# Patient Record
Sex: MALE | Race: WHITE | Employment: OTHER | URBAN - METROPOLITAN AREA
[De-identification: names, ages, dates, MRNs, and addresses within clinical notes are randomized per-mention and may not be internally consistent; named-entity substitution may affect disease eponyms.]

---

## 2017-01-05 ENCOUNTER — OFFICE VISIT (OUTPATIENT)
Dept: FAMILY MEDICINE | Facility: CLINIC | Age: 82
End: 2017-01-05

## 2017-01-05 VITALS
WEIGHT: 175 LBS | OXYGEN SATURATION: 95 % | TEMPERATURE: 97.6 F | HEART RATE: 60 BPM | BODY MASS INDEX: 27.4 KG/M2 | SYSTOLIC BLOOD PRESSURE: 157 MMHG | DIASTOLIC BLOOD PRESSURE: 61 MMHG

## 2017-01-05 DIAGNOSIS — E11.65 TYPE 2 DIABETES MELLITUS WITH HYPERGLYCEMIA, WITH LONG-TERM CURRENT USE OF INSULIN (H): ICD-10-CM

## 2017-01-05 DIAGNOSIS — Z79.4 TYPE 2 DIABETES MELLITUS WITH HYPERGLYCEMIA, WITH LONG-TERM CURRENT USE OF INSULIN (H): ICD-10-CM

## 2017-01-05 DIAGNOSIS — I10 ESSENTIAL HYPERTENSION: ICD-10-CM

## 2017-01-05 DIAGNOSIS — I48.20 CHRONIC ATRIAL FIBRILLATION (H): Primary | ICD-10-CM

## 2017-01-05 NOTE — PATIENT INSTRUCTIONS
- For Blood pressure: Amlodipine 5 mg once daily (continue Toprol  mg daily)  - If you are already taking Amlodipine 10 mg daily, continue.  - If you are taking Amlodipine 5 mg daily, then consider increasing to 10 mg daily.  - You should be off VASOTEC  - We will likely try Lisinopril 2.5 mg in the future (we will discuss at our next visit).  - Recheck INR in 6 weeks from 12/20/2016.    Your medication list is printed, please keep this with you, it is helpful to bring this current list to any other medical appointments, the emergency room or hospital.    If you had lab testing today and your results are reassuring or normal they will be be mailed to you within 7 days.     If the lab tests need quick action we will call you with the results.   The phone number we will call with results is # [phone] (home) . If this is not the best number please call our clinic and change the number.    If you need any refills please call your pharmacy and they will contact us.    If you have any further concerns or wish to schedule another appointment you must call our office during normal business hours  [phone] (8-5:00 M-F)  If you have urgent medical questions that cannot wait  you may also call [phone] at any time of day.  If you have a medical emergency please call [phone].    Thank you for coming to [facility].

## 2017-01-05 NOTE — MR AVS SNAPSHOT
After Visit Summary   1/5/2017    Suman Burton    MRN: [id number]           Patient Information     Date Of Birth          1935        Visit Information        Provider Department      1/5/2017 3:40 PM Ej Villarreal MD [facility]        Care Instructions    - For Blood pressure: Amlodipine 5 mg once daily (continue Toprol  mg daily)  - If you are already taking Amlodipine 10 mg daily, continue.  - If you are taking Amlodipine 5 mg daily, then consider increasing to 10 mg daily.  - You should be off VASOTEC  - We will likely try Lisinopril 2.5 mg in the future (we will discuss at our next visit).  - Recheck INR in 6 weeks from 12/20/2016.    Your medication list is printed, please keep this with you, it is helpful to bring this current list to any other medical appointments, the emergency room or hospital.    If you had lab testing today and your results are reassuring or normal they will be be mailed to you within 7 days.     If the lab tests need quick action we will call you with the results.   The phone number we will call with results is # [phone] (home) . If this is not the best number please call our clinic and change the number.    If you need any refills please call your pharmacy and they will contact us.    If you have any further concerns or wish to schedule another appointment you must call our office during normal business hours  [phone] (8-5:00 M-F)  If you have urgent medical questions that cannot wait  you may also call [phone] at any time of day.  If you have a medical emergency please call [phone].    Thank you for coming to [facility].          Follow-ups after your visit        Who to contact     Please call your clinic at [phone] to:    Ask questions about your health    Make or cancel appointments    Discuss your medicines    Learn about your test results    Speak to your doctor   If you have compliments or concerns about an  experience at your clinic, or if you wish to file a complaint, please contact [facility] Patient Relations at [phone] or email us at [email or web address]         Additional Information About Your Visit        Care EveryWhere ID     This is your Care EveryWhere ID. This could be used by other organizations to access your [place] medical records  [id number]        Your Vitals Were     Pulse Temperature Pulse Oximetry             60 97.6  F (36.4  C) (Oral) 95%          Blood Pressure from Last 3 Encounters:   01/05/17 157/61   12/23/16 172/66   12/21/16 144/78    Weight from Last 3 Encounters:   01/05/17 175 lb (79.379 kg)   12/21/16 165 lb (74.844 kg)   11/15/16 165 lb (74.844 kg)              Today, you had the following     No orders found for display         Today's Medication Changes          These changes are accurate as of: 1/5/17  4:43 PM.  If you have any questions, ask your nurse or doctor.               Stop taking these medicines if you haven't already. Please contact your care team if you have questions.     enalapril 2.5 MG tablet   Commonly known as:  VASOTEC   Stopped by:  Ej Villarreal MD           oxyCODONE 5 MG IR tablet   Commonly known as:  ROXICODONE   Stopped by:  Ej Villarreal MD                    Primary Care Provider Office Phone # Fax #    Ej Villarreal [phone]       [facility] [address] [place] [address]        Thank you!     Thank you for choosing [facility]  for your care. Our goal is always to provide you with excellent care. Hearing back from our patients is one way we can continue to improve our services. Please take a few minutes to complete the written survey that you may receive in the mail after your visit with us. Thank you!             Your Updated Medication List - Protect others around you: Learn how to safely use, store and throw away your medicines at www.disposemymeds.org.           This list is accurate as of: 1/5/17  4:43 PM.  Always use your most recent med list.                   Brand Name Dispense Instructions for use    acetaminophen 500 MG tablet    TYLENOL    100 tablet    Take 2 tablets (1,000 mg) by mouth 3 times daily as needed for mild pain       amLODIPine 5 MG tablet    NORVASC    60 tablet    Take 1 tablet (5 mg) by mouth 2 times daily       * blood glucose monitoring test strip    no brand specified    1 Box    Test blood glucose twice daily.       * blood glucose monitoring test strip    ONE TOUCH ULTRA    100 each    Use to test blood sugar 2 times daily or as directed.       digoxin 125 MCG tablet    LANOXIN    90 tablet    Take 1 tablet (125 mcg) by mouth every other day       glipiZIDE 10 MG tablet    GLUCOTROL    360 tablet    Take 2 tablets (20 mg) by mouth 2 times daily Take 20mg in AM with breakfast and 20mg in PM with evening meal       insulin glargine 100 UNIT/ML injection    LANTUS SOLOSTAR    3 Month    35 units at bedtime       insulin pen needle 32G X 4 MM    BD TAMIKA U/F    100 each    Use once daily or as directed.       metoprolol 100 MG 24 hr tablet    TOPROL-XL    90 tablet    Take 1 tablet (100 mg) by mouth daily       MULTIVITAMINS PO      Take 1 tablet by mouth daily.       * ONE TOUCH FINE POINT LANCETS      Check blood sugars twice a day.       * blood glucose monitoring lancets     1 Box    Use to test blood sugar 2 times daily or as directed.       * ONETOUCH DELICA LANCETS 33G Misc     100 each    1 strip 2 times daily       warfarin 3 MG tablet    COUMADIN    90 tablet    Take 1 tablet (3 mg) by mouth daily       * Notice:  This list has 5 medication(s) that are the same as other medications prescribed for you. Read the directions carefully, and ask your doctor or other care provider to review them with you.

## 2017-01-10 NOTE — PROGRESS NOTES
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  He presents today for a number of issues.   First, as far as his hypertension he recently initiated amlodipine in addition to an escalating dose of Elanopril over the past 3 months.  He moved from Elanapril 2.5mg to Elanapril 10 mg each day over the past 3 months.  He had a lab visit on 12/23 due to his increased Elanapril dose which showed some mild hyperkalemia and reduction in his GFR.  I recommended he discontinue his Elanopril and start amlodipine instead.  He has been checking his blood pressures at home with systolic blood pressures mainly in the 150s.  He denies any new muscle aches.  No change in his urinary frequency.  He does have chronic urinary hesitancy which has not changed and he has declined a trial of Flomax or other evaluation.  His \"left thorax pain\" has resolved.  He did have a cardiac stress test which was not diagnostic for clear ischemic heart disease back in November.   As far as his diabetes over the last couple months his Lantus dose has increased from 30 units up to 38 units a day.  He has not had any hypoglycemic symptoms or readings.   REVIEW OF SYSTEMS:  His weight has been stable.  No swelling, no new respiratory symptoms.  He continues to nap during the day and having low energy which is unchanged.  He has chronic skin itch for many years which is unchanged.  The remainder of the review of systems is outlined above.   PHYSICAL EXAMINATION:   VITAL SIGNS:  As above with his blood pressure just above goal.   GENERAL:  He is alert and relates his own history.  He occasionally pauses to recall what topic he was talking about.   HEENT:  Head is normocephalic and atraumatic.  Eyes, sclerae are nonicteric, noninjected.  He has hearing aids in place and when removed, tympanic membranes are normal with normal bony and light " landmarks.  Moist mucous membranes.   NECK:  Supple without lymphadenopathy.   CARDIOVASCULAR:  His heart tones today are irregular, no murmur.   LUNGS:  Clear with fair air exchange mildly prolonged expiratory phase.   ABDOMEN:  Modestly obese, soft and nontender.   EXTREMITIES:  Free of edema.   NEUROLOGIC:  He has a mild resting tremor.  He provides a detailed history and occasionally has to pause to recall a name or retrain his thoughts.   ASSESSMENT AND PLAN:   1.  Hypertension:  It is unclear today whether he is using amlodipine 5 mg once a day or twice a day.  He is going to go home and check his medications.  I recommend amlodipine increased to 10 mg a day if he is only taking 5 mg.  If he is already taking 10 mg he may benefit from reinitiation of a low dose ACE inhibitor such as lisinopril at 2.5 mg in light of his chronic kidney disease.  We will need to monitor potassium and GFR carefully and repeat a potassium and GFR 2-3 weeks after initiation even of a low dose ACE inhibitor.  ADDENDUM: via a phone call after the visit, patient confirmed he is taking amlodipine 10mg daily. Will likely add LISINOPRIL 2.5mg daily at his next visit in February 2017.  2.  Left-sided thorax pain, unknown etiology:  This has resolved.  A cardiac stress test in November was not definitive for an ischemic etiology at this point he is asymptomatic and after our discussion does not plan on pursuing further evaluation.  He was given a copy of his cardiac stress test report today.   3.  Type 2 diabetes:  His A1c in late December was 8.9%.  His goal is 8.5%.  I recommended he eliminate sweetened beverages, reduce high-calorie foods and increase his daily activity.  He will monitor his home glucoses with a fasting glucose goal of  and postprandial goal of less than 220.  If he is not mostly meeting these glycemic goals we will make diabetic medication management changes in Feb.  Otherwise if he is meeting these goals he will  return for a hemoglobin A1c in 03/2017.   4.  Atrial fibrillation:  INR management as outlined on the flow sheet.   5.  Chronic kidney disease.  His GFR has returned to his baseline and was 48 in late December.  Plan to repeat a basic metabolic profile 2-3 weeks after he resumes an ACE inhibitor.  If he does not resume an ACE inhibitor, than no later than the end of March 2017.   6.  Chronic pain syndrome:  He is using Tylenol for back pain at this point, he is not using oxycodone any longer.   7.  Chronic urinary hesitancy:  He at this point declines further evaluation or a trial of Flomax.  We will continue to consider a point of care ultrasound with a post void residual at that initial evaluation and certainly a prostate exam if he were to change his mind and consent to this in the future.   8.  Chronic fatigue.   9.  Chronic skin itching.   10.  Mild cognitive decline of aging.

## 2017-01-24 ENCOUNTER — OFFICE VISIT (OUTPATIENT)
Dept: FAMILY MEDICINE | Facility: CLINIC | Age: 82
End: 2017-01-24

## 2017-01-24 VITALS
DIASTOLIC BLOOD PRESSURE: 67 MMHG | BODY MASS INDEX: 24.88 KG/M2 | OXYGEN SATURATION: 95 % | SYSTOLIC BLOOD PRESSURE: 171 MMHG | TEMPERATURE: 97.6 F | HEART RATE: 57 BPM | RESPIRATION RATE: 24 BRPM | HEIGHT: 69 IN | WEIGHT: 168 LBS

## 2017-01-24 DIAGNOSIS — I10 BENIGN ESSENTIAL HYPERTENSION: ICD-10-CM

## 2017-01-24 DIAGNOSIS — N18.30 CHRONIC KIDNEY DISEASE, STAGE III (MODERATE) (H): ICD-10-CM

## 2017-01-24 DIAGNOSIS — R06.09 DYSPNEA ON EXERTION: Primary | ICD-10-CM

## 2017-01-24 DIAGNOSIS — E11.65 TYPE 2 DIABETES MELLITUS WITH HYPERGLYCEMIA, UNSPECIFIED LONG TERM INSULIN USE STATUS: ICD-10-CM

## 2017-01-24 LAB
% GRANULOCYTES: 72.9 %G (ref 40–75)
ANION GAP SERPL CALCULATED.3IONS-SCNC: 8 MMOL/L (ref 5–18)
BNP SERPL-MCNC: 388 PG/ML (ref 0–88)
BUN SERPL-MCNC: 27 MG/DL (ref 8–28)
CALCIUM SERPL-MCNC: 9.7 MG/DL (ref 8.5–10.5)
CHLORIDE SERPL-SCNC: 112 MMOL/L (ref 98–107)
CO2 SERPL-SCNC: 19 MMOL/L (ref 22–31)
CREAT SERPL-MCNC: 1.52 MG/DL (ref 0.7–1.3)
GLUCOSE SERPL-MCNC: 210 MG/DL (ref 70–125)
GRANULOCYTES #: 5.2 K/UL (ref 1.6–8.3)
HCT VFR BLD AUTO: 46.5 % (ref 40–53)
HEMOGLOBIN: 14.7 G/DL (ref 13.3–17.7)
LYMPHOCYTES # BLD AUTO: 1.4 K/UL (ref 0.8–5.3)
LYMPHOCYTES NFR BLD AUTO: 19.5 %L (ref 20–48)
MCH RBC QN AUTO: 31.3 PG (ref 26.5–35)
MCHC RBC AUTO-ENTMCNC: 31.6 G/DL (ref 32–36)
MCV RBC AUTO: 99.1 FL (ref 78–100)
MID #: 0.5 K/UL (ref 0–2.2)
MID %: 7.6 %M (ref 0–20)
PLATELET # BLD AUTO: 174 K/UL (ref 150–450)
POTASSIUM SERPL-SCNC: 4.7 MMOL/L (ref 3.5–5)
RBC # BLD AUTO: 4.69 M/UL (ref 4.4–5.9)
SODIUM SERPL-SCNC: 139 MMOL/L (ref 136–145)
WBC # BLD AUTO: 7.2 K/UL (ref 4–11)

## 2017-01-24 RX ORDER — AMLODIPINE BESYLATE 5 MG/1
5 TABLET ORAL DAILY
Qty: 60 TABLET | Refills: 0
Start: 2017-01-24 | End: 2017-03-01

## 2017-01-24 RX ORDER — FUROSEMIDE 20 MG
20 TABLET ORAL
Qty: 90 TABLET | Refills: 0 | Status: SHIPPED | OUTPATIENT
Start: 2017-01-24 | End: 2017-01-31

## 2017-01-24 NOTE — Clinical Note
"January 25, 2017      Suman KAREN [place]  [address] [facility] [address]        Dear Suman,    As we discussed by phone, I suspect that adding FUROSEMIDE will improve your shortness of breath.  Follow the FUROSEMIDE dose titration instructions on your \"after visit summary\", and call me Monday as planned. Please call earlier if you have any new symptoms or concerns.    Please see below for your test results.    Resulted Orders   BNP ([facility])   Result Value Ref Range     (H) 0 - 88 pg/mL    Narrative    Test performed by:  [facility]  [address] [place] [address]   CBC with Diff Plt ([facility])   Result Value Ref Range    WBC 7.2 4.0 - 11.0 K/uL    Lymphocytes # 1.4 0.8 - 5.3 K/uL    % Lymphocytes 19.5 (L) 20.0 - 48.0 %L    Mid # 0.5 0.0 - 2.2 K/uL    Mid % 7.6 0.0 - 20.0 %M    GRANULOCYTES # 5.2 1.6 - 8.3 K/uL    % Granulocytes 72.9 40.0 - 75.0 %G    RBC 4.69 4.40 - 5.90 M/uL    Hemoglobin 14.7 13.3 - 17.7 g/dL    Hematocrit 46.5 40.0 - 53.0 %    MCV 99.1 78.0 - 100.0 fL    MCH 31.3 26.5 - 35.0 pg    MCHC 31.6 (L) 32.0 - 36.0 g/dL    Platelets 174.0 150.0 - 450.0 K/uL   Basic Metabolic Profile ([facility]) - Results > 1 hr   Result Value Ref Range    Sodium 139 136 - 145 mmol/L    Potassium 4.7 3.5 - 5.0 mmol/L    Chloride 112 (H) 98 - 107 mmol/L    CO2, Total 19 (L) 22 - 31 mmol/L    Anion Gap 8 5 - 18 mmol/L    Glucose 210 (H) 70 - 125 mg/dL    Calcium 9.7 8.5 - 10.5 mg/dL    Urea Nitrogen 27 8 - 28 mg/dL    Creatinine 1.52 (H) 0.70 - 1.30 mg/dL    GFR Estimate If Black 54 (L) >60 mL/min/1.73m2    GFR Estimate 44 (L) >60 mL/min/1.73m2    Narrative    Test performed by:  [facility]  [address] [place] [address]  Fasting Glucose reference range is 70-99 mg/dL per  American Diabetes Association (ADA) guidelines.       If you have any questions, please call the clinic to make an appointment.    Sincerely,    Ej Villarreal MD  "

## 2017-01-24 NOTE — NURSING NOTE
1/24/2017 PCS Previsit Plan     DUE FOR:  Eye Exam referral  DM Foot exam  PHQ9  Advance Directive  Tdap - Declined

## 2017-01-24 NOTE — PATIENT INSTRUCTIONS
"Please REDUCE your AMLODIPINE dose from 10mg to 5 mg each day in an attempt to reduce your swelling.    START taking Furosemide 20mg once daily in the morning.  If in 3 days you have not lost at least 2 pounds of \"water-weight\", then increase your dose to FUROSEMIDE 40mg once daily in the morning.    Call the clinic if you lose more than 10 pounds of \"water-weight\" at any point, or if you have not lost 2 pounds after 6 days of the FUROSEMIDE.    You have a number of lab test results pending at this time.    Your blood pressure goal is 140/90.    Call Dr. Villarreal on Monday or Tuesday with an update on your swelling and shortness of breath.  If you are not improving, the next step may be to use breathing medications (prednisone and inhalers).      "

## 2017-01-24 NOTE — MR AVS SNAPSHOT
"              After Visit Summary   1/24/2017    Suman Burton    MRN: [id number]           Patient Information     Date Of Birth          1935        Visit Information        Provider Department      1/24/2017 4:00 PM Ej Villarreal MD [facility]        Today's Diagnoses     Dyspnea on exertion    -  1     Chronic kidney disease, stage III (moderate)         Type 2 diabetes mellitus with hyperglycemia, unspecified long term insulin use status (H)         Benign essential hypertension           Care Instructions    Please REDUCE your AMLODIPINE dose from 10mg to 5 mg each day in an attempt to reduce your swelling.    START taking Furosemide 20mg once daily in the morning.  If in 3 days you have not lost at least 2 pounds of \"water-weight\", then increase your dose to FUROSEMIDE 40mg once daily in the morning.    Call the clinic if you lose more than 10 pounds of \"water-weight\" at any point, or if you have not lost 2 pounds after 6 days of the FUROSEMIDE.    You have a number of lab test results pending at this time.    Your blood pressure goal is 140/90.    Call Dr. Villarreal on Monday or Tuesday with an update on your swelling and shortness of breath.  If you are not improving, the next step may be to use breathing medications (prednisone and inhalers).            Follow-ups after your visit        Your next 10 appointments already scheduled     Feb 13, 2017  1:00 PM   Return Visit with Ej Villarreal MD   [facility] ([facility])    [address] [place] [address]   [phone]              Who to contact     Please call your clinic at [phone] to:    Ask questions about your health    Make or cancel appointments    Discuss your medicines    Learn about your test results    Speak to your doctor   If you have compliments or concerns about an experience at your clinic, or if you wish to file a complaint, please contact [facility] Patient " "Relations at [phone] or email us at [email or web address]         Additional Information About Your Visit        Care EveryWhere ID     This is your Care EveryWhere ID. This could be used by other organizations to access your [place] medical records  [id number]        Your Vitals Were     Pulse Temperature Respirations Height BMI (Body Mass Index) Pulse Oximetry    57 97.6  F (36.4  C) (Oral) 24 5' 9\" (175.3 cm) 24.80 kg/m2 95%       Blood Pressure from Last 3 Encounters:   01/24/17 171/67   01/05/17 157/61   12/23/16 172/66    Weight from Last 3 Encounters:   01/24/17 168 lb (76.204 kg)   01/05/17 175 lb (79.379 kg)   12/21/16 165 lb (74.844 kg)              We Performed the Following     Basic Metabolic Profile ([facility]) - Results > 1 hr     BNP ([facility])     CBC with Diff Plt ([facility])          Today's Medication Changes          These changes are accurate as of: 1/24/17  5:26 PM.  If you have any questions, ask your nurse or doctor.               Start taking these medicines.        Dose/Directions    furosemide 20 MG tablet   Commonly known as:  LASIX   Used for:  Dyspnea on exertion, Benign essential hypertension   Started by:  Ej Villarreal MD        Dose:  20 mg   Take 1 tablet (20 mg) by mouth every morning (before breakfast)   Quantity:  90 tablet   Refills:  0         These medicines have changed or have updated prescriptions.        Dose/Directions    amLODIPine 5 MG tablet   Commonly known as:  NORVASC   This may have changed:  when to take this   Used for:  Benign essential hypertension   Changed by:  Ej Villarreal MD        Dose:  5 mg   Take 1 tablet (5 mg) by mouth daily   Quantity:  60 tablet   Refills:  0            Where to get your medicines      These medications were sent to [facility] [address] [place] [address]  [address] [facility] [address]     Phone:  [phone]    - furosemide 20 MG tablet      Some of these will " need a paper prescription and others can be bought over the counter.  Ask your nurse if you have questions.     You don't need a prescription for these medications    - amLODIPine 5 MG tablet             Primary Care Provider Office Phone # Fax #    Ej Villarreal [phone]       [facility] [address] [place] [address]        Thank you!     Thank you for choosing [facility]  for your care. Our goal is always to provide you with excellent care. Hearing back from our patients is one way we can continue to improve our services. Please take a few minutes to complete the written survey that you may receive in the mail after your visit with us. Thank you!             Your Updated Medication List - Protect others around you: Learn how to safely use, store and throw away your medicines at www.disposemymeds.org.          This list is accurate as of: 1/24/17  5:26 PM.  Always use your most recent med list.                   Brand Name Dispense Instructions for use    acetaminophen 500 MG tablet    TYLENOL    100 tablet    Take 2 tablets (1,000 mg) by mouth 3 times daily as needed for mild pain       amLODIPine 5 MG tablet    NORVASC    60 tablet    Take 1 tablet (5 mg) by mouth daily       * blood glucose monitoring test strip    no brand specified    1 Box    Test blood glucose twice daily.       * blood glucose monitoring test strip    ONE TOUCH ULTRA    100 each    Use to test blood sugar 2 times daily or as directed.       digoxin 125 MCG tablet    LANOXIN    90 tablet    Take 1 tablet (125 mcg) by mouth every other day       furosemide 20 MG tablet    LASIX    90 tablet    Take 1 tablet (20 mg) by mouth every morning (before breakfast)       glipiZIDE 10 MG tablet    GLUCOTROL    360 tablet    Take 2 tablets (20 mg) by mouth 2 times daily Take 20mg in AM with breakfast and 20mg in PM with evening meal       insulin glargine 100 UNIT/ML injection    LANVIJAY SOLOSTAR    3 Month     35 units at bedtime       insulin pen needle 32G X 4 MM    BD TAMIKA U/F    100 each    Use once daily or as directed.       metoprolol 100 MG 24 hr tablet    TOPROL-XL    90 tablet    Take 1 tablet (100 mg) by mouth daily       MULTIVITAMINS PO      Take 1 tablet by mouth daily.       * ONE TOUCH FINE POINT LANCETS      Check blood sugars twice a day.       * blood glucose monitoring lancets     1 Box    Use to test blood sugar 2 times daily or as directed.       * ONETOUCH DELICA LANCETS 33G Misc     100 each    1 strip 2 times daily       warfarin 3 MG tablet    COUMADIN    90 tablet    Take 1 tablet (3 mg) by mouth daily       * Notice:  This list has 5 medication(s) that are the same as other medications prescribed for you. Read the directions carefully, and ask your doctor or other care provider to review them with you.

## 2017-01-25 NOTE — PROGRESS NOTES
"Quick Note:    Please send result letter  As we discussed by phone, I suspect that adding FUROSEMIDE will improve your shortness of breath. Follow the FUROSEMIDE dose titration instructions on your \"after visit summary\", and call me Monday as planned. Please call earlier if you have any new symptoms or concerns.  ______  "

## 2017-01-26 NOTE — PROGRESS NOTES
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  He presents today with increasing dyspnea.  He has had a number of years of shortness of breath with exertion that is attributed to deconditioning.  This has slowly gotten worse over the years and he admits he has become less and less active.  He has noticed over the past few weeks that he become short of breath with walking even just the length of his house.  He does not have shortness of breath at rest.  He does not have orthopnea.  He has checked his heart rate on a number of occasions at home and has never greater than 75.  No chest pain or palpitations.  There is no acute onset of shortness of breath but it seems insidious over the past 3 weeks.  The shortness of breath will typically improve after 10 minutes of rest.  He did recently undergo cardiac stress testing which did not show any definitive signs of ischemia.  He has not had cough, runny nose, sore throat or other upper respiratory symptoms.  He has not had any bruising, bleeding, blood in stool or black stools.  He continues to be sedentary for most of the day.  He wakes up late, typically around noon and does very little walking at home.   REVIEW OF SYSTEMS:  Continues to have chronic skin itch which has bothered him for many years.  Both legs are mildly swollen compared to a month ago.  He thinks his weight has been stable, potentially gained a few pounds.  No dizziness or lightheadedness.  The remainder of the review of systems is as above.   OBJECTIVE:   VITAL SIGNS:  As above with his blood pressure above goal and increased compared to our last visit earlier this month.   GENERAL:  He is alert and relates his own history.   HEENT:  Head is normocephalic and atraumatic.  Eyes, sclerae are nonicteric, noninjected.  He has hearing aids in place and when removed, tympanic membranes are  normal with normal bony and light landmarks.  Moist mucous membranes.   NECK:  Supple without lymphadenopathy .  His neck veins are prominent at about 30 degrees elevation with an estimated jugular venous pressure of 14.   CARDIOVASCULAR:  His heart tones are irregular without murmur.   LUNGS:  Clear with fair air exchange.  He has fine crackles at the extreme bases bilaterally.  He has a prolonged expiratory phase, which is chronic.   ABDOMEN:  Modestly obese, soft and nontender.   EXTREMITIES:  With trace bilateral edema.   NEUROLOGIC:  He has a resting tremor in his hands.  He provides a detailed history and only rarely pauses to recall medication name during today's visit.   ASSESSMENT AND PLAN:   1.  Dyspnea:  Most likely due to hypervolemia due to potential new onset congestive heart failure.  He has recently undergone extensive cardiac testing.  At this point, after a lengthy discussion, he elected empiric treatment with Lasix.  He will initiate 20 mg once daily.  Due to his chronic kidney disease he may need significantly higher doses.  He will monitor his weights on a daily basis as well as his symptoms.  If he has not lost at least 2 pounds in the next 3 days then he will increase his furosemide dose to 40 mg once daily.  He will call the clinic if he loses more than 10 pounds at any point or if he has not lost at least 2 pounds after 3 days at the 40 mg dose.  He will present to the Emergency Department if he had any increasing shortness of breath or any chest  pain.  I anticipate improvement in his symptoms with the initiation of diuretic therapy.  The etiology at this point is not entirely clear.  He has undergone recent cardiac evaluation, but may need to repeat an echocardiogram if his symptoms do not respond.  At this point he is hesitant to pursue intervention but if his symptoms did not improve, I suspect he will want to pursue further evaluation and change his mind regarding intervention.   2.   Hypertension:  I expect some blood pressure improvement with the addition of furosemide.  I have asked him to decrease his amlodipine from 10 to 5 mg due to the peripheral edema and blood pressure goal is 140/90.   3.  Atrial fibrillation:  INR management as outlined on the flow sheet.   4.  Chronic kidney disease:  He has stage IIIA chronic kidney disease and his GFR baseline is about 48.  Basic metabolic profile today shows a GFR at baseline.  He will need to return for a basic metabolic profile next week to evaluate his potassium and GFR in light of the addition of Lasix.   5.  Chronic urinary hesitancy:  He declines further evaluation or a trial of Flomax.  Continue to monitor symptoms continue to consider a point of care ultrasound to measure post-void residual.   6.  Chronic fatigue.   7.  Chronic skin itching.   8.  Mild cognitive decline of aging.   ADDENDEUM:  Labs returned showing an elevation of BNP of 388.  This is an increase compared to the elevation in the 200 range 2 years ago during an episode of shortness of breath.  A CBC is normal without anemia contributing to his dyspnea, he will follow up with a phone call on Monday and lab or physician visit later in the week.  We will continue to monitor closely.

## 2017-01-31 ENCOUNTER — TELEPHONE (OUTPATIENT)
Dept: FAMILY MEDICINE | Facility: CLINIC | Age: 82
End: 2017-01-31

## 2017-01-31 DIAGNOSIS — Z79.4 TYPE 2 DIABETES MELLITUS WITH HYPERGLYCEMIA, WITH LONG-TERM CURRENT USE OF INSULIN (H): Primary | ICD-10-CM

## 2017-01-31 DIAGNOSIS — E11.65 TYPE 2 DIABETES MELLITUS WITH HYPERGLYCEMIA, WITH LONG-TERM CURRENT USE OF INSULIN (H): Primary | ICD-10-CM

## 2017-01-31 DIAGNOSIS — R06.09 DYSPNEA ON EXERTION: Primary | ICD-10-CM

## 2017-01-31 DIAGNOSIS — I10 BENIGN ESSENTIAL HYPERTENSION: ICD-10-CM

## 2017-01-31 RX ORDER — FUROSEMIDE 20 MG
40 TABLET ORAL
Qty: 90 TABLET | Refills: 0
Start: 2017-01-31 | End: 2017-03-07

## 2017-01-31 RX ORDER — GLIPIZIDE 10 MG/1
20 TABLET ORAL 2 TIMES DAILY
Qty: 360 TABLET | Refills: 0 | Status: SHIPPED | OUTPATIENT
Start: 2017-01-31

## 2017-01-31 NOTE — TELEPHONE ENCOUNTER
Dyspnea significantly improved. Leg swelling significantly improved. No chest pain.  Weight 163 today. Down 5 lbs since starting lasix.  Using lasix 40mg day.   /65  Using compression stockings  He will see me next week for a BMP and BNP on his new lasix regimen.  We may be able to discontinue amlodipine if BP controlled (as he may see further peripheral edema improvement if we can stop amlodipine completely)    OLYA Villarreal MD

## 2017-01-31 NOTE — TELEPHONE ENCOUNTER
Patient calls today with an update since starting lasix 20 mg daily and reducing amlodipine from 10 mg to 5 mg.  He reports his SOB has improved but still has trouble with exercise such as climbing stairs.  He has lost 5 lbs. And the swelling in his legs and ankles is much better.  Will wait for further instruction from Dr. Villarreal.  Laurita Romero

## 2017-02-08 ENCOUNTER — OFFICE VISIT (OUTPATIENT)
Dept: FAMILY MEDICINE | Facility: CLINIC | Age: 82
End: 2017-02-08

## 2017-02-08 VITALS
BODY MASS INDEX: 24.29 KG/M2 | HEIGHT: 69 IN | HEART RATE: 63 BPM | SYSTOLIC BLOOD PRESSURE: 132 MMHG | OXYGEN SATURATION: 93 % | DIASTOLIC BLOOD PRESSURE: 79 MMHG | TEMPERATURE: 97.4 F | WEIGHT: 164 LBS

## 2017-02-08 DIAGNOSIS — R06.09 DYSPNEA ON EXERTION: ICD-10-CM

## 2017-02-08 DIAGNOSIS — N18.30 CHRONIC KIDNEY DISEASE, STAGE III (MODERATE) (H): Primary | ICD-10-CM

## 2017-02-08 DIAGNOSIS — I48.20 CHRONIC ATRIAL FIBRILLATION (H): ICD-10-CM

## 2017-02-08 LAB
BNP SERPL-MCNC: 288 PG/ML (ref 0–88)
BUN SERPL-MCNC: 32 MG/DL (ref 7–30)
CALCIUM SERPL-MCNC: 10 MG/DL (ref 8.5–10.4)
CHLORIDE SERPLBLD-SCNC: 100 MMOL/L (ref 94–109)
CO2 SERPL-SCNC: 26 MMOL/L (ref 20–32)
CREAT SERPL-MCNC: 1.8 MG/DL (ref 0.8–1.5)
EGFR CALCULATED (BLACK REFERENCE): 46.8 ML/MIN
EGFR CALCULATED (NON BLACK REFERENCE): 38.7 ML/MIN
FEF 25/75: NORMAL
FEV-1: NORMAL
FEV1/FVC: NORMAL
FVC: NORMAL
GLUCOSE SERPL-MCNC: 373 MG/DL (ref 60–109)
INR PPP: 3.1
POTASSIUM SERPL-SCNC: 4.9 MMOL/L (ref 3.4–5.3)
SODIUM SERPL-SCNC: 138 MMOL/L (ref 133–144)

## 2017-02-08 RX ORDER — METOPROLOL SUCCINATE 25 MG/1
50 TABLET, EXTENDED RELEASE ORAL DAILY
Qty: 180 TABLET | Refills: 3 | Status: SHIPPED | OUTPATIENT
Start: 2017-02-08 | End: 2017-03-21

## 2017-02-08 NOTE — Clinical Note
February 9, 2017      Suman JONES Florien  [address]  [place] [facility] [address]        Dear Suman,    As we discussed in clinic, your kidney function is stable.  The slight decline in kidney function on this test is most likely due to the addition of lasix two weeks ago.  Your heart stretch test has improved with the addition of lasix, but is not yet back in the normal range.  Your chest x-ray was reviewed by the radiologist, and there are no new findings.    Please see below for your test results.    Resulted Orders   Basic Metabolic Panel ([facility])  - Results < 1 hr   Result Value Ref Range    Glucose 373.0 (H) 60.0 - 109.0 mg/dL    Urea Nitrogen 32.0 (H) 7.0 - 30.0 mg/dL    Creatinine 1.8 (H) 0.8 - 1.5 mg/dL    Sodium 138.0 133.0 - 144.0 mmol/L    Potassium 4.9 3.4 - 5.3 mmol/L    Chloride 100.0 94.0 - 109.0 mmol/L    Carbon Dioxide 26.0 20.0 - 32.0 mmol/L    Calcium 10.0 8.5 - 10.4 mg/dL    eGFR Calculated (Non Black Reference) 38.7 (L) >60.0 mL/min    eGFR Calculated (Black Reference) 46.8 (L) >60.0 mL/min   BNP ([facility])   Result Value Ref Range     (H) 0 - 88 pg/mL    Narrative    Test performed by:  [facility]  [address] [place] [address]   INR ([facility])   Result Value Ref Range    INR 3.1       Comment:      Adult Normal Range: 0.90 - 1.10  Therapeutic Range: 2.0 - 3.5         If you have any questions, please call the clinic to make an appointment.    Sincerely,    Ej Villarreal MD

## 2017-02-08 NOTE — NURSING NOTE
The following nebulizer treatment was given:     MEDICATION: Albuterol Sulfate  ROUTE: Inhalation  DOSE: 2.5 mg/ 3 mL  LOT #: [id number]  : [employer]  EXPIRATION DATE: 07/2018  NDC#: 0794-7798-49    One time dose given in clinic per Dr Villarreal.

## 2017-02-08 NOTE — PATIENT INSTRUCTIONS
- Decrease Metoprolol to 50 mg daily for the next 2 weeks and let Dr Villarreal know how you are feeling (shortness of breath).    -Call right away if your heart rate increases regularly above 100, or blood pressure on top greater than 180 or bottom number above 100.    - Check your blood sugar once in a while (twice a week) in the afternoons or evenings (as well as the morning readings)      Your medication list is printed, please keep this with you, it is helpful to bring this current list to any other medical appointments, the emergency room or hospital.    If you had lab testing today and your results are reassuring or normal they will be be mailed to you within 7 days.     If the lab tests need quick action we will call you with the results.   The phone number we will call with results is # [phone] (home) . If this is not the best number please call our clinic and change the number.    If you need any refills please call your pharmacy and they will contact us.    If you have any further concerns or wish to schedule another appointment you must call our office during normal business hours  [phone] (8-5:00 M-F)  If you have urgent medical questions that cannot wait  you may also call [phone] at any time of day.  If you have a medical emergency please call [phone].    Thank you for coming to [facility].    [facility] WARFARIN DOSING INSTRUCTIONS  Patient Name: Suman JONES Josephine       Date: 2/9/2017  INR goal: 2.0 - 3.0   Today s Result:  INR      3.1   2/8/2017  Your INR today is above your goal. Please take your warfarin as instructed in the table below. Recheck INR: 4 weeks is recommended:     Please schedule your follow up appointment on one of the following day: Mondays, Wednesday mornings (8-11:40am), or Thursday afternoons (1-4:40pm). This will allow our pharmacist to be able to speak with you about your results.  Warfarin Weekly Dosing  Sunday Dose: 3mg Monday Dose: 3mg Tuesday  Dose: 3mg Wednesday Dose: 3mg Thursday Dose: 3mg Friday Dose: 3mg Saturday Dose: 3mg   Additional Week Dosing (will be blank, if not needed)    mg   mg   mg   mg   mg   mg   mg          It is very important to take your warfarin as instructed and to watch for abnormal bruising or bleeding, including blood in your urine or stool. If you notice these, please contact the clinic right away. Please also inform your doctor of any upcoming surgeries or procedures, as we may need to change your warfarin dose for these events.  Thank you for choosing [facility] for your care.

## 2017-02-08 NOTE — MR AVS SNAPSHOT
After Visit Summary   2/8/2017    Suman Burton    MRN: [id number]           Patient Information     Date Of Birth          1935        Visit Information        Provider Department      2/8/2017 1:20 PM Ej Villarreal MD [facility]        Today's Diagnoses     Chronic kidney disease, stage III (moderate)    -  1     Dyspnea on exertion         Chronic atrial fibrillation (H)           Care Instructions    - Decrease Metoprolol to 50 mg daily for the next 2 weeks and let Dr Villarreal know how you are feeling (shortness of breath).    -Call right away if your heart rate increases regularly above 100, or blood pressure on top greater than 180 or bottom number above 100.    - Check your blood sugar once in a while (twice a week) in the afternoons or evenings (as well as the morning readings)      Your medication list is printed, please keep this with you, it is helpful to bring this current list to any other medical appointments, the emergency room or hospital.    If you had lab testing today and your results are reassuring or normal they will be be mailed to you within 7 days.     If the lab tests need quick action we will call you with the results.   The phone number we will call with results is # [phone] (home) . If this is not the best number please call our clinic and change the number.    If you need any refills please call your pharmacy and they will contact us.    If you have any further concerns or wish to schedule another appointment you must call our office during normal business hours  [phone] (8-5:00 M-F)  If you have urgent medical questions that cannot wait  you may also call [phone] at any time of day.  If you have a medical emergency please call [phone].    Thank you for coming to [facility].          Follow-ups after your visit        Your next 10 appointments already scheduled     Feb 13, 2017  1:00 PM   Return Visit with Ej Villarreal MD   [facility]    [address] [place] [address]   [phone]              Who to contact     Please call your clinic at [phone] to:    Ask questions about your health    Make or cancel appointments    Discuss your medicines    Learn about your test results    Speak to your doctor   If you have compliments or concerns about an experience at your clinic, or if you wish to file a complaint, please contact [facility] Patient Relations at [phone] or email us at [email or web address]         Additional Information About Your Visit        Care EveryWhere ID     This is your Care EveryWhere ID. This could be used by other organizations to access your [place] medical records  [id number]        Your Vitals Were     Pulse Temperature Height BMI (Body Mass Index) Pulse Oximetry       63 97.4  F (36.3  C) (Oral) 5' 9\" (175.3 cm) 24.21 kg/m2 93%        Blood Pressure from Last 3 Encounters:   02/08/17 132/79   01/24/17 171/67   01/05/17 157/61    Weight from Last 3 Encounters:   02/08/17 164 lb (74.39 kg)   01/24/17 168 lb (76.204 kg)   01/05/17 175 lb (79.379 kg)              We Performed the Following     Basic Metabolic Panel ([facility])  - Results < 1 hr     BNP ([facility])     EKG 12-lead complete w/read - Clinics     INR ([facility])     Nebulizer/Inhalation Treatment, initial     Spirometry, Breathing Capacity     XR CHEST 2 VW          Today's Medication Changes          These changes are accurate as of: 2/8/17  3:28 PM.  If you have any questions, ask your nurse or doctor.               These medicines have changed or have updated prescriptions.        Dose/Directions    * metoprolol 100 MG 24 hr tablet   Commonly known as:  TOPROL-XL   This may have changed:  Another medication with the same name was added. Make sure you understand how and when to take each.   Used for:  Chronic atrial fibrillation (H)   Changed by:  Ej Villarreal MD        " Dose:  100 mg   Take 1 tablet (100 mg) by mouth daily   Quantity:  90 tablet   Refills:  3       * metoprolol 25 MG 24 hr tablet   Commonly known as:  TOPROL-XL   This may have changed:  You were already taking a medication with the same name, and this prescription was added. Make sure you understand how and when to take each.   Used for:  Chronic atrial fibrillation (H)   Changed by:  Ej Villarreal MD        Dose:  50 mg   Take 2 tablets (50 mg) by mouth daily   Quantity:  180 tablet   Refills:  3       * Notice:  This list has 2 medication(s) that are the same as other medications prescribed for you. Read the directions carefully, and ask your doctor or other care provider to review them with you.         Where to get your medicines      These medications were sent to [facility] [address] [place] [address]     Phone:  [phone]    - metoprolol 25 MG 24 hr tablet             Primary Care Provider Office Phone # Fax #    Ej Villarreal [phone]       [facility] [address] [place] [address]        Thank you!     Thank you for choosing [facility]  for your care. Our goal is always to provide you with excellent care. Hearing back from our patients is one way we can continue to improve our services. Please take a few minutes to complete the written survey that you may receive in the mail after your visit with us. Thank you!             Your Updated Medication List - Protect others around you: Learn how to safely use, store and throw away your medicines at www.disposemymeds.org.          This list is accurate as of: 2/8/17  3:28 PM.  Always use your most recent med list.                   Brand Name Dispense Instructions for use    acetaminophen 500 MG tablet    TYLENOL    100 tablet    Take 2 tablets (1,000 mg) by mouth 3 times daily as needed for mild pain       amLODIPine 5 MG tablet     NORVASC    60 tablet    Take 1 tablet (5 mg) by mouth daily       * blood glucose monitoring test strip    no brand specified    1 Box    Test blood glucose twice daily.       * blood glucose monitoring test strip    ONE TOUCH ULTRA    100 each    Use to test blood sugar 2 times daily or as directed.       digoxin 125 MCG tablet    LANOXIN    90 tablet    Take 1 tablet (125 mcg) by mouth every other day       furosemide 20 MG tablet    LASIX    90 tablet    Take 2 tablets (40 mg) by mouth every morning (before breakfast)       glipiZIDE 10 MG tablet    GLUCOTROL    360 tablet    Take 2 tablets (20 mg) by mouth 2 times daily Take 20mg in AM with breakfast and 20mg in PM with evening meal       insulin glargine 100 UNIT/ML injection    LANTUS SOLOSTAR    3 Month    35 units at bedtime       insulin pen needle 32G X 4 MM    BD TAMIKA U/F    100 each    Use once daily or as directed.       * metoprolol 100 MG 24 hr tablet    TOPROL-XL    90 tablet    Take 1 tablet (100 mg) by mouth daily       * metoprolol 25 MG 24 hr tablet    TOPROL-XL    180 tablet    Take 2 tablets (50 mg) by mouth daily       MULTIVITAMINS PO      Take 1 tablet by mouth daily.       * ONE TOUCH FINE POINT LANCETS      Check blood sugars twice a day.       * blood glucose monitoring lancets     1 Box    Use to test blood sugar 2 times daily or as directed.       * ONETOUCH DELICA LANCETS 33G Misc     100 each    1 strip 2 times daily       warfarin 3 MG tablet    COUMADIN    90 tablet    Take 1 tablet (3 mg) by mouth daily       * Notice:  This list has 7 medication(s) that are the same as other medications prescribed for you. Read the directions carefully, and ask your doctor or other care provider to review them with you.

## 2017-02-09 NOTE — PROGRESS NOTES
Quick Note:    A letter with the results has been sent to be mailed to pt.  Spoke to patient, gave results.  ______

## 2017-02-09 NOTE — PROGRESS NOTES
Quick Note:    Please call with results and recommendations AND send letter with results and recommendations.    As we discussed in clinic, your kidney function is stable. The slight decline in kidney function on this test is most likely due to the addition of lasix two weeks ago.  Your heart stretch test has improved with the addition of lasix, but is not yet back in the normal range.  Your chest x-ray was reviewed by the radiologist, and there are no new findings.  ______

## 2017-02-09 NOTE — PROGRESS NOTES
SUBJECTIVE:  This is an 81-year-old male with a history of type 2 diabetes, chronic atrial fibrillation, hypertension, chronic low back pain and chronic tremor.  He presents today with ongoing dyspnea.  He has had a number of years or shortness of breath with exertion which he has attributed to deconditioning.  This has slowly gotten worse over the last 2 years.  He has become less and less active.  In the past few weeks he has become short of breath walking short distances within his house.  I saw him in late January and he had a moderately elevated BNP and with his description of symptoms and lab findings I initiated a trial of Lasix.  He has been taking 20 mg of Lasix since that time.  He initially lost 4 pounds and he had some improvement in his shortness of breath, but he continues to feel short of breath.  He has not had chest pain episodes.  He does not have orthopnea.  He has mild swelling in both legs which has improved slightly compared to 1/24 when he last saw me.  He has not had a cough, wheezing or noisy breathing, no palpitations or racing heart.  He has not been monitoring his blood pressure at home.  He has not missed any medication doses.   As far as his diabetes, he checks only fasting glucoses which have ranged from 125 to 150.   REVIEW OF SYSTEMS:  No fever, chills, no positional nature to his dyspnea.  No dizziness or lightheadedness or orthostatic symptoms.  He has chronic skin itch which has bothered him for many years and is unchanged.  No bruising or bleeding, no skin lumps or bumps.  No change in his urinary habits or stooling.  No blood in stool or black stool.  The remainder of the review of systems as outlined above.   PHYSICAL EXAMINATION:   VITAL SIGNS:  As above with his blood pressure at goal and improved compared to 1/24.   GENERAL:  He is alert, no distress, relates his own history.   HEENT:  Head is normocephalic and atraumatic.  Eyes, sclerae are nonicteric, noninjected.  He has  hearing aids in place.  He has moist mucous membranes.  No tonsillar hypertrophy or exudate.   NECK:  Supple without lymphadenopathy.  His neck  veins are prominent at 30 degrees of elevation.   CARDIOVASCULAR:  Heart tones are irregularly irregular with a heart rate of about 60.   LUNGS:  Clear with fair air exchange.  He has a prolonged expiratory phase.   ABDOMEN:  Modestly obese, soft, nontender.   EXTREMITIES:  With trace bilateral edema.   NEUROLOGIC:  He has a resting tremor in his hands.  He is able to provide a detailed history, he recalls a medication names and doses.   EKG shows atrial fibrillation with a slow ventricular response, heart rate of 54 beats per minute.  There is T-wave flattening laterally V4 through V6 and AVL.   Spirometry shows an FEV1 of 2.4 liters, 90% of predicted and FVC of 3.3 liters, 87% of predicted and a FEV1/FVC of 0.73, which 100% of predicted.   INR is 3.1, basic metabolic profile shows stable stage III chronic kidney disease with a GFR of 39 and potassium of 4.9.  B-type natriuretic peptide has improved.  To 288 down from 388  two weeks ago    ASSESSMENT AND PLAN:   1.  Dyspnea:  Most likely multifactorial including hypervolemia with mild congestive heart failure of unknown etiology.  He did undergo cardiac stress testing recently which was unremarkable other than some reversible stress-induced dilation.  Please see the report.  Heart rate today is slow and may be contributing to his dyspnea with exertion.  He has had some improvement after initiating Lasix diuresis  2 weeks ago.  At this point it seems unlikely he will have significant further improvement with more diuresis.  Spirometry was unremarkable, although he certainly has a chronic obstructive pulmonary disease, most likely from his long smoking history.  At this point I recommend reducing his beta blocker from 100 mg to 50 mg each day.  Reassess his symptoms in 2 weeks to see if symptoms improve significantly with  the reduction in his beta blocker which I anticipate will follow his heart rate response to exertion.  He was given instructions to call right away if he has some heart rates over 100, palpitations, chest pain, increased shortness of breath or other new symptoms particular symptoms suggestive of atrial fibrillation with RVR.  He will continue to monitor his weight and call if he gains any weight.  If his dyspnea does not improve with improvement in his bradycardia  could consider a modest increase in his diuretic dose versus a trial of prednisone.   2.  If his symptoms do not respond to any of these measures.  He will likely need to reconsider meeting with Cardiology for further evaluation.   3.  Hypertension:  Blood pressure is improved with the addition of furosemide.  His leg swelling has improved with the reduction of his amlodipine from 10 to 5 mg at the same time.   4.  Atrial fibrillation:  INR is therapeutic.  Plan to repeat no later than 4 weeks.   5.  Chronic kidney disease:  He has stage IIIA chronic kidney disease.  His GFR is at baseline today and his potassium is within the normal range.  Plan to repeat no later than July 2017 or earlier with medication changes.   6.  Chronic fatigue.   7.  Chronic urinary hesitancy:  He has declined further evaluation or a trial of Flomax.   8.  Mild cognitive decline of aging.

## 2017-03-01 DIAGNOSIS — I10 BENIGN ESSENTIAL HYPERTENSION: ICD-10-CM

## 2017-03-02 RX ORDER — AMLODIPINE BESYLATE 5 MG/1
5 TABLET ORAL DAILY
Qty: 60 TABLET | Refills: 1 | Status: SHIPPED | OUTPATIENT
Start: 2017-03-02 | End: 2017-03-21

## 2017-03-07 DIAGNOSIS — I10 BENIGN ESSENTIAL HYPERTENSION: ICD-10-CM

## 2017-03-07 DIAGNOSIS — R06.09 DYSPNEA ON EXERTION: ICD-10-CM

## 2017-03-08 DIAGNOSIS — R06.09 DYSPNEA ON EXERTION: ICD-10-CM

## 2017-03-08 DIAGNOSIS — I10 BENIGN ESSENTIAL HYPERTENSION: ICD-10-CM

## 2017-03-08 RX ORDER — FUROSEMIDE 20 MG
40 TABLET ORAL
Qty: 180 TABLET | Refills: 1 | Status: SHIPPED | OUTPATIENT
Start: 2017-03-08 | End: 2018-12-17

## 2017-03-08 RX ORDER — FUROSEMIDE 20 MG
40 TABLET ORAL
Qty: 90 TABLET | Refills: 3 | Status: SHIPPED | OUTPATIENT
Start: 2017-03-08 | End: 2017-03-08

## 2017-03-17 ENCOUNTER — TRANSFERRED RECORDS (OUTPATIENT)
Dept: HEALTH INFORMATION MANAGEMENT | Facility: CLINIC | Age: 82
End: 2017-03-17

## 2017-03-17 DIAGNOSIS — H34.8192 CRVO (CENTRAL RETINAL VEIN OCCLUSION) (H): Primary | ICD-10-CM

## 2017-03-21 ENCOUNTER — TELEPHONE (OUTPATIENT)
Dept: FAMILY MEDICINE | Facility: CLINIC | Age: 82
End: 2017-03-21

## 2017-03-21 DIAGNOSIS — E11.65 TYPE 2 DIABETES MELLITUS WITH HYPERGLYCEMIA, WITH LONG-TERM CURRENT USE OF INSULIN (H): Primary | ICD-10-CM

## 2017-03-21 DIAGNOSIS — Z79.4 TYPE 2 DIABETES MELLITUS WITH HYPERGLYCEMIA, WITH LONG-TERM CURRENT USE OF INSULIN (H): Primary | ICD-10-CM

## 2017-03-21 DIAGNOSIS — I10 BENIGN ESSENTIAL HYPERTENSION: ICD-10-CM

## 2017-03-21 RX ORDER — AMLODIPINE BESYLATE 10 MG/1
10 TABLET ORAL DAILY
Qty: 90 TABLET | Refills: 3 | Status: ON HOLD | OUTPATIENT
Start: 2017-03-21 | End: 2019-07-02

## 2017-03-21 RX ORDER — METOPROLOL SUCCINATE 100 MG/1
100 TABLET, EXTENDED RELEASE ORAL DAILY
Qty: 90 TABLET | Refills: 0 | Status: SHIPPED | OUTPATIENT
Start: 2017-03-21 | End: 2018-02-20

## 2017-03-21 NOTE — TELEPHONE ENCOUNTER
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  His BP readings at home run about 160/80 and blood sugars run about 120 or less in the AM, 250 before supper and 250 to 300 before bed.   I verified his medications as furosemide 20 mg two in AM, amlodipine 5 mg one daily, lanoxin 125 mcg one daily, metoprolol 100 mg one daily, glipizide 20 mg two tabs twice daily, lantus insulin 40 units at bedtime, warfarin 3 mg as directed (one daily).   He had some difficulty going through this list over the phone and I asked him to read it off to me from the bottles and he tells me that his vision is cloudy and grey.  Will send message to Dr. Villarreal.  Laurita Romero

## 2017-03-21 NOTE — TELEPHONE ENCOUNTER
"Saw eye doctor last Thursday.  Woke up with blurry vision last Thursday in left eye last Thursday.  Went to ophthalmologist, referred to retina specialist in [place]. Dx central retinal venous thrombosis.  Retina specialist reviewed recommended treatment options, discussed intraocular steroid injections, laser treatments, but recommended wait and watch for 3 weeks, and consider interventions in 3 weeks if no better or any worse. Has f/u appt 4/21/17.    Dr. Burton's understanding is that there were really no good options, and that his vision may not improve.  Vision same in left eye since last Thursday. \"Black spot in center of vision\" \"Dim all around\".        Blood glucoses:  Morning 100-120  Afternoon 225      BP at home usually 160/80, with pulse usually in 80's. No orthostatic symptoms. Last time he was in the office in Feb 's, pulse 62.    Today during our phone call I recommended increasing Toprol XL from 50mg to 100mg daily.  Continue AMLODIPINE 10mg daily, FUROSEMIDE 40mg daily.    For diabetes, his fasting glucoses are well controlled. Continue Lantus 35 units daily.  Post prandials above goal.  He is at max dose GLIPIZIDE 20mg twice daily.      Today during our phone call I recommended adding METFORMIN to his diabetic regimen, as his GFR is above 30.  Titrate from 500mg daily to 1000mg BID over the next 3 weeks (see script).  Please advise patient to titrate Metformin as follows:  Week one: 500mg once daily with breakfast  Week two: 500mg with breakfast, 500mg with evening meal  Week three: 1000mg with breakfast, 500mg with evening meal  Week four and thereafter: 1000mg twice daily with meals    He will see or call me Monday 3/27/17.    I have put out a call to his ophthalmologist to confirm the diagnosis and treatment plan.    OLYA Villarreal MD      "

## 2017-03-27 ENCOUNTER — OFFICE VISIT (OUTPATIENT)
Dept: FAMILY MEDICINE | Facility: CLINIC | Age: 82
End: 2017-03-27

## 2017-03-27 VITALS
SYSTOLIC BLOOD PRESSURE: 128 MMHG | WEIGHT: 165.8 LBS | DIASTOLIC BLOOD PRESSURE: 74 MMHG | TEMPERATURE: 97.5 F | OXYGEN SATURATION: 93 % | BODY MASS INDEX: 26.02 KG/M2 | HEIGHT: 67 IN | HEART RATE: 58 BPM

## 2017-03-27 DIAGNOSIS — I48.20 CHRONIC ATRIAL FIBRILLATION (H): ICD-10-CM

## 2017-03-27 DIAGNOSIS — Z79.4 TYPE 2 DIABETES MELLITUS WITH HYPERGLYCEMIA, WITH LONG-TERM CURRENT USE OF INSULIN (H): ICD-10-CM

## 2017-03-27 DIAGNOSIS — E11.65 TYPE 2 DIABETES MELLITUS WITH HYPERGLYCEMIA, WITH LONG-TERM CURRENT USE OF INSULIN (H): ICD-10-CM

## 2017-03-27 DIAGNOSIS — I10 BENIGN HYPERTENSION: ICD-10-CM

## 2017-03-27 DIAGNOSIS — H34.8192: Primary | ICD-10-CM

## 2017-03-27 LAB — INR PPP: 1.9

## 2017-03-27 NOTE — MR AVS SNAPSHOT
After Visit Summary   3/27/2017    Suman Burton    MRN: [id number]           Patient Information     Date Of Birth          1935        Visit Information        Provider Department      3/27/2017 11:20 AM Ej Villarreal MD [facility]        Today's Diagnoses     Central retinal vein thrombosis    -  1    Type 2 diabetes mellitus with hyperglycemia, with long-term current use of insulin (H)        Benign hypertension        Chronic atrial fibrillation (H)          Care Instructions    [facility] WARFARIN DOSING INSTRUCTIONS  Patient Name: Suman Burton       Date: 3/27/2017  INR goal: 2.0 - 3.0   Today s Result:  Lab Results   Component Value Date    INR 1.9 03/27/2017     Your INR today is below your goal. Please take your warfarin as instructed in the table below. Recheck INR: 4 days is recommended. INR Recheck Date: 03/31/17 approximate.    Please schedule your follow up appointment on one of the following day: Mondays, Wednesday mornings (8-11:40am), or Thursday afternoons (1-4:40pm). This will allow our pharmacist to be able to speak with you about your results.  Warfarin Weekly Dosing   mg Monday Dose: 3mg Tuesday Dose: 3mg Wednesday Dose: 3mg Thursday Dose: 3mg  mg  mg   Additional Week Dosing (will be blank, if not needed)    mg   mg   mg   mg   mg   mg   mg     Pill size at home: 5 mg (peach)    It is very important to take your warfarin as instructed and to watch for abnormal bruising or bleeding, including blood in your urine or stool. If you notice these, please contact the clinic right away. Please also inform your doctor of any upcoming surgeries or procedures, as we may need to change your warfarin dose for these events.  Thank you for choosing [facility] for your care.          Follow-ups after your visit        Who to contact     Please call your clinic at [phone] to:    Ask questions about your health    Make or cancel  "appointments    Discuss your medicines    Learn about your test results    Speak to your doctor   If you have compliments or concerns about an experience at your clinic, or if you wish to file a complaint, please contact [facility] Patient Relations at [phone] or email us at [email or web address]         Additional Information About Your Visit        Care EveryWhere ID     This is your Care EveryWhere ID. This could be used by other organizations to access your [facility] medical records  [id number]        Your Vitals Were     Pulse Temperature Height Pulse Oximetry BMI (Body Mass Index)       58 97.5  F (36.4  C) (Oral) 5' 6.75\" (169.5 cm) 93% 26.16 kg/m2        Blood Pressure from Last 3 Encounters:   03/27/17 128/74   02/08/17 132/79   01/24/17 171/67    Weight from Last 3 Encounters:   03/27/17 165 lb 12.8 oz (75.2 kg)   02/08/17 164 lb (74.4 kg)   01/24/17 168 lb (76.2 kg)              We Performed the Following     [facility])        Primary Care Provider Office Phone # Fax #    Ej Villarreal [phone]       [facility] [address] [place] [address]        Thank you!     Thank you for choosing [facility]  for your care. Our goal is always to provide you with excellent care. Hearing back from our patients is one way we can continue to improve our services. Please take a few minutes to complete the written survey that you may receive in the mail after your visit with us. Thank you!             Your Updated Medication List - Protect others around you: Learn how to safely use, store and throw away your medicines at www.disposemymeds.org.          This list is accurate as of: 3/27/17  5:19 PM.  Always use your most recent med list.                   Brand Name Dispense Instructions for use    acetaminophen 500 MG tablet    TYLENOL    100 tablet    Take 2 tablets (1,000 mg) by mouth 3 times daily as needed for mild pain       " amLODIPine 10 MG tablet    NORVASC    90 tablet    Take 1 tablet (10 mg) by mouth daily       * blood glucose monitoring test strip    no brand specified    1 Box    Test blood glucose twice daily.       * blood glucose monitoring test strip    ONE TOUCH ULTRA    100 each    Use to test blood sugar 2 times daily or as directed.       digoxin 125 MCG tablet    LANOXIN    90 tablet    Take 1 tablet (125 mcg) by mouth every other day       furosemide 20 MG tablet    LASIX    180 tablet    Take 2 tablets (40 mg) by mouth every morning (before breakfast)       glipiZIDE 10 MG tablet    GLUCOTROL    360 tablet    Take 2 tablets (20 mg) by mouth 2 times daily Take 20mg in AM with breakfast and 20mg in PM with evening meal       insulin glargine 100 UNIT/ML injection    LANTUS SOLOSTAR    3 Month    35 units at bedtime       insulin pen needle 32G X 4 MM    BD TAMIKA U/F    100 each    Use once daily or as directed.       metFORMIN 500 MG tablet    GLUCOPHAGE    360 tablet    Take 2 tablets (1,000 mg) by mouth 2 times daily (with meals)       metoprolol 100 MG 24 hr tablet    TOPROL-XL    90 tablet    Take 1 tablet (100 mg) by mouth daily       MULTIVITAMINS PO      Take 1 tablet by mouth daily.       * ONE TOUCH FINE POINT LANCETS      Check blood sugars twice a day.       * blood glucose monitoring lancets     1 Box    Use to test blood sugar 2 times daily or as directed.       * ONETOUCH DELICA LANCETS 33G Misc     100 each    1 strip 2 times daily       warfarin 3 MG tablet    COUMADIN    90 tablet    Take 1 tablet (3 mg) by mouth daily       * Notice:  This list has 5 medication(s) that are the same as other medications prescribed for you. Read the directions carefully, and ask your doctor or other care provider to review them with you.

## 2017-03-27 NOTE — PROGRESS NOTES
SUBJECTIVE:  This is an 81-year-old male who developed decreased vision in his left eye about 12 days ago.  He presented to the ophthalmologist around 3/17/2017 and was referred to retina specialist.  I have notes from Dr. Glass and I had actually talked with the patient by phone  late last week and to discuss his symptoms and his ophthalmology evaluation.  He was diagnosed with a central retinal vein occlusion.  He did not undergo any injections or laser therapy as there was not significant edema at that time.  Since I talked to the patient last week his vision has improved slightly.  He still has significant loss of vision in his central visual field.  He finds it easier to see using his peripheral vision in his left eye.  He has a number of risk factors for this condition including his age, diabetes and hypertension.   As far as his hypertension his blood pressure with his home readings had been above goal  often in the 150s to despite his last blood pressure reading here at goal.  We increased his Toprol dose by phone call late last week.  He is tolerating this change without dizziness, lightheadedness or orthostatic symptoms.  His home blood pressure systolics are now 130s and diastolics 73.  Blood pressure here is 128/74 and he is asymptomatic.   As far as his diabetes, he had increased his Lantus up to 40 units and his morning blood sugars were 92, 93, and 96 over the last 3 days.  He feels a bit shaky when his blood sugar is under 100.  He self-reduced his Lantus to 35 units, which was his recently prescribed dose about 2 weeks ago.  He initiated metformin late last week after discussion and is taking 500 mg once a day without any GI or other side effects.  He has not checked postprandial glucoses over the past 2-1/2 weeks, but in the past they have often 220-250.  He denies polyuria or polydipsia.   Next, as far as his atrial fibrillation he continues on Coumadin and his INR has been in the therapeutic  range consecutively for the past 7 months.   REVIEW OF SYSTEMS:  No palpitations, chest pain episodes, no change in his exercise tolerance.  His exercise tolerance continues to be low.  He is frustrated with his vision loss.  He is using a magnifying glass to read which he finds extremely frustrating.  The remainder of the review of systems is outlined above.   PHYSICAL EXAMINATION:   VITAL SIGNS:  As above.   GENERAL:  He is alert, no distress.  He has mild scleral injection at the inferior portion of the left eye.   HEENT:  There is no drainage, extraocular movements are intact.  Pupils are equal and reactive.  Tympanic membranes are partially obstructed by cerumen.  Moist mucous membranes.   NECK:  Supple without adenopathy.   CARDIOVASCULAR:  Regular rate and rhythm.   LUNGS:  Clear with fair air exchange with a somewhat prolonged expiratory phase bilaterally.   ABDOMEN:  Soft and nontender.   EXTREMITIES:  Free of edema.   ASSESSMENT AND PLAN:   1.  Central retinal vein occlusion:  Risk factor management with blood pressure and diabetic management as outlined below.  I have contacted his retinal specialist at this point and they recommended wait-and-watch approach and he has a follow up appointment in a few weeks.  He will return if he has any decrease in his vision or new symptoms and there is no sign that he has a new inflammatory disorder and a new thrombophilia.  He is adequately anticoagulated with Coumadin due to atrial fibrillation.   2.  Atrial fibrillation:  INR today is 1.9.  He has had an INR in therapeutic range for 7 consecutive months on 3 mg a day.  Will hold the same dose and recheck an INR in about 5 days.   3.  Hypertension:  Blood pressure is now well controlled with increased dose of Toprol see medication list for his current regimen.   4.  Type 2 diabetes:  Lantus now at 35 units today and he is initiating metformin and will titrated up over the next 3 weeks.  I have asked him to check his  blood glucoses once daily at rotating times and sometimes fasting and sometimes postprandial and record.   5.  Stage III chronic kidney disease.  Follow up basic metabolic profile no later than May 2017.

## 2017-03-27 NOTE — NURSING NOTE
Pt is not able to see any letter on the eye exam with the left eye wearing glasses  Rt eye: 20/50 With glasses

## 2017-03-27 NOTE — PATIENT INSTRUCTIONS
[facility] WARFARIN DOSING INSTRUCTIONS  Patient Name: Suman JONES Josephine       Date: 3/27/2017  INR goal: 2.0 - 3.0   Today s Result:  Lab Results   Component Value Date    INR 1.9 03/27/2017     Your INR today is below your goal. Please take your warfarin as instructed in the table below. Recheck INR: 4 days is recommended. INR Recheck Date: 03/31/17 approximate.    Please schedule your follow up appointment on one of the following day: Mondays, Wednesday mornings (8-11:40am), or Thursday afternoons (1-4:40pm). This will allow our pharmacist to be able to speak with you about your results.  Warfarin Weekly Dosing   mg Monday Dose: 3mg Tuesday Dose: 3mg Wednesday Dose: 3mg Thursday Dose: 3mg  mg  mg   Additional Week Dosing (will be blank, if not needed)    mg   mg   mg   mg   mg   mg   mg     Pill size at home: 5 mg (peach)    It is very important to take your warfarin as instructed and to watch for abnormal bruising or bleeding, including blood in your urine or stool. If you notice these, please contact the clinic right away. Please also inform your doctor of any upcoming surgeries or procedures, as we may need to change your warfarin dose for these events.  Thank you for choosing [facility] for your care.

## 2017-03-31 ENCOUNTER — DOCUMENTATION ONLY (OUTPATIENT)
Dept: FAMILY MEDICINE | Facility: CLINIC | Age: 82
End: 2017-03-31

## 2017-03-31 DIAGNOSIS — I48.20 CHRONIC ATRIAL FIBRILLATION (H): Primary | ICD-10-CM

## 2017-03-31 LAB — INR PPP: 2.7

## 2017-03-31 NOTE — PROGRESS NOTES
S: Discussed lab result with patient while in lab    Warfarin dose for last 7 days: 3 mg daily    Warfarin tablet strength(s) available: 3 mg     1) Any missed doses in last week: No  2) Any extra doses in last week: No  3) Has the patient started or stopped any medications, supplements, OTC products: Yes - Metformin   4) Has the patient experienced any diarrhea and/or vomiting episodes in the last week: Yes - Believes due to Metformin above. Currently not worrisome to patient. Reports using Loperamide PRN.  5) Has the patient had any changes in their diet (amount of green, leafy vegetables): No  6) Are there any bleeding concerns (blood in urine, stool, nose bleeds, bleeding gums, easy bruising): No  7) Warfarin refill needed?: No    Patient reports to follow up with Dr. Villarreal in one month.     O: Indication for therapy: Atrial Fibrillation 427.31 with INR goal: 2.0 - 3.0. Duration of therapy: indefinite  Per chart review: CHADS2-VASc = history of HTN +1, age (> 75) +2, history of DM +1 = 4    Lab Results   Component Value Date    INR 2.7 03/31/2017    INR 1.9 03/27/2017    INR 3.1 02/08/2017    INR 3.0 12/21/2016    INR 2.39 11/09/2016    INR 2.0 09/07/2016       Patient Active Problem List   Diagnosis     Adenocarcinoma (H)     Benign hypertension     Health Care Home     Cerebral infarction due to occlusion or stenosis of carotid artery     Chronic kidney disease, stage III (moderate)     Diabetes mellitus, type 2 (H)     Hyperlipidemia     Lumbago     Thoracic or lumbosacral neuritis or radiculitis, unspecified     Transient visual loss     Diabetes mellitus, type 2 (H)     Hypertension     Chronic atrial fibrillation (H)     Chronic pain syndrome       A/P: INR today within goal range. Fairly consistently within range with this same regimen. Given result and history obtained, no dose adjustment warranted today. Discussed that diarrhea common ADR and should improve with time. Patient not concerned and  agreeable.     Previous weekly dose: 21mg  New Weekly Dose: 21mg  = Percent Change: 0%    Using Warfarin Pill size at home: 5 mg (peach), weekly Warfarin dosing as follows:     Sunday Dose: 3mg Monday Dose: 3mg Tuesday Dose: 3mg Wednesday Dose: 3mg Thursday Dose: 3mg Friday Dose: 3mg Saturday Dose: 3mg      Recheck INR: 4 weeks is recommended. When returns to see Dr. Villarreal.       Dr. Villarreal was provided our recommendations via routed note and Dr. Garcia was available for supervision during this visit and is the authorizing prescriber for this visit through the pharmacist collaborative practice agreement.    Ekaterina Wilson, Pharm.D.

## 2017-04-17 DIAGNOSIS — I48.20 CHRONIC ATRIAL FIBRILLATION (H): ICD-10-CM

## 2017-04-17 DIAGNOSIS — H34.8192 CRVO (CENTRAL RETINAL VEIN OCCLUSION) (H): ICD-10-CM

## 2017-04-17 LAB
CHOLEST SERPL-MCNC: 194 MG/DL
CRP SERPL-MCNC: 0.7 MG/DL (ref 0–0.8)
ERYTHROCYTE [SEDIMENTATION RATE] IN BLOOD: 25 MM/HR (ref 0–15)
FASTING?: ABNORMAL
HCT VFR BLD AUTO: 48.3 % (ref 40–53)
HDLC SERPL-MCNC: 37 MG/DL
HEMOGLOBIN: 15.7 G/DL (ref 13.3–17.7)
LDLC SERPL CALC-MCNC: 100 MG/DL
MCH RBC QN AUTO: 31.7 PG (ref 26.5–35)
MCHC RBC AUTO-ENTMCNC: 32.5 G/DL (ref 32–36)
MCV RBC AUTO: 97.6 FL (ref 78–100)
PLATELET # BLD AUTO: 200 K/UL (ref 150–450)
RBC # BLD AUTO: 4.95 M/UL (ref 4.4–5.9)
TRIGL SERPL-MCNC: 284 MG/DL
WBC # BLD AUTO: 8.6 K/UL (ref 4–11)

## 2017-04-17 NOTE — LETTER
April 20, 2017      Suman KAREN Josephine  [address]  [facility] [address]        Dear Suman,    As we discussed by phone your results are unremarkable by my interpretation.  FAX results to ophthalmology for his visit scheduled with ophthalmology tomorrow.     Please see below for your test results.    Resulted Orders   CBC with Plt ([facility])   Result Value Ref Range    WBC 8.6 4.0 - 11.0 K/uL    RBC 4.95 4.40 - 5.90 M/uL    Hemoglobin 15.7 13.3 - 17.7 g/dL    Hematocrit 48.3 40.0 - 53.0 %    MCV 97.6 78.0 - 100.0 fL    MCH 31.7 26.5 - 35.0 pg    MCHC 32.5 32.0 - 36.0 g/dL    Platelets 200.0 150.0 - 450.0 K/uL   Lipid Profile ([facility])   Result Value Ref Range    Triglycerides 284 (H) <=149 mg/dL    Cholesterol 194 <=199 mg/dL    LDL Cholesterol Calculated 100 <=129 mg/dL    HDL Cholesterol 37 (L) >=40 mg/dL    Fasting? Unknown     Narrative    Test performed by:  [facility]  [address] [place] [address]   Erythrocyte Sed Rate ([facility])   Result Value Ref Range    Sed Rate 25 (H) 0 - 15 mm/hr    Narrative    Test performed by:  [facility] [address] [place] [address]   C-Reactive Protein ([facility])   Result Value Ref Range    C-Reactive Protein 0.7 0.0 - 0.8 mg/dL    Narrative    Test performed by:  [facility] [address] [place] [address]   ELP Prot  Serum [SPEP] ([facility])   Result Value Ref Range    Albumin-% 59.6 51.0 - 67.0 %    Albumin 4.1 3.2 - 4.7 g/dL    Alpha 1-% 3.0 2.0 - 4.0 %    Alpha 1 0.2 0.1 - 0.3 g/dL    Alpha 2-% 11.1 5.0 - 13.0 %    Alpha 2 0.8 0.4 - 0.9 g/dL    Beta-% 13.6 10.0 - 17.0 %    Beta 0.9 0.7 - 1.2 g/dL    Gamma-% 12.7 9.0 - 20.0 %    Gamma 0.9 0.6 - 1.4 g/dL    Electrophoresis Comments Unremarkable protein electrophoresis.      Protein Total 6.9 6.0 - 8.0 g/dL    Path ICD H34.8192     Interpreted By Wali Briceño MD     Narrative    Test performed by:  [facility]  [address] [place] [address]   Homocysteine Plasma ([facility])   Result Value Ref Range    Homocysteine umol/L 18 (H) 0 - 13 umol/L    Fasting? Unknown     Narrative    Test performed by:  [facility]  [address] [place] [address]  Higher values are detected on non-fasting patients.  Values are approximately 25% higher for males versus  age-matched pre-menopausal women.  Values increase with age.       If you have any questions, please call the clinic to make an appointment.    Sincerely,    [facility]

## 2017-04-18 LAB
FASTING?: ABNORMAL
HOMOCYSTEINE UMOL/L: 18 UMOL/L (ref 0–13)

## 2017-04-19 RX ORDER — WARFARIN SODIUM 3 MG/1
3 TABLET ORAL DAILY
Qty: 90 TABLET | Refills: 3 | Status: SHIPPED | OUTPATIENT
Start: 2017-04-19 | End: 2018-01-08

## 2017-04-20 ENCOUNTER — DOCUMENTATION ONLY (OUTPATIENT)
Dept: FAMILY MEDICINE | Facility: CLINIC | Age: 82
End: 2017-04-20

## 2017-04-20 LAB
ALBUMIN MFR SERPL ELPH: 59.6 % (ref 51–67)
ALBUMIN SERPL-MCNC: 4.1 G/DL (ref 3.2–4.7)
ALPHA 1-%: 3 % (ref 2–4)
ALPHA 1: 0.2 G/DL (ref 0.1–0.3)
ALPHA 2-%: 11.1 % (ref 5–13)
ALPHA 2: 0.8 G/DL (ref 0.4–0.9)
B-GLOBULIN MFR SERPL ELPH: 13.6 % (ref 10–17)
B-GLOBULIN SERPL ELPH-MCNC: 0.9 G/DL (ref 0.7–1.2)
ELECTROPHORESIS COMMENTS: NORMAL
GAMMA GLOB MFR SERPL ELPH: 12.7 % (ref 9–20)
GAMMA GLOB SNV ELPH-MCNC: 0.9 G/DL (ref 0.6–1.4)
INTERPRETED BY: NORMAL
PATH ICD: NORMAL
PROT SERPL-MCNC: 6.9 G/DL (ref 6–8)

## 2017-04-20 NOTE — PROGRESS NOTES
Please send result letter  As we discussed by phone your results are unremarkable by my interpretation.  FAX results to ophthalmology for his visit scheduled with ophthalmology tomorrow.

## 2017-04-21 ENCOUNTER — TRANSFERRED RECORDS (OUTPATIENT)
Dept: HEALTH INFORMATION MANAGEMENT | Facility: CLINIC | Age: 82
End: 2017-04-21

## 2017-04-28 DIAGNOSIS — Z79.4 TYPE 2 DIABETES MELLITUS WITH HYPERGLYCEMIA, WITH LONG-TERM CURRENT USE OF INSULIN (H): ICD-10-CM

## 2017-04-28 DIAGNOSIS — E11.65 TYPE 2 DIABETES MELLITUS WITH HYPERGLYCEMIA, WITH LONG-TERM CURRENT USE OF INSULIN (H): ICD-10-CM

## 2017-05-26 ENCOUNTER — OFFICE VISIT (OUTPATIENT)
Dept: FAMILY MEDICINE | Facility: CLINIC | Age: 82
End: 2017-05-26

## 2017-05-26 VITALS
HEART RATE: 64 BPM | DIASTOLIC BLOOD PRESSURE: 80 MMHG | WEIGHT: 159.8 LBS | TEMPERATURE: 97.6 F | HEIGHT: 67 IN | OXYGEN SATURATION: 93 % | SYSTOLIC BLOOD PRESSURE: 132 MMHG | BODY MASS INDEX: 25.08 KG/M2

## 2017-05-26 DIAGNOSIS — I48.20 CHRONIC ATRIAL FIBRILLATION (H): ICD-10-CM

## 2017-05-26 DIAGNOSIS — Z79.4 TYPE 2 DIABETES MELLITUS WITH HYPERGLYCEMIA, WITH LONG-TERM CURRENT USE OF INSULIN (H): Primary | ICD-10-CM

## 2017-05-26 DIAGNOSIS — E11.65 TYPE 2 DIABETES MELLITUS WITH HYPERGLYCEMIA, WITH LONG-TERM CURRENT USE OF INSULIN (H): Primary | ICD-10-CM

## 2017-05-26 DIAGNOSIS — N18.30 CHRONIC KIDNEY DISEASE, STAGE III (MODERATE) (H): ICD-10-CM

## 2017-05-26 LAB
BUN SERPL-MCNC: 43 MG/DL (ref 7–30)
CALCIUM SERPL-MCNC: 10.2 MG/DL (ref 8.5–10.4)
CHLORIDE SERPLBLD-SCNC: 101 MMOL/L (ref 94–109)
CO2 SERPL-SCNC: 23 MMOL/L (ref 20–32)
CREAT SERPL-MCNC: 2 MG/DL (ref 0.8–1.5)
DIGOXIN SERPL IA-MCNC: 0.8 NG/ML (ref 0.5–2)
EGFR CALCULATED (BLACK REFERENCE): 41.4 ML/MIN
EGFR CALCULATED (NON BLACK REFERENCE): 34.3 ML/MIN
GLUCOSE SERPL-MCNC: 181 MG/DL (ref 60–109)
HBA1C MFR BLD: 7.3 % (ref 4.1–5.7)
HCT VFR BLD AUTO: 46.8 % (ref 40–53)
HEMOGLOBIN: 15.4 G/DL (ref 13.3–17.7)
INR PPP: 2.1
MCH RBC QN AUTO: 32.3 PG (ref 26.5–35)
MCHC RBC AUTO-ENTMCNC: 32.9 G/DL (ref 32–36)
MCV RBC AUTO: 98.2 FL (ref 78–100)
PLATELET # BLD AUTO: 170 K/UL (ref 150–450)
POTASSIUM SERPL-SCNC: 4.3 MMOL/L (ref 3.4–5.3)
RBC # BLD AUTO: 4.77 M/UL (ref 4.4–5.9)
SODIUM SERPL-SCNC: 139 MMOL/L (ref 133–144)
WBC # BLD AUTO: 9.9 K/UL (ref 4–11)

## 2017-05-26 RX ORDER — LOPERAMIDE HYDROCHLORIDE 2 MG/1
TABLET ORAL
COMMUNITY
Start: 2017-05-26 | End: 2019-03-13

## 2017-05-26 NOTE — LETTER
June 1, 2017      Suman JONES Josephine  [address]  [facility] [address]        Dear Suman,    Your digoxin level is normal.     Please see below for your test results.    Resulted Orders   Basic Metabolic Panel ([facility])  - Results < 1 hr   Result Value Ref Range    Glucose 181.0 (H) 60.0 - 109.0 mg/dL    Urea Nitrogen 43.0 (H) 7.0 - 30.0 mg/dL    Creatinine 2.0 (H) 0.8 - 1.5 mg/dL    Sodium 139.0 133.0 - 144.0 mmol/L    Potassium 4.3 3.4 - 5.3 mmol/L    Chloride 101.0 94.0 - 109.0 mmol/L    Carbon Dioxide 23.0 20.0 - 32.0 mmol/L    Calcium 10.2 8.5 - 10.4 mg/dL    eGFR Calculated (Non Black Reference) 34.3 (L) >60.0 mL/min    eGFR Calculated (Black Reference) 41.4 (L) >60.0 mL/min   INR ([facility])   Result Value Ref Range    INR 2.1       Comment:      Adult Normal Range: 0.90 - 1.10  Therapeutic Range: 2.0 - 3.5     Hemoglobin A1c ([facility])   Result Value Ref Range    Hemoglobin A1C 7.3 (H) 4.1 - 5.7 %   Digoxin ([facility])   Result Value Ref Range    Digoxin Serum 0.8 0.5 - 2.0 ng/mL    Narrative    Test performed by:  [facility]  [address] [place] [address]   CBC with Plt ([facility])   Result Value Ref Range    WBC 9.9 4.0 - 11.0 K/uL    RBC 4.77 4.40 - 5.90 M/uL    Hemoglobin 15.4 13.3 - 17.7 g/dL    Hematocrit 46.8 40.0 - 53.0 %    MCV 98.2 78.0 - 100.0 fL    MCH 32.3 26.5 - 35.0 pg    MCHC 32.9 32.0 - 36.0 g/dL    Platelets 170.0 150.0 - 450.0 K/uL       If you have any questions, please call the clinic to make an appointment.    Sincerely,    Ej Villarreal MD

## 2017-05-26 NOTE — PATIENT INSTRUCTIONS
- [facility]  Address: [address]                     [place] [address]  Hours: 8AM-4:30PM  Phone: [phone]  - Increase your calorie intake.  - Replace Zantac with TUMS  - Take 500 mg JAQUELIN in the morning and 500 mg JAQUELIN in the afternoon.  - Recheck INR in 6 weeks.     Your medication list is printed, please keep this with you, it is helpful to bring this current list to any other medical appointments, the emergency room or hospital.    If you had lab testing today and your results are reassuring or normal they will be be mailed to you within 7 days.     If the lab tests need quick action we will call you with the results.   The phone number we will call with results is # [phone] (home) . If this is not the best number please call our clinic and change the number.    If you need any refills please call your pharmacy and they will contact us.    If you have any further concerns or wish to schedule another appointment you must call our office during normal business hours  [phone] (8-5:00 M-F)  If you have urgent medical questions that cannot wait  you may also call [phone] at any time of day.  If you have a medical emergency please call [phone].    Thank you for coming to [facility].

## 2017-05-26 NOTE — LETTER
May 30, 2017      Suman JNOES Josephine  [address]  [facility] [address]        Dear Suman,    Your blood count is normal.  Your kidney function is stable (stable chronic kidney disease).  Your 3 month blood sugar average reflects good sugar control.     Please see below for your test results.    Resulted Orders   Basic Metabolic Panel ([facility])  - Results < 1 hr   Result Value Ref Range    Glucose 181.0 (H) 60.0 - 109.0 mg/dL    Urea Nitrogen 43.0 (H) 7.0 - 30.0 mg/dL    Creatinine 2.0 (H) 0.8 - 1.5 mg/dL    Sodium 139.0 133.0 - 144.0 mmol/L    Potassium 4.3 3.4 - 5.3 mmol/L    Chloride 101.0 94.0 - 109.0 mmol/L    Carbon Dioxide 23.0 20.0 - 32.0 mmol/L    Calcium 10.2 8.5 - 10.4 mg/dL    eGFR Calculated (Non Black Reference) 34.3 (L) >60.0 mL/min    eGFR Calculated (Black Reference) 41.4 (L) >60.0 mL/min   INR ([facility])   Result Value Ref Range    INR 2.1       Comment:      Adult Normal Range: 0.90 - 1.10  Therapeutic Range: 2.0 - 3.5     Hemoglobin A1c ([facility])   Result Value Ref Range    Hemoglobin A1C 7.3 (H) 4.1 - 5.7 %   CBC with Plt ([facility])   Result Value Ref Range    WBC 9.9 4.0 - 11.0 K/uL    RBC 4.77 4.40 - 5.90 M/uL    Hemoglobin 15.4 13.3 - 17.7 g/dL    Hematocrit 46.8 40.0 - 53.0 %    MCV 98.2 78.0 - 100.0 fL    MCH 32.3 26.5 - 35.0 pg    MCHC 32.9 32.0 - 36.0 g/dL    Platelets 170.0 150.0 - 450.0 K/uL       If you have any questions, please call the clinic to make an appointment.    Sincerely,    Ej Villarreal MD

## 2017-05-26 NOTE — MR AVS SNAPSHOT
After Visit Summary   5/26/2017    Suman Burton    MRN: [id number]           Patient Information     Date Of Birth          1935        Visit Information        Provider Department      5/26/2017 2:20 PM Ej Villarreal MD [facility]        Today's Diagnoses     Type 2 diabetes mellitus with hyperglycemia, with long-term current use of insulin (H)    -  1    Chronic kidney disease, stage III (moderate)        Chronic atrial fibrillation (H)          Care Instructions    - [facility]  Address: [address] [place]                     [place] [address]  Hours: 8AM-4:30PM  Phone: [phone]  - Increase your calorie intake.  - Replace Zantac with TUMS  - Take 500 mg JAQUELIN in the morning and 500 mg JAQUELIN in the afternoon.  - Recheck INR in 6 weeks.     Your medication list is printed, please keep this with you, it is helpful to bring this current list to any other medical appointments, the emergency room or hospital.    If you had lab testing today and your results are reassuring or normal they will be be mailed to you within 7 days.     If the lab tests need quick action we will call you with the results.   The phone number we will call with results is # [phone] (home) . If this is not the best number please call our clinic and change the number.    If you need any refills please call your pharmacy and they will contact us.    If you have any further concerns or wish to schedule another appointment you must call our office during normal business hours  [phone] (8-5:00 M-F)  If you have urgent medical questions that cannot wait  you may also call [phone] at any time of day.  If you have a medical emergency please call [phone].    Thank you for coming to [facility].            Follow-ups after your visit        Who to contact     Please call your clinic at [phone] to:    Ask questions about your health    Make or cancel appointments    Discuss your  "medicines    Learn about your test results    Speak to your doctor   If you have compliments or concerns about an experience at your clinic, or if you wish to file a complaint, please contact [facility] Patient Relations at [phone] or email us at [email or web address]         Additional Information About Your Visit        Care EveryWhere ID     This is your Care EveryWhere ID. This could be used by other organizations to access your [place] medical records  [id number]        Your Vitals Were     Pulse Temperature Height Pulse Oximetry BMI (Body Mass Index)       64 97.6  F (36.4  C) (Oral) 5' 6.75\" (169.5 cm) 93% 25.22 kg/m2        Blood Pressure from Last 3 Encounters:   05/26/17 132/80   03/27/17 128/74   02/08/17 132/79    Weight from Last 3 Encounters:   05/26/17 159 lb 12.8 oz (72.5 kg)   03/27/17 165 lb 12.8 oz (75.2 kg)   02/08/17 164 lb (74.4 kg)              We Performed the Following     Basic Metabolic Panel ([facility])  - Results < 1 hr     CBC with Plt ([facility])     Digoxin ([facility])     Hemoglobin A1c ([facility])     INR (P )        Primary Care Provider Office Phone # Fax #    Ej Villarreal [phone]       [facility] [address] [place] [address]        Thank you!     Thank you for choosing [facility]  for your care. Our goal is always to provide you with excellent care. Hearing back from our patients is one way we can continue to improve our services. Please take a few minutes to complete the written survey that you may receive in the mail after your visit with us. Thank you!             Your Updated Medication List - Protect others around you: Learn how to safely use, store and throw away your medicines at www.disposemymeds.org.          This list is accurate as of: 5/26/17  3:24 PM.  Always use your most recent med list.                   Brand Name Dispense Instructions for use    acetaminophen 500 MG " tablet    TYLENOL    100 tablet    Take 2 tablets (1,000 mg) by mouth 3 times daily as needed for mild pain       amLODIPine 10 MG tablet    NORVASC    90 tablet    Take 1 tablet (10 mg) by mouth daily       * blood glucose monitoring test strip    no brand specified    1 Box    Test blood glucose twice daily.       * blood glucose monitoring test strip    ONE TOUCH ULTRA    100 each    Use to test blood sugar 2 times daily or as directed.       digoxin 125 MCG tablet    LANOXIN    90 tablet    Take 1 tablet (125 mcg) by mouth every other day       furosemide 20 MG tablet    LASIX    180 tablet    Take 2 tablets (40 mg) by mouth every morning (before breakfast)       glipiZIDE 10 MG tablet    GLUCOTROL    360 tablet    Take 2 tablets (20 mg) by mouth 2 times daily Take 20mg in AM with breakfast and 20mg in PM with evening meal       insulin pen needle 32G X 4 MM    BD TAMIKA U/F    100 each    Use once daily or as directed.       loperamide 2 MG tablet    IMODIUM A-D     Take 2 tabs in the morning and 1 tablet in the evening       metFORMIN 500 MG tablet    GLUCOPHAGE    360 tablet    Take 2 tablets (1,000 mg) by mouth 2 times daily (with meals)       metoprolol 100 MG 24 hr tablet    TOPROL-XL    90 tablet    Take 1 tablet (100 mg) by mouth daily       MULTIVITAMINS PO      Take 1 tablet by mouth daily.       * ONE TOUCH FINE POINT LANCETS      Check blood sugars twice a day.       * blood glucose monitoring lancets     1 Box    Use to test blood sugar 2 times daily or as directed.       * ONETOUCH DELICA LANCETS 33G Misc     100 each    1 strip 2 times daily       ranitidine 150 MG tablet    ZANTAC     Take 1 tablet (150 mg) by mouth daily       warfarin 3 MG tablet    COUMADIN    90 tablet    Take 1 tablet (3 mg) by mouth daily       * Notice:  This list has 5 medication(s) that are the same as other medications prescribed for you. Read the directions carefully, and ask your doctor or other care provider to review  them with you.

## 2017-05-26 NOTE — PROGRESS NOTES
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  He presents for multiple issues.   First, as far as his central retinal vein occlusion he is using magnifiers to help read.  He finds this very frustrating he wishes he could read the paper more readily.  He has not yet tapped into any professional vision loss resources.  He is following closely with an ophthalmologist.  They are not hopeful that he will regain much of his vision and are not hopeful that any injections or other therapies will be very effective.   Next, as far as his diabetes, for the most part his fasting blood sugars have been around 120.  The last few days they have been a bit higher in the 160s.  He has not had any hypoglycemic readings under 100 or any hypoglycemic symptoms.  He does have some intermittent loose stools which he attributes to metformin.   Next, as far as his reflux, he has intermittent reflux symptoms.  He is using ranitidine on a daily basis.   Next, as far as his atrial fibrillation he is using his digoxin daily.  He was initially ordered it every other day, but he has been using it daily regularly.  He takes at 5:00 p.m. each evening.  He has not had racing heart, palpitations.  He has some occasional bruising which has not changed on his Coumadin.   Next, as far as his hypertension,  he monitors his blood pressure at home cuff which tend to be a bit higher than our readings here at the clinic.  No headaches or chest pain episodes.   REVIEW OF SYSTEMS:  He continues to be short of breath with walking.  His exercise tolerance has not changed in months.  He finds that his legs get tired after walking more than 100 feet which is unchanged over the last couple of years.  He feels he is losing weight.  In reviewing his weights he has lost about 6 pounds over the past 6 months.  His typical weight was  around 164 to 165 pounds.  He is now 159.  He eats twice a day, occasionally eats a very small dinner if he is not feeling hungry.  The remainder of the review of systems is as outlined above.   PHYSICAL EXAMINATION:   VITAL SIGNS:  As above.   GENERAL:  He is alert, no distress.   HEENT:  Head is normocephalic and atraumatic.  Eyes, sclerae are nonicteric, noninjected.  Tympanic membranes are white with normal bony and light landmarks.   NECK:  Supple without adenopathy, moist mucous membranes.   CARDIOVASCULAR:  Heart tones are quiet, irregularly irregular.   LUNGS:  Have fair air exchange bilaterally.   ABDOMEN:  Soft and nontender.   EXTREMITIES:  With trace peripheral edema.  Examination of his feet shows no skin wounds.  He has dry skin on the feet.  He has onychomycosis with very thick, yellow, dystrophic toenails.   ASSESSMENT AND PLAN:   1.  Type 2 diabetes:  A1c today is at goal level.  Continue current regimen.  If he has more trouble loose stools could consider slightly reducing his dose of metformin.  I did not do this today as his fasting glucose glucoses have been trending up somewhat over the past week or so but continue to consider this for the future as his A1c has decreased despite deescalating his diabetic regimen I did check a CBC today which showed that there is no sign of anemia or hemolysis.  Plan for a repeat A1c, likely in 3-6 months.   2.  Central retinal vein occlusion, left:   I referred him to vision loss resources.  Hopefully, they can give him some recommendations as far as [place], magnifying devices to help him to get back to enjoying reading, he will continue to follow with Ophthalmology and the retinal specialist.  [facility]   3.  Hypertension:  Blood pressure is well controlled.  Will continue the current blood pressure regimen for now.  His goal blood pressure is 140/90 or less.   4.  Stage III chronic kidney disease.  Basic  metabolic profile today shows stable GFR.  Plan to repeat a basic metabolic profile no later than late 8/2017.   5.  Atrial fibrillation:  We will check a digoxin level today and if elevated he will go to an every other day dosing regimen.  Currently asymptomatic.  INR is therapeutic.  Repeat INR in 6 weeks.

## 2017-05-26 NOTE — PROGRESS NOTES
Please send result letter    Your blood count is normal.  Your kidney function is stable (stable chronic kidney disease).  Your 3 month blood sugar average reflects good sugar control.

## 2017-06-15 ENCOUNTER — TRANSFERRED RECORDS (OUTPATIENT)
Dept: HEALTH INFORMATION MANAGEMENT | Facility: CLINIC | Age: 82
End: 2017-06-15

## 2017-07-20 ENCOUNTER — OFFICE VISIT (OUTPATIENT)
Dept: FAMILY MEDICINE | Facility: CLINIC | Age: 82
End: 2017-07-20

## 2017-07-20 VITALS
TEMPERATURE: 97.4 F | WEIGHT: 158.4 LBS | HEART RATE: 65 BPM | OXYGEN SATURATION: 95 % | SYSTOLIC BLOOD PRESSURE: 110 MMHG | HEIGHT: 68 IN | DIASTOLIC BLOOD PRESSURE: 65 MMHG | BODY MASS INDEX: 24.01 KG/M2

## 2017-07-20 DIAGNOSIS — I48.20 CHRONIC ATRIAL FIBRILLATION (H): ICD-10-CM

## 2017-07-20 DIAGNOSIS — I10 BENIGN HYPERTENSION: ICD-10-CM

## 2017-07-20 DIAGNOSIS — E11.65 TYPE 2 DIABETES MELLITUS WITH HYPERGLYCEMIA, WITH LONG-TERM CURRENT USE OF INSULIN (H): Primary | ICD-10-CM

## 2017-07-20 DIAGNOSIS — B35.1 ONYCHOMYCOSIS: ICD-10-CM

## 2017-07-20 DIAGNOSIS — Z79.4 TYPE 2 DIABETES MELLITUS WITH HYPERGLYCEMIA, WITH LONG-TERM CURRENT USE OF INSULIN (H): Primary | ICD-10-CM

## 2017-07-20 LAB — INR PPP: 1.8

## 2017-07-20 NOTE — MR AVS SNAPSHOT
"              After Visit Summary   7/20/2017    Suman Burton    MRN: [id number]           Patient Information     Date Of Birth          1935        Visit Information        Provider Department      7/20/2017 11:40 AM Ej Villarreal MD [facility]        Today's Diagnoses     Type 2 diabetes mellitus with hyperglycemia, with long-term current use of insulin (H)    -  1    Chronic atrial fibrillation (H)        Onychomycosis        Benign hypertension           Follow-ups after your visit        Who to contact     Please call your clinic at [phone] to:    Ask questions about your health    Make or cancel appointments    Discuss your medicines    Learn about your test results    Speak to your doctor   If you have compliments or concerns about an experience at your clinic, or if you wish to file a complaint, please contact [facility] Patient Relations at [phone] or email us at [email or web address]         Additional Information About Your Visit        Care EveryWhere ID     This is your Care EveryWhere ID. This could be used by other organizations to access your [place] medical records  [id number]        Your Vitals Were     Pulse Temperature Height Pulse Oximetry BMI (Body Mass Index)       65 97.4  F (36.3  C) (Oral) 5' 8\" (172.7 cm) 95% 24.08 kg/m2        Blood Pressure from Last 3 Encounters:   07/20/17 110/65   05/26/17 132/80   03/27/17 128/74    Weight from Last 3 Encounters:   07/20/17 158 lb 6.4 oz (71.8 kg)   05/26/17 159 lb 12.8 oz (72.5 kg)   03/27/17 165 lb 12.8 oz (75.2 kg)              We Performed the Following     INR ([facility])        Primary Care Provider Office Phone # Fax #    Ej Villarreal [phone]       [facility] [address] [place] [address]        Equal Access to Services     DEMETRIO PETTY AH: Hadii aad ku hadasho Soomaali, waaxda luqadaha, qaybta kaalmada adeegyada, waxay idiin hayclarisse " saidaprieto leighannmini la'aaberry ah. So [facility] [phone].    ATENCIÓN: Si morena madrigal, tiene a ron disposición servicios gratuitos de asistencia lingüística. Roc bell [phone].    We comply with applicable federal civil rights laws and [place] laws. We do not discriminate on the basis of race, color, national origin, age, disability sex, sexual orientation or gender identity.            Thank you!     Thank you for choosing [facility]  for your care. Our goal is always to provide you with excellent care. Hearing back from our patients is one way we can continue to improve our services. Please take a few minutes to complete the written survey that you may receive in the mail after your visit with us. Thank you!             Your Updated Medication List - Protect others around you: Learn how to safely use, store and throw away your medicines at www.disposemymeds.org.          This list is accurate as of: 7/20/17 12:18 PM.  Always use your most recent med list.                   Brand Name Dispense Instructions for use Diagnosis    acetaminophen 500 MG tablet    TYLENOL    100 tablet    Take 2 tablets (1,000 mg) by mouth 3 times daily as needed for mild pain    Chronic pain syndrome       amLODIPine 10 MG tablet    NORVASC    90 tablet    Take 1 tablet (10 mg) by mouth daily    Benign essential hypertension       * blood glucose monitoring test strip    no brand specified    1 Box    Test blood glucose twice daily.    Type II or unspecified type diabetes mellitus without mention of complication, not stated as uncontrolled       * blood glucose monitoring test strip    ONE TOUCH ULTRA    100 each    Use to test blood sugar 2 times daily or as directed.    Type 2 diabetes mellitus with hyperglycemia, with long-term current use of insulin (H)       digoxin 125 MCG tablet    LANOXIN    90 tablet    Take 1 tablet (125 mcg) by mouth every other day    Chronic atrial fibrillation (H)       furosemide 20 MG tablet     LASIX    180 tablet    Take 2 tablets (40 mg) by mouth every morning (before breakfast)    Dyspnea on exertion, Benign essential hypertension       glipiZIDE 10 MG tablet    GLUCOTROL    360 tablet    Take 2 tablets (20 mg) by mouth 2 times daily Take 20mg in AM with breakfast and 20mg in PM with evening meal    Type 2 diabetes mellitus with hyperglycemia, with long-term current use of insulin (H)       insulin pen needle 32G X 4 MM    BD TAMIKA U/F    100 each    Use once daily or as directed.    Type 2 diabetes mellitus with hyperglycemia (H)       loperamide 2 MG tablet    IMODIUM A-D     Take 2 tabs in the morning and 1 tablet in the evening        metFORMIN 500 MG tablet    GLUCOPHAGE    360 tablet    Take 2 tablets (1,000 mg) by mouth 2 times daily (with meals)    Type 2 diabetes mellitus with hyperglycemia, with long-term current use of insulin (H)       metoprolol 100 MG 24 hr tablet    TOPROL-XL    90 tablet    Take 1 tablet (100 mg) by mouth daily    Benign essential hypertension       MULTIVITAMINS PO      Take 1 tablet by mouth daily.        * ONE TOUCH FINE POINT LANCETS      Check blood sugars twice a day.        * blood glucose monitoring lancets     1 Box    Use to test blood sugar 2 times daily or as directed.    Diabetes mellitus, type 2 (H)       * ONETOUCH DELICA LANCETS 33G Misc     100 each    1 strip 2 times daily    Type 2 diabetes mellitus with hyperglycemia (H)       ranitidine 150 MG tablet    ZANTAC     Take 1 tablet (150 mg) by mouth daily        warfarin 3 MG tablet    COUMADIN    90 tablet    Take 1 tablet (3 mg) by mouth daily    Chronic atrial fibrillation (H)       * Notice:  This list has 5 medication(s) that are the same as other medications prescribed for you. Read the directions carefully, and ask your doctor or other care provider to review them with you.

## 2017-07-20 NOTE — PROGRESS NOTES
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  He presents for diabetes management and hypertension management.  Right great toe pain at the distal nail for the past 10 days. Nail is ingrown. He can't cut it because it is too thick.  Blood pressure at home between 131-158 systolic, but he always checks first thing in the morning.  Later in the day here at clinic, his blood pressure is much lower. Today . No dizziness or lightheadedness.  Blood sugars at home  fasting, one glucose of 302 after a muffin.  As far as his central retinal vein occlusion, his vision has not improved.  He is no longer driving. He finds this very frustrating he wishes he could read the paper more readily.    He is following closely with an ophthalmologist.  They are not hopeful that he will regain much of his vision and are not hopeful that any injections or other therapies will be very effective.   Next, as far as his atrial fibrillation he is using his digoxin and coumadin daily.    He has not had racing heart, palpitations.  He has some occasional bruising which has not changed on his Coumadin.     REVIEW OF SYSTEMS:  He continues to be short of breath with walking.   His legs get tired after walking more than 100 feet which is unchanged over the last couple of years.  He feels he is losing weight.  In reviewing his weights he has lost about 6 pounds over the past 7 months.  His typical weight was around 164 to 165 pounds.  He is now 158. The remainder of the review of systems is as outlined above.   PHYSICAL EXAMINATION:   VITAL SIGNS:  As above.   GENERAL:  He is alert, no distress.   HEENT:  Head is normocephalic and atraumatic.  Eyes, sclerae are nonicteric, noninjected.  Tympanic membranes are white with normal bony and light landmarks.   NECK:  Supple without adenopathy, moist mucous  membranes.   CARDIOVASCULAR:  Heart tones are quiet, irregularly irregular.   LUNGS:  Have fair air exchange bilaterally.   ABDOMEN:  Soft and nontender.   EXTREMITIES:  With trace peripheral edema.  Examination of his feet shows no skin wounds.  He has dry skin on the feet.  He has onychomycosis with very thick, yellow, dystrophic toenails. He has an ingrown right great toenail.  ASSESSMENT AND PLAN:   1.  Ingrown right great toenail: If trimming does not improve the discomfort, may need toenail excision.  2. Type 2 diabetes:  A1c 7.2 2 months ago.  Continue metformin + Glipizide; likely reduce dose of glipizide after next A1c in a month.  Plan for a repeat A1c in late August.  3.  Central retinal vein occlusion, left:   4.  Stage III chronic kidney disease.  Basic metabolic profile today shows stable GFR.  Plan to repeat a basic metabolic profile no later than late 8/2017.   5.  Atrial fibrillation:  Repeat INR in 2 weeks.

## 2017-07-21 DIAGNOSIS — E11.65 TYPE 2 DIABETES MELLITUS WITH HYPERGLYCEMIA, WITH LONG-TERM CURRENT USE OF INSULIN (H): ICD-10-CM

## 2017-07-21 DIAGNOSIS — Z79.4 TYPE 2 DIABETES MELLITUS WITH HYPERGLYCEMIA, WITH LONG-TERM CURRENT USE OF INSULIN (H): ICD-10-CM

## 2017-07-27 RX ORDER — LANCETS 33 GAUGE
1 EACH MISCELLANEOUS 2 TIMES DAILY
Qty: 100 EACH | Refills: 11 | Status: SHIPPED | OUTPATIENT
Start: 2017-07-27 | End: 2019-08-07

## 2017-08-17 ENCOUNTER — TELEPHONE (OUTPATIENT)
Dept: FAMILY MEDICINE | Facility: CLINIC | Age: 82
End: 2017-08-17

## 2017-08-21 ENCOUNTER — OFFICE VISIT (OUTPATIENT)
Dept: FAMILY MEDICINE | Facility: CLINIC | Age: 82
End: 2017-08-21

## 2017-08-21 VITALS
DIASTOLIC BLOOD PRESSURE: 75 MMHG | BODY MASS INDEX: 24.64 KG/M2 | WEIGHT: 157 LBS | HEIGHT: 67 IN | OXYGEN SATURATION: 96 % | SYSTOLIC BLOOD PRESSURE: 127 MMHG | HEART RATE: 66 BPM | TEMPERATURE: 97.4 F

## 2017-08-21 DIAGNOSIS — E11.65 TYPE 2 DIABETES MELLITUS WITH HYPERGLYCEMIA, WITH LONG-TERM CURRENT USE OF INSULIN (H): ICD-10-CM

## 2017-08-21 DIAGNOSIS — Z79.4 TYPE 2 DIABETES MELLITUS WITH HYPERGLYCEMIA, WITH LONG-TERM CURRENT USE OF INSULIN (H): ICD-10-CM

## 2017-08-21 DIAGNOSIS — L60.0 INGROWN TOENAIL: Primary | ICD-10-CM

## 2017-08-21 DIAGNOSIS — N18.30 CKD (CHRONIC KIDNEY DISEASE) STAGE 3, GFR 30-59 ML/MIN (H): ICD-10-CM

## 2017-08-21 DIAGNOSIS — B35.1 ONYCHOMYCOSIS: ICD-10-CM

## 2017-08-21 LAB
BUN SERPL-MCNC: 29 MG/DL (ref 7–30)
CALCIUM SERPL-MCNC: 10.4 MG/DL (ref 8.5–10.4)
CHLORIDE SERPLBLD-SCNC: 100 MMOL/L (ref 94–109)
CO2 SERPL-SCNC: 24 MMOL/L (ref 20–32)
CREAT SERPL-MCNC: 1.8 MG/DL (ref 0.8–1.5)
EGFR CALCULATED (BLACK REFERENCE): 46.7 ML/MIN
EGFR CALCULATED (NON BLACK REFERENCE): 38.6 ML/MIN
GLUCOSE SERPL-MCNC: 278 MG/DL (ref 60–109)
HBA1C MFR BLD: 7.3 % (ref 4.1–5.7)
POTASSIUM SERPL-SCNC: 5.6 MMOL/L (ref 3.4–5.3)
SODIUM SERPL-SCNC: 146 MMOL/L (ref 133–144)

## 2017-08-21 NOTE — MR AVS SNAPSHOT
"              After Visit Summary   8/21/2017    Suman Burton    MRN: [id number]           Patient Information     Date Of Birth          1935        Visit Information        Provider Department      8/21/2017 2:20 PM Ej Villarreal MD; [facility] PROCEDURE ROOM [facility]        Today's Diagnoses     Ingrown toenail    -  1    CKD (chronic kidney disease) stage 3, GFR 30-59 ml/min        Type 2 diabetes mellitus with hyperglycemia, with long-term current use of insulin (H)        Onychomycosis           Follow-ups after your visit        Who to contact     Please call your clinic at [phone] to:    Ask questions about your health    Make or cancel appointments    Discuss your medicines    Learn about your test results    Speak to your doctor   If you have compliments or concerns about an experience at your clinic, or if you wish to file a complaint, please contact [facility] Patient Relations at [phone] or email us at [email or web address]         Additional Information About Your Visit        Care EveryWhere ID     This is your Care EveryWhere ID. This could be used by other organizations to access your [facility] medical records  [id number]        Your Vitals Were     Pulse Temperature Height Pulse Oximetry BMI (Body Mass Index)       66 97.4  F (36.3  C) (Oral) 5' 6.5\" (168.9 cm) 96% 24.96 kg/m2        Blood Pressure from Last 3 Encounters:   08/21/17 127/75   07/20/17 110/65   05/26/17 132/80    Weight from Last 3 Encounters:   08/21/17 157 lb (71.2 kg)   07/20/17 158 lb 6.4 oz (71.8 kg)   05/26/17 159 lb 12.8 oz (72.5 kg)              We Performed the Following     Basic Metabolic Panel ([facility])  - Results < 1 hr     Hemoglobin A1c ([facility])     REMOVAL OF NAIL PLATE SIMPLE SINGLE     TRIMMING DYSTROPHIC NAILS,ANY NUMBER        Primary Care Provider Office Phone # Fax #    Ej Villarreal [phone]       [facility] [address] [address] [facility] [id number]        Equal Access to Services     DEMETRIO PETTY : Hadii aad ku hadfredpage Campbelleddieali, waelaineda lunegritaadaha, qacarolyneta vivianamapaul mcghee, geovanna lawtonkeithcruzito kim. [facility] [phone].    ATENCIÓN: Si habla español, tiene a ron disposición servicios gratuitos de asistencia lingüística. [facility] [phone].    We comply with applicable federal civil rights laws and [place] laws. We do not discriminate on the basis of race, color, national origin, age, disability sex, sexual orientation or gender identity.            Thank you!     Thank you for choosing [facility]  for your care. Our goal is always to provide you with excellent care. Hearing back from our patients is one way we can continue to improve our services. Please take a few minutes to complete the written survey that you may receive in the mail after your visit with us. Thank you!             Your Updated Medication List - Protect others around you: Learn how to safely use, store and throw away your medicines at www.disposemymeds.org.          This list is accurate as of: 8/21/17  7:32 PM.  Always use your most recent med list.                   Brand Name Dispense Instructions for use Diagnosis    acetaminophen 500 MG tablet    TYLENOL    100 tablet    Take 2 tablets (1,000 mg) by mouth 3 times daily as needed for mild pain    Chronic pain syndrome       amLODIPine 10 MG tablet    NORVASC    90 tablet    Take 1 tablet (10 mg) by mouth daily    Benign essential hypertension       * blood glucose monitoring test strip    no brand specified    1 Box    Test blood glucose twice daily.    Type II or unspecified type diabetes mellitus without mention of complication, not stated as uncontrolled       * blood glucose monitoring test strip    ONE TOUCH ULTRA    100 each    Use to test blood sugar 2 times daily or as directed.    Type 2 diabetes mellitus with hyperglycemia, with long-term current use of insulin (H)        digoxin 125 MCG tablet    LANOXIN    90 tablet    Take 1 tablet (125 mcg) by mouth every other day    Chronic atrial fibrillation (H)       furosemide 20 MG tablet    LASIX    180 tablet    Take 2 tablets (40 mg) by mouth every morning (before breakfast)    Dyspnea on exertion, Benign essential hypertension       glipiZIDE 10 MG tablet    GLUCOTROL    360 tablet    Take 2 tablets (20 mg) by mouth 2 times daily Take 20mg in AM with breakfast and 20mg in PM with evening meal    Type 2 diabetes mellitus with hyperglycemia, with long-term current use of insulin (H)       insulin pen needle 32G X 4 MM    BD TAMIKA U/F    100 each    Use once daily or as directed.    Type 2 diabetes mellitus with hyperglycemia (H)       loperamide 2 MG tablet    IMODIUM A-D     Take 2 tabs in the morning and 1 tablet in the evening        metFORMIN 500 MG tablet    GLUCOPHAGE    360 tablet    Take 2 tablets (1,000 mg) by mouth 2 times daily (with meals)    Type 2 diabetes mellitus with hyperglycemia, with long-term current use of insulin (H)       metoprolol 100 MG 24 hr tablet    TOPROL-XL    90 tablet    Take 1 tablet (100 mg) by mouth daily    Benign essential hypertension       MULTIVITAMINS PO      Take 1 tablet by mouth daily.        * ONE TOUCH FINE POINT LANCETS      Check blood sugars twice a day.        * blood glucose monitoring lancets     1 Box    Use to test blood sugar 2 times daily or as directed.    Diabetes mellitus, type 2 (H)       * ONETOUCH DELICA LANCETS 33G Misc     100 each    1 strip 2 times daily    Type 2 diabetes mellitus with hyperglycemia, with long-term current use of insulin (H)       ranitidine 150 MG tablet    ZANTAC     Take 1 tablet (150 mg) by mouth daily        warfarin 3 MG tablet    COUMADIN    90 tablet    Take 1 tablet (3 mg) by mouth daily    Chronic atrial fibrillation (H)       * Notice:  This list has 5 medication(s) that are the same as other medications prescribed for you. Read the  directions carefully, and ask your doctor or other care provider to review them with you.

## 2017-08-21 NOTE — PROGRESS NOTES
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  He presents today with a number of issues.   First, as far as his diabetes, his A1c in May was 7.3%.  His morning blood glucoses are typically in the 140-160 range and postprandials usually similar to 160s to 170s.  He has had no readings under 100, no hypoglycemic symptoms.  No polyuria or polydipsia.   Next, as far as his reflux, he continues to have on and off heartburn.  He finds himself using Tums 2 or 3 times a day.  In the past he has used Zantac which helped with his reflux.  He is considering returning to daily Zantac for prophylaxis.   As far as his chronic kidney disease his GFR is typically 37-39.  He has not had change in his urination.     The medial side of his right great toenail is in pain.  He had transient relief with a partial toenail excision recently in the office.  The pain has persisted and he presents today for more complete excision of the medial nail fold.     Lastly, he has yellow dystrophic toenails x10.  He is unable to cut his nails because there are too thick and brittle and because his vision has become to poor.   REVIEW OF SYSTEMS:  No fevers or chills.  He is otherwise in his usual state.  He is frustrated with his vision loss which makes it hard for him to enjoy things as he cannot read or see the television.  He is no longer driving.  The remainder of the review of systems is outlined above.   PHYSICAL EXAMINATION:   VITAL SIGNS:  As above, blood pressure is at goal.   GENERAL:  He is alert.  He holds his left eye a bit closed.   EXTREMITIES:  Examination of his feet shows chronic venous stasis.  He has a scaling throughout both shins.  He has yellow thickened toenails x10.  He has mild amount of redness along the medial right great toenail fold.  He has tenderness to palpation along  the medial right toenail fold.   ASSESSMENT AND PLAN:   1.  Ingrown right great toenail:  We discussed the risks of a toenail excision procedure including bleeding, infection and pain, recurrence and non-efficacy.  He consented to the procedure.  He signed a consent form.  We discussed the risks and also we filled out a pause for the cause, safety check list.  After cleaning the skin with alcohol and Betadine and numbing with 2 cc of 2% lidocaine on each side of nail.  The toenail was excised (the medial one-half) in the usual fashion without complication.  Blood loss was less than 1 cc.  I did not use a tourniquet during the procedure.  He will resume his Coumadin.  He was given post-toenail excision cares.   2.  Type 2 diabetes:  A1c today is 7.3%.  Continue current medication regimen.  Plan to repeat an A1c in 6 months.   3.  Stage 3 chronic kidney disease:  GFR today is 39 and stable.  Continue current blood pressure, diabetes management.  His potassium was mildly elevated at 5.6.  I recommend a recheck before making any other changes.  He will schedule a lab only visit for some time no later than 14 days from now.   4.  Gastroesophageal reflux disease:  He can use Zantac on a daily basis.  Will monitor for side effects including return of loose stools which has been a problem for him in the past.   5.  Atrial fibrillation:  He will resume his typical Coumadin dosing today.

## 2017-08-21 NOTE — PROGRESS NOTES
Please send result letter  As we discussed in clinic, your kidney function is stable and your blood sugars are well controlled.  Your potassium level is slightly elevated. As you know, this could be due to lab error or hemolysis during the blood processing process. I recommend you have your potassium level rechecked in 1 to 2 weeks with a lab only appt.

## 2017-08-21 NOTE — LETTER
August 22, 2017      Suman JONES Josephine  [address]  [facility] [address]        Dear Suman,    As we discussed in clinic, your kidney function is stable and your blood sugars are well controlled.   Your potassium level is slightly elevated. As you know, this could be due to lab error or hemolysis during the blood processing process. I recommend you have your potassium level rechecked in 1 to 2 weeks with a lab only appt.     Please see below for your test results.    Resulted Orders   Basic Metabolic Panel ([facility])  - Results < 1 hr   Result Value Ref Range    Glucose 278.0 (H) 60.0 - 109.0 mg/dL    Urea Nitrogen 29.0 7.0 - 30.0 mg/dL    Creatinine 1.8 (H) 0.8 - 1.5 mg/dL    Sodium 146.0 (H) 133.0 - 144.0 mmol/L    Potassium 5.6 (H) 3.4 - 5.3 mmol/L    Chloride 100.0 94.0 - 109.0 mmol/L    Carbon Dioxide 24.0 20.0 - 32.0 mmol/L    Calcium 10.4 8.5 - 10.4 mg/dL    eGFR Calculated (Non Black Reference) 38.6 (L) >60.0 mL/min    eGFR Calculated (Black Reference) 46.7 (L) >60.0 mL/min   Hemoglobin A1c ([facility])   Result Value Ref Range    Hemoglobin A1C 7.3 (H) 4.1 - 5.7 %       If you have any questions, please call the clinic to make an appointment.    Sincerely,    Ej Villarreal MD

## 2017-09-11 ENCOUNTER — MEDICAL CORRESPONDENCE (OUTPATIENT)
Dept: HEALTH INFORMATION MANAGEMENT | Facility: CLINIC | Age: 82
End: 2017-09-11

## 2017-09-21 ENCOUNTER — TRANSFERRED RECORDS (OUTPATIENT)
Dept: HEALTH INFORMATION MANAGEMENT | Facility: CLINIC | Age: 82
End: 2017-09-21

## 2017-12-04 ENCOUNTER — OFFICE VISIT (OUTPATIENT)
Dept: FAMILY MEDICINE | Facility: CLINIC | Age: 82
End: 2017-12-04

## 2017-12-04 VITALS
DIASTOLIC BLOOD PRESSURE: 77 MMHG | BODY MASS INDEX: 24.32 KG/M2 | TEMPERATURE: 97.3 F | SYSTOLIC BLOOD PRESSURE: 120 MMHG | OXYGEN SATURATION: 96 % | HEART RATE: 89 BPM | WEIGHT: 153 LBS

## 2017-12-04 DIAGNOSIS — E11.65 TYPE 2 DIABETES MELLITUS WITH HYPERGLYCEMIA, WITH LONG-TERM CURRENT USE OF INSULIN (H): ICD-10-CM

## 2017-12-04 DIAGNOSIS — I48.20 CHRONIC ATRIAL FIBRILLATION (H): ICD-10-CM

## 2017-12-04 DIAGNOSIS — E11.65 TYPE 2 DIABETES MELLITUS WITH HYPERGLYCEMIA, WITHOUT LONG-TERM CURRENT USE OF INSULIN (H): ICD-10-CM

## 2017-12-04 DIAGNOSIS — Z23 NEEDS FLU SHOT: ICD-10-CM

## 2017-12-04 DIAGNOSIS — Z79.4 TYPE 2 DIABETES MELLITUS WITH HYPERGLYCEMIA, WITH LONG-TERM CURRENT USE OF INSULIN (H): ICD-10-CM

## 2017-12-04 DIAGNOSIS — N18.30 CHRONIC KIDNEY DISEASE, STAGE III (MODERATE) (H): Primary | ICD-10-CM

## 2017-12-04 LAB
BUN SERPL-MCNC: 29 MG/DL (ref 7–30)
CALCIUM SERPL-MCNC: 10.1 MG/DL (ref 8.5–10.4)
CHLORIDE SERPLBLD-SCNC: 103 MMOL/L (ref 94–109)
CO2 SERPL-SCNC: 23 MMOL/L (ref 20–32)
CREAT SERPL-MCNC: 1.7 MG/DL (ref 0.8–1.5)
EGFR CALCULATED (BLACK REFERENCE): 49.9 ML/MIN
EGFR CALCULATED (NON BLACK REFERENCE): 41.2 ML/MIN
GLUCOSE SERPL-MCNC: 199 MG/DL (ref 60–109)
HBA1C MFR BLD: 7.4 % (ref 4.1–5.7)
INR PPP: 3
POTASSIUM SERPL-SCNC: 4.7 MMOL/L (ref 3.4–5.3)
SODIUM SERPL-SCNC: 138 MMOL/L (ref 133–144)

## 2017-12-04 NOTE — PROGRESS NOTES
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  He has a complex medical history.  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  He presents today to start care planning for transfer to a primary care clinic closer to his home in [place].  He is no longer able to drive due to his vision and finds it increasingly difficult to get the 45 minutes over to this clinic.  Our longtime nurse manager is retiring in the coming 2 weeks which in the and has prompted the move.     As far as his atrial fibrillation his last INR was done in late July.  He has not had any bruising or bleeding.  He continues on 3 mg a day which has not changed in a year and a half.   Next, as far as his diabetes he continues on metformin and glipizide.  His medication options were significantly limited over the years due to cost, but [facility] all medications for about $15.00.  He previously was on Lantus insulin.  Due to decreasing A1c he moved to an oral only regimen.  He has had some difficulty with very loose stools about 3 or 4 times a month, so loose that it causes stool incontinence.  It is very embarrassing and messy for him to deal with his stool incontinence.  He reduced how much Zantac he was taking and this seemed to reduce the frequency but not extinguished these symptoms.  We have done some medication adjustments in the past which seemed to help but not extinguished his symptoms.  He denies any blood in stool, black stools.  He continues to have yellow thickened toenails and when he trims them himself he always draw blood at the end of his toes.  He has had resolution of his toenail pain since a partial toenail removal during the summer.     As far as his  hypertension he had been checking blood pressures first thing in the morning at home with 130s to 140s systolic.  His blood pressures have been in the 120s/70s here in the clinic.  Denies dizziness or lightheadedness.   Next, as far as his central retinal vein occlusion, he has had no improvement in his vision over the course of the summer.  He is no longer able to drive.  He can hardly read the paper with a magnifying air.  He is unable to read his book with book club.  He is very frustrated.     As far as his atrial fibrillation he feels rate controlled.  He has not had palpitations or racing heart.  He continues on digoxin.  He has also seen Dr. Gibbons of [facility].     Lastly, and most importantly he continues now with years of anhedonia, lack of motivation.  No appetite and a slow weight loss.  He feels very guilty that he is not able to get himself up and moving it.  He will frequently stay in bed for most of the day until 3 in the afternoon.  His wife begs him to just get up in the morning at breakfast and do 10 minutes of walking or exercise and he just cannot motivate himself to do it.  He feels very guilty about this.  This is a cycle that has been going on now for a number of years.  He tried citalopram starting at 10 mg back in March 2015 and worked his way up to 20 mg and continued the medication for about 4 months.  He did not feel any improvement.  He felt his tremor was a bit worse.  He has not taken any alternative antidepressants.   REVIEW OF SYSTEMS:  He has longstanding back pain.  He gets short of breath with walking which is unchanged.  He can only walk 100 feet before he feels tired.  He feels unsteady on his feet.  His typical weight was about 165 pounds during his adult life.  He currently weighs 153 pounds.  He was 158 pounds 4 months ago.  The remainder of the review of systems is as outlined above.   OBJECTIVE:   VITAL SIGNS:  As above.  He has lost about 5 pounds in the last  4 months.  Blood pressure is at goal.   GENERAL:  He is alert, in no distress.  He has a somewhat flat affect today but is an emotional day.   HEENT:  Head is normocephalic and atraumatic.  Eyes, sclerae are nonicteric, not injected.  The neck is free of adenopathy he has moist mucous membranes.   CARDIOVASCULAR:  Heart tones are quiet and irregularly irregular.   LUNGS:  He has fair air exchange bilaterally with a prolonged expiratory phase.   ABDOMEN:  Soft.   EXTREMITIES:  Have trace to 1+ peripheral edema.  He has a thickened erythematous skin on his feet, without ulceration.  He has onychomycosis with very thick yellow dystrophic toenails x10.   ASSESSMENT AND PLAN:   1.  Weight loss and lack of motivation; likely depression:  He plans to transfer his primary care to [place].  I think pursuing his mood, world-view, and ability to get out of bed each day and enjoy at least some aspect of his life is paramount.  His wife and family are going to work to get him audiobooks so he can continue to participate in his monthly book club.  I would strongly consider alternative antidepressant medications (he has only tried citalopram x4 months back in 2015).  He does have a number of chronic conditions and medications so medications will need to be chosen and titrated carefully.  Counseling may be beneficial. He will schedule an appointment with his new primary physician in the next few weeks to outline a care strategy.   2.  Type 2 diabetes:  A1c today is 7.4%.  His goal A1c is 8.5%, in light of his age and longstanding disease.  I recommend reducing his metformin from 1000 mg twice a day to 500 mg twice a day in an effort to reduce his loose stools.  Could consider discontinuing metformin altogether.  I would also consider now that [facility] other [facility] discontinuing glipizide and potentially returning to a lower dose metformin plus Lantus only regimen.  He will work out a long-term plan with  his new primary.   3.  Stage III chronic kidney disease:  GFR today shows stable GFR in the 40s.  Plan to repeat a basic metabolic profile no later than 3 months.   4.  Atrial fibrillation:  INR today is 3.0.  Continue Coumadin 3 mg a day.  He will follow up with an INR no later than 8 weeks through his new primary office.   5.  Central rate vein occlusion, left:  Continue to follow with ophthalmology.  Continue to pursue vision loss resources.   6.  COPD:  Stable.

## 2017-12-04 NOTE — LETTER
December 6, 2017      Suman KAREN Lipan  [address]  [facility] [address]        Dear Suman,    As we discussed your diabetes is well controlled.  Your kidney function is stable.   Your new physician will have access to your lab results through the electronic medical record.     Please see below for your test results.    Resulted Orders   Basic Metabolic Panel ([facility])  - Results < 1 hr   Result Value Ref Range    Glucose 199.0 (H) 60.0 - 109.0 mg/dL    Urea Nitrogen 29.0 7.0 - 30.0 mg/dL    Creatinine 1.7 (H) 0.8 - 1.5 mg/dL    Sodium 138.0 133.0 - 144.0 mmol/L    Potassium 4.7 3.4 - 5.3 mmol/L    Chloride 103.0 94.0 - 109.0 mmol/L    Carbon Dioxide 23.0 20.0 - 32.0 mmol/L    Calcium 10.1 8.5 - 10.4 mg/dL    eGFR Calculated (Non Black Reference) 41.2 (L) >60.0 mL/min    eGFR Calculated (Black Reference) 49.9 (L) >60.0 mL/min   INR ([facility])   Result Value Ref Range    INR 3.0       Comment:      Adult Normal Range: 0.90 - 1.10  Therapeutic Range: 2.0 - 3.5     Hemoglobin A1c ([facility])   Result Value Ref Range    Hemoglobin A1C 7.4 (H) 4.1 - 5.7 %       If you have any questions, please call the clinic to make an appointment.    Sincerely,    Ej Villarreal MD

## 2017-12-04 NOTE — PATIENT INSTRUCTIONS
Indication for therapy: Atrial Fibrillation 427.31 with INR goal: 2.0 - 3.0. Duration of therapy: indefinite    INR today within goal range.     Using Warfarin Pill size at home: 3 mg (beige/tan), weekly Warfarin dosing as follows:     Sunday Dose: 3mg Monday Dose: 3mg Tuesday Dose: 3mg Wednesday Dose: 3mg Thursday Dose: 3mg Friday Dose: 3mg Saturday Dose: 3mg   Additional Week Dosing (will be blank, if not needed)    mg   mg   mg   mg   mg   mg   mg        Recheck INR: 8 weeks is recommended.   Approximate.    You are transferring your care to a physician in [place].  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   Chavez NLOASCO

## 2017-12-04 NOTE — MR AVS SNAPSHOT
After Visit Summary   12/4/2017    Suman Burton    MRN: [id number]           Patient Information     Date Of Birth          1935        Visit Information        Provider Department      12/4/2017 11:20 AM Ej Villarreal MD [facility]        Today's Diagnoses     Chronic kidney disease, stage III (moderate)    -  1    Chronic atrial fibrillation (H)        Needs flu shot        Type 2 diabetes mellitus with hyperglycemia, without long-term current use of insulin (H)        Type 2 diabetes mellitus with hyperglycemia, with long-term current use of insulin (H)          Care Instructions    Indication for therapy: Atrial Fibrillation 427.31 with INR goal: 2.0 - 3.0. Duration of therapy: indefinite    INR today within goal range.     Using Warfarin Pill size at home: 3 mg (beige/tan), weekly Warfarin dosing as follows:     Sunday Dose: 3mg Monday Dose: 3mg Tuesday Dose: 3mg Wednesday Dose: 3mg Thursday Dose: 3mg Friday Dose: 3mg Saturday Dose: 3mg   Additional Week Dosing (will be blank, if not needed)    mg   mg   mg   mg   mg   mg   mg        Recheck INR: 8 weeks is recommended.   Approximate.    You are transferring your care to a physician in [place].  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   Chavez NOLASCO            Follow-ups after your visit        Follow-up notes from your care team     Return in about 4 weeks (around 1/1/2018).      Who to contact     Please call your clinic at [phone] to:    Ask questions about your health    Make or cancel appointments    Discuss your medicines    Learn about your test results    Speak to your doctor   If you have compliments or concerns about an experience at your clinic, or if you wish to file a complaint, please contact [facility] Patient Relations at [phone] or email us at [email or web address]         Additional Information About Your Visit        MyChart Information     [employer] is an  electronic gateway that provides easy, online access to your medical records. With [employer], you can request a clinic appointment, read your test results, renew a prescription or communicate with your care team.     To sign up for [employer] visit the website at [email or web address]   You will be asked to enter the access code listed below, as well as some personal information. Please follow the directions to create your username and password.     Your access code is: [email or web address]  Expires: 3/5/2018  8:16 PM     Your access code will  in 90 days. If you need help or a new code, please contact your [facility] or call [phone] for assistance.        Care EveryWhere ID     This is your Care EveryWhere ID. This could be used by other organizations to access your [place] medical records  [id number]        Your Vitals Were     Pulse Temperature Pulse Oximetry BMI (Body Mass Index)          89 97.3  F (36.3  C) (Oral) 96% 24.32 kg/m2         Blood Pressure from Last 3 Encounters:   17 120/77   17 127/75   17 110/65    Weight from Last 3 Encounters:   17 153 lb (69.4 kg)   17 157 lb (71.2 kg)   17 158 lb 6.4 oz (71.8 kg)              We Performed the Following     ADMIN VACCINE, INITIAL     Basic Metabolic Panel ([facility])  - Results < 1 hr     C FOOT EXAM  NO CHARGE     Hemoglobin A1c ([facility])     INR ([facility])          Today's Medication Changes          These changes are accurate as of: 17 11:59 PM.  If you have any questions, ask your nurse or doctor.               These medicines have changed or have updated prescriptions.        Dose/Directions    metFORMIN 500 MG tablet   Commonly known as:  GLUCOPHAGE   This may have changed:  how much to take   Used for:  Type 2 diabetes mellitus with hyperglycemia, with long-term current use of insulin (H)   Changed by:  Ej Villarreal MD        Dose:  500 mg   Take 1 tablet (500 mg) by mouth 2  times daily (with meals)   Quantity:  360 tablet   Refills:  0            Where to get your medicines      Some of these will need a paper prescription and others can be bought over the counter.  Ask your nurse if you have questions.     You don't need a prescription for these medications     metFORMIN 500 MG tablet                Primary Care Provider Office Phone # Fax #    Ej Villarreal [phone]       [facility] [address] [place] [address]        Equal Access to Services     JUDITH [facility] HospitalKENIA : Hadii aad ku hadasho Soomaali, waaxda luqadaha, qaybta kaalmada adeegyada, waxay idiin hayaan adeeg kharash la'aan . So [facility] [phone].    ATENCIÓN: Si habla español, tiene a ron disposición servicios gratuitos de asistencia lingüística. [facility] [phone].    We comply with applicable federal civil rights laws and [place] laws. We do not discriminate on the basis of race, color, national origin, age, disability, sex, sexual orientation, or gender identity.            Thank you!     Thank you for choosing [facility]  for your care. Our goal is always to provide you with excellent care. Hearing back from our patients is one way we can continue to improve our services. Please take a few minutes to complete the written survey that you may receive in the mail after your visit with us. Thank you!             Your Updated Medication List - Protect others around you: Learn how to safely use, store and throw away your medicines at www.disposemymeds.org.          This list is accurate as of: 12/4/17 11:59 PM.  Always use your most recent med list.                   Brand Name Dispense Instructions for use Diagnosis    acetaminophen 500 MG tablet    TYLENOL    100 tablet    Take 2 tablets (1,000 mg) by mouth 3 times daily as needed for mild pain    Chronic pain syndrome       amLODIPine 10 MG tablet    NORVASC    90 tablet    Take 1 tablet (10 mg) by mouth daily    Benign  essential hypertension       * blood glucose monitoring test strip    no brand specified    1 Box    Test blood glucose twice daily.    Type II or unspecified type diabetes mellitus without mention of complication, not stated as uncontrolled       * blood glucose monitoring test strip    ONETOUCH ULTRA    100 each    Use to test blood sugar 2 times daily or as directed.    Type 2 diabetes mellitus with hyperglycemia, with long-term current use of insulin (H)       digoxin 125 MCG tablet    LANOXIN    90 tablet    Take 1 tablet (125 mcg) by mouth every other day    Chronic atrial fibrillation (H)       furosemide 20 MG tablet    LASIX    180 tablet    Take 2 tablets (40 mg) by mouth every morning (before breakfast)    Dyspnea on exertion, Benign essential hypertension       glipiZIDE 10 MG tablet    GLUCOTROL    360 tablet    Take 2 tablets (20 mg) by mouth 2 times daily Take 20mg in AM with breakfast and 20mg in PM with evening meal    Type 2 diabetes mellitus with hyperglycemia, with long-term current use of insulin (H)       insulin pen needle 32G X 4 MM    BD TAMIKA U/F    100 each    Use once daily or as directed.    Type 2 diabetes mellitus with hyperglycemia (H)       loperamide 2 MG tablet    IMODIUM A-D     Take 2 tabs in the morning and 1 tablet in the evening        metFORMIN 500 MG tablet    GLUCOPHAGE    360 tablet    Take 1 tablet (500 mg) by mouth 2 times daily (with meals)    Type 2 diabetes mellitus with hyperglycemia, with long-term current use of insulin (H)       metoprolol 100 MG 24 hr tablet    TOPROL-XL    90 tablet    Take 1 tablet (100 mg) by mouth daily    Benign essential hypertension       MULTIVITAMINS PO      Take 1 tablet by mouth daily.        * ONE TOUCH FINE POINT LANCETS      Check blood sugars twice a day.        * blood glucose monitoring lancets     1 Box    Use to test blood sugar 2 times daily or as directed.    Diabetes mellitus, type 2 (H)       * ONETOUCH DELICA LANCETS 33G  Misc     100 each    1 strip 2 times daily    Type 2 diabetes mellitus with hyperglycemia, with long-term current use of insulin (H)       ranitidine 150 MG tablet    ZANTAC     Take 1 tablet (150 mg) by mouth daily        warfarin 3 MG tablet    COUMADIN    90 tablet    Take 1 tablet (3 mg) by mouth daily    Chronic atrial fibrillation (H)       * Notice:  This list has 5 medication(s) that are the same as other medications prescribed for you. Read the directions carefully, and ask your doctor or other care provider to review them with you.

## 2017-12-06 NOTE — PROGRESS NOTES
Please call with results and recommendations AND send letter with results and recommendations.  As we discussed your diabetes is well controlled.  Your kidney function is stable.  Your new physician will have access to your lab results through the electronic medical record.

## 2018-01-08 ENCOUNTER — OFFICE VISIT (OUTPATIENT)
Dept: FAMILY MEDICINE | Facility: CLINIC | Age: 83
End: 2018-01-08
Payer: MEDICARE

## 2018-01-08 VITALS
BODY MASS INDEX: 24.33 KG/M2 | TEMPERATURE: 97 F | HEART RATE: 75 BPM | WEIGHT: 155 LBS | HEIGHT: 67 IN | RESPIRATION RATE: 16 BRPM | SYSTOLIC BLOOD PRESSURE: 126 MMHG | DIASTOLIC BLOOD PRESSURE: 60 MMHG

## 2018-01-08 DIAGNOSIS — I10 BENIGN HYPERTENSION: Primary | ICD-10-CM

## 2018-01-08 DIAGNOSIS — G47.10 HYPERSOMNIA: ICD-10-CM

## 2018-01-08 DIAGNOSIS — Z23 NEED FOR PROPHYLACTIC VACCINATION AND INOCULATION AGAINST INFLUENZA: ICD-10-CM

## 2018-01-08 DIAGNOSIS — I48.20 CHRONIC ATRIAL FIBRILLATION (H): ICD-10-CM

## 2018-01-08 PROCEDURE — 90662 IIV NO PRSV INCREASED AG IM: CPT | Performed by: FAMILY MEDICINE

## 2018-01-08 PROCEDURE — 99214 OFFICE O/P EST MOD 30 MIN: CPT | Mod: 25 | Performed by: FAMILY MEDICINE

## 2018-01-08 PROCEDURE — G0008 ADMIN INFLUENZA VIRUS VAC: HCPCS | Performed by: FAMILY MEDICINE

## 2018-01-08 RX ORDER — WARFARIN SODIUM 3 MG/1
3 TABLET ORAL DAILY
Qty: 90 TABLET | Refills: 3 | Status: SHIPPED | OUTPATIENT
Start: 2018-01-08 | End: 2019-01-17

## 2018-01-08 ASSESSMENT — ANXIETY QUESTIONNAIRES
IF YOU CHECKED OFF ANY PROBLEMS ON THIS QUESTIONNAIRE, HOW DIFFICULT HAVE THESE PROBLEMS MADE IT FOR YOU TO DO YOUR WORK, TAKE CARE OF THINGS AT HOME, OR GET ALONG WITH OTHER PEOPLE: NOT DIFFICULT AT ALL
7. FEELING AFRAID AS IF SOMETHING AWFUL MIGHT HAPPEN: NOT AT ALL
5. BEING SO RESTLESS THAT IT IS HARD TO SIT STILL: NOT AT ALL
GAD7 TOTAL SCORE: 0
6. BECOMING EASILY ANNOYED OR IRRITABLE: NOT AT ALL
2. NOT BEING ABLE TO STOP OR CONTROL WORRYING: NOT AT ALL
1. FEELING NERVOUS, ANXIOUS, OR ON EDGE: NOT AT ALL
3. WORRYING TOO MUCH ABOUT DIFFERENT THINGS: NOT AT ALL

## 2018-01-08 ASSESSMENT — PATIENT HEALTH QUESTIONNAIRE - PHQ9
5. POOR APPETITE OR OVEREATING: NOT AT ALL
SUM OF ALL RESPONSES TO PHQ QUESTIONS 1-9: 5

## 2018-01-08 NOTE — PATIENT INSTRUCTIONS
We had a lengthy discussion about the causes of his hypersomnia during the day which then leads to difficulties with insomnia at night.  He is not getting any exercise.  We discussed a trial of an antidepressant.  His PHQ 9 score today was 5.  He was tried a number of years ago on an antidepressant which did not have any positive effects for him.  We did finally come to the conclusion that we would try having him increase his exercise by walking in place.  He will follow-up in one month.  Influenza vaccine was given today.  The patient no longer drives due to his decreased vision in his left eye.  He sees a retina specialist at the [facility] and is very happy with that care so will not get a second opinion from a different retina specialist.

## 2018-01-08 NOTE — MR AVS SNAPSHOT
After Visit Summary   1/8/2018    Suman Burton    MRN: [id number]           Patient Information     Date Of Birth          1935        Visit Information        Provider Department      1/8/2018 2:15 PM Jamie Guerrier MD [facility]        Today's Diagnoses     Benign hypertension    -  1    Chronic atrial fibrillation (H)        Need for prophylactic vaccination and inoculation against influenza        Hypersomnia          Care Instructions    We had a lengthy discussion about the causes of his hypersomnia during the day which then leads to difficulties with insomnia at night.  He is not getting any exercise.  We discussed a trial of an antidepressant.  His PHQ 9 score today was 5.  He was tried a number of years ago on an antidepressant which did not have any positive effects for him.  We did finally come to the conclusion that we would try having him increase his exercise by walking in place.  He will follow-up in one month.  Influenza vaccine was given today.  The patient no longer drives due to his decreased vision in his left eye.  He sees a retina specialist at the [facility] and is very happy with that care so will not get a second opinion from a different retina specialist.          Follow-ups after your visit        Your next 10 appointments already scheduled     Feb 19, 2018  1:00 PM CST   SHORT [facility]    [address] [place] [address] [id number] [phone]              Who to contact     If you have questions or need follow up information about today's clinic visit or your schedule please contact [facility] directly at [phone].  Normal or non-critical lab and imaging results will be communicated to you by MyChart, letter or phone within 4 business days after the clinic has received  "the results. If you do not hear from us within 7 days, please contact the clinic through [employer] or phone. If you have a critical or abnormal lab result, we will notify you by phone as soon as possible.  Submit refill requests through [employer] or call your pharmacy and they will forward the refill request to us. Please allow 3 business days for your refill to be completed.          Additional Information About Your Visit        [employer] Information     [employer] lets you send messages to your doctor, view your test results, renew your prescriptions, schedule appointments and more. To sign up, go to [email or web address] . Click on \"Log in\" on the left side of the screen, which will take you to the Welcome page. Then click on \"Sign up Now\" on the right side of the page.     You will be asked to enter the access code listed below, as well as some personal information. Please follow the directions to create your username and password.     Your access code is: [email or web address]  Expires: 3/5/2018  8:16 PM     Your access code will  in 90 days. If you need help or a new code, please call your [place] clinic or [phone].        Care EveryWhere ID     This is your Care EveryWhere ID. This could be used by other organizations to access your [place] medical records  [id number]        Your Vitals Were     Pulse Temperature Respirations Height BMI (Body Mass Index)       75 97  F (36.1  C) (Tympanic) 16 5' 6.5\" (1.689 m) 24.64 kg/m2        Blood Pressure from Last 3 Encounters:   18 126/60   17 120/77   17 127/75    Weight from Last 3 Encounters:   18 155 lb (70.3 kg)   17 153 lb (69.4 kg)   17 157 lb (71.2 kg)              We Performed the Following     ADMIN INFLUENZA (For MEDICARE Patients ONLY) []     FLU VACCINE, INCREASED ANTIGEN, PRESV FREE, AGE 65+ [00288]          Where to get your medicines      Some of these will need a paper prescription and others can be bought over " the counter.  Ask your nurse if you have questions.     Bring a paper prescription for each of these medications     warfarin 3 MG tablet          Primary Care Provider Office Phone # Fax #    Ej Villarreal [phone]       [facility] [address] [place] [id number]        Equal Access to Services     MANDAJUDITH JOVANNY : Hadii aad ku hadasho Soomaali, waaxda luqadaha, qaybta kaalmada adeegyada, waxay idiin hayaan adeeg khkeithsh la'filippon ah. So [facility] [phone].    ATENCIÓN: Si habla español, tiene a ron disposición servicios gratuitos de asistencia lingüística. Llame al [phone].    We comply with applicable federal civil rights laws and [place] laws. We do not discriminate on the basis of race, color, national origin, age, disability, sex, sexual orientation, or gender identity.            Thank you!     Thank you for choosing [facility]  for your care. Our goal is always to provide you with excellent care. Hearing back from our patients is one way we can continue to improve our services. Please take a few minutes to complete the written survey that you may receive in the mail after your visit with us. Thank you!             Your Updated Medication List - Protect others around you: Learn how to safely use, store and throw away your medicines at www.disposemymeds.org.          This list is accurate as of: 1/8/18  3:27 PM.  Always use your most recent med list.                   Brand Name Dispense Instructions for use Diagnosis    acetaminophen 500 MG tablet    TYLENOL    100 tablet    Take 2 tablets (1,000 mg) by mouth 3 times daily as needed for mild pain    Chronic pain syndrome       amLODIPine 10 MG tablet    NORVASC    90 tablet    Take 1 tablet (10 mg) by mouth daily    Benign essential hypertension       * blood glucose monitoring test strip    no brand specified    1 Box    Test blood glucose twice daily.    Type II or unspecified type diabetes  mellitus without mention of complication, not stated as uncontrolled       * blood glucose monitoring test strip    ONETOUCH ULTRA    100 each    Use to test blood sugar 2 times daily or as directed.    Type 2 diabetes mellitus with hyperglycemia, with long-term current use of insulin (H)       digoxin 125 MCG tablet    LANOXIN    90 tablet    Take 1 tablet (125 mcg) by mouth every other day    Chronic atrial fibrillation (H)       furosemide 20 MG tablet    LASIX    180 tablet    Take 2 tablets (40 mg) by mouth every morning (before breakfast)    Dyspnea on exertion, Benign essential hypertension       glipiZIDE 10 MG tablet    GLUCOTROL    360 tablet    Take 2 tablets (20 mg) by mouth 2 times daily Take 20mg in AM with breakfast and 20mg in PM with evening meal    Type 2 diabetes mellitus with hyperglycemia, with long-term current use of insulin (H)       insulin pen needle 32G X 4 MM    BD TAMIKA U/F    100 each    Use once daily or as directed.    Type 2 diabetes mellitus with hyperglycemia (H)       loperamide 2 MG tablet    IMODIUM A-D     Take 2 tabs in the morning and 1 tablet in the evening        metFORMIN 500 MG tablet    GLUCOPHAGE    360 tablet    Take 1 tablet (500 mg) by mouth 2 times daily (with meals)    Type 2 diabetes mellitus with hyperglycemia, with long-term current use of insulin (H)       metoprolol 100 MG 24 hr tablet    TOPROL-XL    90 tablet    Take 1 tablet (100 mg) by mouth daily    Benign essential hypertension       MULTIVITAMINS PO      Take 1 tablet by mouth daily.        * ONE TOUCH FINE POINT LANCETS      Check blood sugars twice a day.        * blood glucose monitoring lancets     1 Box    Use to test blood sugar 2 times daily or as directed.    Diabetes mellitus, type 2 (H)       * ONETOUCH DELICA LANCETS 33G Misc     100 each    1 strip 2 times daily    Type 2 diabetes mellitus with hyperglycemia, with long-term current use of insulin (H)       ranitidine 150 MG tablet    ZANTAC      Take 1 tablet (150 mg) by mouth daily        warfarin 3 MG tablet    COUMADIN    90 tablet    Take 1 tablet (3 mg) by mouth daily    Chronic atrial fibrillation (H)       * Notice:  This list has 5 medication(s) that are the same as other medications prescribed for you. Read the directions carefully, and ask your doctor or other care provider to review them with you.

## 2018-01-08 NOTE — NURSING NOTE
"Chief Complaint   Patient presents with     Establish Care       Initial /60  Pulse 75  Temp 97  F (36.1  C) (Tympanic)  Resp 16  Ht 5' 6.5\" (1.689 m)  Wt 155 lb (70.3 kg)  BMI 24.64 kg/m2 Estimated body mass index is 24.64 kg/(m^2) as calculated from the following:    Height as of this encounter: 5' 6.5\" (1.689 m).    Weight as of this encounter: 155 lb (70.3 kg).  Medication Reconciliation: complete     Talya Casas CMA      "

## 2018-01-09 ASSESSMENT — ANXIETY QUESTIONNAIRES: GAD7 TOTAL SCORE: 0

## 2018-02-20 ENCOUNTER — OFFICE VISIT (OUTPATIENT)
Dept: FAMILY MEDICINE | Facility: CLINIC | Age: 83
End: 2018-02-20
Payer: MEDICARE

## 2018-02-20 VITALS
DIASTOLIC BLOOD PRESSURE: 64 MMHG | OXYGEN SATURATION: 94 % | RESPIRATION RATE: 15 BRPM | SYSTOLIC BLOOD PRESSURE: 126 MMHG | BODY MASS INDEX: 24.64 KG/M2 | HEART RATE: 68 BPM | TEMPERATURE: 98.5 F | WEIGHT: 155 LBS

## 2018-02-20 DIAGNOSIS — G47.10 HYPERSOMNIA: ICD-10-CM

## 2018-02-20 DIAGNOSIS — G47.00 PERSISTENT INSOMNIA: ICD-10-CM

## 2018-02-20 DIAGNOSIS — I10 BENIGN ESSENTIAL HYPERTENSION: ICD-10-CM

## 2018-02-20 DIAGNOSIS — E11.65 TYPE 2 DIABETES MELLITUS WITH HYPERGLYCEMIA, WITH LONG-TERM CURRENT USE OF INSULIN (H): Primary | ICD-10-CM

## 2018-02-20 DIAGNOSIS — Z79.4 TYPE 2 DIABETES MELLITUS WITH HYPERGLYCEMIA, WITH LONG-TERM CURRENT USE OF INSULIN (H): Primary | ICD-10-CM

## 2018-02-20 LAB — HBA1C MFR BLD: 9.1 % (ref 4.3–6)

## 2018-02-20 PROCEDURE — 36415 COLL VENOUS BLD VENIPUNCTURE: CPT | Performed by: FAMILY MEDICINE

## 2018-02-20 PROCEDURE — 83036 HEMOGLOBIN GLYCOSYLATED A1C: CPT | Performed by: FAMILY MEDICINE

## 2018-02-20 PROCEDURE — 82043 UR ALBUMIN QUANTITATIVE: CPT | Performed by: FAMILY MEDICINE

## 2018-02-20 PROCEDURE — 99214 OFFICE O/P EST MOD 30 MIN: CPT | Performed by: FAMILY MEDICINE

## 2018-02-20 RX ORDER — METOPROLOL SUCCINATE 100 MG/1
50 TABLET, EXTENDED RELEASE ORAL DAILY
Qty: 90 TABLET | Refills: 0
Start: 2018-02-20 | End: 2018-03-06

## 2018-02-20 NOTE — NURSING NOTE
"Chief Complaint   Patient presents with     Diabetes     Hypertension     Sleep Problem     hypersomnia       Initial /64  Pulse 68  Temp 98.5  F (36.9  C) (Tympanic)  Resp 15  Wt 155 lb (70.3 kg)  SpO2 94%  BMI 24.64 kg/m2 Estimated body mass index is 24.64 kg/(m^2) as calculated from the following:    Height as of 1/8/18: 5' 6.5\" (1.689 m).    Weight as of this encounter: 155 lb (70.3 kg).  Medication Reconciliation: complete   Batsheva Burton MA student     "

## 2018-02-20 NOTE — PROGRESS NOTES
SUBJECTIVE:   Suman Burton is a 82 year old male who presents to clinic today for the following health issues:      Diabetes Follow-up      Patient is checking blood sugars: not at all    Diabetic concerns:other - fasting sugar 2/20/18     Symptoms of hypoglycemia (low blood sugar): none     Paresthesias (numbness or burning in feet) or sores: No     Date of last diabetic eye exam: UTD     BP Readings from Last 2 Encounters:   02/20/18 126/64   01/08/18 126/60     Hemoglobin A1C (%)   Date Value   02/20/2018 9.1 (H)   12/04/2017 7.4 (H)     LDL Cholesterol Calculated (mg/dL)   Date Value   04/17/2017 100   04/29/2014 112     Hypertension Follow-up      Outpatient blood pressures are not being checked.    Low Salt Diet: not monitoring salt      Amount of exercise or physical activity: 1 day/week for an average of 15-30 minutes    Problems taking medications regularly: No    Medication side effects: none    Diet: regular (no restrictions)      Insomnia      Duration: awhile     Description  Frequency of insomnia:  several times a week  Time to fall asleep: 2 hours plus  Middle of night awakening:  several times a week  Early morning awakening:  none    Accompanying signs and symptoms:  excessive daytime sleepiness    History  Similar episodes in past:  YES  Previous evaluation/sleep study:  no     Precipitating or alleviating factors:  New stressful situation: no   Caffeine intake after lunchtime: no   OTC decongestants: no   Any new medications: no     Therapies tried and outcome: none          Problem list and histories reviewed & adjusted, as indicated.  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     Chronic kidney disease, stage III (moderate)     Diabetes mellitus, type 2 (H)     Hyperlipidemia     Lumbago     Thoracic or lumbosacral  neuritis or radiculitis, unspecified     Transient visual loss     Diabetes mellitus, type 2 (H)     Chronic atrial fibrillation (H)     Chronic pain syndrome     Hypersomnia     Persistent insomnia     Past Surgical History:   Procedure Laterality Date     CHOLECYSTECTOMY       LAPAROSCOPIC APPENDECTOMY         Social History   Substance Use Topics     Smoking status: Former Smoker     Smokeless tobacco: Never Used     Alcohol use 0.0 oz/week     0 Standard drinks or equivalent per week      Comment: Occasionally.      Family History   Problem Relation Age of Onset     DIABETES No family hx of      Coronary Artery Disease No family hx of      Hypertension No family hx of      Breast Cancer No family hx of      Colon Cancer No family hx of      Prostate Cancer No family hx of      Other Cancer No family hx of      Asthma No family hx of            Reviewed and updated as needed this visit by clinical staff  Tobacco  Allergies       Reviewed and updated as needed this visit by Provider         ROS:  Constitutional, HEENT, cardiovascular, pulmonary, gi and gu systems are negative, except as otherwise noted.    OBJECTIVE:                                                    /64  Pulse 68  Temp 98.5  F (36.9  C) (Tympanic)  Resp 15  Wt 155 lb (70.3 kg)  SpO2 94%  BMI 24.64 kg/m2  Body mass index is 24.64 kg/(m^2).  GENERAL APPEARANCE: healthy, alert and no distress         ASSESSMENT/PLAN:                                                        ICD-10-CM    1. Type 2 diabetes mellitus with hyperglycemia, with long-term current use of insulin (H) E11.65 Albumin Random Urine Quantitative with Creat Ratio    Z79.4 Hemoglobin A1c   2. Benign essential hypertension I10 metoprolol succinate (TOPROL-XL) 100 MG 24 hr tablet   3. Persistent insomnia G47.00    4. Hypersomnia G47.10     during the day       Patient Instructions   Will decrease metoprolol to 50 mg daily and see back in 2 weeks. Hopefully the decrease  will have a positive effect on his cold hands and feet. Will again attempt to increase his exercise to see if he gets a positive effect on his sleeping.      Jamie Guerrier MD  [facility]

## 2018-02-20 NOTE — PATIENT INSTRUCTIONS
Will decrease metoprolol to 50 mg daily and see back in 2 weeks. Hopefully the decrease will have a positive effect on his cold hands and feet. Will again attempt to increase his exercise to see if he gets a positive effect on his sleeping.

## 2018-02-20 NOTE — LETTER
February 21, 2018      Suman Burton  [address]  [facility] [address]        Dear ,    We are writing to inform you of your test results.    Please make an appointment with your provider to review or follow up on your test results.  Appointments can be made by calling [phone].    Resulted Orders   Albumin Random Urine Quantitative with Creat Ratio   Result Value Ref Range    Creatinine Urine 110 mg/dL    Albumin Urine mg/L 488 mg/L    Albumin Urine mg/g Cr 443.64 (H) 0 - 17 mg/g Cr   Hemoglobin A1c   Result Value Ref Range    Hemoglobin A1C 9.1 (H) 4.3 - 6.0 %       If you have any questions or concerns, please call the clinic at the number listed above.       Sincerely,        Jamie Guerrier MD

## 2018-02-20 NOTE — MR AVS SNAPSHOT
"              After Visit Summary   2/20/2018    Suman Burton    MRN: [id number]           Patient Information     Date Of Birth          1935        Visit Information        Provider Department      2/20/2018 2:00 PM Jamie Guerrier MD [facility]        Today's Diagnoses     Type 2 diabetes mellitus with hyperglycemia, with long-term current use of insulin (H)    -  1    Benign essential hypertension          Care Instructions    Will decrease metoprolol to 50 mg daily and see back in 2 weeks. Hopefully the decrease will have a positive effect on his cold hands and feet. Will again attempt to increase his exercise to see if he gets a positive effect on his sleeping.          Follow-ups after your visit        Follow-up notes from your care team     Return in about 2 weeks (around 3/6/2018).      Who to contact     If you have questions or need follow up information about today's clinic visit or your schedule please contact [facility] directly at [phone].  Normal or non-critical lab and imaging results will be communicated to you by Xolvehart, letter or phone within 4 business days after the clinic has received the results. If you do not hear from us within 7 days, please contact the clinic through OrSenset or phone. If you have a critical or abnormal lab result, we will notify you by phone as soon as possible.  Submit refill requests through [email or web address] or call your pharmacy and they will forward the refill request to us. Please allow 3 business days for your refill to be completed.          Additional Information About Your Visit        Xolvehart Information     [email or web address] lets you send messages to your doctor, view your test results, renew your prescriptions, schedule appointments and more. To sign up, go to [email or web address] . Click on \"Log in\" on the left side of the screen, which will take you to the Welcome page. Then click on \"Sign " "up Now\" on the right side of the page.     You will be asked to enter the access code listed below, as well as some personal information. Please follow the directions to create your username and password.     Your access code is: [email or web address]  Expires: 3/5/2018  8:16 PM     Your access code will  in 90 days. If you need help or a new code, please call your [facility] or [phone].        Care EveryWhere ID     This is your Care EveryWhere ID. This could be used by other organizations to access your [facility] medical records  [id number]        Your Vitals Were     Pulse Temperature Respirations Pulse Oximetry BMI (Body Mass Index)       68 98.5  F (36.9  C) (Tympanic) 15 94% 24.64 kg/m2        Blood Pressure from Last 3 Encounters:   18 126/64   18 126/60   17 120/77    Weight from Last 3 Encounters:   18 155 lb (70.3 kg)   18 155 lb (70.3 kg)   17 153 lb (69.4 kg)              We Performed the Following     Albumin Random Urine Quantitative with Creat Ratio     Hemoglobin A1c          Today's Medication Changes          These changes are accurate as of 18  2:25 PM.  If you have any questions, ask your nurse or doctor.               These medicines have changed or have updated prescriptions.        Dose/Directions    metoprolol succinate 100 MG 24 hr tablet   Commonly known as:  TOPROL-XL   This may have changed:  how much to take   Used for:  Benign essential hypertension   Changed by:  Jamie Guerrier MD        Dose:  50 mg   Take 0.5 tablets (50 mg) by mouth daily   Quantity:  90 tablet   Refills:  0            Where to get your medicines      Some of these will need a paper prescription and others can be bought over the counter.  Ask your nurse if you have questions.     You don't need a prescription for these medications     metoprolol succinate 100 MG 24 hr tablet                Primary Care Provider Office Phone # Fax #    Ej Villarreal MD " [phone]       [facility] [address] [facility] [id number]        Equal Access to Services     DEMETRIO PETTY : Hadwong aad ku hadberna Patino, waelaineda elanlamineha, sadiqta kaianda taz, geovanna saldanaberry judy. So [facility] [phone].    ATENCIÓN: Si habla español, tiene a ron disposición servicios gratuitos de asistencia lingüística. Llame al [phone].    We comply with applicable federal civil rights laws and [place] laws. We do not discriminate on the basis of race, color, national origin, age, disability, sex, sexual orientation, or gender identity.            Thank you!     Thank you for choosing [facility]  for your care. Our goal is always to provide you with excellent care. Hearing back from our patients is one way we can continue to improve our services. Please take a few minutes to complete the written survey that you may receive in the mail after your visit with us. Thank you!             Your Updated Medication List - Protect others around you: Learn how to safely use, store and throw away your medicines at www.disposemymeds.org.          This list is accurate as of 2/20/18  2:25 PM.  Always use your most recent med list.                   Brand Name Dispense Instructions for use Diagnosis    acetaminophen 500 MG tablet    TYLENOL    100 tablet    Take 2 tablets (1,000 mg) by mouth 3 times daily as needed for mild pain    Chronic pain syndrome       amLODIPine 10 MG tablet    NORVASC    90 tablet    Take 1 tablet (10 mg) by mouth daily    Benign essential hypertension       * blood glucose monitoring test strip    no brand specified    1 Box    Test blood glucose twice daily.    Type II or unspecified type diabetes mellitus without mention of complication, not stated as uncontrolled       * blood glucose monitoring test strip    ONETOUCH ULTRA    100 each    Use to test blood sugar 2 times daily or as directed.    Type 2  diabetes mellitus with hyperglycemia, with long-term current use of insulin (H)       digoxin 125 MCG tablet    LANOXIN    90 tablet    Take 1 tablet (125 mcg) by mouth every other day    Chronic atrial fibrillation (H)       furosemide 20 MG tablet    LASIX    180 tablet    Take 2 tablets (40 mg) by mouth every morning (before breakfast)    Dyspnea on exertion, Benign essential hypertension       glipiZIDE 10 MG tablet    GLUCOTROL    360 tablet    Take 2 tablets (20 mg) by mouth 2 times daily Take 20mg in AM with breakfast and 20mg in PM with evening meal    Type 2 diabetes mellitus with hyperglycemia, with long-term current use of insulin (H)       insulin pen needle 32G X 4 MM    BD TAMIKA U/F    100 each    Use once daily or as directed.    Type 2 diabetes mellitus with hyperglycemia (H)       loperamide 2 MG tablet    IMODIUM A-D     Take 2 tabs in the morning and 1 tablet in the evening        metFORMIN 500 MG tablet    GLUCOPHAGE    360 tablet    Take 1 tablet (500 mg) by mouth 2 times daily (with meals)    Type 2 diabetes mellitus with hyperglycemia, with long-term current use of insulin (H)       metoprolol succinate 100 MG 24 hr tablet    TOPROL-XL    90 tablet    Take 0.5 tablets (50 mg) by mouth daily    Benign essential hypertension       MULTIVITAMINS PO      Take 1 tablet by mouth daily.        * ONE TOUCH FINE POINT LANCETS      Check blood sugars twice a day.        * blood glucose monitoring lancets     1 Box    Use to test blood sugar 2 times daily or as directed.    Diabetes mellitus, type 2 (H)       * ONETOUCH DELICA LANCETS 33G Misc     100 each    1 strip 2 times daily    Type 2 diabetes mellitus with hyperglycemia, with long-term current use of insulin (H)       ranitidine 150 MG tablet    ZANTAC     Take 1 tablet (150 mg) by mouth daily        warfarin 3 MG tablet    COUMADIN    90 tablet    Take 1 tablet (3 mg) by mouth daily    Chronic atrial fibrillation (H)       * Notice:  This list has  5 medication(s) that are the same as other medications prescribed for you. Read the directions carefully, and ask your doctor or other care provider to review them with you.

## 2018-02-21 LAB
CREAT UR-MCNC: 110 MG/DL
MICROALBUMIN UR-MCNC: 488 MG/L
MICROALBUMIN/CREAT UR: 443.64 MG/G CR (ref 0–17)

## 2018-03-06 ENCOUNTER — OFFICE VISIT (OUTPATIENT)
Dept: FAMILY MEDICINE | Facility: CLINIC | Age: 83
End: 2018-03-06
Payer: MEDICARE

## 2018-03-06 VITALS
DIASTOLIC BLOOD PRESSURE: 60 MMHG | HEART RATE: 78 BPM | SYSTOLIC BLOOD PRESSURE: 114 MMHG | BODY MASS INDEX: 24.8 KG/M2 | TEMPERATURE: 96.5 F | RESPIRATION RATE: 16 BRPM | WEIGHT: 156 LBS

## 2018-03-06 DIAGNOSIS — L29.9 ITCHING: ICD-10-CM

## 2018-03-06 DIAGNOSIS — N18.30 CHRONIC KIDNEY DISEASE, STAGE III (MODERATE) (H): ICD-10-CM

## 2018-03-06 DIAGNOSIS — I10 BENIGN HYPERTENSION: Primary | ICD-10-CM

## 2018-03-06 DIAGNOSIS — R41.3 SHORT-TERM MEMORY LOSS: ICD-10-CM

## 2018-03-06 PROCEDURE — 84520 ASSAY OF UREA NITROGEN: CPT | Performed by: FAMILY MEDICINE

## 2018-03-06 PROCEDURE — 82565 ASSAY OF CREATININE: CPT | Performed by: FAMILY MEDICINE

## 2018-03-06 PROCEDURE — 99214 OFFICE O/P EST MOD 30 MIN: CPT | Performed by: FAMILY MEDICINE

## 2018-03-06 PROCEDURE — 36415 COLL VENOUS BLD VENIPUNCTURE: CPT | Performed by: FAMILY MEDICINE

## 2018-03-06 RX ORDER — METOPROLOL SUCCINATE 25 MG/1
25 TABLET, EXTENDED RELEASE ORAL DAILY
Qty: 90 TABLET | Refills: 3 | Status: SHIPPED | OUTPATIENT
Start: 2018-03-06 | End: 2018-05-01

## 2018-03-06 NOTE — PROGRESS NOTES
SUBJECTIVE:   Suman Burton is a 82 year old male who presents to clinic today for the following health issues:      Hypertension Follow-up      Outpatient blood pressures are being checked at home.  Results are 127/59 on  Machine today.    Low Salt Diet: not monitoring salt      Amount of exercise or physical activity: 1 day/week for an average of 15-30 minutes    Problems taking medications regularly: No    Medication side effects: none    Diet: regular (no restrictions)        Rash/with itching      Duration: Years    Description  Location: Abdomen and legs mostly  Itching: moderate    Intensity:  moderate    Accompanying signs and symptoms: None    History (similar episodes/previous evaluation): Patient has had this for years and has tried multiple topical creams and lotions without success.    Precipitating or alleviating factors:  New exposures:  None  Recent travel: no      Therapies tried and outcome: Multiple topical creams and lotions and oral medications over the years none of which have worked.    Short-term memory loss      Duration: Many months    Description (location/character/radiation): Could not remember my name this morning for his visit so he had to pull out a card to look at that.  However he does have pretty good short-term recall.    Intensity:  mild    Accompanying signs and symptoms: None    History (similar episodes/previous evaluation): None    Precipitating or alleviating factors: None    Therapies tried and outcome: None         Problem list and histories reviewed & adjusted, as indicated.  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     Chronic kidney disease, stage III (moderate)     Diabetes mellitus, type 2 (H)     Hyperlipidemia     Lumbago     Thoracic or lumbosacral neuritis or radiculitis, unspecified      Transient visual loss     Diabetes mellitus, type 2 (H)     Chronic atrial fibrillation (H)     Chronic pain syndrome     Hypersomnia     Persistent insomnia     Past Surgical History:   Procedure Laterality Date     CHOLECYSTECTOMY       LAPAROSCOPIC APPENDECTOMY         Social History   Substance Use Topics     Smoking status: Former Smoker     Smokeless tobacco: Never Used     Alcohol use 0.0 oz/week     0 Standard drinks or equivalent per week      Comment: Occasionally.      Family History   Problem Relation Age of Onset     DIABETES No family hx of      Coronary Artery Disease No family hx of      Hypertension No family hx of      Breast Cancer No family hx of      Colon Cancer No family hx of      Prostate Cancer No family hx of      Other Cancer No family hx of      Asthma No family hx of          Labs reviewed in EPIC    Reviewed and updated as needed this visit by clinical staff  Tobacco  Allergies  Meds  Med Hx  Surg Hx  Fam Hx  Soc Hx      Reviewed and updated as needed this visit by Provider         ROS:  Constitutional, neuro, ENT, endocrine, pulmonary, cardiac, gastrointestinal, genitourinary, musculoskeletal, integument and psychiatric systems are negative, except as otherwise noted.    OBJECTIVE:                                                    /60  Pulse 78  Temp 96.5  F (35.8  C) (Tympanic)  Resp 16  Wt 156 lb (70.8 kg)  BMI 24.8 kg/m2  Body mass index is 24.8 kg/(m^2).  GENERAL APPEARANCE: healthy, alert and no distress         ASSESSMENT/PLAN:                                                        ICD-10-CM    1. Benign hypertension I10 metoprolol succinate (TOPROL-XL) 25 MG 24 hr tablet   2. Itching L29.9 Pramoxine HCl (CERAVE ITCH RELIEF) 1 % CREA   3. Chronic kidney disease, stage III (moderate) N18.3 Creatinine     Urea nitrogen   4. Short-term memory loss R41.3 NEUROPSYCHOLOGY REFERRAL       Patient Instructions   I gave the patient information for getting his memory  tested with Lizett Mast.  We faxed a referral over to her.  I recommended Cerave cream to put on daily to his entire body to prevent itching.  I recommended no extra hot showers.  He will use a very mild soap like Dove or Dove sensitive skin.  He will not use [place] or Coast soap.  He is not using any fabric softeners at present.  He could try Arm and Hammer sensitive skin detergent.  He will follow-up after getting his memory tests done.  I wrote him a new prescription for his hypertension for metoprolol succinate 25 mg daily.  I printed up his AVS form with his new medication list.  We will check his kidney function today.  He needs his diabetes tests done in May.  His blood pressure cuff from home measured well against ours today in the office.    New prescriptions for his metoprolol and his Cerave cream were sent to [facility].      Jamie Guerrier MD  [facility]

## 2018-03-06 NOTE — NURSING NOTE
"Chief Complaint   Patient presents with     Sleep Problem     Hypertension       Initial /60  Pulse 78  Temp 96.5  F (35.8  C) (Tympanic)  Resp 16  Wt 156 lb (70.8 kg)  BMI 24.8 kg/m2 Estimated body mass index is 24.8 kg/(m^2) as calculated from the following:    Height as of 1/8/18: 5' 6.5\" (1.689 m).    Weight as of this encounter: 156 lb (70.8 kg).  Medication Reconciliation: complete     Talya Casas CMA    '  "

## 2018-03-06 NOTE — PATIENT INSTRUCTIONS
I gave the patient information for getting his memory tested with Lizett Mast.  We faxed a referral over to her.  I recommended Cerave cream to put on daily to his entire body to prevent itching.  I recommended no extra hot showers.  He will use a very mild soap like Dove or Dove sensitive skin.  He will not use [place] Spring or Coast soap.  He is not using any fabric softeners at present.  He could try Arm and Hammer sensitive skin detergent.  He will follow-up after getting his memory tests done.  I wrote him a new prescription for his hypertension for metoprolol succinate 25 mg daily.  I printed up his AVS form with his new medication list.  We will check his kidney function today.  He needs his diabetes tests done in May.  His blood pressure cuff from home measured well against ours today in the office.    New prescriptions for his metoprolol and his Cerave cream were sent to [facility].

## 2018-03-06 NOTE — LETTER
March 7, 2018      Suman Burton  [address] [facility] [address]        Dear ,    We are writing to inform you of your test results.    This is all very stable and can be repeated in 3 months.    Resulted Orders   Creatinine   Result Value Ref Range    Creatinine 1.72 (H) 0.66 - 1.25 mg/dL    GFR Estimate 38 (L) >60 mL/min/1.7m2      Comment:      Non  GFR Calc    GFR Estimate If Black 46 (L) >60 mL/min/1.7m2      Comment:       GFR Calc   Urea nitrogen   Result Value Ref Range    Urea Nitrogen 35 (H) 7 - 30 mg/dL       If you have any questions or concerns, please call the clinic at the number listed above.       Sincerely,        Jamie Guerrier MD

## 2018-03-06 NOTE — MR AVS SNAPSHOT
After Visit Summary   3/6/2018    Suman Burton    MRN: [id number]           Patient Information     Date Of Birth          1935        Visit Information        Provider Department      3/6/2018 10:30 AM Jamie Guerrier MD [facility]        Today's Diagnoses     Benign hypertension    -  1    Itching        Chronic kidney disease, stage III (moderate)        Short-term memory loss           Follow-ups after your visit        Additional Services     NEUROPSYCHOLOGY REFERRAL       Your provider has referred you to:    Lizett Tarore    All scheduling is subject to the client's specific insurance plan & benefits, provider/location availability, and provider clinical specialities.  Please arrive 15 minutes early for your first appointment and bring your completed paperwork.    Please be aware that coverage of these services is subject to the terms and limitations of your health insurance plan.  Call member services at your health plan with any benefit or coverage questions.    Please bring the following to your appointment:  >>   Any x-rays, CTs or MRIs which have been performed.  Contact the facility where they were done to arrange for  prior to your scheduled appointment.  Any new CT, MRI or other procedures ordered by your specialist must be performed at a [place] facility or coordinated by your clinic's referral office.    >>   List of current medications   >>   This referral request   >>   Any documents/labs given to you for this referral                  Who to contact     If you have questions or need follow up information about today's clinic visit or your schedule please contact [facility] directly at [phone].  Normal or non-critical lab and imaging results will be communicated to you by MyChart, letter or phone within 4 business days after the clinic has received the results. If you do not hear from us  "within 7 days, please contact the clinic through [employer] or phone. If you have a critical or abnormal lab result, we will notify you by phone as soon as possible.  Submit refill requests through [employer] or call your pharmacy and they will forward the refill request to us. Please allow 3 business days for your refill to be completed.          Additional Information About Your Visit        [employer] Information     [employer] lets you send messages to your doctor, view your test results, renew your prescriptions, schedule appointments and more. To sign up, go to [email or web address] . Click on \"Log in\" on the left side of the screen, which will take you to the Welcome page. Then click on \"Sign up Now\" on the right side of the page.     You will be asked to enter the access code listed below, as well as some personal information. Please follow the directions to create your username and password.     Your access code is: [email or web address]  Expires: 2018 10:53 AM     Your access code will  in 90 days. If you need help or a new code, please call your [place] clinic or [phone].        Care EveryWhere ID     This is your Care EveryWhere ID. This could be used by other organizations to access your [place] medical records  [id number]        Your Vitals Were     Pulse Temperature Respirations BMI (Body Mass Index)          78 96.5  F (35.8  C) (Tympanic) 16 24.8 kg/m2         Blood Pressure from Last 3 Encounters:   18 114/60   18 126/64   18 126/60    Weight from Last 3 Encounters:   18 156 lb (70.8 kg)   18 155 lb (70.3 kg)   18 155 lb (70.3 kg)              We Performed the Following     Creatinine     NEUROPSYCHOLOGY REFERRAL     Urea nitrogen          Today's Medication Changes          These changes are accurate as of 3/6/18 10:54 AM.  If you have any questions, ask your nurse or doctor.               Start taking these medicines.        Dose/Directions    Pramoxine HCl 1 % " Crea   Commonly known as:  CERAVE ITCH RELIEF   Used for:  Itching   Started by:  Jamie Guerrier MD        Dose:  30 g   Externally apply 30 g topically daily   Quantity:  340 g   Refills:  11         These medicines have changed or have updated prescriptions.        Dose/Directions    metoprolol succinate 25 MG 24 hr tablet   Commonly known as:  TOPROL-XL   This may have changed:    - medication strength  - how much to take   Used for:  Benign hypertension   Changed by:  Jamie Guerrier MD        Dose:  25 mg   Take 1 tablet (25 mg) by mouth daily   Quantity:  90 tablet   Refills:  3            Where to get your medicines      Some of these will need a paper prescription and others can be bought over the counter.  Ask your nurse if you have questions.     Bring a paper prescription for each of these medications     metoprolol succinate 25 MG 24 hr tablet    Pramoxine HCl 1 % Crea                Primary Care Provider Office Phone # Fax #    Ej Villarreal [phone]       [facility] [address] [place] [address]        Equal Access to Services     [facility] HospitalKENIA AH: Hadii aad ku hadasho Soomaali, waaxda luqadaha, qaybta kaalmada adeegyada, waxay idiin hayaan adeeg kharash la'clarisse . So [facility] [phone].    ATENCIÓN: Si habla español, tiene a ron disposición servicios gratuitos de asistencia lingüística. [facility] [phone].    We comply with applicable federal civil rights laws and [place] laws. We do not discriminate on the basis of race, color, national origin, age, disability, sex, sexual orientation, or gender identity.            Thank you!     Thank you for choosing [facility]  for your care. Our goal is always to provide you with excellent care. Hearing back from our patients is one way we can continue to improve our services. Please take a few minutes to complete the written survey that you may receive in the mail after your  visit with us. Thank you!             Your Updated Medication List - Protect others around you: Learn how to safely use, store and throw away your medicines at www.disposemymeds.org.          This list is accurate as of 3/6/18 10:54 AM.  Always use your most recent med list.                   Brand Name Dispense Instructions for use Diagnosis    acetaminophen 500 MG tablet    TYLENOL    100 tablet    Take 2 tablets (1,000 mg) by mouth 3 times daily as needed for mild pain    Chronic pain syndrome       amLODIPine 10 MG tablet    NORVASC    90 tablet    Take 1 tablet (10 mg) by mouth daily    Benign essential hypertension       * blood glucose monitoring test strip    no brand specified    1 Box    Test blood glucose twice daily.    Type II or unspecified type diabetes mellitus without mention of complication, not stated as uncontrolled       * blood glucose monitoring test strip    ONETOUCH ULTRA    100 each    Use to test blood sugar 2 times daily or as directed.    Type 2 diabetes mellitus with hyperglycemia, with long-term current use of insulin (H)       digoxin 125 MCG tablet    LANOXIN    90 tablet    Take 1 tablet (125 mcg) by mouth every other day    Chronic atrial fibrillation (H)       furosemide 20 MG tablet    LASIX    180 tablet    Take 2 tablets (40 mg) by mouth every morning (before breakfast)    Dyspnea on exertion, Benign essential hypertension       glipiZIDE 10 MG tablet    GLUCOTROL    360 tablet    Take 2 tablets (20 mg) by mouth 2 times daily Take 20mg in AM with breakfast and 20mg in PM with evening meal    Type 2 diabetes mellitus with hyperglycemia, with long-term current use of insulin (H)       insulin pen needle 32G X 4 MM    BD TAMIKA U/F    100 each    Use once daily or as directed.    Type 2 diabetes mellitus with hyperglycemia (H)       loperamide 2 MG tablet    IMODIUM A-D     Take 2 tabs in the morning and 1 tablet in the evening        metFORMIN 500 MG tablet    GLUCOPHAGE    360  tablet    Take 1 tablet (500 mg) by mouth 2 times daily (with meals)    Type 2 diabetes mellitus with hyperglycemia, with long-term current use of insulin (H)       metoprolol succinate 25 MG 24 hr tablet    TOPROL-XL    90 tablet    Take 1 tablet (25 mg) by mouth daily    Benign hypertension       MULTIVITAMINS PO      Take 1 tablet by mouth daily.        * ONE TOUCH FINE POINT LANCETS      Check blood sugars twice a day.        * blood glucose monitoring lancets     1 Box    Use to test blood sugar 2 times daily or as directed.    Diabetes mellitus, type 2 (H)       * ONETOUCH DELICA LANCETS 33G Misc     100 each    1 strip 2 times daily    Type 2 diabetes mellitus with hyperglycemia, with long-term current use of insulin (H)       Pramoxine HCl 1 % Crea    CERAVE ITCH RELIEF    340 g    Externally apply 30 g topically daily    Itching       ranitidine 150 MG tablet    ZANTAC     Take 1 tablet (150 mg) by mouth daily        warfarin 3 MG tablet    COUMADIN    90 tablet    Take 1 tablet (3 mg) by mouth daily    Chronic atrial fibrillation (H)       * Notice:  This list has 5 medication(s) that are the same as other medications prescribed for you. Read the directions carefully, and ask your doctor or other care provider to review them with you.

## 2018-03-07 LAB
BUN SERPL-MCNC: 35 MG/DL (ref 7–30)
CREAT SERPL-MCNC: 1.72 MG/DL (ref 0.66–1.25)
GFR SERPL CREATININE-BSD FRML MDRD: 38 ML/MIN/1.7M2

## 2018-05-01 ENCOUNTER — OFFICE VISIT (OUTPATIENT)
Dept: FAMILY MEDICINE | Facility: CLINIC | Age: 83
End: 2018-05-01
Payer: MEDICARE

## 2018-05-01 VITALS
HEART RATE: 74 BPM | TEMPERATURE: 98 F | RESPIRATION RATE: 18 BRPM | DIASTOLIC BLOOD PRESSURE: 60 MMHG | BODY MASS INDEX: 24.32 KG/M2 | WEIGHT: 153 LBS | SYSTOLIC BLOOD PRESSURE: 132 MMHG

## 2018-05-01 DIAGNOSIS — Z23 NEED FOR PROPHYLACTIC VACCINATION WITH TETANUS-DIPHTHERIA (TD): ICD-10-CM

## 2018-05-01 DIAGNOSIS — E11.65 TYPE 2 DIABETES MELLITUS WITH HYPERGLYCEMIA, WITHOUT LONG-TERM CURRENT USE OF INSULIN (H): ICD-10-CM

## 2018-05-01 DIAGNOSIS — E78.2 MIXED HYPERLIPIDEMIA: ICD-10-CM

## 2018-05-01 DIAGNOSIS — L60.3 DYSTROPHIC NAIL: ICD-10-CM

## 2018-05-01 DIAGNOSIS — N18.30 CHRONIC KIDNEY DISEASE, STAGE III (MODERATE) (H): ICD-10-CM

## 2018-05-01 DIAGNOSIS — I10 BENIGN HYPERTENSION: Primary | ICD-10-CM

## 2018-05-01 LAB
HBA1C MFR BLD: 9.1 % (ref 0–5.6)
HGB BLD-MCNC: 15.3 G/DL (ref 13.3–17.7)

## 2018-05-01 PROCEDURE — 84520 ASSAY OF UREA NITROGEN: CPT | Performed by: FAMILY MEDICINE

## 2018-05-01 PROCEDURE — 84439 ASSAY OF FREE THYROXINE: CPT | Performed by: FAMILY MEDICINE

## 2018-05-01 PROCEDURE — 83036 HEMOGLOBIN GLYCOSYLATED A1C: CPT | Performed by: FAMILY MEDICINE

## 2018-05-01 PROCEDURE — 82043 UR ALBUMIN QUANTITATIVE: CPT | Performed by: FAMILY MEDICINE

## 2018-05-01 PROCEDURE — 80061 LIPID PANEL: CPT | Performed by: FAMILY MEDICINE

## 2018-05-01 PROCEDURE — 99214 OFFICE O/P EST MOD 30 MIN: CPT | Performed by: FAMILY MEDICINE

## 2018-05-01 PROCEDURE — 36415 COLL VENOUS BLD VENIPUNCTURE: CPT | Performed by: FAMILY MEDICINE

## 2018-05-01 PROCEDURE — 80051 ELECTROLYTE PANEL: CPT | Performed by: FAMILY MEDICINE

## 2018-05-01 PROCEDURE — 84443 ASSAY THYROID STIM HORMONE: CPT | Performed by: FAMILY MEDICINE

## 2018-05-01 PROCEDURE — 82565 ASSAY OF CREATININE: CPT | Performed by: FAMILY MEDICINE

## 2018-05-01 PROCEDURE — 85018 HEMOGLOBIN: CPT | Performed by: FAMILY MEDICINE

## 2018-05-01 RX ORDER — METOPROLOL SUCCINATE 25 MG/1
25 TABLET, EXTENDED RELEASE ORAL DAILY
Qty: 90 TABLET | Refills: 3 | Status: SHIPPED | OUTPATIENT
Start: 2018-05-01 | End: 2018-09-24

## 2018-05-01 NOTE — MR AVS SNAPSHOT
After Visit Summary   5/1/2018    Suman Burton    MRN: [id number]           Patient Information     Date Of Birth          1935        Visit Information        Provider Department      5/1/2018 1:15 PM Jamie Guerrier MD [facility]        Today's Diagnoses     Benign hypertension    -  1    Chronic kidney disease, stage III (moderate)        Dystrophic nail        Type 2 diabetes mellitus with hyperglycemia, without long-term current use of insulin (H)        Need for prophylactic vaccination with tetanus-diphtheria (TD)        Mixed hyperlipidemia          Care Instructions    The patient's short-term memory is obviously impaired some.  He has an appointment upcoming to get his memory formally tested.  He cannot remember the date of the testing but says his wife has it on the calendar at home.  Further follow-up will be pending review of the memory testing.    With respect to his blood pressure I refilled his metoprolol.    I did give him the contact information for [employer] to get his toenails cut.    With respect to his chronic kidney disease we will check his hemoglobin, electrolyte panel, creatinine and BUN.  He is also due to get his hemoglobin A1c done because of his type 2 diabetes.  We will also do his albumin.  Lipid panel is also being done.  Follow-up will be pending review of his tests.              Follow-ups after your visit        Your next 10 appointments already scheduled     May 08, 2018 10:45 AM CDT   SHORT with Jamie Guerrier MD   [facility] ([facility])    [address] [place] [id number] [phone]              Who to contact     If you have questions or need follow up information about today's clinic visit or your schedule please contact [facility] directly at [phone].  Normal or non-critical  "lab and imaging results will be communicated to you by "[employer]hart, letter or phone within 4 business days after the clinic has received the results. If you do not hear from us within 7 days, please contact the clinic through [employer] or phone. If you have a critical or abnormal lab result, we will notify you by phone as soon as possible.  Submit refill requests through [employer] or call your pharmacy and they will forward the refill request to us. Please allow 3 business days for your refill to be completed.          Additional Information About Your Visit        "[employer]harTVTY Information     [employer] lets you send messages to your doctor, view your test results, renew your prescriptions, schedule appointments and more. To sign up, go to [email or web address] . Click on \"Log in\" on the left side of the screen, which will take you to the Welcome page. Then click on \"Sign up Now\" on the right side of the page.     You will be asked to enter the access code listed below, as well as some personal information. Please follow the directions to create your username and password.     Your access code is: [email or web address]  Expires: 2018 11:53 AM     Your access code will  in 90 days. If you need help or a new code, please call your [place] clinic or [phone].        Care EveryWhere ID     This is your Care EveryWhere ID. This could be used by other organizations to access your [place] medical records  [id number]        Your Vitals Were     Pulse Temperature Respirations BMI (Body Mass Index)          74 98  F (36.7  C) (Tympanic) 18 24.32 kg/m2         Blood Pressure from Last 3 Encounters:   18 132/60   18 114/60   18 126/64    Weight from Last 3 Encounters:   18 153 lb (69.4 kg)   18 156 lb (70.8 kg)   18 155 lb (70.3 kg)              We Performed the Following     Albumin Random Urine Quantitative with Creat Ratio     Creatinine     Electrolyte panel (Na, K, Cl, CO2, Anion gap)     " Hemoglobin A1c     Hemoglobin     Lipid panel reflex to direct LDL Fasting     T4 free     TSH WITH FREE T4 REFLEX     Urea nitrogen          Today's Medication Changes          These changes are accurate as of 5/1/18 11:59 PM.  If you have any questions, ask your nurse or doctor.               These medicines have changed or have updated prescriptions.        Dose/Directions    * ONE TOUCH FINE POINT LANCETS   This may have changed:  Another medication with the same name was removed. Continue taking this medication, and follow the directions you see here.   Changed by:  Jamie Guerrier MD        Check blood sugars twice a day.   Refills:  0       * ONETOUCH DELICA LANCETS 33G Misc   This may have changed:  Another medication with the same name was removed. Continue taking this medication, and follow the directions you see here.   Used for:  Type 2 diabetes mellitus with hyperglycemia, with long-term current use of insulin (H)   Changed by:  Jamie Guerrier MD        Dose:  1 strip   1 strip 2 times daily   Quantity:  100 each   Refills:  11       * Notice:  This list has 2 medication(s) that are the same as other medications prescribed for you. Read the directions carefully, and ask your doctor or other care provider to review them with you.      Stop taking these medicines if you haven't already. Please contact your care team if you have questions.     blood glucose monitoring test strip   Commonly known as:  ONETOUCH ULTRA   Stopped by:  Jamie Guerrier MD           insulin pen needle 32G X 4 MM   Commonly known as:  BD TAMIKA U/F   Stopped by:  Jamie Guerrier MD           ranitidine 150 MG tablet   Commonly known as:  ZANTAC   Stopped by:  Jamie Guerrier MD                Where to get your medicines      Some of these will need a paper prescription and others can be bought over the counter.  Ask your nurse if you have questions.     Bring a paper prescription for each of  these medications     metoprolol succinate 25 MG 24 hr tablet                Primary Care Provider Office Phone # Fax #    Jamie Guerrier [phone]       [address] [facility] [address]        Equal Access to Services     DEMETRIO PETTY : Hadii aad ku hadfredo Soomaali, waaxda luqadaha, qaybta kaalmada adeegyada, waxay idiin hayaan adeeg khkeithsh lalulu kim. So [facility] [phone].    ATENCIÓN: Si habla español, tiene a ron disposición servicios gratuitos de asistencia lingüística. Llame al [phone].    We comply with applicable federal civil rights laws and [place] laws. We do not discriminate on the basis of race, color, national origin, age, disability, sex, sexual orientation, or gender identity.            Thank you!     Thank you for choosing [facility]  for your care. Our goal is always to provide you with excellent care. Hearing back from our patients is one way we can continue to improve our services. Please take a few minutes to complete the written survey that you may receive in the mail after your visit with us. Thank you!             Your Updated Medication List - Protect others around you: Learn how to safely use, store and throw away your medicines at www.disposemymeds.org.          This list is accurate as of 5/1/18 11:59 PM.  Always use your most recent med list.                   Brand Name Dispense Instructions for use Diagnosis    acetaminophen 500 MG tablet    TYLENOL    100 tablet    Take 2 tablets (1,000 mg) by mouth 3 times daily as needed for mild pain    Chronic pain syndrome       amLODIPine 10 MG tablet    NORVASC    90 tablet    Take 1 tablet (10 mg) by mouth daily    Benign essential hypertension       digoxin 125 MCG tablet    LANOXIN    90 tablet    Take 1 tablet (125 mcg) by mouth every other day    Chronic atrial fibrillation (H)       furosemide 20 MG tablet    LASIX    180 tablet    Take 2 tablets (40 mg) by mouth every  morning (before breakfast)    Dyspnea on exertion, Benign essential hypertension       glipiZIDE 10 MG tablet    GLUCOTROL    360 tablet    Take 2 tablets (20 mg) by mouth 2 times daily Take 20mg in AM with breakfast and 20mg in PM with evening meal    Type 2 diabetes mellitus with hyperglycemia, with long-term current use of insulin (H)       loperamide 2 MG tablet    IMODIUM A-D     Take 2 tabs in the morning and 1 tablet in the evening        metFORMIN 500 MG tablet    GLUCOPHAGE    360 tablet    Take 1 tablet (500 mg) by mouth 2 times daily (with meals)    Type 2 diabetes mellitus with hyperglycemia, with long-term current use of insulin (H)       metoprolol succinate 25 MG 24 hr tablet    TOPROL-XL    90 tablet    Take 1 tablet (25 mg) by mouth daily    Benign hypertension       MULTIVITAMINS PO      Take 1 tablet by mouth daily.        * ONE TOUCH FINE POINT LANCETS      Check blood sugars twice a day.        * ONETOUCH DELICA LANCETS 33G Misc     100 each    1 strip 2 times daily    Type 2 diabetes mellitus with hyperglycemia, with long-term current use of insulin (H)       Pramoxine HCl 1 % Crea    CERAVE ITCH RELIEF    340 g    Externally apply 30 g topically daily    Itching       warfarin 3 MG tablet    COUMADIN    90 tablet    Take 1 tablet (3 mg) by mouth daily    Chronic atrial fibrillation (H)       * Notice:  This list has 2 medication(s) that are the same as other medications prescribed for you. Read the directions carefully, and ask your doctor or other care provider to review them with you.

## 2018-05-01 NOTE — NURSING NOTE
"Chief Complaint   Patient presents with     Hypertension       Initial /60 (Cuff Size: Adult Regular)  Pulse 74  Temp 98  F (36.7  C) (Tympanic)  Resp 18  Wt 153 lb (69.4 kg)  BMI 24.32 kg/m2 Estimated body mass index is 24.32 kg/(m^2) as calculated from the following:    Height as of 1/8/18: 5' 6.5\" (1.689 m).    Weight as of this encounter: 153 lb (69.4 kg).  Medication Reconciliation: complete     Talya Casas CMA      "

## 2018-05-01 NOTE — LETTER
May 3, 2018      Suman Burton  [address] [facility] [address]        Dear ,    We are writing to inform you of your test results.    As I am sure you know your hemoglobin A1c is too high at 9.1.  Remainder of your tests look pretty stable.  We should repeat your tests again in 3 months.  I should see you at that time in August.    Resulted Orders   Hemoglobin   Result Value Ref Range    Hemoglobin 15.3 13.3 - 17.7 g/dL   Lipid panel reflex to direct LDL Fasting   Result Value Ref Range    Cholesterol 165 <200 mg/dL    Triglycerides 262 (H) <150 mg/dL      Comment:      Borderline high:  150-199 mg/dl  High:             200-499 mg/dl  Very high:       >499 mg/dl      HDL Cholesterol 36 (L) >39 mg/dL    LDL Cholesterol Calculated 77 <100 mg/dL      Comment:      Desirable:       <100 mg/dl    Non HDL Cholesterol 129 <130 mg/dL   TSH WITH FREE T4 REFLEX   Result Value Ref Range    TSH 5.28 (H) 0.40 - 4.00 mU/L   Albumin Random Urine Quantitative with Creat Ratio   Result Value Ref Range    Creatinine Urine 88 mg/dL    Albumin Urine mg/L 301 mg/L    Albumin Urine mg/g Cr 342.04 (H) 0 - 17 mg/g Cr   Hemoglobin A1c   Result Value Ref Range    Hemoglobin A1C 9.1 (H) 0 - 5.6 %      Comment:      Normal <5.7% Prediabetes 5.7-6.4%  Diabetes 6.5% or higher - adopted from ADA   consensus guidelines.  Reviewed: OK with previous     Electrolyte panel (Na, K, Cl, CO2, Anion gap)   Result Value Ref Range    Sodium 139 133 - 144 mmol/L    Potassium 4.6 3.4 - 5.3 mmol/L    Chloride 105 94 - 109 mmol/L    Carbon Dioxide 26 20 - 32 mmol/L    Anion Gap 8 3 - 14 mmol/L   Creatinine   Result Value Ref Range    Creatinine 1.80 (H) 0.66 - 1.25 mg/dL    GFR Estimate 36 (L) >60 mL/min/1.7m2      Comment:      Non  GFR Calc    GFR Estimate If Black 44 (L) >60 mL/min/1.7m2      Comment:       GFR Calc   Urea nitrogen   Result Value Ref Range    Urea Nitrogen 43 (H) 7 - 30 mg/dL   T4 free    Result Value Ref Range    T4 Free 0.96 0.76 - 1.46 ng/dL       If you have any questions or concerns, please call the clinic at the number listed above.       Sincerely,        Jamie Guerrier MD

## 2018-05-01 NOTE — PROGRESS NOTES
SUBJECTIVE:   Suman Burton is a 82 year old male who presents to clinic today for the following health issues:      Hypertension Follow-up      Outpatient blood pressures are not being checked.    Low Salt Diet: not monitoring salt      Amount of exercise or physical activity: 1 day/week for an average of 15-30 minutes    Problems taking medications regularly: No    Medication side effects: none    Diet: regular (no restrictions)        Rash      Duration: years    Description  Location: chest and legs  Itching: moderate    Intensity:  moderate    Accompanying signs and symptoms: None    History (similar episodes/previous evaluation): None    Precipitating or alleviating factors:  New exposures:  None  Recent travel: no      Therapies tried and outcome: none      Problem list and histories reviewed & adjusted, as indicated.  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     Chronic kidney disease, stage III (moderate)     Diabetes mellitus, type 2 (H)     Hyperlipidemia     Lumbago     Thoracic or lumbosacral neuritis or radiculitis, unspecified     Transient visual loss     Diabetes mellitus, type 2 (H)     Chronic atrial fibrillation (H)     Chronic pain syndrome     Hypersomnia     Persistent insomnia     Dystrophic nail     Past Surgical History:   Procedure Laterality Date     CHOLECYSTECTOMY       LAPAROSCOPIC APPENDECTOMY         Social History   Substance Use Topics     Smoking status: Former Smoker     Smokeless tobacco: Never Used     Alcohol use 0.0 oz/week     0 Standard drinks or equivalent per week      Comment: Occasionally.      Family History   Problem Relation Age of Onset     DIABETES No family hx of      Coronary Artery Disease No family hx of      Hypertension No family hx of      Breast Cancer No family hx of      Colon  "Cancer No family hx of      Prostate Cancer No family hx of      Other Cancer No family hx of      Asthma No family hx of            Reviewed and updated as needed this visit by clinical staff  Tobacco  Allergies  Meds  Med Hx  Surg Hx  Fam Hx  Soc Hx      Reviewed and updated as needed this visit by Provider         ROS:  Constitutional, HEENT, cardiovascular, pulmonary, gi and gu systems are negative, except as otherwise noted.  INTEGUMENTARY/SKIN: POSITIVE for rash with itching  MUSCULOSKELETAL: POSITIVE  for left leg pain with walking    OBJECTIVE:                                                    /60 (Cuff Size: Adult Regular)  Pulse 74  Temp 98  F (36.7  C) (Tympanic)  Resp 18  Wt 153 lb (69.4 kg)  BMI 24.32 kg/m2  Body mass index is 24.32 kg/(m^2).  GENERAL APPEARANCE: healthy, alert and no distress  EXT: The feet show \"alligator\" skin.  He has marked dystrophy of all toenails.  The skin on the dorsal aspects of both feet is markedly thickened and yellowish in color.       ASSESSMENT/PLAN:                                                        ICD-10-CM    1. Benign hypertension I10 metoprolol succinate (TOPROL-XL) 25 MG 24 hr tablet     T4 free   2. Chronic kidney disease, stage III (moderate) N18.3 Hemoglobin     Electrolyte panel (Na, K, Cl, CO2, Anion gap)     Creatinine     Urea nitrogen   3. Dystrophic nail L60.3    4. Type 2 diabetes mellitus with hyperglycemia, without long-term current use of insulin (H) E11.65 TSH WITH FREE T4 REFLEX     Albumin Random Urine Quantitative with Creat Ratio     Hemoglobin A1c   5. Need for prophylactic vaccination with tetanus-diphtheria (TD) Z23    6. Mixed hyperlipidemia E78.2 Lipid panel reflex to direct LDL Fasting       Patient Instructions   The patient's short-term memory is obviously impaired some.  He has an appointment upcoming to get his memory formally tested.  He cannot remember the date of the testing but says his wife has it on the " calendar at home.  Further follow-up will be pending review of the memory testing.    With respect to his blood pressure I refilled his metoprolol.    I did give him the contact information for [employer] to get his toenails cut.    With respect to his chronic kidney disease we will check his hemoglobin, electrolyte panel, creatinine and BUN.  He is also due to get his hemoglobin A1c done because of his type 2 diabetes.  We will also do his albumin.  Lipid panel is also being done.  Follow-up will be pending review of his tests.          Jamie Guerrier MD  [facility]

## 2018-05-02 LAB
ANION GAP SERPL CALCULATED.3IONS-SCNC: 8 MMOL/L (ref 3–14)
BUN SERPL-MCNC: 43 MG/DL (ref 7–30)
CHLORIDE SERPL-SCNC: 105 MMOL/L (ref 94–109)
CHOLEST SERPL-MCNC: 165 MG/DL
CO2 SERPL-SCNC: 26 MMOL/L (ref 20–32)
CREAT SERPL-MCNC: 1.8 MG/DL (ref 0.66–1.25)
CREAT UR-MCNC: 88 MG/DL
GFR SERPL CREATININE-BSD FRML MDRD: 36 ML/MIN/1.7M2
HDLC SERPL-MCNC: 36 MG/DL
LDLC SERPL CALC-MCNC: 77 MG/DL
MICROALBUMIN UR-MCNC: 301 MG/L
MICROALBUMIN/CREAT UR: 342.04 MG/G CR (ref 0–17)
NONHDLC SERPL-MCNC: 129 MG/DL
POTASSIUM SERPL-SCNC: 4.6 MMOL/L (ref 3.4–5.3)
SODIUM SERPL-SCNC: 139 MMOL/L (ref 133–144)
T4 FREE SERPL-MCNC: 0.96 NG/DL (ref 0.76–1.46)
TRIGL SERPL-MCNC: 262 MG/DL
TSH SERPL DL<=0.005 MIU/L-ACNC: 5.28 MU/L (ref 0.4–4)

## 2018-05-02 NOTE — PATIENT INSTRUCTIONS
The patient's short-term memory is obviously impaired some.  He has an appointment upcoming to get his memory formally tested.  He cannot remember the date of the testing but says his wife has it on the calendar at home.  Further follow-up will be pending review of the memory testing.    With respect to his blood pressure I refilled his metoprolol.    I did give him the contact information for [employer] to get his toenails cut.    With respect to his chronic kidney disease we will check his hemoglobin, electrolyte panel, creatinine and BUN.  He is also due to get his hemoglobin A1c done because of his type 2 diabetes.  We will also do his albumin.  Lipid panel is also being done.  Follow-up will be pending review of his tests.

## 2018-05-08 ENCOUNTER — TELEPHONE (OUTPATIENT)
Dept: FAMILY MEDICINE | Facility: CLINIC | Age: 83
End: 2018-05-08

## 2018-05-08 NOTE — TELEPHONE ENCOUNTER
Reason for Call:  Form, our goal is to have forms completed with 72 hours, however, some forms may require a visit or additional information.    Type of letter, form or note:  medical    Who is the form from?: Home care    Where did the form come from: form was faxed in    What clinic location was the form placed at?: [facility]    Where the form was placed: 's Box: Jamie Guerrier MD    What number is listed as a contact on the form?: [phone]  Phone [phone]       Additional comments: [facility] mg vs the 25 mg we have in chart need OV notes to confirm    Call taken on 5/8/2018 at 1:39 PM by Ca Conteh

## 2018-05-24 ENCOUNTER — TRANSFERRED RECORDS (OUTPATIENT)
Dept: HEALTH INFORMATION MANAGEMENT | Facility: CLINIC | Age: 83
End: 2018-05-24

## 2018-05-24 NOTE — TELEPHONE ENCOUNTER
form was refaxed with a letter  From Dr Guerrier stating that his dose was decreased and 2 OV notes were sent as well 5/24/18

## 2018-07-05 ENCOUNTER — TELEPHONE (OUTPATIENT)
Dept: FAMILY MEDICINE | Facility: CLINIC | Age: 83
End: 2018-07-05

## 2018-08-06 ENCOUNTER — TELEPHONE (OUTPATIENT)
Dept: FAMILY MEDICINE | Facility: CLINIC | Age: 83
End: 2018-08-06

## 2018-08-06 NOTE — TELEPHONE ENCOUNTER
[facility] phone call message- general phone call:    Reason for call: Patient is no longer a patient here now, he use to see Dr. Villarreal but [facility] and per the letter they received they are to let the clinic know so that is why she is calling.     Return call needed: No    OK to leave a message on voice mail?     Primary language: English      needed? No    Call taken on August 6, 2018 at 1:53 PM by Da Gonzalez

## 2018-08-20 ENCOUNTER — TRANSFERRED RECORDS (OUTPATIENT)
Dept: HEALTH INFORMATION MANAGEMENT | Facility: CLINIC | Age: 83
End: 2018-08-20

## 2018-08-27 ENCOUNTER — OFFICE VISIT (OUTPATIENT)
Dept: FAMILY MEDICINE | Facility: CLINIC | Age: 83
End: 2018-08-27
Payer: MEDICARE

## 2018-08-27 VITALS
BODY MASS INDEX: 24.17 KG/M2 | WEIGHT: 152 LBS | OXYGEN SATURATION: 94 % | DIASTOLIC BLOOD PRESSURE: 60 MMHG | HEART RATE: 77 BPM | SYSTOLIC BLOOD PRESSURE: 130 MMHG

## 2018-08-27 DIAGNOSIS — I10 BENIGN HYPERTENSION: ICD-10-CM

## 2018-08-27 DIAGNOSIS — E11.65 TYPE 2 DIABETES MELLITUS WITH HYPERGLYCEMIA, WITHOUT LONG-TERM CURRENT USE OF INSULIN (H): ICD-10-CM

## 2018-08-27 DIAGNOSIS — E11.65 TYPE 2 DIABETES MELLITUS WITH HYPERGLYCEMIA, WITH LONG-TERM CURRENT USE OF INSULIN (H): ICD-10-CM

## 2018-08-27 DIAGNOSIS — N18.30 CHRONIC KIDNEY DISEASE, STAGE III (MODERATE) (H): Primary | ICD-10-CM

## 2018-08-27 DIAGNOSIS — I48.20 CHRONIC ATRIAL FIBRILLATION (H): ICD-10-CM

## 2018-08-27 DIAGNOSIS — Z79.4 TYPE 2 DIABETES MELLITUS WITH HYPERGLYCEMIA, WITH LONG-TERM CURRENT USE OF INSULIN (H): ICD-10-CM

## 2018-08-27 LAB
BASOPHILS # BLD AUTO: 0 10E9/L (ref 0–0.2)
BASOPHILS NFR BLD AUTO: 0.4 %
DIFFERENTIAL METHOD BLD: ABNORMAL
EOSINOPHIL # BLD AUTO: 0.2 10E9/L (ref 0–0.7)
EOSINOPHIL NFR BLD AUTO: 1.8 %
ERYTHROCYTE [DISTWIDTH] IN BLOOD BY AUTOMATED COUNT: 13.1 % (ref 10–15)
HBA1C MFR BLD: 10.6 % (ref 0–5.6)
HCT VFR BLD AUTO: 43.8 % (ref 40–53)
HGB BLD-MCNC: 15.3 G/DL (ref 13.3–17.7)
LYMPHOCYTES # BLD AUTO: 2.3 10E9/L (ref 0.8–5.3)
LYMPHOCYTES NFR BLD AUTO: 27.1 %
MCH RBC QN AUTO: 31.7 PG (ref 26.5–33)
MCHC RBC AUTO-ENTMCNC: 34.9 G/DL (ref 31.5–36.5)
MCV RBC AUTO: 91 FL (ref 78–100)
MONOCYTES # BLD AUTO: 1 10E9/L (ref 0–1.3)
MONOCYTES NFR BLD AUTO: 11.3 %
NEUTROPHILS # BLD AUTO: 5.1 10E9/L (ref 1.6–8.3)
NEUTROPHILS NFR BLD AUTO: 59.4 %
PLATELET # BLD AUTO: 148 10E9/L (ref 150–450)
RBC # BLD AUTO: 4.82 10E12/L (ref 4.4–5.9)
WBC # BLD AUTO: 8.5 10E9/L (ref 4–11)

## 2018-08-27 PROCEDURE — 85025 COMPLETE CBC W/AUTO DIFF WBC: CPT | Performed by: FAMILY MEDICINE

## 2018-08-27 PROCEDURE — 36415 COLL VENOUS BLD VENIPUNCTURE: CPT | Performed by: FAMILY MEDICINE

## 2018-08-27 PROCEDURE — 82565 ASSAY OF CREATININE: CPT | Performed by: FAMILY MEDICINE

## 2018-08-27 PROCEDURE — 83036 HEMOGLOBIN GLYCOSYLATED A1C: CPT | Performed by: FAMILY MEDICINE

## 2018-08-27 PROCEDURE — 99214 OFFICE O/P EST MOD 30 MIN: CPT | Performed by: FAMILY MEDICINE

## 2018-08-27 PROCEDURE — 80051 ELECTROLYTE PANEL: CPT | Performed by: FAMILY MEDICINE

## 2018-08-27 PROCEDURE — 84520 ASSAY OF UREA NITROGEN: CPT | Performed by: FAMILY MEDICINE

## 2018-08-27 PROCEDURE — 81001 URINALYSIS AUTO W/SCOPE: CPT | Performed by: FAMILY MEDICINE

## 2018-08-27 RX ORDER — DIGOXIN 125 MCG
125 TABLET ORAL DAILY
Qty: 90 TABLET | Refills: 3
Start: 2018-08-27

## 2018-08-27 NOTE — MR AVS SNAPSHOT
After Visit Summary   8/27/2018    Suman Burton    MRN: [id number]           Patient Information     Date Of Birth          1935        Visit Information        Provider Department      8/27/2018 1:00 PM Jamie Guerrier MD [facility]        Today's Diagnoses     Chronic kidney disease, stage III (moderate)    -  1    Type 2 diabetes mellitus with hyperglycemia, without long-term current use of insulin (H)        Chronic atrial fibrillation (H)        Benign hypertension          Care Instructions    Will see back in one month with all of his pill bottles. Check labs today.          Follow-ups after your visit        Your next 10 appointments already scheduled     Sep 24, 2018  1:00 PM CDT   Office Visit with Jamie Guerrier MD   [facility] ([facility])    [address] [place] [id number] [phone]           Bring a current list of meds and any records pertaining to this visit. For Physicals, please bring immunization records and any forms needing to be filled out. Please arrive 10 minutes early to complete paperwork.              Who to contact     If you have questions or need follow up information about today's clinic visit or your schedule please contact [facility] directly at [phone].  Normal or non-critical lab and imaging results will be communicated to you by MyChart, letter or phone within 4 business days after the clinic has received the results. If you do not hear from us within 7 days, please contact the clinic through MyChart or phone. If you have a critical or abnormal lab result, we will notify you by phone as soon as possible.  Submit refill requests through [employer] or call your pharmacy and they will forward the refill request to us. Please allow 3 business days for your refill to be completed.           Additional Information About Your Visit        Care EveryWhere ID     This is your Care EveryWhere ID. This could be used by other organizations to access your [place] medical records  [id number]        Your Vitals Were     Pulse Pulse Oximetry BMI (Body Mass Index)             77 94% 24.17 kg/m2          Blood Pressure from Last 3 Encounters:   08/27/18 130/60   05/01/18 132/60   03/06/18 114/60    Weight from Last 3 Encounters:   08/27/18 152 lb (68.9 kg)   05/01/18 153 lb (69.4 kg)   03/06/18 156 lb (70.8 kg)              We Performed the Following     Albumin Random Urine Quantitative with Creat Ratio     CBC with platelets differential     Creatinine     Electrolyte panel (Na, K, Cl, CO2, Anion gap)     Hemoglobin A1c     UA with Microscopic     Urea nitrogen        Primary Care Provider Office Phone # Fax #    Jamie Guerrier [phone] [address] [facility] [address]        Equal Access to Services     DEMETRIO PETTY : Hadii aad ku hadasho Soomaali, waaxda luqadaha, qaybta kaalmada adeegyada, waxay idiin hayaan adeeg kharash la'clarisse . So [facility] [phone].    ATENCIÓN: Si habla español, tiene a ron disposición servicios gratuitos de asistencia lingüística. Llame al [phone].    We comply with applicable federal civil rights laws and [place] laws. We do not discriminate on the basis of race, color, national origin, age, disability, sex, sexual orientation, or gender identity.            Thank you!     Thank you for choosing [facility]  for your care. Our goal is always to provide you with excellent care. Hearing back from our patients is one way we can continue to improve our services. Please take a few minutes to complete the written survey that you may receive in the mail after your visit with us. Thank you!             Your Updated Medication List - Protect others around you: Learn how to safely use, store and throw away your  medicines at www.disposemymeds.org.          This list is accurate as of 8/27/18  1:33 PM.  Always use your most recent med list.                   Brand Name Dispense Instructions for use Diagnosis    acetaminophen 500 MG tablet    TYLENOL    100 tablet    Take 2 tablets (1,000 mg) by mouth 3 times daily as needed for mild pain    Chronic pain syndrome       amLODIPine 10 MG tablet    NORVASC    90 tablet    Take 1 tablet (10 mg) by mouth daily    Benign essential hypertension       digoxin 125 MCG tablet    LANOXIN    90 tablet    Take 1 tablet (125 mcg) by mouth every other day    Chronic atrial fibrillation (H)       furosemide 20 MG tablet    LASIX    180 tablet    Take 2 tablets (40 mg) by mouth every morning (before breakfast)    Dyspnea on exertion, Benign essential hypertension       glipiZIDE 10 MG tablet    GLUCOTROL    360 tablet    Take 2 tablets (20 mg) by mouth 2 times daily Take 20mg in AM with breakfast and 20mg in PM with evening meal    Type 2 diabetes mellitus with hyperglycemia, with long-term current use of insulin (H)       loperamide 2 MG tablet    IMODIUM A-D     Take 2 tabs in the morning and 1 tablet in the evening        metFORMIN 500 MG tablet    GLUCOPHAGE    360 tablet    Take 1 tablet (500 mg) by mouth 2 times daily (with meals)    Type 2 diabetes mellitus with hyperglycemia, with long-term current use of insulin (H)       metoprolol succinate 25 MG 24 hr tablet    TOPROL-XL    90 tablet    Take 1 tablet (25 mg) by mouth daily    Benign hypertension       MULTIVITAMINS PO      Take 1 tablet by mouth daily.        * ONE TOUCH FINE POINT LANCETS      Check blood sugars twice a day.        * ONETOUCH DELICA LANCETS 33G Misc     100 each    1 strip 2 times daily    Type 2 diabetes mellitus with hyperglycemia, with long-term current use of insulin (H)       Pramoxine HCl 1 % Crea    CERAVE ITCH RELIEF    340 g    Externally apply 30 g topically daily    Itching       warfarin 3 MG tablet     COUMADIN    90 tablet    Take 1 tablet (3 mg) by mouth daily    Chronic atrial fibrillation (H)       * Notice:  This list has 2 medication(s) that are the same as other medications prescribed for you. Read the directions carefully, and ask your doctor or other care provider to review them with you.

## 2018-08-27 NOTE — PATIENT INSTRUCTIONS
Will see back in one month with all of his pill bottles. Check labs today.  The patient did get his formal memory testing done and came out just about average.  See report from Lizett Mast.  The patient does seem confused about his medications.  He will bring in all of his pill bottles next visit.  He gets the vast majority of his medicines from the [facility].  Unfortunately, when they change medications we do not necessarily know that.  The patient was urged to start checking his blood sugars a little more faithfully.  He stopped because his fingers were getting sore.  He has also getting numbness and tingling in his hands and feet most likely due to his diabetes since that has not been in good control for quite a while.  We did have a discussion about seeing a vascular surgeon about his right carotid artery which had a 70-80% stenosis.  He is not sure who ordered the test.  He is also not sure he wants to have any surgery done so at this point he is passing on the referral to vascular surgery.  25 minutes was spent with the patient today discussing his medications, diabetes, carotid artery stenosis and his atrial fibrillation.

## 2018-08-27 NOTE — PROGRESS NOTES
SUBJECTIVE:   Suman Burton is a 83 year old male who presents to clinic today for the following health issues:      Diabetes Follow-up      Patient is checking blood sugars: not at all    Diabetic concerns: None and other - thinks sugar high     Symptoms of hypoglycemia (low blood sugar): none     Paresthesias (numbness or burning in feet) or sores: Yes numb     Date of last diabetic eye exam:     BP Readings from Last 2 Encounters:   08/27/18 130/60   05/01/18 132/60     Hemoglobin A1C (%)   Date Value   08/27/2018 10.6 (H)   05/01/2018 9.1 (H)     LDL Cholesterol Calculated (mg/dL)   Date Value   05/01/2018 77   04/17/2017 100       Diabetes Management Resources    Amount of exercise or physical activity: None    Problems taking medications regularly: No    Medication side effects: none    Diet: regular (no restrictions)        Hypertension Follow-up      Outpatient blood pressures are not being checked.    Low Salt Diet: no added salt      Problem list and histories reviewed & adjusted, as indicated.  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     Chronic kidney disease, stage III (moderate)     Diabetes mellitus, type 2 (H)     Hyperlipidemia     Lumbago     Thoracic or lumbosacral neuritis or radiculitis, unspecified     Transient visual loss     Diabetes mellitus, type 2 (H)     Chronic atrial fibrillation (H)     Chronic pain syndrome     Hypersomnia     Persistent insomnia     Dystrophic nail     Past Surgical History:   Procedure Laterality Date     CHOLECYSTECTOMY       LAPAROSCOPIC APPENDECTOMY         Social History   Substance Use Topics     Smoking status: Former Smoker     Smokeless tobacco: Never Used     Alcohol use 0.0 oz/week     0 Standard drinks or equivalent per week      Comment: Occasionally.      Family History    Problem Relation Age of Onset     Diabetes No family hx of      Coronary Artery Disease No family hx of      Hypertension No family hx of      Breast Cancer No family hx of      Colon Cancer No family hx of      Prostate Cancer No family hx of      Other Cancer No family hx of      Asthma No family hx of            Reviewed and updated as needed this visit by clinical staff  Allergies  Meds       Reviewed and updated as needed this visit by Provider         ROS:  Constitutional, HEENT, cardiovascular, pulmonary, gi and gu systems are negative, except as otherwise noted.  NEURO: POSITIVE for numbness or tingling in hands and feet    OBJECTIVE:                                                    /60  Pulse 77  Wt 152 lb (68.9 kg)  SpO2 94%  BMI 24.17 kg/m2  Body mass index is 24.17 kg/(m^2).  GENERAL APPEARANCE: healthy, alert and no distress  RESP: lungs clear to auscultation - no rales, rhonchi or wheezes  CV: regular rates and rhythm, normal S1 S2, no S3 or S4 and no murmur, click or rub         ASSESSMENT/PLAN:                                                        ICD-10-CM    1. Chronic kidney disease, stage III (moderate) N18.3 UA with Microscopic     Electrolyte panel (Na, K, Cl, CO2, Anion gap)     Creatinine     Urea nitrogen   2. Type 2 diabetes mellitus with hyperglycemia, without long-term current use of insulin (H) E11.65 Albumin Random Urine Quantitative with Creat Ratio     Hemoglobin A1c   3. Chronic atrial fibrillation (H) I48.2 digoxin (LANOXIN) 125 MCG tablet   4. Benign hypertension I10 UA with Microscopic     CBC with platelets differential   5. Type 2 diabetes mellitus with hyperglycemia, with long-term current use of insulin (H) E11.65 metFORMIN (GLUCOPHAGE) 1000 MG tablet    Z79.4        Follow up with Provider -1 month  Patient Instructions   Will see back in one month with all of his pill bottles. Check labs today.      Jamie Guerrier MD  [facility]  [facility]

## 2018-08-27 NOTE — LETTER
August 29, 2018      Suman Burton  [address] [facility] [id number]        Dear ,    We are writing to inform you of your test results.  When you come in with your pill bottles we will need to change your medications.        Resulted Orders   UA with Microscopic   Result Value Ref Range    Color Urine Yellow     Appearance Urine Clear     Glucose Urine 250 (A) NEG^Negative mg/dL    Bilirubin Urine Small (A) NEG^Negative      Comment:      This is an unconfirmed screening test result. A positive result may be false.    Ketones Urine Trace (A) NEG^Negative mg/dL    Specific Gravity Urine 1.025 1.003 - 1.035    pH Urine 5.5 5.0 - 7.0 pH    Protein Albumin Urine >=300 (A) NEG^Negative mg/dL    Urobilinogen Urine 1.0 0.2 - 1.0 EU/dL    Nitrite Urine Negative NEG^Negative    Blood Urine Negative NEG^Negative    Leukocyte Esterase Urine Negative NEG^Negative    Source Midstream Urine     WBC Urine 0 - 5 OTO5^0 - 5 /HPF    RBC Urine O - 2 OTO2^O - 2 /HPF    Squamous Epithelial /LPF Urine Few FEW^Few /LPF   CBC with platelets differential   Result Value Ref Range    WBC 8.5 4.0 - 11.0 10e9/L    RBC Count 4.82 4.4 - 5.9 10e12/L    Hemoglobin 15.3 13.3 - 17.7 g/dL    Hematocrit 43.8 40.0 - 53.0 %    MCV 91 78 - 100 fl    MCH 31.7 26.5 - 33.0 pg    MCHC 34.9 31.5 - 36.5 g/dL    RDW 13.1 10.0 - 15.0 %    Platelet Count 148 (L) 150 - 450 10e9/L    Diff Method Automated Method     % Neutrophils 59.4 %    % Lymphocytes 27.1 %    % Monocytes 11.3 %    % Eosinophils 1.8 %    % Basophils 0.4 %    Absolute Neutrophil 5.1 1.6 - 8.3 10e9/L    Absolute Lymphocytes 2.3 0.8 - 5.3 10e9/L    Absolute Monocytes 1.0 0.0 - 1.3 10e9/L    Absolute Eosinophils 0.2 0.0 - 0.7 10e9/L    Absolute Basophils 0.0 0.0 - 0.2 10e9/L   Hemoglobin A1c   Result Value Ref Range    Hemoglobin A1C 10.6 (H) 0 - 5.6 %      Comment:      Normal <5.7% Prediabetes 5.7-6.4%  Diabetes 6.5% or higher - adopted from ADA   consensus guidelines.      Electrolyte panel (Na, K, Cl, CO2, Anion gap)   Result Value Ref Range    Sodium 138 133 - 144 mmol/L    Potassium 4.3 3.4 - 5.3 mmol/L    Chloride 104 94 - 109 mmol/L    Carbon Dioxide 22 20 - 32 mmol/L    Anion Gap 12 3 - 14 mmol/L   Creatinine   Result Value Ref Range    Creatinine 1.76 (H) 0.66 - 1.25 mg/dL    GFR Estimate 37 (L) >60 mL/min/1.7m2      Comment:      Non  GFR Calc    GFR Estimate If Black 45 (L) >60 mL/min/1.7m2      Comment:       GFR Calc   Urea nitrogen   Result Value Ref Range    Urea Nitrogen 39 (H) 7 - 30 mg/dL       If you have any questions or concerns, please call the clinic at the number listed above.       Sincerely,        Jamie Guerrier MD

## 2018-08-28 LAB
ALBUMIN UR-MCNC: >=300 MG/DL
ANION GAP SERPL CALCULATED.3IONS-SCNC: 12 MMOL/L (ref 3–14)
APPEARANCE UR: CLEAR
BILIRUB UR QL STRIP: ABNORMAL
BUN SERPL-MCNC: 39 MG/DL (ref 7–30)
CHLORIDE SERPL-SCNC: 104 MMOL/L (ref 94–109)
CO2 SERPL-SCNC: 22 MMOL/L (ref 20–32)
COLOR UR AUTO: YELLOW
CREAT SERPL-MCNC: 1.76 MG/DL (ref 0.66–1.25)
GFR SERPL CREATININE-BSD FRML MDRD: 37 ML/MIN/1.7M2
GLUCOSE UR STRIP-MCNC: 250 MG/DL
HGB UR QL STRIP: NEGATIVE
KETONES UR STRIP-MCNC: ABNORMAL MG/DL
LEUKOCYTE ESTERASE UR QL STRIP: NEGATIVE
NITRATE UR QL: NEGATIVE
NON-SQ EPI CELLS #/AREA URNS LPF: ABNORMAL /LPF
PH UR STRIP: 5.5 PH (ref 5–7)
POTASSIUM SERPL-SCNC: 4.3 MMOL/L (ref 3.4–5.3)
RBC #/AREA URNS AUTO: ABNORMAL /HPF
SODIUM SERPL-SCNC: 138 MMOL/L (ref 133–144)
SOURCE: ABNORMAL
SP GR UR STRIP: 1.02 (ref 1–1.03)
UROBILINOGEN UR STRIP-ACNC: 1 EU/DL (ref 0.2–1)
WBC #/AREA URNS AUTO: ABNORMAL /HPF

## 2018-09-24 ENCOUNTER — OFFICE VISIT (OUTPATIENT)
Dept: FAMILY MEDICINE | Facility: CLINIC | Age: 83
End: 2018-09-24
Payer: MEDICARE

## 2018-09-24 VITALS
WEIGHT: 152 LBS | DIASTOLIC BLOOD PRESSURE: 60 MMHG | OXYGEN SATURATION: 92 % | SYSTOLIC BLOOD PRESSURE: 126 MMHG | HEART RATE: 98 BPM | BODY MASS INDEX: 24.17 KG/M2

## 2018-09-24 DIAGNOSIS — I25.10 CORONARY ARTERY DISEASE INVOLVING NATIVE CORONARY ARTERY OF NATIVE HEART WITHOUT ANGINA PECTORIS: ICD-10-CM

## 2018-09-24 DIAGNOSIS — N18.30 TYPE 2 DIABETES MELLITUS WITH STAGE 3 CHRONIC KIDNEY DISEASE, WITHOUT LONG-TERM CURRENT USE OF INSULIN (H): Primary | ICD-10-CM

## 2018-09-24 DIAGNOSIS — H34.9 RETINAL ARTERY OCCLUSION: ICD-10-CM

## 2018-09-24 DIAGNOSIS — E11.22 TYPE 2 DIABETES MELLITUS WITH STAGE 3 CHRONIC KIDNEY DISEASE, WITHOUT LONG-TERM CURRENT USE OF INSULIN (H): Primary | ICD-10-CM

## 2018-09-24 DIAGNOSIS — I10 BENIGN HYPERTENSION: ICD-10-CM

## 2018-09-24 DIAGNOSIS — Z23 NEED FOR PROPHYLACTIC VACCINATION AND INOCULATION AGAINST INFLUENZA: ICD-10-CM

## 2018-09-24 PROCEDURE — G0008 ADMIN INFLUENZA VIRUS VAC: HCPCS | Performed by: FAMILY MEDICINE

## 2018-09-24 PROCEDURE — 90662 IIV NO PRSV INCREASED AG IM: CPT | Performed by: FAMILY MEDICINE

## 2018-09-24 PROCEDURE — 99214 OFFICE O/P EST MOD 30 MIN: CPT | Mod: 25 | Performed by: FAMILY MEDICINE

## 2018-09-24 RX ORDER — METOPROLOL SUCCINATE 50 MG/1
25 TABLET, EXTENDED RELEASE ORAL DAILY
Status: ON HOLD
Start: 2018-09-24 | End: 2019-07-02

## 2018-09-24 NOTE — MR AVS SNAPSHOT
After Visit Summary   9/24/2018    Suman Burton    MRN: [id number]           Patient Information     Date Of Birth          1935        Visit Information        Provider Department      9/24/2018 1:00 PM Jamie Guerrier MD [facility]        Today's Diagnoses     Type 2 diabetes mellitus with stage 3 chronic kidney disease, without long-term current use of insulin (H)    -  1    Retinal artery occlusion        Coronary artery disease involving native coronary artery of native heart without angina pectoris        Benign hypertension        Need for prophylactic vaccination and inoculation against influenza          Care Instructions    Flu shot was given today.    I added Januvia 50 mg daily to his regimen.  We faxed that prescription over to [facility].    With regards to his retinal artery occlusion on the left, apparently, the [facility] wants him to have a full echocardiogram done.  He has had a carotid ultrasound done showing 70-80% occlusion on one side and 50-60 on the other.  That is being followed by the [facility].  He will return in 1 month to see what his blood sugars are doing.          Follow-ups after your visit        Follow-up notes from your care team     Return in about 4 weeks (around 10/22/2018) for diabetes, Lab Work.      Your next 10 appointments already scheduled     Oct 29, 2018  1:00 PM CDT   Office Visit with Jamie Guerrier MD   [facility] ([facility])    [address] [place] [phone]           Bring a current list of meds and any records pertaining to this visit. For Physicals, please bring immunization records and any forms needing to be filled out. Please arrive 10 minutes early to complete paperwork.              Future tests that were ordered for you today     Open Future Orders        Priority Expected Expires Ordered     Echocardiogram Complete Routine  9/24/2019 9/24/2018            Who to contact     If you have questions or need follow up information about today's clinic visit or your schedule please contact [facility] directly at [phone].  Normal or non-critical lab and imaging results will be communicated to you by MyChart, letter or phone within 4 business days after the clinic has received the results. If you do not hear from us within 7 days, please contact the clinic through MyChart or phone. If you have a critical or abnormal lab result, we will notify you by phone as soon as possible.  Submit refill requests through [employer] or call your pharmacy and they will forward the refill request to us. Please allow 3 business days for your refill to be completed.          Additional Information About Your Visit        Care EveryWhere ID     This is your Care EveryWhere ID. This could be used by other organizations to access your [place] medical records  [id number]        Your Vitals Were     Pulse Pulse Oximetry BMI (Body Mass Index)             98 92% 24.17 kg/m2          Blood Pressure from Last 3 Encounters:   09/24/18 126/60   08/27/18 130/60   05/01/18 132/60    Weight from Last 3 Encounters:   09/24/18 152 lb (68.9 kg)   08/27/18 152 lb (68.9 kg)   05/01/18 153 lb (69.4 kg)              We Performed the Following     ADMIN INFLUENZA (For MEDICARE Patients ONLY) []     FLU VACCINE, INCREASED ANTIGEN, PRESV FREE, AGE 65+ [80182]          Today's Medication Changes          These changes are accurate as of 9/24/18  2:19 PM.  If you have any questions, ask your nurse or doctor.               Start taking these medicines.        Dose/Directions    sitagliptin 50 MG tablet   Commonly known as:  JANUVIA   Used for:  Type 2 diabetes mellitus with stage 3 chronic kidney disease, without long-term current use of insulin (H)   Started by:  Jamie Guerrier MD        Dose:  50 mg   Take 1  tablet (50 mg) by mouth daily   Quantity:  30 tablet   Refills:  1         These medicines have changed or have updated prescriptions.        Dose/Directions    metoprolol succinate 50 MG 24 hr tablet   Commonly known as:  TOPROL-XL   This may have changed:  medication strength   Used for:  Benign hypertension   Changed by:  Jamie Guerrier MD        Dose:  25 mg   Take 0.5 tablets (25 mg) by mouth daily   Refills:  0            Where to get your medicines      Some of these will need a paper prescription and others can be bought over the counter.  Ask your nurse if you have questions.     Bring a paper prescription for each of these medications     sitagliptin 50 MG tablet       You don't need a prescription for these medications     metoprolol succinate 50 MG 24 hr tablet                Primary Care Provider Office Phone # Fax #    Jamie Guerrier [phone] [address] [facility] [address]        Equal Access to Services     [facility]: Hadii aad ku hadasho Soomaali, waaxda luqadaha, qaybta kaalmada adeegyada, waxay idiin hayaan rosemarie kharacruzito brooke . So [facility] [phone].    ATENCIÓN: Si habla español, tiene a ron disposición servicios gratuitos de asistencia lingüística. Llame al [phone].    We comply with applicable federal civil rights laws and [place] laws. We do not discriminate on the basis of race, color, national origin, age, disability, sex, sexual orientation, or gender identity.            Thank you!     Thank you for choosing [facility]  for your care. Our goal is always to provide you with excellent care. Hearing back from our patients is one way we can continue to improve our services. Please take a few minutes to complete the written survey that you may receive in the mail after your visit with us. Thank you!             Your Updated Medication List - Protect others around you: Learn how to safely use, store and  throw away your medicines at www.disposemymeds.org.          This list is accurate as of 9/24/18  2:19 PM.  Always use your most recent med list.                   Brand Name Dispense Instructions for use Diagnosis    acetaminophen 500 MG tablet    TYLENOL    100 tablet    Take 2 tablets (1,000 mg) by mouth 3 times daily as needed for mild pain    Chronic pain syndrome       amLODIPine 10 MG tablet    NORVASC    90 tablet    Take 1 tablet (10 mg) by mouth daily    Benign essential hypertension       digoxin 125 MCG tablet    LANOXIN    90 tablet    Take 1 tablet (125 mcg) by mouth daily    Chronic atrial fibrillation (H)       furosemide 20 MG tablet    LASIX    180 tablet    Take 2 tablets (40 mg) by mouth every morning (before breakfast)    Dyspnea on exertion, Benign essential hypertension       glipiZIDE 10 MG tablet    GLUCOTROL    360 tablet    Take 2 tablets (20 mg) by mouth 2 times daily Take 20mg in AM with breakfast and 20mg in PM with evening meal    Type 2 diabetes mellitus with hyperglycemia, with long-term current use of insulin (H)       loperamide 2 MG tablet    IMODIUM A-D     Take 2 tabs in the morning and 1 tablet in the evening        metFORMIN 1000 MG tablet    GLUCOPHAGE     Take 1 tablet (1,000 mg) by mouth 2 times daily (with meals)    Type 2 diabetes mellitus with hyperglycemia, with long-term current use of insulin (H)       metoprolol succinate 50 MG 24 hr tablet    TOPROL-XL     Take 0.5 tablets (25 mg) by mouth daily    Benign hypertension       MULTIVITAMINS PO      Take 1 tablet by mouth daily.        * ONE TOUCH FINE POINT LANCETS      Check blood sugars twice a day.        * ONETOUCH DELICA LANCETS 33G Misc     100 each    1 strip 2 times daily    Type 2 diabetes mellitus with hyperglycemia, with long-term current use of insulin (H)       Pramoxine HCl 1 % Crea    CERAVE ITCH RELIEF    340 g    Externally apply 30 g topically daily    Itching       sitagliptin 50 MG tablet    JANUVIA     30 tablet    Take 1 tablet (50 mg) by mouth daily    Type 2 diabetes mellitus with stage 3 chronic kidney disease, without long-term current use of insulin (H)       warfarin 3 MG tablet    COUMADIN    90 tablet    Take 1 tablet (3 mg) by mouth daily    Chronic atrial fibrillation (H)       * Notice:  This list has 2 medication(s) that are the same as other medications prescribed for you. Read the directions carefully, and ask your doctor or other care provider to review them with you.

## 2018-09-24 NOTE — PROGRESS NOTES
SUBJECTIVE:   Suman Burton is a 83 year old male who presents to clinic today for the following health issues:      Diabetes Follow-up      Patient is checking blood sugars: not at all    Diabetic concerns: None and other - not controlled     Symptoms of hypoglycemia (low blood sugar): none     Paresthesias (numbness or burning in feet) or sores: Yes      Date of last diabetic eye exam:     BP Readings from Last 2 Encounters:   09/24/18 126/60   08/27/18 130/60     Hemoglobin A1C (%)   Date Value   08/27/2018 10.6 (H)   05/01/2018 9.1 (H)     LDL Cholesterol Calculated (mg/dL)   Date Value   05/01/2018 77   04/17/2017 100       Diabetes Management Resources    Amount of exercise or physical activity: None    Problems taking medications regularly: No    Medication side effects: none    Diet: regular (no restrictions)            Problem list and histories reviewed & adjusted, as indicated.  Additional history: See evaluation by Dr. Mast for his intellectual capabilities.  When he comes for his next visit we will do a formal Mini-Mental exam so we have a baseline to compare over the next year.    Patient Active Problem List   Diagnosis     Adenocarcinoma (H)     Benign hypertension     Health Care Home     Cerebral infarction due to occlusion or stenosis of carotid artery     Chronic kidney disease, stage III (moderate)     Diabetes mellitus, type 2 (H)     Hyperlipidemia     Lumbago     Thoracic or lumbosacral neuritis or radiculitis, unspecified     Transient visual loss     Diabetes mellitus, type 2 (H)     Chronic atrial fibrillation (H)     Chronic pain syndrome     Hypersomnia     Persistent insomnia     Dystrophic nail     Retinal artery occlusion     Past Surgical History:   Procedure Laterality Date     CHOLECYSTECTOMY       LAPAROSCOPIC APPENDECTOMY         Social History   Substance Use Topics     Smoking status: Former Smoker     Smokeless tobacco: Never Used     Alcohol use 0.0 oz/week     0  Standard drinks or equivalent per week      Comment: Occasionally.      History reviewed. No pertinent family history.        Reviewed and updated as needed this visit by clinical staff  Tobacco  Allergies  Med Hx  Surg Hx  Fam Hx  Soc Hx      Reviewed and updated as needed this visit by Provider         ROS:  Constitutional, HEENT, cardiovascular, pulmonary, gi and gu systems are negative, except as otherwise noted.    OBJECTIVE:                                                    /60 (Cuff Size: Adult Regular)  Pulse 98  Wt 152 lb (68.9 kg)  SpO2 92%  BMI 24.17 kg/m2  Body mass index is 24.17 kg/(m^2).  GENERAL APPEARANCE: healthy, alert and no distress         ASSESSMENT/PLAN:                                                        ICD-10-CM    1. Type 2 diabetes mellitus with stage 3 chronic kidney disease, without long-term current use of insulin (H) E11.22 sitagliptin (JANUVIA) 50 MG tablet    N18.3    2. Retinal artery occlusion H34.9     left   3. Coronary artery disease involving native coronary artery of native heart without angina pectoris I25.10 Echocardiogram Complete   4. Benign hypertension I10 metoprolol succinate (TOPROL-XL) 50 MG 24 hr tablet   5. Need for prophylactic vaccination and inoculation against influenza Z23 FLU VACCINE, INCREASED ANTIGEN, PRESV FREE, AGE 65+ [97409]     ADMIN INFLUENZA (For MEDICARE Patients ONLY) []     Return in about 4 weeks (around 10/22/2018) for diabetes, Lab Work.  Patient Instructions   Flu shot was given today.    I added Januvia 50 mg daily to his regimen.  We faxed that prescription over to [facility].    With regards to his retinal artery occlusion on the left, apparently, the [facility] wants him to have a full echocardiogram done.  He has had a carotid ultrasound done showing 70-80% occlusion on one side and 50-60 on the other.  That is being followed by the [facility].  He will return in 1 month to see what his blood sugars are doing.      Jamie Carrero  MD [place]  [facility]

## 2018-09-24 NOTE — PROGRESS NOTES

## 2018-09-24 NOTE — PATIENT INSTRUCTIONS
Flu shot was given today.    I added Januvia 50 mg daily to his regimen.  We faxed that prescription over to [facility].    With regards to his retinal artery occlusion on the left, apparently, the [facility] wants him to have a full echocardiogram done.  He has had a carotid ultrasound done showing 70-80% occlusion on one side and 50-60 on the other.  That is being followed by the [facility].  He will return in 1 month to see what his blood sugars are doing.

## 2018-09-26 ENCOUNTER — TELEPHONE (OUTPATIENT)
Dept: FAMILY MEDICINE | Facility: CLINIC | Age: 83
End: 2018-09-26

## 2018-09-26 ENCOUNTER — HOSPITAL ENCOUNTER (OUTPATIENT)
Dept: CARDIOLOGY | Facility: CLINIC | Age: 83
Discharge: HOME | End: 2018-09-26
Attending: FAMILY MEDICINE | Admitting: FAMILY MEDICINE
Payer: MEDICARE

## 2018-09-26 DIAGNOSIS — N18.30 TYPE 2 DIABETES MELLITUS WITH STAGE 3 CHRONIC KIDNEY DISEASE, WITHOUT LONG-TERM CURRENT USE OF INSULIN (H): ICD-10-CM

## 2018-09-26 DIAGNOSIS — I25.10 CORONARY ARTERY DISEASE INVOLVING NATIVE CORONARY ARTERY OF NATIVE HEART WITHOUT ANGINA PECTORIS: ICD-10-CM

## 2018-09-26 DIAGNOSIS — E11.22 TYPE 2 DIABETES MELLITUS WITH STAGE 3 CHRONIC KIDNEY DISEASE, WITHOUT LONG-TERM CURRENT USE OF INSULIN (H): ICD-10-CM

## 2018-09-26 PROCEDURE — 93306 TTE W/DOPPLER COMPLETE: CPT

## 2018-09-26 PROCEDURE — 93306 TTE W/DOPPLER COMPLETE: CPT | Mod: 26 | Performed by: INTERNAL MEDICINE

## 2018-09-26 NOTE — TELEPHONE ENCOUNTER
Reason for Call:  Other call back    Detailed comments: Suman called to request [facility] most [facility]. Patient does not know the name of the medication.     Phone Number Patient can be reached at: Home number on file [phone] (home)    Best Time: any    Can we leave a detailed message on this number? YES    Call taken on 9/26/2018 at 2:45 PM by Svitlana Giraldo

## 2018-09-26 NOTE — TELEPHONE ENCOUNTER
sitagliptin (JANUVIA) 50 MG tablet 30 tablet 1 9/24/2018  --      Sig - Route: Take 1 tablet (50 mg) by mouth daily - Oral     Class: Local Print     Order: [id number]     Patient is wanting the above medication sent to [facility] pharmacy so [facility] can start it. [facility] for [facility] to get it through them.    Order and preferred pharmacy are pended.     Routing to provider for approval/denial.

## 2018-10-09 ENCOUNTER — TELEPHONE (OUTPATIENT)
Dept: FAMILY MEDICINE | Facility: CLINIC | Age: 83
End: 2018-10-09

## 2018-10-10 NOTE — TELEPHONE ENCOUNTER
Our goal is to have forms completed within 72 hours, however some forms may require a visit or additional information.    What clinic location was the form placed at [facility] or [place]     Who is the form from?   Where did the form come from? Faxed to clinic   The form was placed in the inbox of Jamie Guerrier MD      Please fax to [phone]  Phone number: [phone]    Additional comments: [facility] and [facility] a non formulary med    Call take on 10/10/2018 at 11:32 AM by Ca Conteh

## 2018-10-29 ENCOUNTER — OFFICE VISIT (OUTPATIENT)
Dept: FAMILY MEDICINE | Facility: CLINIC | Age: 83
End: 2018-10-29
Payer: MEDICARE

## 2018-10-29 VITALS
WEIGHT: 152 LBS | OXYGEN SATURATION: 95 % | BODY MASS INDEX: 24.17 KG/M2 | SYSTOLIC BLOOD PRESSURE: 110 MMHG | DIASTOLIC BLOOD PRESSURE: 62 MMHG | HEART RATE: 65 BPM

## 2018-10-29 DIAGNOSIS — E78.2 MIXED HYPERLIPIDEMIA: ICD-10-CM

## 2018-10-29 DIAGNOSIS — E11.65 UNCONTROLLED TYPE 2 DIABETES MELLITUS WITH HYPERGLYCEMIA (H): Primary | ICD-10-CM

## 2018-10-29 DIAGNOSIS — I10 BENIGN HYPERTENSION: ICD-10-CM

## 2018-10-29 DIAGNOSIS — I48.20 CHRONIC ATRIAL FIBRILLATION (H): ICD-10-CM

## 2018-10-29 DIAGNOSIS — R06.02 SHORTNESS OF BREATH: ICD-10-CM

## 2018-10-29 PROCEDURE — 99214 OFFICE O/P EST MOD 30 MIN: CPT | Performed by: FAMILY MEDICINE

## 2018-10-29 RX ORDER — SAXAGLIPTIN 2.5 MG/1
2.5 TABLET, FILM COATED ORAL DAILY
Qty: 30 TABLET | Refills: 1 | Status: ON HOLD | OUTPATIENT
Start: 2018-10-29 | End: 2019-08-05

## 2018-10-29 NOTE — PROGRESS NOTES
SUBJECTIVE:   Suman Burton is a 83 year old male who presents to clinic today for the following health issues:      Diabetes Follow-up      Patient is checking blood sugars: not at all    Diabetic concerns: None     Symptoms of hypoglycemia (low blood sugar): none     Paresthesias (numbness or burning in feet) or sores: Yes      Date of last diabetic eye exam:     BP Readings from Last 2 Encounters:   10/29/18 110/62   09/24/18 126/60     Hemoglobin A1C (%)   Date Value   08/27/2018 10.6 (H)   05/01/2018 9.1 (H)     LDL Cholesterol Calculated (mg/dL)   Date Value   05/01/2018 77   04/17/2017 100       Diabetes Management Resources    Amount of exercise or physical activity: None    Problems taking medications regularly: No    Medication side effects: none    Diet: regular (no restrictions)        RESPIRATORY SYMPTOMS      Duration: Months to years    Description  Shortness of breath    Severity: moderate    Accompanying signs and symptoms: None    History (predisposing factors):  none    Precipitating or alleviating factors: Any activity precipitates the problem and consequently the patient is not getting any exercise whatsoever.    Therapies tried and outcome:  none    Hypertension Follow-up      Outpatient blood pressures are not being checked.    Low Salt Diet: no added salt      Problem list and histories reviewed & adjusted, as indicated.  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     Chronic kidney disease, stage III (moderate) (H)     Diabetes mellitus, type 2 (H)     Hyperlipidemia     Lumbago     Thoracic or lumbosacral neuritis or radiculitis, unspecified     Transient visual loss     Diabetes mellitus, type 2 (H)     Chronic atrial fibrillation (H)     Chronic pain syndrome     Hypersomnia     Persistent insomnia     Dystrophic  nail     Retinal artery occlusion     Shortness of breath     Past Surgical History:   Procedure Laterality Date     CHOLECYSTECTOMY       LAPAROSCOPIC APPENDECTOMY         Social History   Substance Use Topics     Smoking status: Former Smoker     Smokeless tobacco: Never Used     Alcohol use 0.0 oz/week     0 Standard drinks or equivalent per week      Comment: Occasionally.      History reviewed. No pertinent family history.        Reviewed and updated as needed this visit by clinical staff  Tobacco  Allergies  Meds  Med Hx  Surg Hx  Fam Hx  Soc Hx      Reviewed and updated as needed this visit by Provider         ROS:  Constitutional, HEENT, cardiovascular, pulmonary, gi and gu systems are negative, except as otherwise noted.    OBJECTIVE:                                                    /62 (Cuff Size: Adult Regular)  Pulse 65  Wt 152 lb (68.9 kg)  SpO2 95%  BMI 24.17 kg/m2  Body mass index is 24.17 kg/(m^2).  GENERAL APPEARANCE: healthy, alert and no distress  RESP: lungs clear to auscultation - no rales, rhonchi or wheezes  CV: regular rates and rhythm, normal S1 S2, no S3 or S4 and no murmur, click or rub         ASSESSMENT/PLAN:                                                        ICD-10-CM    1. Uncontrolled type 2 diabetes mellitus with hyperglycemia (H) E11.65 saxagliptin (ONGLYZA) 2.5 MG TABS tablet   2. Benign hypertension I10    3. Mixed hyperlipidemia E78.2    4. Chronic atrial fibrillation (H) I48.2    5. Shortness of breath R06.02      Return in about 6 weeks (around 12/10/2018) for diabetes.  Patient Instructions   I added saxagliptin 2.5 mg daily for his diabetes regimen.  I encouraged him to use take baby steps at increasing his exercise by walking in place.  He should start by doing whatever is comfortable even if that is only a minute or 2.  Then weekly he should be trying to increase the amount of time that he walks.  He will follow-up in approximately 6 weeks and will  need blood testing at that time for his diabetes.  Prescription was faxed over to [facility].      Jamie Guerrier MD  [facility]

## 2018-10-29 NOTE — MR AVS SNAPSHOT
After Visit Summary   10/29/2018    Suman Burton    MRN: [id number]           Patient Information     Date Of Birth          1935        Visit Information        Provider Department      10/29/2018 1:00 PM Jamie Guerrier MD [facility]        Today's Diagnoses     Uncontrolled type 2 diabetes mellitus with hyperglycemia (H)    -  1    Benign hypertension        Mixed hyperlipidemia        Chronic atrial fibrillation (H)        Shortness of breath          Care Instructions    I added saxagliptin 2.5 mg daily for his diabetes regimen.  I encouraged him to  take baby steps at increasing his exercise by walking in place.  He should start by doing whatever is comfortable even if that is only a minute or 2.  Then weekly he should be trying to increase the amount of time that he walks.  He will follow-up in approximately 6 weeks and will need blood testing at that time for his diabetes.  Prescription was faxed over to [facility].          Follow-ups after your visit        Follow-up notes from your care team     Return in about 6 weeks (around 12/10/2018) for diabetes.      Your next 10 appointments already scheduled     Dec 10, 2018  1:00 PM CST   Office Visit with Jamie Guerrier MD   [facility] ([facility])    [address] [place] [id number] [phone]           Bring a current list of meds and any records pertaining to this visit. For Physicals, please bring immunization records and any forms needing to be filled out. Please arrive 10 minutes early to complete paperwork.              Who to contact     If you have questions or need follow up information about today's clinic visit or your schedule please contact [facility] directly at [phone].  Normal or non-critical lab and imaging results will be communicated to you by Beba  letter or phone within 4 business days after the clinic has received the results. If you do not hear from us within 7 days, please contact the clinic through [employer] or phone. If you have a critical or abnormal lab result, we will notify you by phone as soon as possible.  Submit refill requests through GemPh[employer] or call your pharmacy and they will forward the refill request to us. Please allow 3 business days for your refill to be completed.          Additional Information About Your Visit        Care EveryWhere ID     This is your Care EveryWhere ID. This could be used by other organizations to access your [facility] medical records  [id number]        Your Vitals Were     Pulse Pulse Oximetry BMI (Body Mass Index)             65 95% 24.17 kg/m2          Blood Pressure from Last 3 Encounters:   10/29/18 110/62   09/24/18 126/60   08/27/18 130/60    Weight from Last 3 Encounters:   10/29/18 152 lb (68.9 kg)   09/24/18 152 lb (68.9 kg)   08/27/18 152 lb (68.9 kg)              Today, you had the following     No orders found for display         Today's Medication Changes          These changes are accurate as of 10/29/18  2:25 PM.  If you have any questions, ask your nurse or doctor.               Start taking these medicines.        Dose/Directions    saxagliptin 2.5 MG Tabs tablet   Commonly known as:  ONGLYZA   Used for:  Uncontrolled type 2 diabetes mellitus with hyperglycemia (H)   Started by:  Jamie Guerrier MD        Dose:  2.5 mg   Take 1 tablet (2.5 mg) by mouth daily   Quantity:  30 tablet   Refills:  1         Stop taking these medicines if you haven't already. Please contact your care team if you have questions.     sitagliptin 50 MG tablet   Commonly known as:  JANUVIA   Stopped by:  Jamie Guerrier MD                Where to get your medicines      Some of these will need a paper prescription and others can be bought over the counter.  Ask your nurse if you have questions.     Bring a paper  prescription for each of these medications     saxagliptin 2.5 MG Tabs tablet                Primary Care Provider Office Phone # Fax #    Jamie Guerrier [phone]       [address] [facility] [address]        Equal Access to Services     DEMETRIO PETTY : Hadii aad ku hadasho Soomaali, waaxda luqadaha, qaybta kaalmada adeegyada, waxay kayliin hayaan adeeg mahendracruzito lalulu kim. So [facility] [place] [phone].    ATENCIÓN: Si habla español, tiene a ron disposición servicios gratuitos de asistencia lingüística. Llame al [phone].    We comply with applicable federal civil rights laws and [place] laws. We do not discriminate on the basis of race, color, national origin, age, disability, sex, sexual orientation, or gender identity.            Thank you!     Thank you for choosing [facility]  for your care. Our goal is always to provide you with excellent care. Hearing back from our patients is one way we can continue to improve our services. Please take a few minutes to complete the written survey that you may receive in the mail after your visit with us. Thank you!             Your Updated Medication List - Protect others around you: Learn how to safely use, store and throw away your medicines at www.disposemymeds.org.          This list is accurate as of 10/29/18  2:25 PM.  Always use your most recent med list.                   Brand Name Dispense Instructions for use Diagnosis    acetaminophen 500 MG tablet    TYLENOL    100 tablet    Take 2 tablets (1,000 mg) by mouth 3 times daily as needed for mild pain    Chronic pain syndrome       amLODIPine 10 MG tablet    NORVASC    90 tablet    Take 1 tablet (10 mg) by mouth daily    Benign essential hypertension       digoxin 125 MCG tablet    LANOXIN    90 tablet    Take 1 tablet (125 mcg) by mouth daily    Chronic atrial fibrillation (H)       furosemide 20 MG tablet    LASIX    180 tablet    Take 2 tablets (40 mg) by  mouth every morning (before breakfast)    Dyspnea on exertion, Benign essential hypertension       glipiZIDE 10 MG tablet    GLUCOTROL    360 tablet    Take 2 tablets (20 mg) by mouth 2 times daily Take 20mg in AM with breakfast and 20mg in PM with evening meal    Type 2 diabetes mellitus with hyperglycemia, with long-term current use of insulin (H)       loperamide 2 MG tablet    IMODIUM A-D     Take 2 tabs in the morning and 1 tablet in the evening        metFORMIN 1000 MG tablet    GLUCOPHAGE     Take 1 tablet (1,000 mg) by mouth 2 times daily (with meals)    Type 2 diabetes mellitus with hyperglycemia, with long-term current use of insulin (H)       metoprolol succinate 50 MG 24 hr tablet    TOPROL-XL     Take 0.5 tablets (25 mg) by mouth daily    Benign hypertension       MULTIVITAMINS PO      Take 1 tablet by mouth daily.        * ONE TOUCH FINE POINT LANCETS      Check blood sugars twice a day.        * ONETOUCH DELICA LANCETS 33G Misc     100 each    1 strip 2 times daily    Type 2 diabetes mellitus with hyperglycemia, with long-term current use of insulin (H)       Pramoxine HCl 1 % Crea    CERAVE ITCH RELIEF    340 g    Externally apply 30 g topically daily    Itching       saxagliptin 2.5 MG Tabs tablet    ONGLYZA    30 tablet    Take 1 tablet (2.5 mg) by mouth daily    Uncontrolled type 2 diabetes mellitus with hyperglycemia (H)       warfarin 3 MG tablet    COUMADIN    90 tablet    Take 1 tablet (3 mg) by mouth daily    Chronic atrial fibrillation (H)       * Notice:  This list has 2 medication(s) that are the same as other medications prescribed for you. Read the directions carefully, and ask your doctor or other care provider to review them with you.

## 2018-10-29 NOTE — PATIENT INSTRUCTIONS
I added saxagliptin 2.5 mg daily for his diabetes regimen.  I encouraged him to  take baby steps at increasing his exercise by walking in place.  He should start by doing whatever is comfortable even if that is only a minute or 2.  Then weekly he should be trying to increase the amount of time that he walks.  He will follow-up in approximately 6 weeks and will need blood testing at that time for his diabetes.  Prescription was faxed over to [facility].

## 2018-11-07 ENCOUNTER — TRANSFERRED RECORDS (OUTPATIENT)
Dept: HEALTH INFORMATION MANAGEMENT | Facility: CLINIC | Age: 83
End: 2018-11-07

## 2018-12-17 ENCOUNTER — OFFICE VISIT (OUTPATIENT)
Dept: FAMILY MEDICINE | Facility: CLINIC | Age: 83
End: 2018-12-17
Payer: MEDICARE

## 2018-12-17 VITALS — HEART RATE: 68 BPM | WEIGHT: 152 LBS | OXYGEN SATURATION: 96 % | BODY MASS INDEX: 24.17 KG/M2

## 2018-12-17 DIAGNOSIS — E11.22 TYPE 2 DIABETES MELLITUS WITH STAGE 3 CHRONIC KIDNEY DISEASE, WITHOUT LONG-TERM CURRENT USE OF INSULIN (H): ICD-10-CM

## 2018-12-17 DIAGNOSIS — E78.2 MIXED HYPERLIPIDEMIA: ICD-10-CM

## 2018-12-17 DIAGNOSIS — E11.65 UNCONTROLLED TYPE 2 DIABETES MELLITUS WITH HYPERGLYCEMIA (H): Primary | ICD-10-CM

## 2018-12-17 DIAGNOSIS — I10 BENIGN ESSENTIAL HYPERTENSION: ICD-10-CM

## 2018-12-17 DIAGNOSIS — I10 BENIGN HYPERTENSION: ICD-10-CM

## 2018-12-17 DIAGNOSIS — R06.09 DYSPNEA ON EXERTION: ICD-10-CM

## 2018-12-17 DIAGNOSIS — N18.30 TYPE 2 DIABETES MELLITUS WITH STAGE 3 CHRONIC KIDNEY DISEASE, WITHOUT LONG-TERM CURRENT USE OF INSULIN (H): ICD-10-CM

## 2018-12-17 DIAGNOSIS — N18.30 CKD (CHRONIC KIDNEY DISEASE) STAGE 3, GFR 30-59 ML/MIN (H): ICD-10-CM

## 2018-12-17 LAB — HBA1C MFR BLD: 7.1 % (ref 0–5.6)

## 2018-12-17 PROCEDURE — 36415 COLL VENOUS BLD VENIPUNCTURE: CPT | Performed by: FAMILY MEDICINE

## 2018-12-17 PROCEDURE — 82043 UR ALBUMIN QUANTITATIVE: CPT | Performed by: FAMILY MEDICINE

## 2018-12-17 PROCEDURE — 82565 ASSAY OF CREATININE: CPT | Performed by: FAMILY MEDICINE

## 2018-12-17 PROCEDURE — 80051 ELECTROLYTE PANEL: CPT | Performed by: FAMILY MEDICINE

## 2018-12-17 PROCEDURE — 99214 OFFICE O/P EST MOD 30 MIN: CPT | Performed by: FAMILY MEDICINE

## 2018-12-17 PROCEDURE — 83036 HEMOGLOBIN GLYCOSYLATED A1C: CPT | Performed by: FAMILY MEDICINE

## 2018-12-17 PROCEDURE — 84520 ASSAY OF UREA NITROGEN: CPT | Performed by: FAMILY MEDICINE

## 2018-12-17 RX ORDER — FUROSEMIDE 20 MG
80 TABLET ORAL
Qty: 180 TABLET | Refills: 1
Start: 2018-12-17 | End: 2019-03-12

## 2018-12-17 NOTE — PROGRESS NOTES
SUBJECTIVE:   Suman Burton is a 83 year old male who presents to clinic today for the following health issues:    SUBJECTIVE:   Suman Burton is a 83 year old male who presents to clinic today for the following health issues:      Hyperlipidemia Follow-Up      Rate your low fat/cholesterol diet?: good    Taking statin?  No    Other lipid medications/supplements?:  none    Hypertension Follow-up      Outpatient blood pressures are not being checked.    Low Salt Diet: no added salt      Amount of exercise or physical activity: None    Problems taking medications regularly: No    Medication side effects: none    Diet: low salt            Problem list and histories reviewed & adjusted, as indicated.  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     Chronic kidney disease, stage III (moderate) (H)     Diabetes mellitus, type 2 (H)     Hyperlipidemia     Lumbago     Thoracic or lumbosacral neuritis or radiculitis, unspecified     Transient visual loss     Diabetes mellitus, type 2 (H)     Chronic atrial fibrillation (H)     Chronic pain syndrome     Hypersomnia     Persistent insomnia     Dystrophic nail     Retinal artery occlusion     Shortness of breath     Past Surgical History:   Procedure Laterality Date     CHOLECYSTECTOMY       LAPAROSCOPIC APPENDECTOMY         Social History     Tobacco Use     Smoking status: Former Smoker     Smokeless tobacco: Never Used   Substance Use Topics     Alcohol use: Yes     Alcohol/week: 0.0 oz     Comment: Occasionally.      History reviewed. No pertinent family history.        Reviewed and updated as needed this visit by clinical staff  Tobacco  Allergies  Meds  Med Hx  Surg Hx  Fam Hx  Soc Hx      Reviewed and updated as needed this visit by Provider             Diabetes Follow-up      Patient  is checking blood sugars: not at all    Diabetic concerns: None     Symptoms of hypoglycemia (low blood sugar): none     Paresthesias (numbness or burning in feet) or sores: Yes      Date of last diabetic eye exam:     BP Readings from Last 2 Encounters:   10/29/18 110/62   09/24/18 126/60     Hemoglobin A1C (%)   Date Value   08/27/2018 10.6 (H)   05/01/2018 9.1 (H)     LDL Cholesterol Calculated (mg/dL)   Date Value   05/01/2018 77   04/17/2017 100       Diabetes Management Resources    Amount of exercise or physical activity: None    Problems taking medications regularly: No    Medication side effects: none    Diet: regular (no restrictions)        Pt increased lasix to 80mg       Problem list and histories reviewed & adjusted, as indicated.  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     Chronic kidney disease, stage III (moderate) (H)     Diabetes mellitus, type 2 (H)     Hyperlipidemia     Lumbago     Thoracic or lumbosacral neuritis or radiculitis, unspecified     Transient visual loss     Diabetes mellitus, type 2 (H)     Chronic atrial fibrillation (H)     Chronic pain syndrome     Hypersomnia     Persistent insomnia     Dystrophic nail     Retinal artery occlusion     Shortness of breath     Past Surgical History:   Procedure Laterality Date     CHOLECYSTECTOMY       LAPAROSCOPIC APPENDECTOMY         Social History     Tobacco Use     Smoking status: Former Smoker     Smokeless tobacco: Never Used   Substance Use Topics     Alcohol use: Yes     Alcohol/week: 0.0 oz     Comment: Occasionally.      History reviewed. No pertinent family history.        Reviewed and updated as needed this visit by clinical staff  Tobacco  Allergies  Meds  Med Hx  Surg Hx  Fam Hx  Soc Hx      Reviewed and updated as needed this visit by Provider          ROS:  Constitutional, neuro, ENT, endocrine, pulmonary, cardiac, gastrointestinal, genitourinary, musculoskeletal, integument and psychiatric systems are negative, except as otherwise noted.    OBJECTIVE:                                                    Pulse 68   Wt 68.9 kg (152 lb)   SpO2 96%   BMI 24.17 kg/m    Body mass index is 24.17 kg/m .  GENERAL APPEARANCE: healthy, alert and no distress  CV: regular rates and rhythm, normal S1 S2, no S3 or S4, no murmur, click or rub and trace B/L LE edema to ankles         ASSESSMENT/PLAN:                                                        ICD-10-CM    1. Uncontrolled type 2 diabetes mellitus with hyperglycemia (H) E11.65    2. Benign hypertension I10    3. Mixed hyperlipidemia E78.2    4. Type 2 diabetes mellitus with stage 3 chronic kidney disease, without long-term current use of insulin (H) E11.22 Albumin Random Urine Quantitative with Creat Ratio    N18.3 Hemoglobin A1c   5. CKD (chronic kidney disease) stage 3, GFR 30-59 ml/min (H) N18.3 Electrolyte panel (Na, K, Cl, CO2, Anion gap)     Creatinine     Urea nitrogen     Return in about 3 months (around 3/17/2019) for diabetes, dyslipidemia, hypertension.  Patient Instructions   Will see back in 3 months, sooner as needed.      Jamie Guerrier MD  [facility] [address]

## 2018-12-18 LAB
ANION GAP SERPL CALCULATED.3IONS-SCNC: 10 MMOL/L (ref 3–14)
BUN SERPL-MCNC: 35 MG/DL (ref 7–30)
CHLORIDE SERPL-SCNC: 107 MMOL/L (ref 94–109)
CO2 SERPL-SCNC: 20 MMOL/L (ref 20–32)
CREAT SERPL-MCNC: 1.46 MG/DL (ref 0.66–1.25)
CREAT UR-MCNC: 94 MG/DL
GFR SERPL CREATININE-BSD FRML MDRD: 46 ML/MIN/1.7M2
MICROALBUMIN UR-MCNC: 499 MG/L
MICROALBUMIN/CREAT UR: 530.85 MG/G CR (ref 0–17)
POTASSIUM SERPL-SCNC: 4.4 MMOL/L (ref 3.4–5.3)
SODIUM SERPL-SCNC: 137 MMOL/L (ref 133–144)

## 2019-01-07 ENCOUNTER — TELEPHONE (OUTPATIENT)
Dept: FAMILY MEDICINE | Facility: CLINIC | Age: 84
End: 2019-01-07

## 2019-01-07 NOTE — TELEPHONE ENCOUNTER
Pt's dtr gabriel called asking if pt should still be taking sitagliptin (Januvia) since it was on the medlist from [facility] 10/29/18 or just saxagliptin? Advised saxagliptin is the current med on the list and the sitagliptin can be discarded since it is missing the original bottle and date of expiration. Current medlist e-mailed ([email or web address]) and mailed to patient per gabriel's request.

## 2019-01-07 NOTE — TELEPHONE ENCOUNTER
Reason for Call:  Other med clarification    Detailed comments: daughter Birgit is with pt. Wants to go over med list.  There is a discrepancy.    Phone Number Patient can be reached at: Home number on file [phone] (home)    Best Time: any    Can we leave a detailed message on this number? YES    Call taken on 1/7/2019 at 9:55 AM by ELIZABETH ALEJANDRE

## 2019-01-14 ENCOUNTER — TELEPHONE (OUTPATIENT)
Dept: FAMILY MEDICINE | Facility: CLINIC | Age: 84
End: 2019-01-14

## 2019-01-14 DIAGNOSIS — I48.20 CHRONIC ATRIAL FIBRILLATION (H): ICD-10-CM

## 2019-01-14 NOTE — TELEPHONE ENCOUNTER
Reason for Call:  Other prescription    Detailed comments: Suman's wife is calling regarding the patients furosemide (LASIX) 20 MG tablet and how much he should be taking every day.     Phone Number Patient can be reached at: Home number on file [phone] (home)    Best Time: anytime     Can we leave a detailed message on this number? YES    Call taken on 1/14/2019 at 2:05 PM by Faye Morales

## 2019-01-14 NOTE — TELEPHONE ENCOUNTER
furosemide (LASIX) 20 MG tablet 180 tablet 1 12/17/2018  No   Sig - Route: Take 4 tablets (80 mg) by mouth every morning (before breakfast) - Oral   Class: No Print Out   Order: [id number]       Office visit note from 12/17/18:         ASSESSMENT/PLAN:                                                           ICD-10-CM     1. Uncontrolled type 2 diabetes mellitus with hyperglycemia (H) E11.65     2. Benign hypertension I10     3. Mixed hyperlipidemia E78.2     4. Type 2 diabetes mellitus with stage 3 chronic kidney disease, without long-term current use of insulin (H) E11.22 Albumin Random Urine Quantitative with Creat Ratio     N18.3 Hemoglobin A1c   5. CKD (chronic kidney disease) stage 3, GFR 30-59 ml/min (H) N18.3 Electrolyte panel (Na, K, Cl, CO2, Anion gap)       Creatinine       Urea nitrogen      Return in about 3 months (around 3/17/2019) for diabetes, dyslipidemia, hypertension.  Patient Instructions   Will see back in 3 months, sooner as needed.        Jamie Guerrier MD  [facility]         Instructions         Return in about 3 months (around 3/17/2019) for diabetes, dyslipidemia, hypertension.   Will see back in 3 months, sooner as needed.        Patient's wife is calling to clarify the dosage on this prescription, per chart review, looks like the patient should be taking 80 mg daily.     Routing to provider to clarify, I told the wife that he should be taking 80 mg.

## 2019-01-15 NOTE — TELEPHONE ENCOUNTER
"Requested Prescriptions   Pending Prescriptions Disp Refills     warfarin (COUMADIN) 3 MG tablet  Last Written Prescription Date:  1/8/18  Last Fill Quantity: 90,  # refills: 3   Last office visit: 12/17/2018 with prescribing provider:  dejan   Future Office Visit:     90 tablet 3     Sig: Take 1 tablet (3 mg) by mouth daily    Vitamin K Antagonists Failed - 1/14/2019  1:03 PM       Failed - INR is within goal in the past 6 weeks    Confirm INR is within goal in the past 6 weeks.     Recent Labs   Lab Test 12/04/17  1123   INR 3.0                      Passed - Recent (12 mo) or future (30 days) visit within the authorizing provider's specialty    Patient had office visit in the last 12 months or has a visit in the next 30 days with authorizing provider or within the authorizing provider's specialty.  See \"Patient Info\" tab in inbasket, or \"Choose Columns\" in Meds & Orders section of the refill encounter.             Passed - Medication is active on med list       Passed - Patient is 18 years of age or older          "

## 2019-01-16 NOTE — TELEPHONE ENCOUNTER
Routing refill request to provider for review/approval because:  Labs not current:  INR last in 2017, we don;t see that we see him for INRs, is the patient supposed to be on this medication?

## 2019-01-17 RX ORDER — WARFARIN SODIUM 3 MG/1
3 TABLET ORAL DAILY
Qty: 90 TABLET | Refills: 3 | Status: SHIPPED | OUTPATIENT
Start: 2019-01-17 | End: 2019-08-07

## 2019-01-17 RX ORDER — WARFARIN SODIUM 3 MG/1
3 TABLET ORAL DAILY
Qty: 90 TABLET | Refills: 3 | OUTPATIENT
Start: 2019-01-17

## 2019-02-13 ENCOUNTER — TRANSFERRED RECORDS (OUTPATIENT)
Dept: HEALTH INFORMATION MANAGEMENT | Facility: CLINIC | Age: 84
End: 2019-02-13

## 2019-02-15 NOTE — TELEPHONE ENCOUNTER
Lexis, patient's wife came into the [facility], her  [facility] [place] [facility]    They will not refill her coumadin though, Dr SNIDER Has been doing that for them.     The patient is down to 5 pills left, she ask that a prescription be sent over to [facility] so the patient does not run out.       Routing to provider team for the patient's request.         DO NOT FEEL THAT ACUTE SPEECH PATHOLOGY INTERVENTION WOULD BE OF CLINICAL BENEFIT/WOULD . PT IS ABLE TO VERBALIZE HER INTENTS WITHOUT A SPEECH-LANGUAGE PATHOLOGY AND IS AT COMMUNICATIVE BASELINE. FURTHER, I FEEL THAT PRESBYPHAGIA/OROPHARYNGEAL SWALLOW INTEGRITY IS AT USUAL STATE. COMMUNICATIVE AND OROPHARYNGEAL SWALLOWING POTENTIAL ARE FELT TO BE MAXIMIZED. GIVEN ABOVE, WILL NOT ACTIVELY FOLLOW. RECONSULT PRN.

## 2019-03-12 ENCOUNTER — OFFICE VISIT (OUTPATIENT)
Dept: FAMILY MEDICINE | Facility: CLINIC | Age: 84
End: 2019-03-12
Payer: MEDICARE

## 2019-03-12 VITALS
HEART RATE: 78 BPM | BODY MASS INDEX: 23.86 KG/M2 | OXYGEN SATURATION: 94 % | DIASTOLIC BLOOD PRESSURE: 68 MMHG | RESPIRATION RATE: 18 BRPM | HEIGHT: 67 IN | SYSTOLIC BLOOD PRESSURE: 130 MMHG | WEIGHT: 152 LBS

## 2019-03-12 DIAGNOSIS — R32 URINARY INCONTINENCE, UNSPECIFIED TYPE: ICD-10-CM

## 2019-03-12 DIAGNOSIS — R19.7 DIARRHEA, UNSPECIFIED TYPE: ICD-10-CM

## 2019-03-12 DIAGNOSIS — Z13.89 SCREENING FOR DIABETIC PERIPHERAL NEUROPATHY: ICD-10-CM

## 2019-03-12 DIAGNOSIS — R60.9 DEPENDENT EDEMA: ICD-10-CM

## 2019-03-12 DIAGNOSIS — Z23 NEED FOR PROPHYLACTIC VACCINATION WITH TETANUS-DIPHTHERIA (TD): ICD-10-CM

## 2019-03-12 DIAGNOSIS — E11.22 TYPE 2 DIABETES MELLITUS WITH STAGE 3 CHRONIC KIDNEY DISEASE, WITHOUT LONG-TERM CURRENT USE OF INSULIN (H): Primary | ICD-10-CM

## 2019-03-12 DIAGNOSIS — N18.30 TYPE 2 DIABETES MELLITUS WITH STAGE 3 CHRONIC KIDNEY DISEASE, WITHOUT LONG-TERM CURRENT USE OF INSULIN (H): Primary | ICD-10-CM

## 2019-03-12 LAB — HBA1C MFR BLD: 6.7 % (ref 0–5.6)

## 2019-03-12 PROCEDURE — 99214 OFFICE O/P EST MOD 30 MIN: CPT | Performed by: FAMILY MEDICINE

## 2019-03-12 PROCEDURE — 99207 C FOOT EXAM  NO CHARGE: CPT | Performed by: FAMILY MEDICINE

## 2019-03-12 PROCEDURE — 82043 UR ALBUMIN QUANTITATIVE: CPT | Performed by: FAMILY MEDICINE

## 2019-03-12 PROCEDURE — 36415 COLL VENOUS BLD VENIPUNCTURE: CPT | Performed by: FAMILY MEDICINE

## 2019-03-12 PROCEDURE — 80048 BASIC METABOLIC PNL TOTAL CA: CPT | Performed by: FAMILY MEDICINE

## 2019-03-12 PROCEDURE — 83036 HEMOGLOBIN GLYCOSYLATED A1C: CPT | Performed by: FAMILY MEDICINE

## 2019-03-12 RX ORDER — OXYBUTYNIN CHLORIDE 5 MG/1
5 TABLET, EXTENDED RELEASE ORAL DAILY
Qty: 30 TABLET | Refills: 1 | Status: SHIPPED | OUTPATIENT
Start: 2019-03-12 | End: 2019-04-30

## 2019-03-12 RX ORDER — LOPERAMIDE HYDROCHLORIDE 2 MG/1
2 TABLET ORAL 4 TIMES DAILY PRN
Qty: 100 TABLET | Refills: 5 | Status: ON HOLD | OUTPATIENT
Start: 2019-03-12 | End: 2019-07-02

## 2019-03-12 RX ORDER — FUROSEMIDE 80 MG
80 TABLET ORAL DAILY
Qty: 90 TABLET | Refills: 1 | Status: SHIPPED | OUTPATIENT
Start: 2019-03-12 | End: 2019-04-10

## 2019-03-12 RX ORDER — FUROSEMIDE 80 MG
80 TABLET ORAL DAILY
Qty: 30 TABLET | Refills: 0 | Status: SHIPPED | OUTPATIENT
Start: 2019-03-12 | End: 2019-03-12

## 2019-03-12 ASSESSMENT — PATIENT HEALTH QUESTIONNAIRE - PHQ9: SUM OF ALL RESPONSES TO PHQ QUESTIONS 1-9: 3

## 2019-03-12 ASSESSMENT — MIFFLIN-ST. JEOR: SCORE: 1335.16

## 2019-03-12 NOTE — PATIENT INSTRUCTIONS
We will check labs as noted.  I refilled his furosemide and wrote him a new prescription for Imodium right ear.  I added oxybutynin 5 mg daily.  The patient will follow-up in April.  He will follow-up sooner as needed.  We did discuss potential side effects of the new medication for his urinary incontinence.

## 2019-03-12 NOTE — LETTER
March 13, 2019      Suman Burton  [address] [facility] [address]        Dear ,    We are writing to inform you of your test results.    This all looks pretty stable and can be repeated in 3 months.    Resulted Orders   BASIC METABOLIC PANEL   Result Value Ref Range    Sodium 138 133 - 144 mmol/L    Potassium 3.7 3.4 - 5.3 mmol/L    Chloride 105 94 - 109 mmol/L    Carbon Dioxide 26 20 - 32 mmol/L    Anion Gap 7 3 - 14 mmol/L    Glucose 300 (H) 70 - 99 mg/dL    Urea Nitrogen 33 (H) 7 - 30 mg/dL    Creatinine 1.49 (H) 0.66 - 1.25 mg/dL    GFR Estimate 43 (L) >60 mL/min/[1.73_m2]      Comment:      Non  GFR Calc  Starting 12/18/2018, serum creatinine based estimated GFR (eGFR) will be   calculated using the Chronic Kidney Disease Epidemiology Collaboration   (CKD-EPI) equation.      GFR Estimate If Black 49 (L) >60 mL/min/[1.73_m2]      Comment:       GFR Calc  Starting 12/18/2018, serum creatinine based estimated GFR (eGFR) will be   calculated using the Chronic Kidney Disease Epidemiology Collaboration   (CKD-EPI) equation.      Calcium 10.0 8.5 - 10.1 mg/dL   Albumin Random Urine Quantitative with Creat Ratio   Result Value Ref Range    Creatinine Urine 38 mg/dL    Albumin Urine mg/L 212 mg/L    Albumin Urine mg/g Cr 563.83 (H) 0 - 17 mg/g Cr   Hemoglobin A1c   Result Value Ref Range    Hemoglobin A1C 6.7 (H) 0 - 5.6 %      Comment:      Normal <5.7% Prediabetes 5.7-6.4%  Diabetes 6.5% or higher - adopted from ADA   consensus guidelines.         If you have any questions or concerns, please call the clinic at the number listed above.       Sincerely,        Jamie Guerrier MD

## 2019-03-12 NOTE — PROGRESS NOTES
SUBJECTIVE:   Suman Burton is a 83 year old male who presents to clinic today for the following health issues:      Diabetes Follow-up      Patient is checking blood sugars: not at all    Diabetic concerns: None     Symptoms of hypoglycemia (low blood sugar): none     Paresthesias (numbness or burning in feet) or sores: Yes numbness     [facility]    Check Furosemide rx, pt is running out of medication.  Pt also concerned because he is having incontinence    Diabetes Management Resources    Hyperlipidemia Follow-Up      Rate your low fat/cholesterol diet?: good    Taking statin?  No    Other lipid medications/supplements?:  none    Hypertension Follow-up      Outpatient blood pressures are not being checked.    Low Salt Diet: no added salt    BP Readings from Last 2 Encounters:   10/29/18 110/62   09/24/18 126/60     Hemoglobin A1C (%)   Date Value   12/17/2018 7.1 (H)   08/27/2018 10.6 (H)     LDL Cholesterol Calculated (mg/dL)   Date Value   05/01/2018 77   04/17/2017 100       Amount of exercise or physical activity: rare    Problems taking medications regularly: No    Medication side effects: none    Diet: low salt, low fat/cholesterol and diabetic        Genitourinary symptoms      Duration: Months    Description:  incontinence    Intensity:  moderate and worse after increasing dose of furosemide.    Accompanying signs and symptoms (fever/discharge/nausea/vomiting/back or abdominal pain):  None    History (frequent UTI's/kidney stones/prostate problems): None  Sexually active: no     Precipitating or alleviating factors: Increased dose of furosemide is increased the problem.    Therapies tried and outcome: none   Outcome: N/A      Problem list and histories reviewed & adjusted, as indicated.  Additional history: as documented    Patient Active Problem List   Diagnosis     Adenocarcinoma (H)     Benign hypertension     Health Care Home     Cerebral infarction due  "to occlusion or stenosis of carotid artery     Chronic kidney disease, stage III (moderate) (H)     Diabetes mellitus, type 2 (H)     Hyperlipidemia     Lumbago     Thoracic or lumbosacral neuritis or radiculitis, unspecified     Transient visual loss     Diabetes mellitus, type 2 (H)     Chronic atrial fibrillation (H)     Chronic pain syndrome     Hypersomnia     Persistent insomnia     Dystrophic nail     Retinal artery occlusion     Shortness of breath     Past Surgical History:   Procedure Laterality Date     CHOLECYSTECTOMY       LAPAROSCOPIC APPENDECTOMY         Social History     Tobacco Use     Smoking status: Former Smoker     Smokeless tobacco: Never Used   Substance Use Topics     Alcohol use: Yes     Alcohol/week: 0.0 oz     Comment: Occasionally.      History reviewed. No pertinent family history.        Reviewed and updated as needed this visit by clinical staff       Reviewed and updated as needed this visit by Provider         ROS:  Constitutional, HEENT, cardiovascular, pulmonary, gi and gu systems are negative, except as otherwise noted.    OBJECTIVE:                                                    /68   Pulse 78   Resp 18   Ht 1.689 m (5' 6.5\")   Wt 68.9 kg (152 lb)   SpO2 94%   BMI 24.17 kg/m    Body mass index is 24.17 kg/m .  GENERAL APPEARANCE: healthy, alert, no distress and there is trace edema in the lower extremities worse on the right than the left.  Skin is intact of the feet.  Pulses are symmetric.  Sensation is normal.         ASSESSMENT/PLAN:                                                        ICD-10-CM    1. Type 2 diabetes mellitus with stage 3 chronic kidney disease, without long-term current use of insulin (H) E11.22 BASIC METABOLIC PANEL    N18.3 Albumin Random Urine Quantitative with Creat Ratio     Hemoglobin A1c   2. Screening for diabetic peripheral neuropathy Z13.89 FOOT EXAM  NO CHARGE [95648.114]   3. Need for prophylactic vaccination with " tetanus-diphtheria (Td) Z23    4. Diarrhea, unspecified type R19.7 loperamide (IMODIUM A-D) 2 MG tablet   5. Urinary incontinence, unspecified type R32 oxybutynin ER (DITROPAN-XL) 5 MG 24 hr tablet   6. Dependent edema R60.9 furosemide (LASIX) 80 MG tablet     DISCONTINUED: furosemide (LASIX) 80 MG tablet       There are no Patient Instructions on file for this visit.    Jamie Guerrier MD  [facility]

## 2019-03-13 LAB
ANION GAP SERPL CALCULATED.3IONS-SCNC: 7 MMOL/L (ref 3–14)
BUN SERPL-MCNC: 33 MG/DL (ref 7–30)
CALCIUM SERPL-MCNC: 10 MG/DL (ref 8.5–10.1)
CHLORIDE SERPL-SCNC: 105 MMOL/L (ref 94–109)
CO2 SERPL-SCNC: 26 MMOL/L (ref 20–32)
CREAT SERPL-MCNC: 1.49 MG/DL (ref 0.66–1.25)
CREAT UR-MCNC: 38 MG/DL
GFR SERPL CREATININE-BSD FRML MDRD: 43 ML/MIN/{1.73_M2}
GLUCOSE SERPL-MCNC: 300 MG/DL (ref 70–99)
MICROALBUMIN UR-MCNC: 212 MG/L
MICROALBUMIN/CREAT UR: 563.83 MG/G CR (ref 0–17)
POTASSIUM SERPL-SCNC: 3.7 MMOL/L (ref 3.4–5.3)
SODIUM SERPL-SCNC: 138 MMOL/L (ref 133–144)

## 2019-04-04 ENCOUNTER — TELEPHONE (OUTPATIENT)
Dept: FAMILY MEDICINE | Facility: CLINIC | Age: 84
End: 2019-04-04

## 2019-04-04 NOTE — TELEPHONE ENCOUNTER
Our goal is to have forms completed within 72 hours, however some forms may require a visit or additional information.    What clinic location was the form placed at [facility] or [place].?     Who is the form from?   Where did the form come from? Faxed to clinic   The form was placed in the inbox of Jamie Guerrier MD      Please fax to [phone]  Phone number: [phone]    Additional comments: [facility]  [facility] notes do not talk about dosage increase of furosemide    Call take on 4/4/2019 at 11:00 AM by Ca Conthe

## 2019-04-08 DIAGNOSIS — R60.9 DEPENDENT EDEMA: ICD-10-CM

## 2019-04-08 NOTE — TELEPHONE ENCOUNTER
Reason for Call:  Medication or medication refill:  Do you use a [facility]?  Name of the pharmacy and phone number for the current request:  [facility] [address] [place] [address]  [phone]    Name of the medication requested:   furosemide (LASIX) 80 MG tablet 80 mg, DAILY     Other request: none  Can we leave a detailed message on this number? YES  Phone number patient can be reached at: Home number on file [phone] (home)  Best Time: any  Call taken on 4/8/2019 at 12:47 PM by PRANEETH HOWELL

## 2019-04-09 NOTE — TELEPHONE ENCOUNTER
"Requested Prescriptions   Pending Prescriptions Disp Refills     furosemide (LASIX) 80 MG tablet 90 tablet 1     Sig: Take 1 tablet (80 mg) by mouth daily    Last Written Prescription Date:  3/12/2019  Last Fill Quantity: 90 tablet,  # refills: 1   Last office visit: 3/12/2019 with prescribing provider:  Fareed   Future Office Visit:   Next 5 appointments (look out 90 days)    Apr 22, 2019 11:45 AM CDT  [facility] [address] [place] [id number]  [phone]                Diuretics (Including Combos) Protocol Failed - 4/8/2019  2:06 PM        Failed - Normal serum creatinine on file in past 12 months     Recent Labs   Lab Test 03/12/19  1147   CR 1.49*              Passed - Blood pressure under 140/90 in past 12 months     BP Readings from Last 3 Encounters:   03/12/19 130/68   10/29/18 110/62   09/24/18 126/60                 Passed - Recent (12 mo) or future (30 days) visit within the authorizing provider's specialty     Patient had office visit in the last 12 months or has a visit in the next 30 days with authorizing provider or within the authorizing provider's specialty.  See \"Patient Info\" tab in inbasket, or \"Choose Columns\" in Meds & Orders section of the refill encounter.              Passed - Medication is active on med list        Passed - Patient is age 18 or older        Passed - Normal serum potassium on file in past 12 months     Recent Labs   Lab Test 03/12/19  1147   POTASSIUM 3.7                    Passed - Normal serum sodium on file in past 12 months     Recent Labs   Lab Test 03/12/19  1147                    "

## 2019-04-10 RX ORDER — FUROSEMIDE 80 MG
80 TABLET ORAL DAILY
Qty: 90 TABLET | Refills: 1 | Status: SHIPPED | OUTPATIENT
Start: 2019-04-10 | End: 2019-08-01

## 2019-04-30 ENCOUNTER — OFFICE VISIT (OUTPATIENT)
Dept: FAMILY MEDICINE | Facility: CLINIC | Age: 84
End: 2019-04-30
Payer: MEDICARE

## 2019-04-30 VITALS
WEIGHT: 146 LBS | SYSTOLIC BLOOD PRESSURE: 112 MMHG | RESPIRATION RATE: 16 BRPM | HEART RATE: 74 BPM | OXYGEN SATURATION: 95 % | DIASTOLIC BLOOD PRESSURE: 66 MMHG | BODY MASS INDEX: 23.21 KG/M2

## 2019-04-30 DIAGNOSIS — G47.10 HYPERSOMNIA: ICD-10-CM

## 2019-04-30 DIAGNOSIS — H34.9 RETINAL ARTERY OCCLUSION: ICD-10-CM

## 2019-04-30 DIAGNOSIS — N39.42 URINARY INCONTINENCE WITHOUT SENSORY AWARENESS: Primary | ICD-10-CM

## 2019-04-30 DIAGNOSIS — F33.1 MODERATE EPISODE OF RECURRENT MAJOR DEPRESSIVE DISORDER (H): ICD-10-CM

## 2019-04-30 PROCEDURE — 99214 OFFICE O/P EST MOD 30 MIN: CPT | Performed by: FAMILY MEDICINE

## 2019-04-30 RX ORDER — OXYBUTYNIN CHLORIDE 10 MG/1
10 TABLET, EXTENDED RELEASE ORAL DAILY
Qty: 30 TABLET | Refills: 3 | Status: SHIPPED | OUTPATIENT
Start: 2019-04-30 | End: 2019-06-04

## 2019-04-30 RX ORDER — MIRTAZAPINE 7.5 MG/1
7.5 TABLET, FILM COATED ORAL AT BEDTIME
Qty: 30 TABLET | Refills: 3 | Status: SHIPPED | OUTPATIENT
Start: 2019-04-30 | End: 2019-06-04

## 2019-04-30 ASSESSMENT — ANXIETY QUESTIONNAIRES
5. BEING SO RESTLESS THAT IT IS HARD TO SIT STILL: NOT AT ALL
1. FEELING NERVOUS, ANXIOUS, OR ON EDGE: NOT AT ALL
GAD7 TOTAL SCORE: 1
3. WORRYING TOO MUCH ABOUT DIFFERENT THINGS: NOT AT ALL
6. BECOMING EASILY ANNOYED OR IRRITABLE: SEVERAL DAYS
7. FEELING AFRAID AS IF SOMETHING AWFUL MIGHT HAPPEN: NOT AT ALL
7. FEELING AFRAID AS IF SOMETHING AWFUL MIGHT HAPPEN: NOT AT ALL
4. TROUBLE RELAXING: NOT AT ALL
GAD7 TOTAL SCORE: 1
2. NOT BEING ABLE TO STOP OR CONTROL WORRYING: NOT AT ALL

## 2019-04-30 NOTE — PATIENT INSTRUCTIONS
We will try increasing his dose of oxybutynin to 10 mg daily.  If not improving over the next couple of weeks he will let us know so we can give even a larger dose.  I placed him on mirtazapine 7.5 mg to take at bedtime.  Hopefully this will help with both of his problems with appetite and with depression and lack of interest in doing things.  He will follow-up in 1 month sooner as needed.

## 2019-04-30 NOTE — PROGRESS NOTES
SUBJECTIVE:   Suman Burton is a 83 year old male who presents to clinic today for the following   health issues:        Follow up      Duration:     Description (location/character/radiation): pt is here to follow up on incontinence. And states many other things    Intensity:      Accompanying signs and symptoms:     History (similar episodes/previous evaluation): None    Precipitating or alleviating factors: None    Therapies tried and outcome: None       Genitourinary symptoms      Duration: Many months    Description:  incontinence    Intensity:  moderate    Accompanying signs and symptoms (fever/discharge/nausea/vomiting/back or abdominal pain):  None    History (frequent UTI's/kidney stones/prostate problems): None  Sexually active: no     Precipitating or alleviating factors: None    Therapies tried and outcome:   Outcome: Oxybutynin 5 mg once daily and he is not sure if that had a beneficial effect    Depression Followup    Status since last visit: Stable however, the patient will get out of bed for breakfast or lunch and sometimes does get out of bed for dinner.  In talking with the patient and his wife is pretty clear that he is become moderately depressed.  He does not have much of an appetite.    See PHQ-9 for current symptoms.  Other associated symptoms: None    Complicating factors:   Significant life event:  Yes-vision has gotten bad enough that he cannot drive anymore.  His wife is going to get him some large crossword puzzles because he used to like to do those but he cannot read the normal-sized ones now.   Current substance abuse:  None  Anxiety or Panic symptoms:  No    PHQ 1/8/2018 3/12/2019   PHQ-9 Total Score 5 3   Q9: Thoughts of better off dead/self-harm past 2 weeks Not at all Not at all       PHQ-9  English  PHQ-9   Any Language  Suicide Assessment Five-step Evaluation and Treatment (SAFE-T)    Additional history: as documented    Reviewed  and updated as needed this visit by  clinical staff  Tobacco  Allergies  Meds  Med Hx  Surg Hx  Fam Hx  Soc Hx        Reviewed and updated as needed this visit by Provider         Patient Active Problem List   Diagnosis     Adenocarcinoma (H)     Benign hypertension     Health Care Home     Cerebral infarction due to occlusion or stenosis of carotid artery     Chronic kidney disease, stage III (moderate) (H)     Diabetes mellitus, type 2 (H)     Hyperlipidemia     Lumbago     Thoracic or lumbosacral neuritis or radiculitis, unspecified     Transient visual loss     Diabetes mellitus, type 2 (H)     Chronic atrial fibrillation (H)     Chronic pain syndrome     Hypersomnia     Persistent insomnia     Dystrophic nail     Retinal artery occlusion     Shortness of breath     Urinary incontinence without sensory awareness     Past Surgical History:   Procedure Laterality Date     CHOLECYSTECTOMY       LAPAROSCOPIC APPENDECTOMY         Social History     Tobacco Use     Smoking status: Former Smoker     Smokeless tobacco: Never Used   Substance Use Topics     Alcohol use: Yes     Alcohol/week: 0.0 oz     Comment: Occasionally.      History reviewed. No pertinent family history.        ROS:  Constitutional, HEENT, cardiovascular, pulmonary, gi and gu systems are negative, except as otherwise noted.  Patient is getting numbness and a sensation that his hands are just waking up from being asleep in both hands.  He also has tremors in both hands.    OBJECTIVE:                                                    /66   Pulse 74   Resp 16   Wt 66.2 kg (146 lb)   SpO2 95%   BMI 23.21 kg/m    Body mass index is 23.21 kg/m .  GENERAL APPEARANCE: healthy, alert and no distress  NEURO: Normal strength and tone, mentation intact, speech normal, cranial nerves 2-12 intact and there are tremors of both hands.         ASSESSMENT/PLAN:                                                        ICD-10-CM    1. Urinary incontinence without sensory awareness N39.42  oxybutynin ER (DITROPAN-XL) 10 MG 24 hr tablet   2. Moderate episode of recurrent major depressive disorder (H) F33.1 mirtazapine (REMERON) 7.5 MG tablet   3. Retinal artery occlusion H34.9    4. Hypersomnia G47.10        Patient Instructions   We will try increasing his dose of oxybutynin to 10 mg daily.  If not improving over the next couple of weeks he will let us know so we can give even a larger dose.  I placed him on mirtazapine 7.5 mg to take at bedtime.  Hopefully this will help with both of his problems with appetite and with depression and lack of interest in doing things.  He will follow-up in 1 month sooner as needed.      Jamie Guerrier MD  [facility]

## 2019-04-30 NOTE — LETTER
[facility]  [address]  [facility] [address]  Phone: [phone]  Fax: [phone]    April 30, 2019        Suman Burton  [address] [place] [facility] [address]          To whom it may concern:    RE: Suman Burton    Patient was seen and treated today at our clinic. Due to health issues he is unable to fly.    Please contact me for questions or concerns.      Sincerely,        Jamie Guerrier MD

## 2019-05-01 ASSESSMENT — ANXIETY QUESTIONNAIRES: GAD7 TOTAL SCORE: 1

## 2019-06-04 ENCOUNTER — OFFICE VISIT (OUTPATIENT)
Dept: FAMILY MEDICINE | Facility: CLINIC | Age: 84
End: 2019-06-04
Payer: MEDICARE

## 2019-06-04 VITALS
SYSTOLIC BLOOD PRESSURE: 108 MMHG | WEIGHT: 143 LBS | RESPIRATION RATE: 14 BRPM | HEIGHT: 67 IN | DIASTOLIC BLOOD PRESSURE: 64 MMHG | BODY MASS INDEX: 22.44 KG/M2 | HEART RATE: 75 BPM | OXYGEN SATURATION: 96 %

## 2019-06-04 DIAGNOSIS — F32.1 MODERATE MAJOR DEPRESSION (H): ICD-10-CM

## 2019-06-04 DIAGNOSIS — N39.42 URINARY INCONTINENCE WITHOUT SENSORY AWARENESS: Primary | ICD-10-CM

## 2019-06-04 DIAGNOSIS — I10 BENIGN HYPERTENSION: ICD-10-CM

## 2019-06-04 DIAGNOSIS — R06.02 SOB (SHORTNESS OF BREATH): ICD-10-CM

## 2019-06-04 DIAGNOSIS — R60.9 DEPENDENT EDEMA: ICD-10-CM

## 2019-06-04 LAB
FEF 25/75: NORMAL
FEV-1: NORMAL
FEV1/FVC: NORMAL
FVC: NORMAL

## 2019-06-04 PROCEDURE — 99215 OFFICE O/P EST HI 40 MIN: CPT | Mod: 25 | Performed by: FAMILY MEDICINE

## 2019-06-04 PROCEDURE — 94010 BREATHING CAPACITY TEST: CPT | Performed by: FAMILY MEDICINE

## 2019-06-04 RX ORDER — OXYBUTYNIN CHLORIDE 15 MG/1
15 TABLET, EXTENDED RELEASE ORAL DAILY
Qty: 30 TABLET | Refills: 5 | Status: SHIPPED | OUTPATIENT
Start: 2019-06-04 | End: 2019-06-04

## 2019-06-04 RX ORDER — MIRTAZAPINE 15 MG/1
15 TABLET, FILM COATED ORAL AT BEDTIME
Qty: 30 TABLET | Refills: 5 | Status: ON HOLD | OUTPATIENT
Start: 2019-06-04 | End: 2019-07-02

## 2019-06-04 RX ORDER — OXYBUTYNIN CHLORIDE 15 MG/1
15 TABLET, EXTENDED RELEASE ORAL DAILY
Qty: 30 TABLET | Refills: 5 | Status: ON HOLD | OUTPATIENT
Start: 2019-06-04 | End: 2019-07-02

## 2019-06-04 ASSESSMENT — MIFFLIN-ST. JEOR: SCORE: 1294.33

## 2019-06-04 NOTE — PROGRESS NOTES
Subjective     Suman Burton is a 83 year old male who presents to clinic today for the following health issues:    HPI   Patient states here for routine follow up with Dr AUSTIN .    RESPIRATORY SYMPTOMS      Duration: Months    Description  Shortness of breath    Severity: moderate after only walking 50 to 60 feet    Accompanying signs and symptoms: Swollen ankles    History (predisposing factors):  tobacco abuse    Precipitating or alleviating factors: None    Therapies tried and outcome:  none    Abnormal Mood Symptoms      Duration: Months    Description:  Depression: YES  Anxiety: no  Panic attacks: no     Accompanying signs and symptoms: see PHQ-9 and OMERO scores    History (similar episodes/previous evaluation): None    Precipitating or alleviating factors: On mirtazapine but not having a very good effect yet.  Therapies tried and outcome: Remeron (Mirtazapine) and has not noticed much of an effect yet  Diarrhea      Duration: Months    Description:       Consistency of stool: loose       Blood in stool: no        Number of loose stools past 24 hours: Usually has one very loose stool that he may not be able to control every 5 to 7 days.    Intensity:  moderate    Accompanying signs and symptoms:       Fever: no        Nausea/vomitting: no        Abdominal pain: no        Weight loss: no     History (recent antibiotics or travel/ill contacts/med changes/testing done): None    Precipitating or alleviating factors: None    Therapies tried and outcome: Imodium AD may be helps a little    Genitourinary symptoms      Duration: Months    Description:  incontinence    Intensity:  moderate    Accompanying signs and symptoms (fever/discharge/nausea/vomiting/back or abdominal pain):  None    History (frequent UTI's/kidney stones/prostate problems): None  Sexually active: no     Precipitating or alleviating factors: None    Therapies tried and outcome: Oxybutynin XR 10 mg   outcome: Not effective      Patient Active  "Problem List   Diagnosis     Adenocarcinoma (H)     Benign hypertension     Health Care Home     Cerebral infarction due to occlusion or stenosis of carotid artery     Chronic kidney disease, stage III (moderate) (H)     Diabetes mellitus, type 2 (H)     Hyperlipidemia     Lumbago     Thoracic or lumbosacral neuritis or radiculitis, unspecified     Transient visual loss     Diabetes mellitus, type 2 (H)     Chronic atrial fibrillation (H)     Chronic pain syndrome     Hypersomnia     Persistent insomnia     Dystrophic nail     Retinal artery occlusion     SOB (shortness of breath)     Urinary incontinence without sensory awareness     Moderate major depression (H)     Dependent edema     Past Surgical History:   Procedure Laterality Date     CHOLECYSTECTOMY       LAPAROSCOPIC APPENDECTOMY         Social History     Tobacco Use     Smoking status: Former Smoker     Smokeless tobacco: Never Used   Substance Use Topics     Alcohol use: Yes     Alcohol/week: 0.0 oz     Comment: Occasionally.      No family history on file.          Reviewed and updated as needed this visit by Provider         Review of Systems   ROS COMP: Constitutional, HEENT, cardiovascular, pulmonary, gi and gu systems are negative, except as otherwise noted.      Objective    /64 (BP Location: Right arm, Patient Position: Sitting, Cuff Size: Adult Regular)   Pulse 75   Resp 14   Ht 1.689 m (5' 6.5\")   Wt 64.9 kg (143 lb)   SpO2 96%   BMI 22.74 kg/m    Body mass index is 22.74 kg/m .  Physical Exam   GENERAL APPEARANCE: healthy, alert and no distress  RESP: lungs clear to auscultation - no rales, rhonchi or wheezes  CV: regular rates and rhythm, normal S1 S2, no S3 or S4, no murmur, click or rub and pitting B/L LE edema to mid calf  ABDOMEN: soft, nontender, without hepatosplenomegaly or masses and bowel sounds normal            Assessment & Plan       ICD-10-CM    1. Urinary incontinence without sensory awareness N39.42 oxybutynin ER " (DITROPAN XL) 15 MG 24 hr tablet     DISCONTINUED: oxybutynin ER (DITROPAN XL) 15 MG 24 hr tablet   2. SOB (shortness of breath) R06.02 Spirometry, Breathing Capacity     Spirometry, Breathing Capacity     CARDIOLOGY EVAL ADULT REFERRAL     BNP-N terminal pro   3. Moderate major depression (H) F32.1    4. Benign hypertension I10    5. Dependent edema R60.9           MEDICATIONS:        - Increase mirtazapine to 15 mg daily and Ditropan XL to 15 mg daily       - Start taking Metamucil daily       - Continue other medications without change  CONSULTATION/REFERRAL to cardiology for consultation about his shortness of breath.  Patient Instructions   Spirometry was done today and is normal.  Patient does have a lengthy smoking history.  We will check a BNP level.  Patient will continue with Imodium on an as-needed basis.  He will start Metamucil 1 tablespoon in water daily.  Follow-up will be in 4 weeks sooner as needed if not having good effect from his medication increases.  I increased mirtazapine to 15 mg at bedtime.  I also increased his oxybutynin XR to 15 mg daily.  Consultation was sent for cardiology to help evaluate his shortness of breath.  I spent 45 minutes with the patient and his wife going over all of his medical issues.      Return in about 1 month (around 7/2/2019) for urine, sob, depression.    Jamie Guerrier MD  [facility]

## 2019-06-04 NOTE — PATIENT INSTRUCTIONS
Spirometry was done today and is normal.  Patient does have a lengthy smoking history.  We will check a BNP level.  Patient will continue with Imodium on an as-needed basis.  He will start Metamucil 1 tablespoon in water daily.  Follow-up will be in 4 weeks sooner as needed if not having good effect from his medication increases.  I increased mirtazapine to 15 mg at bedtime.  I also increased his oxybutynin XR to 15 mg daily.  Consultation was sent for cardiology to help evaluate his shortness of breath.  I spent 45 minutes with the patient and his wife going over all of his medical issues.

## 2019-06-12 ENCOUNTER — TELEPHONE (OUTPATIENT)
Dept: FAMILY MEDICINE | Facility: CLINIC | Age: 84
End: 2019-06-12

## 2019-06-12 NOTE — TELEPHONE ENCOUNTER
Our goal is to have forms completed within 72 hours, however some forms may require a visit or additional information.    What clinic location was the form placed at [facility] or [place].?     Who is the form from?   Where did the form come from? Faxed to clinic   The form was placed in the inbox of Jamie Guerrier MD      Please fax to [phone]  Phone number: [phone]    Additional comments: [facility] [place] [facility] supporting mirtazapine and oxybutynin ER     Call take on 6/12/2019 at 4:07 PM by Ca Conteh

## 2019-06-17 ENCOUNTER — TELEPHONE (OUTPATIENT)
Dept: FAMILY MEDICINE | Facility: CLINIC | Age: 84
End: 2019-06-17

## 2019-06-17 NOTE — TELEPHONE ENCOUNTER
Clinic Action Needed:Yes, please follow up as necessary    Reason for Call: Wife Lexis called.  Suman has an appointment with a Cardiologist at [facility] on Thursday June 20th at 9:15 am.  They were notified today that the Cardiologist would like him to have a BNP test/result prior to Thursday's appointment.  It appears that there is an order already in chart for lab draw, transferred caller to make lab only visit.  Please return call to Lexis to discuss having the results sent to cardiology prior to Thursday.  Thank you.     Routed to: [facility] Patient Care Team 1 Chirag Estrada, KIRILL  [facility]

## 2019-06-18 ENCOUNTER — PRE VISIT (OUTPATIENT)
Dept: CARDIOLOGY | Facility: CLINIC | Age: 84
End: 2019-06-18

## 2019-06-18 DIAGNOSIS — R06.02 SOB (SHORTNESS OF BREATH): ICD-10-CM

## 2019-06-18 LAB — NT-PROBNP SERPL-MCNC: 2666 PG/ML (ref 0–450)

## 2019-06-18 PROCEDURE — 83880 ASSAY OF NATRIURETIC PEPTIDE: CPT | Performed by: FAMILY MEDICINE

## 2019-06-18 PROCEDURE — 36415 COLL VENOUS BLD VENIPUNCTURE: CPT | Performed by: FAMILY MEDICINE

## 2019-06-18 NOTE — TELEPHONE ENCOUNTER
If cardiology appt is within [place]-which I see in his appointments that it is indeed at [facility], we do not have to send results anywhere because they will be able to view them. Pt is coming in today for BNP lab test at 2:15.

## 2019-06-20 ENCOUNTER — OFFICE VISIT (OUTPATIENT)
Dept: CARDIOLOGY | Facility: CLINIC | Age: 84
End: 2019-06-20
Attending: FAMILY MEDICINE
Payer: MEDICARE

## 2019-06-20 ENCOUNTER — CARE COORDINATION (OUTPATIENT)
Dept: CARDIOLOGY | Facility: CLINIC | Age: 84
End: 2019-06-20

## 2019-06-20 VITALS
HEIGHT: 68 IN | HEART RATE: 82 BPM | OXYGEN SATURATION: 95 % | BODY MASS INDEX: 22.13 KG/M2 | WEIGHT: 146 LBS | SYSTOLIC BLOOD PRESSURE: 120 MMHG | DIASTOLIC BLOOD PRESSURE: 50 MMHG

## 2019-06-20 DIAGNOSIS — R06.02 SOB (SHORTNESS OF BREATH): ICD-10-CM

## 2019-06-20 DIAGNOSIS — I48.91 ATRIAL FIBRILLATION, UNSPECIFIED TYPE (H): ICD-10-CM

## 2019-06-20 DIAGNOSIS — I50.32 CHRONIC DIASTOLIC CONGESTIVE HEART FAILURE (H): ICD-10-CM

## 2019-06-20 DIAGNOSIS — R06.02 SOB (SHORTNESS OF BREATH): Primary | ICD-10-CM

## 2019-06-20 DIAGNOSIS — I65.23 BILATERAL CAROTID ARTERY STENOSIS: ICD-10-CM

## 2019-06-20 DIAGNOSIS — I25.9 CHEST PAIN DUE TO MYOCARDIAL ISCHEMIA, UNSPECIFIED ISCHEMIC CHEST PAIN TYPE: ICD-10-CM

## 2019-06-20 LAB
ALBUMIN SERPL-MCNC: 3.5 G/DL (ref 3.4–5)
ALP SERPL-CCNC: 84 U/L (ref 40–150)
ALT SERPL W P-5'-P-CCNC: 20 U/L (ref 0–70)
ANION GAP SERPL CALCULATED.3IONS-SCNC: 11 MMOL/L (ref 3–14)
AST SERPL W P-5'-P-CCNC: 16 U/L (ref 0–45)
BASOPHILS # BLD AUTO: 0 10E9/L (ref 0–0.2)
BASOPHILS NFR BLD AUTO: 0.4 %
BILIRUB SERPL-MCNC: 0.5 MG/DL (ref 0.2–1.3)
BUN SERPL-MCNC: 37 MG/DL (ref 7–30)
CALCIUM SERPL-MCNC: 10 MG/DL (ref 8.5–10.1)
CHLORIDE SERPL-SCNC: 106 MMOL/L (ref 94–109)
CHOLEST SERPL-MCNC: 155 MG/DL
CO2 SERPL-SCNC: 22 MMOL/L (ref 20–32)
CREAT SERPL-MCNC: 1.74 MG/DL (ref 0.66–1.25)
DIFFERENTIAL METHOD BLD: NORMAL
DIGOXIN SERPL-MCNC: 1.2 UG/L (ref 0.5–2)
EOSINOPHIL # BLD AUTO: 0.2 10E9/L (ref 0–0.7)
EOSINOPHIL NFR BLD AUTO: 1.8 %
ERYTHROCYTE [DISTWIDTH] IN BLOOD BY AUTOMATED COUNT: 12.8 % (ref 10–15)
GFR SERPL CREATININE-BSD FRML MDRD: 35 ML/MIN/{1.73_M2}
GLUCOSE SERPL-MCNC: 230 MG/DL (ref 70–99)
HCT VFR BLD AUTO: 43.6 % (ref 40–53)
HDLC SERPL-MCNC: 36 MG/DL
HGB BLD-MCNC: 15.1 G/DL (ref 13.3–17.7)
IMM GRANULOCYTES # BLD: 0.1 10E9/L (ref 0–0.4)
IMM GRANULOCYTES NFR BLD: 0.8 %
LDLC SERPL CALC-MCNC: 71 MG/DL
LYMPHOCYTES # BLD AUTO: 1.1 10E9/L (ref 0.8–5.3)
LYMPHOCYTES NFR BLD AUTO: 12.9 %
MCH RBC QN AUTO: 31.9 PG (ref 26.5–33)
MCHC RBC AUTO-ENTMCNC: 34.6 G/DL (ref 31.5–36.5)
MCV RBC AUTO: 92 FL (ref 78–100)
MONOCYTES # BLD AUTO: 0.8 10E9/L (ref 0–1.3)
MONOCYTES NFR BLD AUTO: 9 %
NEUTROPHILS # BLD AUTO: 6.3 10E9/L (ref 1.6–8.3)
NEUTROPHILS NFR BLD AUTO: 75.1 %
NONHDLC SERPL-MCNC: 119 MG/DL
NRBC # BLD AUTO: 0 10*3/UL
NRBC BLD AUTO-RTO: 0 /100
PLATELET # BLD AUTO: 167 10E9/L (ref 150–450)
POTASSIUM SERPL-SCNC: 4.2 MMOL/L (ref 3.4–5.3)
PROT SERPL-MCNC: 7.4 G/DL (ref 6.8–8.8)
RBC # BLD AUTO: 4.73 10E12/L (ref 4.4–5.9)
SODIUM SERPL-SCNC: 139 MMOL/L (ref 133–144)
TRIGL SERPL-MCNC: 239 MG/DL
WBC # BLD AUTO: 8.4 10E9/L (ref 4–11)

## 2019-06-20 PROCEDURE — 36415 COLL VENOUS BLD VENIPUNCTURE: CPT | Performed by: INTERNAL MEDICINE

## 2019-06-20 PROCEDURE — 80061 LIPID PANEL: CPT | Performed by: INTERNAL MEDICINE

## 2019-06-20 PROCEDURE — 93000 ELECTROCARDIOGRAM COMPLETE: CPT | Performed by: INTERNAL MEDICINE

## 2019-06-20 PROCEDURE — 80162 ASSAY OF DIGOXIN TOTAL: CPT | Performed by: INTERNAL MEDICINE

## 2019-06-20 PROCEDURE — 85025 COMPLETE CBC W/AUTO DIFF WBC: CPT | Performed by: INTERNAL MEDICINE

## 2019-06-20 PROCEDURE — 80053 COMPREHEN METABOLIC PANEL: CPT | Performed by: INTERNAL MEDICINE

## 2019-06-20 PROCEDURE — 99203 OFFICE O/P NEW LOW 30 MIN: CPT | Performed by: INTERNAL MEDICINE

## 2019-06-20 RX ORDER — ASPIRIN 325 MG
325 TABLET ORAL DAILY
Qty: 30 TABLET | Refills: 0 | Status: ON HOLD | OUTPATIENT
Start: 2019-06-20 | End: 2019-07-02

## 2019-06-20 RX ORDER — ISOSORBIDE MONONITRATE 30 MG/1
30 TABLET, EXTENDED RELEASE ORAL DAILY
Qty: 30 TABLET | Refills: 3 | Status: SHIPPED | OUTPATIENT
Start: 2019-06-20

## 2019-06-20 RX ORDER — ROSUVASTATIN CALCIUM 20 MG/1
20 TABLET, COATED ORAL DAILY
Qty: 30 TABLET | Refills: 3 | Status: ON HOLD | OUTPATIENT
Start: 2019-06-20 | End: 2019-07-02

## 2019-06-20 ASSESSMENT — MIFFLIN-ST. JEOR: SCORE: 1331.75

## 2019-06-20 NOTE — PROGRESS NOTES
Pharmacist from [facility] pharmacy called to clarify if pt should be starting  mg daily while on warfarin. I told her that pt will be holding warfarin for an upcoming procedure and he was given instructions to take the ASA once he stops the warfarin. Further ASA dosing will be dependent on the cath results. Juan Daniel ROY June 20, 2019, 10:50 AM

## 2019-06-20 NOTE — PROGRESS NOTES
HPI and Plan:   See dictation [id number]    Orders Placed This Encounter   Procedures     US Carotid Bilateral     Comprehensive metabolic panel     Lipid Profile     CBC with platelets differential     Digoxin level     [facility] with [facility] - Return MD visit     EKG 12-lead complete w/read - Clinics (performed today)     Zio Patch Holter Adult Pediatric Greater than 48 hrs     Echocardiogram Complete     Orders Placed This Encounter   Medications     aspirin (ASA) 325 MG tablet     Sig: Take 1 tablet (325 mg) by mouth daily     Dispense:  30 tablet     Refill:  0     isosorbide mononitrate (IMDUR) 30 MG 24 hr tablet     Sig: Take 1 tablet (30 mg) by mouth daily     Dispense:  30 tablet     Refill:  3     rosuvastatin (CRESTOR) 20 MG tablet     Sig: Take 1 tablet (20 mg) by mouth daily     Dispense:  30 tablet     Refill:  3     There are no discontinued medications.      Encounter Diagnoses   Name Primary?     SOB (shortness of breath) Yes     Atrial fibrillation, unspecified type (H)      Chronic diastolic congestive heart failure (H)      Bilateral carotid artery stenosis      Chest pain due to myocardial ischemia, unspecified ischemic chest pain type        CURRENT MEDICATIONS:  Current Outpatient Medications   Medication Sig Dispense Refill     acetaminophen (TYLENOL) 500 MG tablet Take 2 tablets (1,000 mg) by mouth 3 times daily as needed for mild pain 100 tablet 0     amLODIPine (NORVASC) 10 MG tablet Take 1 tablet (10 mg) by mouth daily 90 tablet 3     aspirin (ASA) 325 MG tablet Take 1 tablet (325 mg) by mouth daily 30 tablet 0     digoxin (LANOXIN) 125 MCG tablet Take 1 tablet (125 mcg) by mouth daily 90 tablet 3     furosemide (LASIX) 80 MG tablet Take 1 tablet (80 mg) by mouth daily 90 tablet 1     glipiZIDE (GLUCOTROL) 10 MG tablet Take 2 tablets (20 mg) by mouth 2 times daily Take 20mg in AM with breakfast and 20mg in PM with evening meal 360 tablet 0     isosorbide mononitrate (IMDUR) 30 MG 24 hr  tablet Take 1 tablet (30 mg) by mouth daily 30 tablet 3     loperamide (IMODIUM A-D) 2 MG tablet Take 1 tablet (2 mg) by mouth 4 times daily as needed for diarrhea 100 tablet 5     metFORMIN (GLUCOPHAGE) 1000 MG tablet Take 1 tablet (1,000 mg) by mouth 2 times daily (with meals)       metoprolol succinate (TOPROL-XL) 50 MG 24 hr tablet Take 0.5 tablets (25 mg) by mouth daily       mirtazapine (REMERON) 15 MG tablet Take 1 tablet (15 mg) by mouth At Bedtime 30 tablet 5     Multiple Vitamin (MULTIVITAMINS PO) Take 1 tablet by mouth daily.       ONE TOUCH FINE POINT LANCETS Check blood sugars twice a day.       ONETOUCH DELICA LANCETS 33G MISC 1 strip 2 times daily 100 each 11     oxybutynin ER (DITROPAN XL) 15 MG 24 hr tablet Take 1 tablet (15 mg) by mouth daily 30 tablet 5     Pramoxine HCl (CERAVE ITCH RELIEF) 1 % CREA Externally apply 30 g topically daily 340 g 11     rosuvastatin (CRESTOR) 20 MG tablet Take 1 tablet (20 mg) by mouth daily 30 tablet 3     saxagliptin (ONGLYZA) 2.5 MG TABS tablet Take 1 tablet (2.5 mg) by mouth daily 30 tablet 1     warfarin (COUMADIN) 3 MG tablet Take 1 tablet (3 mg) by mouth daily (Patient taking differently: Take 3 mg by mouth daily ) 90 tablet 3       ALLERGIES     Allergies   Allergen Reactions     Nkda [No Known Drug Allergies]        PAST MEDICAL HISTORY:  Past Medical History:   Diagnosis Date     Atrial fibrillation (H)      Diabetes (H)      Edema      Hypertension        PAST SURGICAL HISTORY:  Past Surgical History:   Procedure Laterality Date     CHOLECYSTECTOMY       LAPAROSCOPIC APPENDECTOMY         FAMILY HISTORY:  No family history on file.    SOCIAL HISTORY:  Social History     Socioeconomic History     Marital status:      Spouse name: Not on file     Number of children: Not on file     Years of education: Not on file     Highest education level: Not on file   Occupational History     Not on file   Social Needs     Financial resource strain: Not on file      "Food insecurity:     Worry: Not on file     Inability: Not on file     Transportation needs:     Medical: Not on file     Non-medical: Not on file   Tobacco Use     Smoking status: Former Smoker     Packs/day: 2.00     Types: Cigarettes     Start date:      Last attempt to quit: 2008     Years since quittin.4     Smokeless tobacco: Never Used   Substance and Sexual Activity     Alcohol use: Yes     Alcohol/week: 0.0 oz     Comment: Occasionally.      Drug use: No     Sexual activity: Never   Lifestyle     Physical activity:     Days per week: Not on file     Minutes per session: Not on file     Stress: Not on file   Relationships     Social connections:     Talks on phone: Not on file     Gets together: Not on file     Attends Hoahaoism service: Not on file     Active member of club or organization: Not on file     Attends meetings of clubs or organizations: Not on file     Relationship status: Not on file     Intimate partner violence:     Fear of current or ex partner: Not on file     Emotionally abused: Not on file     Physically abused: Not on file     Forced sexual activity: Not on file   Other Topics Concern     Parent/sibling w/ CABG, MI or angioplasty before 65F 55M? Not Asked   Social History Narrative     Not on file       Review of Systems:  Skin:  Negative     Eyes:  Negative    ENT:  Negative    Respiratory:  Positive for shortness of breath;dyspnea on exertion  Cardiovascular:    Positive for;chest pain;edema;exercise intolerance;fatigue  Gastroenterology: Positive for poor appetite;heartburn  Genitourinary:  Positive for nocturia  Musculoskeletal:  Positive for back pain  Neurologic:  Negative    Psychiatric:  Negative    Heme/Lymph/Imm:  Negative    Endocrine:  Positive for diabetes    Physical Exam:  Vitals: /50   Pulse 82   Ht 1.727 m (5' 8\")   Wt 66.2 kg (146 lb)   SpO2 95%   BMI 22.20 kg/m      Recent Lab Results:  LIPID RESULTS:  Lab Results   Component Value Date    CHOL 165 " 05/01/2018    HDL 36 (L) 05/01/2018    LDL 77 05/01/2018    TRIG 262 (H) 05/01/2018    CHOLHDLRATIO 3.8 02/05/2013       LIVER ENZYME RESULTS:  Lab Results   Component Value Date    .0 (H) 03/15/2012    .0 (H) 03/15/2012    .0 (H) 03/15/2012    .0 (H) 03/15/2012       CBC RESULTS:  Lab Results   Component Value Date    WBC 8.5 08/27/2018    RBC 4.82 08/27/2018    HGB 15.3 08/27/2018    HCT 43.8 08/27/2018    MCV 91 08/27/2018    MCH 31.7 08/27/2018    MCHC 34.9 08/27/2018    RDW 13.1 08/27/2018     (L) 08/27/2018       BMP RESULTS:  Lab Results   Component Value Date     03/12/2019    POTASSIUM 3.7 03/12/2019    CHLORIDE 105 03/12/2019    CO2 26 03/12/2019    ANIONGAP 7 03/12/2019     (H) 03/12/2019    BUN 33 (H) 03/12/2019    CR 1.49 (H) 03/12/2019    GFRESTIMATED 43 (L) 03/12/2019    GFRESTBLACK 49 (L) 03/12/2019    VINCENT 10.0 03/12/2019        A1C RESULTS:  Lab Results   Component Value Date    A1C 6.7 (H) 03/12/2019       INR RESULTS:  Lab Results   Component Value Date    INR 3.0 12/04/2017    INR 1.8 07/20/2017           CC  Jamie Guerrier MD  [address]  [place] [address]

## 2019-06-20 NOTE — LETTER
6/20/2019    Jamie Guerrier MD  [address]  [facility] [address]    RE: Suman Burton       Dear Colleague,    I had the pleasure of seeing Suman Burton in the [facility].    HPI and Plan:   See dictation [id number]    Orders Placed This Encounter   Procedures     US Carotid Bilateral     Comprehensive metabolic panel     Lipid Profile     CBC with platelets differential     Digoxin level     [facility] with [facility] - Return MD visit     EKG 12-lead complete w/read - Clinics (performed today)     Zio Patch Holter Adult Pediatric Greater than 48 hrs     Echocardiogram Complete     Orders Placed This Encounter   Medications     aspirin (ASA) 325 MG tablet     Sig: Take 1 tablet (325 mg) by mouth daily     Dispense:  30 tablet     Refill:  0     isosorbide mononitrate (IMDUR) 30 MG 24 hr tablet     Sig: Take 1 tablet (30 mg) by mouth daily     Dispense:  30 tablet     Refill:  3     rosuvastatin (CRESTOR) 20 MG tablet     Sig: Take 1 tablet (20 mg) by mouth daily     Dispense:  30 tablet     Refill:  3     There are no discontinued medications.      Encounter Diagnoses   Name Primary?     SOB (shortness of breath) Yes     Atrial fibrillation, unspecified type (H)      Chronic diastolic congestive heart failure (H)      Bilateral carotid artery stenosis      Chest pain due to myocardial ischemia, unspecified ischemic chest pain type        CURRENT MEDICATIONS:  Current Outpatient Medications   Medication Sig Dispense Refill     acetaminophen (TYLENOL) 500 MG tablet Take 2 tablets (1,000 mg) by mouth 3 times daily as needed for mild pain 100 tablet 0     amLODIPine (NORVASC) 10 MG tablet Take 1 tablet (10 mg) by mouth daily 90 tablet 3     aspirin (ASA) 325 MG tablet Take 1 tablet (325 mg) by mouth daily 30 tablet 0     digoxin (LANOXIN) 125 MCG tablet Take 1 tablet (125 mcg) by mouth daily 90 tablet 3     furosemide (LASIX) 80 MG tablet Take 1 tablet (80 mg) by mouth  daily 90 tablet 1     glipiZIDE (GLUCOTROL) 10 MG tablet Take 2 tablets (20 mg) by mouth 2 times daily Take 20mg in AM with breakfast and 20mg in PM with evening meal 360 tablet 0     isosorbide mononitrate (IMDUR) 30 MG 24 hr tablet Take 1 tablet (30 mg) by mouth daily 30 tablet 3     loperamide (IMODIUM A-D) 2 MG tablet Take 1 tablet (2 mg) by mouth 4 times daily as needed for diarrhea 100 tablet 5     metFORMIN (GLUCOPHAGE) 1000 MG tablet Take 1 tablet (1,000 mg) by mouth 2 times daily (with meals)       metoprolol succinate (TOPROL-XL) 50 MG 24 hr tablet Take 0.5 tablets (25 mg) by mouth daily       mirtazapine (REMERON) 15 MG tablet Take 1 tablet (15 mg) by mouth At Bedtime 30 tablet 5     Multiple Vitamin (MULTIVITAMINS PO) Take 1 tablet by mouth daily.       ONE TOUCH FINE POINT LANCETS Check blood sugars twice a day.       ONETOUCH DELICA LANCETS 33G MISC 1 strip 2 times daily 100 each 11     oxybutynin ER (DITROPAN XL) 15 MG 24 hr tablet Take 1 tablet (15 mg) by mouth daily 30 tablet 5     Pramoxine HCl (CERAVE ITCH RELIEF) 1 % CREA Externally apply 30 g topically daily 340 g 11     rosuvastatin (CRESTOR) 20 MG tablet Take 1 tablet (20 mg) by mouth daily 30 tablet 3     saxagliptin (ONGLYZA) 2.5 MG TABS tablet Take 1 tablet (2.5 mg) by mouth daily 30 tablet 1     warfarin (COUMADIN) 3 MG tablet Take 1 tablet (3 mg) by mouth daily (Patient taking differently: Take 3 mg by mouth daily ) 90 tablet 3       ALLERGIES     Allergies   Allergen Reactions     Nkda [No Known Drug Allergies]        PAST MEDICAL HISTORY:  Past Medical History:   Diagnosis Date     Atrial fibrillation (H)      Diabetes (H)      Edema      Hypertension        PAST SURGICAL HISTORY:  Past Surgical History:   Procedure Laterality Date     CHOLECYSTECTOMY       LAPAROSCOPIC APPENDECTOMY         FAMILY HISTORY:  No family history on file.    SOCIAL HISTORY:  Social History     Socioeconomic History     Marital status:      Spouse  name: Not on file     Number of children: Not on file     Years of education: Not on file     Highest education level: Not on file   Occupational History     Not on file   Social Needs     Financial resource strain: Not on file     Food insecurity:     Worry: Not on file     Inability: Not on file     Transportation needs:     Medical: Not on file     Non-medical: Not on file   Tobacco Use     Smoking status: Former Smoker     Packs/day: 2.00     Types: Cigarettes     Start date:      Last attempt to quit:      Years since quittin.4     Smokeless tobacco: Never Used   Substance and Sexual Activity     Alcohol use: Yes     Alcohol/week: 0.0 oz     Comment: Occasionally.      Drug use: No     Sexual activity: Never   Lifestyle     Physical activity:     Days per week: Not on file     Minutes per session: Not on file     Stress: Not on file   Relationships     Social connections:     Talks on phone: Not on file     Gets together: Not on file     Attends Holiness service: Not on file     Active member of club or organization: Not on file     Attends meetings of clubs or organizations: Not on file     Relationship status: Not on file     Intimate partner violence:     Fear of current or ex partner: Not on file     Emotionally abused: Not on file     Physically abused: Not on file     Forced sexual activity: Not on file   Other Topics Concern     Parent/sibling w/ CABG, MI or angioplasty before 65F 55M? Not Asked   Social History Narrative     Not on file       Review of Systems:  Skin:  Negative     Eyes:  Negative    ENT:  Negative    Respiratory:  Positive for shortness of breath;dyspnea on exertion  Cardiovascular:    Positive for;chest pain;edema;exercise intolerance;fatigue  Gastroenterology: Positive for poor appetite;heartburn  Genitourinary:  Positive for nocturia  Musculoskeletal:  Positive for back pain  Neurologic:  Negative    Psychiatric:  Negative    Heme/Lymph/Imm:  Negative    Endocrine:   "Positive for diabetes    Physical Exam:  Vitals: /50   Pulse 82   Ht 1.727 m (5' 8\")   Wt 66.2 kg (146 lb)   SpO2 95%   BMI 22.20 kg/m       Recent Lab Results:  LIPID RESULTS:  Lab Results   Component Value Date    CHOL 165 05/01/2018    HDL 36 (L) 05/01/2018    LDL 77 05/01/2018    TRIG 262 (H) 05/01/2018    CHOLHDLRATIO 3.8 02/05/2013       LIVER ENZYME RESULTS:  Lab Results   Component Value Date    .0 (H) 03/15/2012    .0 (H) 03/15/2012    .0 (H) 03/15/2012    .0 (H) 03/15/2012       CBC RESULTS:  Lab Results   Component Value Date    WBC 8.5 08/27/2018    RBC 4.82 08/27/2018    HGB 15.3 08/27/2018    HCT 43.8 08/27/2018    MCV 91 08/27/2018    MCH 31.7 08/27/2018    MCHC 34.9 08/27/2018    RDW 13.1 08/27/2018     (L) 08/27/2018       BMP RESULTS:  Lab Results   Component Value Date     03/12/2019    POTASSIUM 3.7 03/12/2019    CHLORIDE 105 03/12/2019    CO2 26 03/12/2019    ANIONGAP 7 03/12/2019     (H) 03/12/2019    BUN 33 (H) 03/12/2019    CR 1.49 (H) 03/12/2019    GFRESTIMATED 43 (L) 03/12/2019    GFRESTBLACK 49 (L) 03/12/2019    VINCENT 10.0 03/12/2019        A1C RESULTS:  Lab Results   Component Value Date    A1C 6.7 (H) 03/12/2019       INR RESULTS:  Lab Results   Component Value Date    INR 3.0 12/04/2017    INR 1.8 07/20/2017           CC  Jamie Guerrier MD  [address]  [place] [address]                    Thank you for allowing me to participate in the care of your patient.      Sincerely,     Judy Ruiz MD     [facility]    cc:   Jamie Guerrier MD  [address]  [facility] [place] [address]        "

## 2019-06-20 NOTE — PROGRESS NOTES
Service Date: 06/20/2019      REASON FOR VISIT:  Chest discomfort, heart failure, atrial fibrillation.      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  He is a delightful gentleman.  He was followed up in the past at [facility] by Dr. Ej Villarreal and also by Dr. Fermin Hwang at the [facility] who was a PCP for medication refills.      The patient denies any prior cardiovascular problems, but is extremely high risk for coronary artery disease.  He has bilateral carotid artery disease that is under conservative management.  He has had atrial fibrillation for at least 5 years from what he tells me.  This was incidentally noted and he has been on Coumadin and tolerating that well without any bleeding problems.  He has never had an angiogram heart attack or stress testing in the past, he says.  He is a poorly controlled diabetic and also is starting to have signs of early dementia from what he tells me, and his wife tells me.        Today he is seeing a cardiologist because of some bilateral lower extremity edema, shortness of breath, feeling fatigued and having intermittent episodes of chest discomfort that have been going on for the last 6 months.  He has not had resting chest pain ever.  Denies any chest pain at this very moment when he is in clinic as well.  He is getting slow he says and from the [facility] help to the clinic, he took a wheelchair, but otherwise at home, walks with a cane.  Denies any bleeding problems, syncope or presyncope.  He had a mechanical fall about 2 years ago, he says.  Denies orthopnea or PND.      CURRENT MEDICATIONS:   1.  Amlodipine 10 mg daily.   2.  Digoxin 125 mcg daily.   3.  Lasix 80 mg daily.   4.  Glipizide.     5.  Metformin.   6.  Metoprolol succinate 25 mg daily.   7.  Mirtazapine.   8.  Multivitamins.   9.  Oxybutynin.   9.  Saxagliptin.   10.  Warfarin.   Today we started:   11.   Aspirin 325 mg daily (see below).   12.  Imdur 30 mg daily.   13.  Crestor 20 mg daily.      PHYSICAL EXAMINATION:   VITAL SIGNS:  Blood pressure is 120/50 with a pulse of 82.   GENERAL:  Alert and oriented x3, in no acute distress but complaining of mild dementia.   NECK:  Supple.  JVP is normal.  There are carotid bruits bilaterally.   LUNGS:  Clear to auscultation bilaterally.   CARDIAC:  Heart sounds are irregular.  There is a soft systolic murmur, but no S3 or S4.     ABDOMEN:  Soft, nontender.   HEENT:  No icterus or pallor.   EXTREMITIES:  Warm with trace to 1+ bilateral pitting edema.   PSYCHIATRIC:  Appropriate affect.     NEUROLOGIC:  A limited motor neuro exam is nonfocal.      PERTINENT LABORATORY DATA:  He had an echocardiogram in 09/2018, which showed normal LV function without major valve disease.  He has never had an angiogram.  His last LDL was 77 last year.  No recent digoxin level.  Creatinine is 1.5, BUN 33, GFR is 43.  Last hemoglobin A1c is 6.7 in 03/2019, but in the past was 10.6.  Platelet counts are normal.  Hemoglobin is normal.  ECG shows nonspecific ST changes.  Subtle diffuse ST depressions in the setting of atrial fibrillation.      ASSESSMENT AND PLAN:  This is an 83-year-old male with a history of chronic atrial fibrillation for the last 5 years and some degree of diastolic heart failure maintained well on 80 mg of Lasix.  He is taking Coumadin for his atrial fibrillation without any major problems.  He has been complaining of shortness of breath and chest heaviness and discomfort that has been getting worse over the last 6 months.  No resting symptoms.  Denies pain, but says that he just feels this discomfort that comes on occasionally, especially with exertion.        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  At this point, given his classic symptoms of angina, I recommend doing a coronary angiography with right heart catheterization.  He is not interested in surgery.  He says he is starting to have signs of early dementia and he wants to be conservative, which I think is fair.  He denies any bleeding problems and is open to PCI and dual antiplatelet therapy and the addition of Coumadin if needed.  He understands the risk of chronic kidney disease that could get worse with contrast exposure, but at this point he understands the risks and benefits ratio and is willing to proceed with coronary angiogram.      We will do coronary angiography with right heart, echocardiogram, carotid ultrasound and a 7-day ZIO Patch monitor.  In addition to this, we will do labs include CBC, complete comprehensive lipid panel and a BNP and a digoxin level.  I have told my nursing team to give him instructions for the catheterization that would include holding his Coumadin and digoxin.  I am okay with him holding Lasix the day of his coronary angiography, but he can take it the prior day.  When he is going to be off his Coumadin, I have started him on aspirin 325 mg daily so that he can continue to be bridged from a CAD standpoint.  I will start him on Imdur 30 mg daily today and add 20 mg of Crestor to his regimen.        [facility] will [facility] and he will need close followup moving forward.  He understands that based on his coronary angiography findings, he may have to get admitted to the hospital, but we will see what we find.        Between now and the time of his catheterization, which is in the next few days, should he start having any worsening symptoms or any degree of resting chest pain or pressure, I have advised him to immediately call 911 and come into the hospital for an inpatient admission.        cc:      Jamie Guerrier MD    [facility]   [address] [place] [address]          LOLITA BE MD             D: 2019   T: 2019   MT: LIAN      Name:     MOSES BONILLA   MRN:      [id number]        Account:      [id number]   :      1935           Service Date: 2019      Document: [id number]

## 2019-06-20 NOTE — LETTER
6/20/2019      Jaime Guerrier MD  [address]  [facility] [address]      RE: Suman Burton       Dear Colleague,    I had the pleasure of seeing Suman Burton in the [facility].    Service Date: 06/20/2019      REASON FOR VISIT:  Chest discomfort, heart failure, atrial fibrillation.      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  He is a delightful gentleman.  He was followed up in the past at [facility] by Dr. Ej Villarreal and also by Dr. Fermin Hwang at the [facility] who was a PCP for medication refills.      The patient denies any prior cardiovascular problems, but is extremely high risk for coronary artery disease.  He has bilateral carotid artery disease that is under conservative management.  He has had atrial fibrillation for at least 5 years from what he tells me.  This was incidentally noted and he has been on Coumadin and tolerating that well without any bleeding problems.  He has never had an angiogram heart attack or stress testing in the past, he says.  He is a poorly controlled diabetic and also is starting to have signs of early dementia from what he tells me, and his wife tells me.        Today he is seeing a cardiologist because of some bilateral lower extremity edema, shortness of breath, feeling fatigued and having intermittent episodes of chest discomfort that have been going on for the last 6 months.  He has not had resting chest pain ever.  Denies any chest pain at this very moment when he is in clinic as well.  He is getting slow he says and from the  help to the clinic, he took a wheelchair, but otherwise at home, walks with a cane.  Denies any bleeding problems, syncope or presyncope.  He had a mechanical fall about 2 years ago, he says.  Denies orthopnea or PND.          CURRENT MEDICATIONS:   1.  Amlodipine 10 mg daily.   2.  Digoxin 125  mcg daily.   3.  Lasix 80 mg daily.   4.  Glipizide.     5.  Metformin.   6.  Metoprolol succinate 25 mg daily.   7.  Mirtazapine.   8.  Multivitamins.   9.  Oxybutynin.   9.  Saxagliptin.   10.  Warfarin.   Today we started:   11.  Aspirin 325 mg daily (see below).   12.  Imdur 30 mg daily.   13.  Crestor 20 mg daily.      PHYSICAL EXAMINATION:   VITAL SIGNS:  Blood pressure is 120/50 with a pulse of 82.   GENERAL:  Alert and oriented x3, in no acute distress but complaining of mild dementia.   NECK:  Supple.  JVP is normal.  There are carotid bruits bilaterally.   LUNGS:  Clear to auscultation bilaterally.   CARDIAC:  Heart sounds are irregular.  There is a soft systolic murmur, but no S3 or S4.     ABDOMEN:  Soft, nontender.   HEENT:  No icterus or pallor.   EXTREMITIES:  Warm with trace to 1+ bilateral pitting edema.   PSYCHIATRIC:  Appropriate affect.     NEUROLOGIC:  A limited motor neuro exam is nonfocal.      PERTINENT LABORATORY DATA:  He had an echocardiogram in 09/2018, which showed normal LV function without major valve disease.  He has never had an angiogram.  His last LDL was 77 last year.  No recent digoxin level.  Creatinine is 1.5, BUN 33, GFR is 43.  Last hemoglobin A1c is 6.7 in 03/2019, but in the past was 10.6.  Platelet counts are normal.  Hemoglobin is normal.  ECG shows nonspecific ST changes.  Subtle diffuse ST depressions in the setting of atrial fibrillation.      ASSESSMENT AND PLAN:  This is an 83-year-old male with a history of chronic atrial fibrillation for the last 5 years and some degree of diastolic heart failure maintained well on 80 mg of Lasix.  He is taking Coumadin for his atrial fibrillation without any major problems.  He has been complaining of shortness of breath and chest heaviness and discomfort that has been getting worse over the last 6 months.  No resting symptoms.  Denies pain, but says that he just feels this discomfort that comes on occasionally, especially with  exertion.        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  At this point, given his classic symptoms of angina, I recommend doing a coronary angiography with right heart catheterization.  He is not interested in surgery.  He says he is starting to have signs of early dementia and he wants to be conservative, which I think is fair.  He denies any bleeding problems and is open to PCI and dual antiplatelet therapy and the addition of Coumadin if needed.  He understands the risk of chronic kidney disease that could get worse with contrast exposure, but at this point he understands the risks and benefits ratio and is willing to proceed with coronary angiogram.      We will do coronary angiography with right heart, echocardiogram, carotid ultrasound and a 7-day ZIO Patch monitor.  In addition to this, we will do labs include CBC, complete comprehensive lipid panel and a BNP and a digoxin level.  I have told my nursing team to give him instructions for the catheterization that would include holding his Coumadin and digoxin.  I am okay with him holding Lasix the day of his coronary angiography, but he can take it the prior day.  When he is going to be off his Coumadin, I have started him on aspirin 325 mg daily so that he can continue to be bridged from a CAD standpoint.  I will start him on Imdur 30 mg daily today and add 20 mg of Crestor to his regimen.        [facility] will [facility] and he will need close followup moving forward.  He understands that based on his coronary angiography findings, he may have to get admitted to the hospital, but we will see what we find.        Between now and the time of his catheterization, which is in the next few days, should he start having any worsening symptoms or any degree of resting  chest pain or pressure, I have advised him to immediately call 911 and come into the hospital for an inpatient admission.        cc:      Jamie Guerrier MD    [facility]   [address] [place] [address]         LOLITA BE MD             D: 2019   T: 2019   MT: LIAN      Name:     MOSES BONILLA   MRN:      [id number]        Account:      [id number]   :      1935           Service Date: 2019      Document: [id number]         No facility-administered encounter medications on file as of 2019.      Outpatient Encounter Medications as of 2019   Medication Sig Dispense Refill     acetaminophen (TYLENOL) 500 MG tablet Take 2 tablets (1,000 mg) by mouth 3 times daily as needed for mild pain 100 tablet 0     amLODIPine (NORVASC) 10 MG tablet Take 1 tablet (10 mg) by mouth daily 90 tablet 3     aspirin (ASA) 325 MG tablet Take 1 tablet (325 mg) by mouth daily 30 tablet 0     digoxin (LANOXIN) 125 MCG tablet Take 1 tablet (125 mcg) by mouth daily 90 tablet 3     furosemide (LASIX) 80 MG tablet Take 1 tablet (80 mg) by mouth daily 90 tablet 1     glipiZIDE (GLUCOTROL) 10 MG tablet Take 2 tablets (20 mg) by mouth 2 times daily Take 20mg in AM with breakfast and 20mg in PM with evening meal 360 tablet 0     isosorbide mononitrate (IMDUR) 30 MG 24 hr tablet Take 1 tablet (30 mg) by mouth daily 30 tablet 3     loperamide (IMODIUM A-D) 2 MG tablet Take 1 tablet (2 mg) by mouth 4 times daily as needed for diarrhea 100 tablet 5     metFORMIN (GLUCOPHAGE) 1000 MG tablet Take 1 tablet (1,000 mg) by mouth 2 times daily (with meals)       metoprolol succinate (TOPROL-XL) 50 MG 24 hr tablet Take 0.5 tablets (25 mg) by mouth daily       mirtazapine (REMERON) 15 MG tablet Take 1 tablet (15 mg) by mouth At Bedtime 30 tablet 5     Multiple Vitamin (MULTIVITAMINS PO) Take 1 tablet by mouth daily.       ONE TOUCH FINE POINT LANCETS Check blood sugars twice a day.        ONETOUCH DELICA LANCETS 33G MISC 1 strip 2 times daily 100 each 11     oxybutynin ER (DITROPAN XL) 15 MG 24 hr tablet Take 1 tablet (15 mg) by mouth daily 30 tablet 5     Pramoxine HCl (CERAVE ITCH RELIEF) 1 % CREA Externally apply 30 g topically daily 340 g 11     rosuvastatin (CRESTOR) 20 MG tablet Take 1 tablet (20 mg) by mouth daily 30 tablet 3     saxagliptin (ONGLYZA) 2.5 MG TABS tablet Take 1 tablet (2.5 mg) by mouth daily 30 tablet 1     warfarin (COUMADIN) 3 MG tablet Take 1 tablet (3 mg) by mouth daily (Patient taking differently: Take 3 mg by mouth daily ) 90 tablet 3       Again, thank you for allowing me to participate in the care of your patient.      Sincerely,    Judy Ruiz MD     [facility]

## 2019-06-20 NOTE — PATIENT INSTRUCTIONS
Call the nurse at: [phone] if questions, concerns or symptoms      1. Labs today  2. Coronary Angiogram - last day of warfarin is Friday 6/21, then starting Saturday take an aspirin 325 mg once daily, every day  3. Echocardiogram  4. Zio Patch monitor  5. Carotid Ultrasound  6.  new prescriptions from your pharmacy today

## 2019-06-21 ENCOUNTER — CARE COORDINATION (OUTPATIENT)
Dept: CARDIOLOGY | Facility: CLINIC | Age: 84
End: 2019-06-21

## 2019-06-21 ENCOUNTER — TELEPHONE (OUTPATIENT)
Dept: FAMILY MEDICINE | Facility: CLINIC | Age: 84
End: 2019-06-21

## 2019-06-21 DIAGNOSIS — I25.10 CORONARY ARTERY DISEASE INVOLVING NATIVE CORONARY ARTERY OF NATIVE HEART WITHOUT ANGINA PECTORIS: Primary | ICD-10-CM

## 2019-06-21 NOTE — TELEPHONE ENCOUNTER
Our goal is to have forms completed within 72 hours, however some forms may require a visit or additional information.    What clinic location was the form placed at [facility] or [place].?     Who is the form from?   Where did the form come from? Faxed to clinic   The form was placed in the inbox of Jamie Guerrier MD      Please fax to [phone]  Phone number: [phone]    Additional comments: [place] [facility]  documentation on OV notes for oxybutynin 5 mg & trospium 20 mg     Call take on 6/21/2019 at 1:50 PM by Ca Conteh

## 2019-06-21 NOTE — PROGRESS NOTES
Called pt's wife with lab results done after OV yesterday. Reviewed elevated creatinine, pt should hold Lasix starting Sun 6/23 to give kidneys some rest prior to dye load w/ the cath on Tuesday. Also, to arrive at 0700 for 3 hours IV hydration. Reviewed left heart cath prep. Patient's wife took notes and verbalized understanding with readback. RN phone number given to call as needed. Madison Sanders RN on 6/21/2019 at 4:17 PM      Hospital: [facility]  Arrival Time: 0700   to/from procedure? Yes, wife  Responsible adult with patient 24hrs post procedure? Yes, wife  NPO starting at midnight (except allowable meds morning of procedure with small sip of water) reviewed with patient? Yes  On Anticoagulant? Yes - warfarin. 3 day hold per Dr. Ruiz, to begin 6/22.  Diabetic? Yes - instructed to hold oral diabetes meds on the AM of procedure to prevent hypoglycemia d/t fasting state. Instructed to continue to hold Metformin x 48 hours post cath until RN calls with post-cath BMP results. BMP to be checked on Thursday 6/27.  On Aspirin? Yes, to start  mg on 6/22 and continue daily including morning of cath; further ASA instructions to be determined by cath lab following the procedure.  Any other Meds to Hold (Such as phosphodiesterase type 5 inhibitors (Viagra/Cialis/Levitra)? No  Contrast Dye Allergy? NKDA

## 2019-06-24 ENCOUNTER — HOSPITAL ENCOUNTER (OUTPATIENT)
Dept: ULTRASOUND IMAGING | Facility: CLINIC | Age: 84
Discharge: HOME | DRG: 981 | End: 2019-06-24
Attending: INTERNAL MEDICINE | Admitting: INTERNAL MEDICINE
Payer: MEDICARE

## 2019-06-24 DIAGNOSIS — I50.32 CHRONIC DIASTOLIC CONGESTIVE HEART FAILURE (H): ICD-10-CM

## 2019-06-24 DIAGNOSIS — I65.23 BILATERAL CAROTID ARTERY STENOSIS: ICD-10-CM

## 2019-06-24 DIAGNOSIS — R06.02 SOB (SHORTNESS OF BREATH): ICD-10-CM

## 2019-06-24 DIAGNOSIS — I48.91 ATRIAL FIBRILLATION, UNSPECIFIED TYPE (H): ICD-10-CM

## 2019-06-24 PROCEDURE — 93880 EXTRACRANIAL BILAT STUDY: CPT | Mod: 26 | Performed by: INTERNAL MEDICINE

## 2019-06-24 PROCEDURE — 93880 EXTRACRANIAL BILAT STUDY: CPT

## 2019-06-24 RX ORDER — LIDOCAINE 40 MG/G
CREAM TOPICAL
Status: CANCELLED | OUTPATIENT
Start: 2019-06-24

## 2019-06-24 RX ORDER — SODIUM CHLORIDE 9 MG/ML
INJECTION, SOLUTION INTRAVENOUS CONTINUOUS
Status: CANCELLED | OUTPATIENT
Start: 2019-06-24

## 2019-06-24 RX ORDER — POTASSIUM CHLORIDE 1500 MG/1
20 TABLET, EXTENDED RELEASE ORAL
Status: CANCELLED | OUTPATIENT
Start: 2019-06-24

## 2019-06-24 NOTE — PROGRESS NOTES
Left heart cath orders entered, along with post-cath BMP to be done 6/27/19 (48 hrs post contrast dye).

## 2019-06-25 ENCOUNTER — APPOINTMENT (OUTPATIENT)
Dept: CT IMAGING | Facility: CLINIC | Age: 84
DRG: 981 | End: 2019-06-25
Attending: NURSE PRACTITIONER
Payer: MEDICARE

## 2019-06-25 ENCOUNTER — HOSPITAL ENCOUNTER (INPATIENT)
Facility: CLINIC | Age: 84
LOS: 6 days | Discharge: NURSING, REHAB OR LONG-TERM CARE | DRG: 981 | End: 2019-07-02
Admitting: INTERNAL MEDICINE
Payer: MEDICARE

## 2019-06-25 DIAGNOSIS — I25.9 CHEST PAIN DUE TO MYOCARDIAL ISCHEMIA, UNSPECIFIED ISCHEMIC CHEST PAIN TYPE: ICD-10-CM

## 2019-06-25 DIAGNOSIS — I50.32 CHRONIC DIASTOLIC CONGESTIVE HEART FAILURE (H): ICD-10-CM

## 2019-06-25 DIAGNOSIS — I48.91 ATRIAL FIBRILLATION, UNSPECIFIED TYPE (H): ICD-10-CM

## 2019-06-25 DIAGNOSIS — R06.02 SOB (SHORTNESS OF BREATH): ICD-10-CM

## 2019-06-25 PROBLEM — R29.90 STROKE-LIKE SYMPTOM: Status: ACTIVE | Noted: 2019-06-25

## 2019-06-25 PROBLEM — Z98.890 STATUS POST CORONARY ANGIOGRAM: Status: ACTIVE | Noted: 2019-06-25

## 2019-06-25 LAB
ALBUMIN SERPL-MCNC: 3.4 G/DL (ref 3.4–5)
ALP SERPL-CCNC: 74 U/L (ref 40–150)
ALT SERPL W P-5'-P-CCNC: 20 U/L (ref 0–70)
ANION GAP SERPL CALCULATED.3IONS-SCNC: 7 MMOL/L (ref 3–14)
APTT PPP: 38 SEC (ref 22–37)
AST SERPL W P-5'-P-CCNC: 20 U/L (ref 0–45)
BILIRUB DIRECT SERPL-MCNC: 0.2 MG/DL (ref 0–0.2)
BILIRUB SERPL-MCNC: 0.8 MG/DL (ref 0.2–1.3)
BUN SERPL-MCNC: 34 MG/DL (ref 7–30)
CALCIUM SERPL-MCNC: 10.1 MG/DL (ref 8.5–10.1)
CHLORIDE SERPL-SCNC: 112 MMOL/L (ref 94–109)
CHOLEST SERPL-MCNC: 118 MG/DL
CO2 BLDCOV-SCNC: 20 MMOL/L (ref 21–28)
CO2 SERPL-SCNC: 23 MMOL/L (ref 20–32)
CREAT SERPL-MCNC: 1.35 MG/DL (ref 0.66–1.25)
ERYTHROCYTE [DISTWIDTH] IN BLOOD BY AUTOMATED COUNT: 12.7 % (ref 10–15)
GFR SERPL CREATININE-BSD FRML MDRD: 48 ML/MIN/{1.73_M2}
GLUCOSE BLDC GLUCOMTR-MCNC: 157 MG/DL (ref 70–99)
GLUCOSE BLDC GLUCOMTR-MCNC: 161 MG/DL (ref 70–99)
GLUCOSE BLDC GLUCOMTR-MCNC: 176 MG/DL (ref 70–99)
GLUCOSE SERPL-MCNC: 164 MG/DL (ref 70–99)
HBA1C MFR BLD: 6.5 % (ref 0–5.6)
HCT VFR BLD AUTO: 39.5 % (ref 40–53)
HDLC SERPL-MCNC: 32 MG/DL
HGB BLD-MCNC: 13.8 G/DL (ref 13.3–17.7)
INR PPP: 1.64 (ref 0.86–1.14)
KCT BLD-ACNC: 287 SEC (ref 75–150)
LDLC SERPL CALC-MCNC: 46 MG/DL
MCH RBC QN AUTO: 31.9 PG (ref 26.5–33)
MCHC RBC AUTO-ENTMCNC: 34.9 G/DL (ref 31.5–36.5)
MCV RBC AUTO: 91 FL (ref 78–100)
NONHDLC SERPL-MCNC: 86 MG/DL
PCO2 BLDV: 34 MM HG (ref 40–50)
PH BLDV: 7.39 PH (ref 7.32–7.43)
PLATELET # BLD AUTO: 129 10E9/L (ref 150–450)
PO2 BLDV: 29 MM HG (ref 25–47)
POTASSIUM SERPL-SCNC: 4.1 MMOL/L (ref 3.4–5.3)
PROT SERPL-MCNC: 6.7 G/DL (ref 6.8–8.8)
RBC # BLD AUTO: 4.33 10E12/L (ref 4.4–5.9)
SAO2 % BLDV FROM PO2: 56 %
SODIUM SERPL-SCNC: 142 MMOL/L (ref 133–144)
TRIGL SERPL-MCNC: 201 MG/DL
WBC # BLD AUTO: 7.8 10E9/L (ref 4–11)

## 2019-06-25 PROCEDURE — C1769 GUIDE WIRE: HCPCS | Performed by: INTERNAL MEDICINE

## 2019-06-25 PROCEDURE — 0042T CT HEAD PERFUSION WITH CONTRAST: CPT

## 2019-06-25 PROCEDURE — C9600 PERC DRUG-EL COR STENT SING: HCPCS | Performed by: INTERNAL MEDICINE

## 2019-06-25 PROCEDURE — C1887 CATHETER, GUIDING: HCPCS | Performed by: INTERNAL MEDICINE

## 2019-06-25 PROCEDURE — G0378 HOSPITAL OBSERVATION PER HR: HCPCS

## 2019-06-25 PROCEDURE — 00000146 ZZHCL STATISTIC GLUCOSE BY METER IP

## 2019-06-25 PROCEDURE — C1725 CATH, TRANSLUMIN NON-LASER: HCPCS | Performed by: INTERNAL MEDICINE

## 2019-06-25 PROCEDURE — 25800030 ZZH RX IP 258 OP 636: Performed by: INTERNAL MEDICINE

## 2019-06-25 PROCEDURE — 25000125 ZZHC RX 250: Performed by: INTERNAL MEDICINE

## 2019-06-25 PROCEDURE — C1769 GUIDE WIRE: HCPCS

## 2019-06-25 PROCEDURE — 85730 THROMBOPLASTIN TIME PARTIAL: CPT | Performed by: INTERNAL MEDICINE

## 2019-06-25 PROCEDURE — 25000132 ZZH RX MED GY IP 250 OP 250 PS 637: Mod: GY | Performed by: PHYSICIAN ASSISTANT

## 2019-06-25 PROCEDURE — 40000852 ZZH STATISTIC HEART CATH LAB OR EP LAB

## 2019-06-25 PROCEDURE — 82803 BLOOD GASES ANY COMBINATION: CPT

## 2019-06-25 PROCEDURE — C1874 STENT, COATED/COV W/DEL SYS: HCPCS | Performed by: INTERNAL MEDICINE

## 2019-06-25 PROCEDURE — 70450 CT HEAD/BRAIN W/O DYE: CPT

## 2019-06-25 PROCEDURE — 25000132 ZZH RX MED GY IP 250 OP 250 PS 637: Mod: GY | Performed by: INTERNAL MEDICINE

## 2019-06-25 PROCEDURE — 4A0335C MEASUREMENT OF ARTERIAL FLOW, CORONARY, PERCUTANEOUS APPROACH: ICD-10-PCS | Performed by: INTERNAL MEDICINE

## 2019-06-25 PROCEDURE — 85610 PROTHROMBIN TIME: CPT | Performed by: INTERNAL MEDICINE

## 2019-06-25 PROCEDURE — 85347 COAGULATION TIME ACTIVATED: CPT

## 2019-06-25 PROCEDURE — 70498 CT ANGIOGRAPHY NECK: CPT

## 2019-06-25 PROCEDURE — 27210794 ZZH OR GENERAL SUPPLY STERILE: Performed by: INTERNAL MEDICINE

## 2019-06-25 PROCEDURE — 99153 MOD SED SAME PHYS/QHP EA: CPT | Performed by: INTERNAL MEDICINE

## 2019-06-25 PROCEDURE — 93571 IV DOP VEL&/PRESS C FLO 1ST: CPT | Performed by: INTERNAL MEDICINE

## 2019-06-25 PROCEDURE — 36415 COLL VENOUS BLD VENIPUNCTURE: CPT | Performed by: INTERNAL MEDICINE

## 2019-06-25 PROCEDURE — 85027 COMPLETE CBC AUTOMATED: CPT | Performed by: INTERNAL MEDICINE

## 2019-06-25 PROCEDURE — 93005 ELECTROCARDIOGRAM TRACING: CPT

## 2019-06-25 PROCEDURE — 40000235 ZZH STATISTIC TELEMETRY

## 2019-06-25 PROCEDURE — 25000128 H RX IP 250 OP 636: Performed by: INTERNAL MEDICINE

## 2019-06-25 PROCEDURE — 93456 R HRT CORONARY ARTERY ANGIO: CPT | Performed by: INTERNAL MEDICINE

## 2019-06-25 PROCEDURE — 36415 COLL VENOUS BLD VENIPUNCTURE: CPT

## 2019-06-25 PROCEDURE — 25000128 H RX IP 250 OP 636: Performed by: NURSE PRACTITIONER

## 2019-06-25 PROCEDURE — B2111ZZ FLUOROSCOPY OF MULTIPLE CORONARY ARTERIES USING LOW OSMOLAR CONTRAST: ICD-10-PCS | Performed by: INTERNAL MEDICINE

## 2019-06-25 PROCEDURE — 027034Z DILATION OF CORONARY ARTERY, ONE ARTERY WITH DRUG-ELUTING INTRALUMINAL DEVICE, PERCUTANEOUS APPROACH: ICD-10-PCS | Performed by: INTERNAL MEDICINE

## 2019-06-25 PROCEDURE — C1894 INTRO/SHEATH, NON-LASER: HCPCS | Performed by: INTERNAL MEDICINE

## 2019-06-25 PROCEDURE — 83036 HEMOGLOBIN GLYCOSYLATED A1C: CPT | Performed by: INTERNAL MEDICINE

## 2019-06-25 PROCEDURE — 80048 BASIC METABOLIC PNL TOTAL CA: CPT | Performed by: INTERNAL MEDICINE

## 2019-06-25 PROCEDURE — 93010 ELECTROCARDIOGRAM REPORT: CPT | Mod: 76 | Performed by: INTERNAL MEDICINE

## 2019-06-25 PROCEDURE — 80061 LIPID PANEL: CPT | Performed by: INTERNAL MEDICINE

## 2019-06-25 PROCEDURE — 25000125 ZZHC RX 250: Performed by: NURSE PRACTITIONER

## 2019-06-25 PROCEDURE — 80076 HEPATIC FUNCTION PANEL: CPT | Performed by: INTERNAL MEDICINE

## 2019-06-25 PROCEDURE — 99220 ZZC INITIAL OBSERVATION CARE,LEVL III: CPT | Performed by: PHYSICIAN ASSISTANT

## 2019-06-25 PROCEDURE — 4A023N6 MEASUREMENT OF CARDIAC SAMPLING AND PRESSURE, RIGHT HEART, PERCUTANEOUS APPROACH: ICD-10-PCS | Performed by: INTERNAL MEDICINE

## 2019-06-25 PROCEDURE — 99152 MOD SED SAME PHYS/QHP 5/>YRS: CPT | Performed by: INTERNAL MEDICINE

## 2019-06-25 DEVICE — STENT SYNERGY DRUG ELUTING 3.00X16MM  H7493926016300: Type: IMPLANTABLE DEVICE | Status: FUNCTIONAL

## 2019-06-25 RX ORDER — FENTANYL CITRATE 50 UG/ML
INJECTION, SOLUTION INTRAMUSCULAR; INTRAVENOUS
Status: DISCONTINUED | OUTPATIENT
Start: 2019-06-25 | End: 2019-06-25 | Stop reason: HOSPADM

## 2019-06-25 RX ORDER — ATROPINE SULFATE 0.1 MG/ML
0.5 INJECTION INTRAVENOUS EVERY 5 MIN PRN
Status: DISCONTINUED | OUTPATIENT
Start: 2019-06-25 | End: 2019-06-25

## 2019-06-25 RX ORDER — HYDROCODONE BITARTRATE AND ACETAMINOPHEN 5; 325 MG/1; MG/1
1-2 TABLET ORAL EVERY 4 HOURS PRN
Status: DISCONTINUED | OUTPATIENT
Start: 2019-06-25 | End: 2019-06-25

## 2019-06-25 RX ORDER — CLOPIDOGREL BISULFATE 75 MG/1
75 TABLET ORAL DAILY
Qty: 90 TABLET | Refills: 3 | Status: SHIPPED | OUTPATIENT
Start: 2019-06-26 | End: 2019-07-02

## 2019-06-25 RX ORDER — DEXTROSE MONOHYDRATE 25 G/50ML
25-50 INJECTION, SOLUTION INTRAVENOUS
Status: DISCONTINUED | OUTPATIENT
Start: 2019-06-25 | End: 2019-07-02 | Stop reason: HOSPADM

## 2019-06-25 RX ORDER — FENTANYL CITRATE 50 UG/ML
25-50 INJECTION, SOLUTION INTRAMUSCULAR; INTRAVENOUS
Status: DISCONTINUED | OUTPATIENT
Start: 2019-06-25 | End: 2019-06-25

## 2019-06-25 RX ORDER — ARGATROBAN 1 MG/ML
150 INJECTION, SOLUTION INTRAVENOUS
Status: DISCONTINUED | OUTPATIENT
Start: 2019-06-25 | End: 2019-06-25 | Stop reason: HOSPADM

## 2019-06-25 RX ORDER — LIDOCAINE 40 MG/G
CREAM TOPICAL
Status: DISCONTINUED | OUTPATIENT
Start: 2019-06-25 | End: 2019-06-25 | Stop reason: HOSPADM

## 2019-06-25 RX ORDER — CLOPIDOGREL BISULFATE 75 MG/1
TABLET ORAL
Status: DISCONTINUED | OUTPATIENT
Start: 2019-06-25 | End: 2019-06-25 | Stop reason: HOSPADM

## 2019-06-25 RX ORDER — EPTIFIBATIDE 2 MG/ML
180 INJECTION, SOLUTION INTRAVENOUS EVERY 10 MIN PRN
Status: DISCONTINUED | OUTPATIENT
Start: 2019-06-25 | End: 2019-06-25 | Stop reason: HOSPADM

## 2019-06-25 RX ORDER — IOPAMIDOL 755 MG/ML
120 INJECTION, SOLUTION INTRAVASCULAR ONCE
Status: COMPLETED | OUTPATIENT
Start: 2019-06-25 | End: 2019-06-25

## 2019-06-25 RX ORDER — ROSUVASTATIN CALCIUM 20 MG/1
20 TABLET, COATED ORAL DAILY
Status: DISCONTINUED | OUTPATIENT
Start: 2019-06-26 | End: 2019-06-27

## 2019-06-25 RX ORDER — ASPIRIN 81 MG/1
81 TABLET ORAL DAILY
Status: DISCONTINUED | OUTPATIENT
Start: 2019-06-25 | End: 2019-06-25

## 2019-06-25 RX ORDER — DOPAMINE HYDROCHLORIDE 160 MG/100ML
2-20 INJECTION, SOLUTION INTRAVENOUS CONTINUOUS PRN
Status: DISCONTINUED | OUTPATIENT
Start: 2019-06-25 | End: 2019-06-25 | Stop reason: HOSPADM

## 2019-06-25 RX ORDER — AMOXICILLIN 250 MG
1 CAPSULE ORAL 2 TIMES DAILY
Status: DISCONTINUED | OUTPATIENT
Start: 2019-06-25 | End: 2019-07-02 | Stop reason: HOSPADM

## 2019-06-25 RX ORDER — ACETAMINOPHEN 325 MG/1
650 TABLET ORAL EVERY 4 HOURS PRN
Status: DISCONTINUED | OUTPATIENT
Start: 2019-06-25 | End: 2019-06-25

## 2019-06-25 RX ORDER — NALOXONE HYDROCHLORIDE 0.4 MG/ML
.1-.4 INJECTION, SOLUTION INTRAMUSCULAR; INTRAVENOUS; SUBCUTANEOUS
Status: DISCONTINUED | OUTPATIENT
Start: 2019-06-25 | End: 2019-06-25

## 2019-06-25 RX ORDER — BISACODYL 5 MG
5 TABLET, DELAYED RELEASE (ENTERIC COATED) ORAL DAILY PRN
Status: DISCONTINUED | OUTPATIENT
Start: 2019-06-25 | End: 2019-07-02 | Stop reason: HOSPADM

## 2019-06-25 RX ORDER — HEPARIN SODIUM 1000 [USP'U]/ML
INJECTION, SOLUTION INTRAVENOUS; SUBCUTANEOUS
Status: DISCONTINUED | OUTPATIENT
Start: 2019-06-25 | End: 2019-06-25 | Stop reason: HOSPADM

## 2019-06-25 RX ORDER — NALOXONE HYDROCHLORIDE 0.4 MG/ML
.2-.4 INJECTION, SOLUTION INTRAMUSCULAR; INTRAVENOUS; SUBCUTANEOUS
Status: DISCONTINUED | OUTPATIENT
Start: 2019-06-25 | End: 2019-06-25

## 2019-06-25 RX ORDER — DOBUTAMINE HYDROCHLORIDE 200 MG/100ML
2-20 INJECTION INTRAVENOUS CONTINUOUS PRN
Status: DISCONTINUED | OUTPATIENT
Start: 2019-06-25 | End: 2019-06-25 | Stop reason: HOSPADM

## 2019-06-25 RX ORDER — EPTIFIBATIDE 2 MG/ML
2 INJECTION, SOLUTION INTRAVENOUS CONTINUOUS PRN
Status: DISCONTINUED | OUTPATIENT
Start: 2019-06-25 | End: 2019-06-25 | Stop reason: HOSPADM

## 2019-06-25 RX ORDER — IOPAMIDOL 755 MG/ML
INJECTION, SOLUTION INTRAVASCULAR
Status: DISCONTINUED | OUTPATIENT
Start: 2019-06-25 | End: 2019-06-25 | Stop reason: HOSPADM

## 2019-06-25 RX ORDER — ACETAMINOPHEN 325 MG/1
650 TABLET ORAL EVERY 4 HOURS PRN
Status: DISCONTINUED | OUTPATIENT
Start: 2019-06-25 | End: 2019-07-02 | Stop reason: HOSPADM

## 2019-06-25 RX ORDER — BISACODYL 5 MG
10 TABLET, DELAYED RELEASE (ENTERIC COATED) ORAL DAILY PRN
Status: DISCONTINUED | OUTPATIENT
Start: 2019-06-25 | End: 2019-07-02 | Stop reason: HOSPADM

## 2019-06-25 RX ORDER — ASPIRIN 81 MG/1
81 TABLET, CHEWABLE ORAL DAILY
Status: DISCONTINUED | OUTPATIENT
Start: 2019-06-26 | End: 2019-07-02 | Stop reason: HOSPADM

## 2019-06-25 RX ORDER — LOPERAMIDE HCL 2 MG
2 CAPSULE ORAL 4 TIMES DAILY PRN
Status: DISCONTINUED | OUTPATIENT
Start: 2019-06-25 | End: 2019-07-02 | Stop reason: HOSPADM

## 2019-06-25 RX ORDER — DIGOXIN 125 MCG
125 TABLET ORAL DAILY
Status: DISCONTINUED | OUTPATIENT
Start: 2019-06-26 | End: 2019-07-02 | Stop reason: HOSPADM

## 2019-06-25 RX ORDER — HYDRALAZINE HYDROCHLORIDE 20 MG/ML
10-20 INJECTION INTRAMUSCULAR; INTRAVENOUS
Status: DISCONTINUED | OUTPATIENT
Start: 2019-06-25 | End: 2019-07-02 | Stop reason: HOSPADM

## 2019-06-25 RX ORDER — PROCHLORPERAZINE MALEATE 5 MG
5 TABLET ORAL EVERY 6 HOURS PRN
Status: DISCONTINUED | OUTPATIENT
Start: 2019-06-25 | End: 2019-07-02 | Stop reason: HOSPADM

## 2019-06-25 RX ORDER — ARGATROBAN 1 MG/ML
350 INJECTION, SOLUTION INTRAVENOUS
Status: DISCONTINUED | OUTPATIENT
Start: 2019-06-25 | End: 2019-06-25 | Stop reason: HOSPADM

## 2019-06-25 RX ORDER — NICOTINE POLACRILEX 4 MG
15-30 LOZENGE BUCCAL
Status: DISCONTINUED | OUTPATIENT
Start: 2019-06-25 | End: 2019-07-02 | Stop reason: HOSPADM

## 2019-06-25 RX ORDER — ACETAMINOPHEN 650 MG/1
650 SUPPOSITORY RECTAL EVERY 4 HOURS PRN
Status: DISCONTINUED | OUTPATIENT
Start: 2019-06-25 | End: 2019-07-02 | Stop reason: HOSPADM

## 2019-06-25 RX ORDER — VERAPAMIL HYDROCHLORIDE 2.5 MG/ML
INJECTION, SOLUTION INTRAVENOUS
Status: DISCONTINUED | OUTPATIENT
Start: 2019-06-25 | End: 2019-06-25 | Stop reason: HOSPADM

## 2019-06-25 RX ORDER — NITROGLYCERIN 0.4 MG/1
0.4 TABLET SUBLINGUAL EVERY 5 MIN PRN
Status: DISCONTINUED | OUTPATIENT
Start: 2019-06-25 | End: 2019-06-25

## 2019-06-25 RX ORDER — SODIUM CHLORIDE 9 MG/ML
INJECTION, SOLUTION INTRAVENOUS CONTINUOUS
Status: DISCONTINUED | OUTPATIENT
Start: 2019-06-25 | End: 2019-06-25 | Stop reason: HOSPADM

## 2019-06-25 RX ORDER — CLOPIDOGREL BISULFATE 75 MG/1
75 TABLET ORAL DAILY
Status: DISCONTINUED | OUTPATIENT
Start: 2019-06-26 | End: 2019-06-25

## 2019-06-25 RX ORDER — TIROFIBAN HYDROCHLORIDE 50 UG/ML
0.07 INJECTION INTRAVENOUS CONTINUOUS PRN
Status: DISCONTINUED | OUTPATIENT
Start: 2019-06-25 | End: 2019-06-25 | Stop reason: HOSPADM

## 2019-06-25 RX ORDER — SODIUM CHLORIDE 9 MG/ML
INJECTION, SOLUTION INTRAVENOUS CONTINUOUS
Status: ACTIVE | OUTPATIENT
Start: 2019-06-25 | End: 2019-06-25

## 2019-06-25 RX ORDER — NITROGLYCERIN 5 MG/ML
VIAL (ML) INTRAVENOUS
Status: DISCONTINUED | OUTPATIENT
Start: 2019-06-25 | End: 2019-06-25 | Stop reason: HOSPADM

## 2019-06-25 RX ORDER — WARFARIN SODIUM 5 MG/1
5 TABLET ORAL
Status: COMPLETED | OUTPATIENT
Start: 2019-06-25 | End: 2019-06-25

## 2019-06-25 RX ORDER — ONDANSETRON 4 MG/1
4 TABLET, ORALLY DISINTEGRATING ORAL EVERY 6 HOURS PRN
Status: DISCONTINUED | OUTPATIENT
Start: 2019-06-25 | End: 2019-07-02 | Stop reason: HOSPADM

## 2019-06-25 RX ORDER — ACETAMINOPHEN 500 MG
1000 TABLET ORAL 3 TIMES DAILY PRN
Status: DISCONTINUED | OUTPATIENT
Start: 2019-06-25 | End: 2019-06-25

## 2019-06-25 RX ORDER — MAGNESIUM CARB/ALUMINUM HYDROX 105-160MG
148 TABLET,CHEWABLE ORAL
Status: DISCONTINUED | OUTPATIENT
Start: 2019-06-25 | End: 2019-07-02 | Stop reason: HOSPADM

## 2019-06-25 RX ORDER — LIDOCAINE 40 MG/G
CREAM TOPICAL
Status: DISCONTINUED | OUTPATIENT
Start: 2019-06-25 | End: 2019-06-25

## 2019-06-25 RX ORDER — PROCHLORPERAZINE 25 MG
12.5 SUPPOSITORY, RECTAL RECTAL EVERY 12 HOURS PRN
Status: DISCONTINUED | OUTPATIENT
Start: 2019-06-25 | End: 2019-07-02 | Stop reason: HOSPADM

## 2019-06-25 RX ORDER — MIRTAZAPINE 15 MG/1
15 TABLET, FILM COATED ORAL AT BEDTIME
Status: DISCONTINUED | OUTPATIENT
Start: 2019-06-25 | End: 2019-06-29

## 2019-06-25 RX ORDER — BISACODYL 5 MG
15 TABLET, DELAYED RELEASE (ENTERIC COATED) ORAL DAILY PRN
Status: DISCONTINUED | OUTPATIENT
Start: 2019-06-25 | End: 2019-07-02 | Stop reason: HOSPADM

## 2019-06-25 RX ORDER — NALOXONE HYDROCHLORIDE 0.4 MG/ML
.1-.4 INJECTION, SOLUTION INTRAMUSCULAR; INTRAVENOUS; SUBCUTANEOUS
Status: DISCONTINUED | OUTPATIENT
Start: 2019-06-25 | End: 2019-07-02 | Stop reason: HOSPADM

## 2019-06-25 RX ORDER — AMOXICILLIN 250 MG
2 CAPSULE ORAL 2 TIMES DAILY
Status: DISCONTINUED | OUTPATIENT
Start: 2019-06-25 | End: 2019-07-02 | Stop reason: HOSPADM

## 2019-06-25 RX ORDER — POTASSIUM CHLORIDE 1500 MG/1
20 TABLET, EXTENDED RELEASE ORAL
Status: DISCONTINUED | OUTPATIENT
Start: 2019-06-25 | End: 2019-06-25 | Stop reason: HOSPADM

## 2019-06-25 RX ORDER — NITROGLYCERIN 20 MG/100ML
.07-2 INJECTION INTRAVENOUS CONTINUOUS PRN
Status: DISCONTINUED | OUTPATIENT
Start: 2019-06-25 | End: 2019-06-25 | Stop reason: HOSPADM

## 2019-06-25 RX ORDER — NOREPINEPHRINE BITARTRATE 0.06 MG/ML
.03-.4 INJECTION, SOLUTION INTRAVENOUS CONTINUOUS PRN
Status: DISCONTINUED | OUTPATIENT
Start: 2019-06-25 | End: 2019-06-25 | Stop reason: HOSPADM

## 2019-06-25 RX ORDER — EPTIFIBATIDE 2 MG/ML
1 INJECTION, SOLUTION INTRAVENOUS CONTINUOUS PRN
Status: DISCONTINUED | OUTPATIENT
Start: 2019-06-25 | End: 2019-06-25 | Stop reason: HOSPADM

## 2019-06-25 RX ORDER — HYDROCODONE BITARTRATE AND ACETAMINOPHEN 5; 325 MG/1; MG/1
1-2 TABLET ORAL EVERY 4 HOURS PRN
Status: DISCONTINUED | OUTPATIENT
Start: 2019-06-25 | End: 2019-06-28

## 2019-06-25 RX ORDER — FLUMAZENIL 0.1 MG/ML
0.2 INJECTION, SOLUTION INTRAVENOUS
Status: DISCONTINUED | OUTPATIENT
Start: 2019-06-25 | End: 2019-06-25

## 2019-06-25 RX ORDER — METOPROLOL SUCCINATE 25 MG/1
25 TABLET, EXTENDED RELEASE ORAL DAILY
Status: DISCONTINUED | OUTPATIENT
Start: 2019-06-26 | End: 2019-07-02

## 2019-06-25 RX ORDER — ONDANSETRON 2 MG/ML
4 INJECTION INTRAMUSCULAR; INTRAVENOUS EVERY 6 HOURS PRN
Status: DISCONTINUED | OUTPATIENT
Start: 2019-06-25 | End: 2019-07-02 | Stop reason: HOSPADM

## 2019-06-25 RX ADMIN — SODIUM CHLORIDE 100 ML: 9 INJECTION, SOLUTION INTRAVENOUS at 15:56

## 2019-06-25 RX ADMIN — SODIUM CHLORIDE: 9 INJECTION, SOLUTION INTRAVENOUS at 07:29

## 2019-06-25 RX ADMIN — IOPAMIDOL 120 ML: 755 INJECTION, SOLUTION INTRAVENOUS at 15:55

## 2019-06-25 RX ADMIN — MIRTAZAPINE 15 MG: 15 TABLET, FILM COATED ORAL at 21:36

## 2019-06-25 RX ADMIN — SENNOSIDES AND DOCUSATE SODIUM 1 TABLET: 8.6; 5 TABLET ORAL at 21:37

## 2019-06-25 RX ADMIN — WARFARIN SODIUM 5 MG: 5 TABLET ORAL at 21:37

## 2019-06-25 ASSESSMENT — MIFFLIN-ST. JEOR: SCORE: 1331.62

## 2019-06-25 NOTE — DISCHARGE INSTRUCTIONS
Your risk factors for stroke or TIA (transient ischemic attack):    Your Risk Factors Your Results Normal Ranges   High blood pressure BP Readings from Last 1 Encounters:   07/02/19 143/83    Less than 120/80   Cholesterol              Total Lab Results   Component Value Date    CHOL 118 06/25/2019      Less than 150    Triglycerides   Lab Results   Component Value Date    TRIG 201 06/25/2019    Less than 150   LDL Lab Results   Component Value Date    LDL 46 06/25/2019       Less than 70   HDL Lab Results   Component Value Date    HDL 32 06/25/2019            Greater than 40 (men)  Greater than 50 (women)   Diabetes Recent Labs   Lab 07/02/19  0718   *    Fasting blood glucose    Smoking/tobacco use  Quit smoking and tobacco   Overweight  Lose 1-2 pounds a week   Lack of exercise  30 minutes moderate activity each day   Other risk factors include carotid (neck) artery disease, atrial fibrillation and stress. You may be on new medicine to treat high blood pressure, cholesterol, diabetes or atrial fibrillation.    Understanding Stroke Booklet given to patient. Please refer to booklet for further information.    Stroke warning signs and symptoms - CALL 911 right away for:  - Sudden numbness or weakness in the face, arm or leg (often on one side of the body).  - Sudden confusion or trouble understanding what is going on.  - Sudden blurred or decreased vision in one or both eyes.  - Sudden trouble speaking, loss of balance, dizziness or problems with coordination.  - Sudden, severe headache for no reason.  - Fainting or seizures.  - Symptoms may go away then come back suddenly.  Please follow up with neurology in 4-6 weeks. You may call any of the following neurology clinics for an appointment or a clinic of your choice.  1. [facility]   [address]   [place] [address]   [phone]  2. [facility]   [address]   [place]  [address]   [phone]  3. [facility]   Oz Richardson MD   [address] [place], [place] [id number] [phone]  4. [facility] - Raj Richardson MD   [address] [place] [address]   [phone]    Cardiac Angioplasty/Stent Discharge Instructions - Radial    After you go home:      Have an adult stay with you until tomorrow.    Drink extra fluids for 2 days.    You may resume your normal diet.    No smoking       For 24 hours - due to the sedation you received:    Relax and take it easy.    Do NOT make any important or legal decisions.    Do NOT drive or operate machines at home or at work.    Do NOT drink alcohol.    Care of Wrist Puncture Site:      For the first 24 hrs - check the puncture site every 1-2 hours while awake.    It is normal to have soreness at the puncture site and mild tingling in your hand for up to 3 days.    Remove the bandaid after 24 hours. If there is minor oozing, apply another bandaid and remove it after 12 hours.    You may shower tomorrow.  Do NOT take a bath, or use a hot tub or pool for at least 3 days. Do NOT scrub the site. Do not use lotion or powder near the puncture site.           Activity:          For 2 days:     do not use your hand or arm to support your weight (such as rising from a chair)     do not bend your wrist (such as lifting a garage door).    do not lift more than 5 pounds or exercise your arm (such as tennis, golf or bowling).    Do NOT do any heavy activity such as exercise, lifting, or straining.     Bleeding:      If you start bleeding from the site in your wrist, sit down and press firmly on/above the site for 10 minutes.     Once bleeding stops, keep arm still for 2 hours.     Call [facility] as soon as you can.       Call 911 right away if you have heavy bleeding or bleeding that does not stop.      Medicines:      If you are taking an antiplatelet medication such as Plavix, Brilinta or Effient,  do not stop taking it until you talk to your cardiologist.        If you are on Metformin (Glucophage), do not restart it until you have blood tests (within 2 to 3 days after discharge).  After you have your blood drawn, you may restart the Metformin.     Take your medications, including blood thinners, unless your provider tells you not to.  If you take Coumadin (Warfarin), have your INR checked by your provider in  3-5 days. Call your clinic to schedule this.    If you have stopped any medicines, check with your provider about when to restart them.    Follow Up Appointments:      Follow up with [facility] Nurse Practitioner at [facility] of patient preference in 7-10 days.    Cardiac Rehab will contact you for follow up care.    Call the clinic if:      You have a large or growing hard lump around the site.    The site is red, swollen, hot or tender.    Blood or fluid is draining from the site.    You have chills or a fever greater than 101 F (38 C).    Your arm feels numb, cool or changes color.    You have hives, a rash or unusual itching.    Any questions or concerns.    Other Instructions:      If you received a stent - carry your stent card with you at all times.      [facility] [place]:    [phone] [facility] (7 days a week)      Right Heart Cath Discharge Instructions - Brachial       After you go home:      Have an adult stay with you until tomorrow.    Drink extra fluids for 2 days.    You may resume your normal diet.    No smoking       For 24 hours - due to the sedation you received:    Relax and take it easy.    Do NOT make any important or legal decisions.    Do NOT drive or operate machines at home or at work.    Do NOT drink alcohol.    Care of Arm Puncture Site:      For the first 24 hrs - check the puncture site every 1-2 hours while awake.    Remove the bandaid after 24 hours. If there is minor oozing, apply another bandaid and remove it after 12 hours.    It  is normal to have a small bruise or pea size lump at the site.    You may shower. Do NOT take a bath, or use a hot tub or pool for at least 3 days. Do NOT scrub the site. Do not use lotion or powder near the puncture site.     Activity:      For 2 days, do not use your hand or arm to support your weight (such as rising from a chair)     For 2 days, do not lift more than 5 pounds or exercise your arm (such as tennis, golf or bowling).    Bleeding:      If you start bleeding from the site in your arm, sit down and press firmly on/above the site for 10 minutes.     Once bleeding stops, keep your arm straight for 2 hours.     [facility] as soon as you can.       Call 911 right away if you have heavy bleeding or bleeding that does not stop.      Medicines:      If you are taking an antiplatelet medication such as Plavix, Brilinta or Effient, do not stop taking it until you talk to your cardiologist.        If you are on Metformin (Glucophage), do not restart it until you have blood tests (within 2 to 3 days after discharge).  After you have your blood drawn, you may restart the Metformin.     Take your medications, including blood thinners, unless your provider tells you not to.  If you take Coumadin (Warfarin), have your INR checked by your provider in  3-5 days. Call your clinic to schedule this.    If you have stopped any medicines, check with your provider about when to restart them.                 Follow Up Appointments:      Follow up with [facility] Nurse Practitioner at [facility] of patient preference in 7-10 days.    Call the clinic if:      You have increased pain or a large or growing hard lump around the site.    The site is red, swollen, hot or tender.    Blood or fluid is draining from the site.    You have chills or a fever greater than 101 F (38 C).    Your arm feels numb, cool or changes color.    You have hives, a rash or unusual itching.    Any questions or  concerns.    [facility] at [place]:    [phone] [facility] (7 days a week)

## 2019-06-25 NOTE — H&P
[facility]    History and Physical - Hospitalist Service       Date of Admission:  6/25/2019    Assessment & Plan   Suman Burton is a 83 year old male admitted on 6/25/2019. He was registered to the stroke unit under observation     Suspected stroke, possibly Lt frontal lobe  Expressive aphasia, word finding difficulties, trouble with object identification, and RN noted dragging RLE during ambulation s/p Lt/Rt heart cath with PCI. Code stroke called. Head CT and perfusion unremarkable.   CT head neck with contrast revealed: High-grade stenosis versus short segment occlusion of the right proximal cavernous ICA. Short segment moderate stenosis of the left supraclinoid ICA. Basilar artery and proximal anterior, middle, and posterior cerebral arteries are widely patent. 50% stenosis of proximal left subclavian artery and proximal left vertebral artery. 80% stenosis of proximal Rt ICA. Reviewed preliminary imaging with Neurology, tPA not given as symptoms mild and quickly improving. Cardiology called for Hospitalist to admit the patient to observation overnight.   - register to observation on stroke unit, station 73  - stroke orderset, ECHO (was to have one on 6/27 as outpatient)  - neuro checks Q 4 hrs  - permissive hypertension, PRNs available for SBP >220  - formal stroke neurology consultation  - PT/OT/SLP consults, swallow screen before eating    CAD s/p HIWOT PCI to proximal RCA, 6/25/19  Proximal CX to Mid Cx 50% stenosed. Proximal RCA 95% stenosed.   Successful PCI to proximal RCA with HIWOT, noted mild pulmonary HTN, moderate proximal Cx lesion. Per cardiology, bedrest protocol, sheath removed, and was to discharge today post procedure until development of neurological symptoms as above.  - Per cardiology ASA 81mg daily, plavix 75 mg daily, and warfarin x1 month then continue with warfarin/plavix c12 months  - posting pharmacy to dose warfarin    Hypertension  - Hold PTA amlodipine to allow  permissive hypertension    Chronic atrial fibrillation  - telemetry  - Resume warfarin tonight, resume BB in AM with hold parameters    Type 2 diabetes  Hold PTA glipizide, saxagliptin, and metformin.   Start sliding scale insulin medium dose. Hypoglycemia protocol. Fingerstick checks.     Diet: Advance Diet as Tolerated: Regular Diet Adult    DVT Prophylaxis: Warfarin  [place] Catheter: not present  Code Status: Full    Disposition Plan   Expected discharge: Tomorrow, recommended to prior living arrangement once mental status at baseline, safe disposition plan/ TCU bed available and cleared by stroke neurology.  Entered: Marguerite Harp PA-C 06/25/2019, 5:52 PM     The patient's care was discussed with the Attending Physician, Dr. Webster, Patient, Patient's Family and stroke neurology and cardiology.    Marguerite Harp PA-C  [facility]    ______________________________________________________________________    Chief Complaint   Word finding difficulties    History is obtained from the patient, electronic health record and patient's spouse    History of Present Illness   Suman Burton is a 83 year old male with afib on coumadin, diabetes, hypertension, CKD stage III, HLD, CAD s/p PCI on date of admission who developed word finding difficulties, right-sided weakness during ambulation, and mild expressive aphasia with difficulty identifying certain objects.  RRT was called and subsequently code stroke activated. I evaluated patient at bedside and stroke neurology came when code stroke called. We reviewed preliminary imaging together, no LVO noted. tPA not given as the patient's symptoms were mild and seemed to be improving. Head CT without contrast unremarkable for any acute changes. CT head neck with contrast revealed: High-grade stenosis versus short segment occlusion of the right proximal cavernous internal carotid artery. Short segment moderate stenosis of the left supraclinoid internal  carotid artery. Basilar artery and proximal anterior, middle, and posterior cerebral arteries are widely patent. 50% stenosis of proximal left subclavian artery and proximal left vertebral artery. 80% stenosis of proximal right internal carotid artery. CT Head perfusion with contrast was wnl.     I d/w Dr. Yates of stroke neurology and Dr. Garcia of interventional cardiology and the decision was made for admission as observation to the stroke unit station 73 for neuro checks, BP monitoring, and formal stroke neuro consultation. I d/w pt and wife at bedside. Currently feeling well overall but frustrated he is having word finding difficulty. No chest pain, palpitations, HA, or blurry vision. Baseline [place] IRA and decreased vision related to hx of retinal problem per wife. No SOB. Normotensive and normoglycemic.    Review of Systems    The 10 point Review of Systems is negative other than noted in the HPI or here.     Past Medical History    I have reviewed this patient's medical history and updated it with pertinent information if needed.   Past Medical History:   Diagnosis Date     Atrial fibrillation (H)      Coronary artery disease 06/25/2019    s/p PCI      Diabetes (H)      Edema      Hypertension        Past Surgical History   I have reviewed this patient's surgical history and updated it with pertinent information if needed.  Past Surgical History:   Procedure Laterality Date     CHOLECYSTECTOMY       CV HEART CATHETERIZATION WITH POSSIBLE INTERVENTION N/A 6/25/2019    Procedure: Coronary Angiogram;  Surgeon: Cachorro Garcia MD;  Location: [facility] CARDIAC CATH LAB     CV INSTANTANEOUS WAVE-FREE RATIO N/A 6/25/2019    Procedure: Instantaneous Wave-Free Ratio;  Surgeon: Cachorro Garcia MD;  Location: [facility] CARDIAC CATH LAB     CV PCI STENT DRUG ELUTING N/A 6/25/2019    Procedure: PCI Stent Drug Eluting;  Surgeon: Cachorro Garcia MD;  Location:  HEART CARDIAC CATH LAB     CV RIGHT HEART CATH N/A  2019    Procedure: Right Heart Cath;  Surgeon: Cachorro Garcia MD;  Location:  HEART CARDIAC CATH LAB     LAPAROSCOPIC APPENDECTOMY         Social History   I have reviewed this patient's social history and updated it with pertinent information if needed.  Social History     Tobacco Use     Smoking status: Former Smoker     Packs/day: 2.00     Types: Cigarettes     Start date:      Last attempt to quit: 2008     Years since quittin.4     Smokeless tobacco: Never Used   Substance Use Topics     Alcohol use: Yes     Alcohol/week: 0.0 oz     Comment: Occasionally.      Drug use: No       Family History   I have reviewed this patient's family history and updated it with pertinent information if needed.   History reviewed. No pertinent family history.    Prior to Admission Medications   Prior to Admission Medications   Prescriptions Last Dose Informant Patient Reported? Taking?   Multiple Vitamin (MULTIVITAMINS PO) Unknown at Unknown time  Yes No   Sig: Take 1 tablet by mouth daily.   ONE TOUCH FINE POINT LANCETS   Yes No   Sig: Check blood sugars twice a day.   ONETOUCH DELICA LANCETS 33G MISC   No No   Si strip 2 times daily   Pramoxine HCl (CERAVE ITCH RELIEF) 1 % CREA Unknown at Unknown time  No No   Sig: Externally apply 30 g topically daily   acetaminophen (TYLENOL) 500 MG tablet 2019 at 0600  No Yes   Sig: Take 2 tablets (1,000 mg) by mouth 3 times daily as needed for mild pain   amLODIPine (NORVASC) 10 MG tablet 2019 at 0600  No Yes   Sig: Take 1 tablet (10 mg) by mouth daily   aspirin (ASA) 325 MG tablet 2019 at 0600  No Yes   Sig: Take 1 tablet (325 mg) by mouth daily   digoxin (LANOXIN) 125 MCG tablet 2019 at 0600  No Yes   Sig: Take 1 tablet (125 mcg) by mouth daily   furosemide (LASIX) 80 MG tablet 2019 at Unknown time  No Yes   Sig: Take 1 tablet (80 mg) by mouth daily   glipiZIDE (GLUCOTROL) 10 MG tablet 2019 at 2000  No Yes   Sig: Take 2 tablets (20  mg) by mouth 2 times daily Take 20mg in AM with breakfast and 20mg in PM with evening meal   isosorbide mononitrate (IMDUR) 30 MG 24 hr tablet 6/25/2019 at 0600  No Yes   Sig: Take 1 tablet (30 mg) by mouth daily   loperamide (IMODIUM A-D) 2 MG tablet 6/25/2019 at 0600  No Yes   Sig: Take 1 tablet (2 mg) by mouth 4 times daily as needed for diarrhea   metFORMIN (GLUCOPHAGE) 1000 MG tablet 6/24/2019 at 2000  No Yes   Sig: Take 1 tablet (1,000 mg) by mouth 2 times daily (with meals)   metoprolol succinate (TOPROL-XL) 50 MG 24 hr tablet 6/25/2019 at 0600  No Yes   Sig: Take 0.5 tablets (25 mg) by mouth daily   mirtazapine (REMERON) 15 MG tablet 6/24/2019 at 2200  No Yes   Sig: Take 1 tablet (15 mg) by mouth At Bedtime   oxybutynin ER (DITROPAN XL) 15 MG 24 hr tablet 6/25/2019 at 0600  No Yes   Sig: Take 1 tablet (15 mg) by mouth daily   rosuvastatin (CRESTOR) 20 MG tablet 6/24/2019 at 0600  No Yes   Sig: Take 1 tablet (20 mg) by mouth daily   saxagliptin (ONGLYZA) 2.5 MG TABS tablet 6/24/2019 at 0800  No Yes   Sig: Take 1 tablet (2.5 mg) by mouth daily   warfarin (COUMADIN) 3 MG tablet 6/21/2019 at 1800  No Yes   Sig: Take 1 tablet (3 mg) by mouth daily   Patient taking differently: Take 3 mg by mouth daily       Facility-Administered Medications: None     Allergies   Allergies   Allergen Reactions     Nkda [No Known Drug Allergies]        Physical Exam   Vital Signs: Temp: 97.9  F (36.6  C) Temp src: Oral BP: 150/82 Pulse: 58 Heart Rate: 64 Resp: 16 SpO2: 94 % O2 Device: Nasal cannula Oxygen Delivery: 2 LPM  Weight: 145 lbs 15.99 oz    General: Awake, alert, elderly gentleman who appears stated age. Looks comfortable laying in bed. No acute distress.  HEENT: Normocephalic, atraumatic. Extraocular movements intact.    Respiratory: Clear to auscultation bilaterally, no rales, wheezing, or rhonchi to anterior fields.  Cardiovascular: Irregular rate and rhythm, rate ~50s-60s, +S1 and S2, no murmur auscultated. Trace  peripheral edema.   Gastrointestinal: Soft, non-tender, non-distended. Bowel sounds present.  Skin: Warm, dry. No obvious rashes or lesions on exposed skin. Dorsalis pedis pulses palpable bilaterally.  Musculoskeletal: No joint swelling, erythema or tenderness. Moves all extremities equally.  Neurologic: AAO x2-3, unsure which hospital. Incorrectly identified keys. Cranial nerves 2-12 grossly intact, normal strength and sensation. No pronator drift. Mild expressive aphasia which is waxing and waning.  Psychiatric: Appropriate mood and affect. No obvious anxiety or depression.    Data   Data reviewed today: I reviewed all medications, new labs and imaging results over the last 24 hours. I personally reviewed no images or EKG's today.    Recent Labs   Lab 06/25/19  0730 06/20/19  1017   WBC 7.8 8.4   HGB 13.8 15.1   MCV 91 92   * 167   INR 1.64*  --     139   POTASSIUM 4.1 4.2   CHLORIDE 112* 106   CO2 23 22   BUN 34* 37*   CR 1.35* 1.74*   ANIONGAP 7 11   VINCENT 10.1 10.0   * 230*   ALBUMIN  --  3.5   PROTTOTAL  --  7.4   BILITOTAL  --  0.5   ALKPHOS  --  84   ALT  --  20   AST  --  16     Recent Results (from the past 24 hour(s))   CT Head w/o Contrast    Narrative    CT SCAN OF THE HEAD WITHOUT CONTRAST   6/25/2019 3:53 PM     HISTORY: Code stroke, aphasia.    TECHNIQUE: Axial images of the head and coronal reformations without  IV contrast material. Radiation dose for this scan was reduced using  automated exposure control, adjustment of the mA and/or kV according  to patient size, or iterative reconstruction technique.    COMPARISON: None.    FINDINGS: Moderate cerebral atrophy is present. There are some patchy  white matter changes in both hemispheres without mass effect. There is  no evidence for intracranial hemorrhage, mass effect, acute infarct,  or skull fracture. Vascular calcifications noted.      Impression    IMPRESSION: Chronic changes. No evidence for intracranial hemorrhage  or any  acute process.    LM EDWARDS MD   CTA Head Neck with Contrast    Narrative    CTA  HEAD AND NECK WITH CONTRAST 6/25/2019 3:56 PM     HISTORY: Code Stroke. Aphasia.    TECHNIQUE: Axial images were obtained through the head and neck with  intravenous contrast. 70 mL of Isovue-370 was given. Multiplanar  reconstructions were performed. 3-D reconstructions off a remote  workstation for CT angiography were also acquired. Carotid stenoses  were evaluated by comparing the caliber of the proximal internal  carotid artery to the caliber of the distal internal carotid artery.  Radiation dose for this scan was reduced using automated exposure  control, adjustment of the mA and/or kV according to patient size, or  iterative reconstruction technique.    FINDINGS:    Brachiocephalic vessels: There is extensive atherosclerotic plaque  involving the thoracic arch and proximal brachiocephalic vessels with  approximately 50% stenosis of the proximal left subclavian artery.    Right carotid system: Severe plaque is seen in the carotid bifurcation  region resulting in 80% % stenosis of the proximal internal carotid  artery.    Left carotid system: Moderate calcified plaque is seen in the proximal  internal carotid artery resulting in 50% stenosis.    Right vertebral artery: There is scattered calcified plaque but there  is no significant stenosis.    Left vertebral artery: There is calcified plaque at the origin of left  vertebral artery resulting in 50% stenosis.    [place]: There is short segment high-grade stenosis of the  proximal right cavernous carotid artery. There is also moderate short  segment stenosis of the left supraclinoid carotid artery. The basilar  artery is patent. The proximal anterior, middle, and posterior  cerebral arteries are patent.    Other findings: Emphysematous changes along with increased  interstitial markings are seen in both lung apices. Degenerative  changes are seen in the spine.       Impression    IMPRESSION:  1. High-grade stenosis versus short segment occlusion of the right  proximal cavernous internal carotid artery.  2. Short segment moderate stenosis of the left supraclinoid internal  carotid artery.  3. Basilar artery and proximal anterior, middle, and posterior  cerebral arteries are widely patent.  4. 50% stenosis of proximal left subclavian artery and proximal left  vertebral artery.  5. 80% stenosis of proximal right internal carotid artery.    LM EDWARDS MD   CT Head Perfusion w Contrast    Narrative    CT HEAD PERFUSION WITH CONTRAST 6/25/2019 4:06 PM     HISTORY: Code stroke. Aphasia.    TECHNIQUE: Axial images were obtained through the brain with  intravenous contrast for CT brain perfusion imaging. 50 mL of  Isovue-370 was given. Radiation dose for this scan was reduced using  automated exposure control, adjustment of the mA and/or kV according  to patient size, or iterative reconstruction technique.    FINDINGS: Cerebral blood flow, cerebral blood volume, mean transit  time maps are symmetric. There is no evidence for ischemia or infarct.      Impression    IMPRESSION: Normal CT brain perfusion imaging.    LM EDWARDS MD

## 2019-06-25 NOTE — PROGRESS NOTES
[id number]: Presumed care from RN - last 1mL of air released from TR band. Right radial site without hematoma and right brachial site dressing clean, dry, and intact without hematoma. Patient able to verbalize needs at that time. Just after 1500 handoff report given to oncoming nurse.

## 2019-06-25 NOTE — H&P
"[facility]--Code Stroke--          Code stroke called at 1540 for pt having word finding troubles, and more confused than usual. Pt has history of Dementia. Initial /50 HR 60 Sats 93% on 2l/nc.  Nurse noticed that when she had gotten pt up to bathroom he leaned more to the right ( though pt has some weakness and uses a walking stick). 1545 INitial NIHSS is a 2 for mild-moderate aphasia and trouble following simple commands ( pt had difficulty with finger to nose). 1550-- Pt transported to CT. Pt's speech difficulties seem to wax/wane.  Speech is clear and the next moment he cant put a sentence together correctly ( like....\"I spell right.\") when he meant I cant speak right. 1600-- /92 HR 63 Sats 93% 2l/nc RR 16. 1605-- [facility] where Dr. Yates talked with pt's wife and her sister.  Pt still having trouble with his words. Dr Yates would like pt to be watched overnight to make sure he doesn't continue to wax/wane with his speech.  THough he believes the pt will improve over time without any intervention needed at this time. 1615-- /82 HR 64 RR 16 Sats 94%.  Pt to be admitted to [facility] Observation.  "

## 2019-06-25 NOTE — CODE/RAPID RESPONSE
[facility]    Code Stroke  House Officer Note    ////////////////////////////////////////////////////////////////////////////////////////////////////////////////////////////////  Acute stroke evaluation:  Time of arrival: 1531   Time called: 1528   Glucose level 161   Time patient seen by neurology: 1545   Time onset of stroke symptoms / witnessed onset: 1510   Time last known well: 1500   IV tPA admistered: No   IA / Thrombolectomy No   Stroke Presentation Type Inhouse Stroke - care suites   Stroke Thrombolytic Ineligible Reason Improving symptoms, Mild deficits   Stroke Thrombolytic Treatments None   Neurologists Dr. Gomez and Dr. Yates   Stroke Type of Vascular Event Suspected small Lt frontal stroke, based on symptoms   Pre TPa Wts entered? N/A   Post TPa VS Neuro Checks N/A      IV tPA:  Patient was not treated with rt-TP due to the following reason(s):  Mild stroke symptoms ( NIHSS < 4 and not globally aphasic)  Rapidly improving symptoms     IA thrombolectomy:  No IA intervention was performed due to lack of large vessel occlusion      National [employer] Stroke Scale  Exam Interval: Baseline   Score    Level of consciousness: (0)   Alert, keenly responsive    LOC questions: (1)   Answers one question correctly    LOC commands: (1)   Performs one task correctly    Best gaze: (0)   Normal    Visual: (0)   No visual loss    Facial palsy: (0)   Normal symmetrical movements    Motor arm (left): (0)   No drift    Motor arm (right): (0)   No drift    Motor leg (left): (0)   No drift    Motor leg (right): (0)   No drift    Limb ataxia: (0)   Absent    Sensory: (0)   Normal- no sensory loss    Best language: (0)   Normal- no aphasia    Dysarthria: (1)   Normal    Extinction and inattention: (0)   No abnormality - baseline visual and hearing deficitis        Total Score:  3          Imaging:  Recent Results (from the past 24 hour(s))   CT Head w/o Contrast    Narrative    CT SCAN OF THE HEAD  WITHOUT CONTRAST   6/25/2019 3:53 PM     HISTORY: Code stroke, aphasia.    TECHNIQUE: Axial images of the head and coronal reformations without  IV contrast material. Radiation dose for this scan was reduced using  automated exposure control, adjustment of the mA and/or kV according  to patient size, or iterative reconstruction technique.    COMPARISON: None.    FINDINGS: Moderate cerebral atrophy is present. There are some patchy  white matter changes in both hemispheres without mass effect. There is  no evidence for intracranial hemorrhage, mass effect, acute infarct,  or skull fracture. Vascular calcifications noted.      Impression    IMPRESSION: Chronic changes. No evidence for intracranial hemorrhage  or any acute process.    LM EDWARDS MD   CTA Head Neck with Contrast    Narrative    CTA  HEAD AND NECK WITH CONTRAST 6/25/2019 3:56 PM     HISTORY: Code Stroke. Aphasia.    TECHNIQUE: Axial images were obtained through the head and neck with  intravenous contrast. 70 mL of Isovue-370 was given. Multiplanar  reconstructions were performed. 3-D reconstructions off a remote  workstation for CT angiography were also acquired. Carotid stenoses  were evaluated by comparing the caliber of the proximal internal  carotid artery to the caliber of the distal internal carotid artery.  Radiation dose for this scan was reduced using automated exposure  control, adjustment of the mA and/or kV according to patient size, or  iterative reconstruction technique.    FINDINGS:    Brachiocephalic vessels: There is extensive atherosclerotic plaque  involving the thoracic arch and proximal brachiocephalic vessels with  approximately 50% stenosis of the proximal left subclavian artery.    Right carotid system: Severe plaque is seen in the carotid bifurcation  region resulting in 80% % stenosis of the proximal internal carotid  artery.    Left carotid system: Moderate calcified plaque is seen in the proximal  internal carotid artery  resulting in 50% stenosis.    Right vertebral artery: There is scattered calcified plaque but there  is no significant stenosis.    Left vertebral artery: There is calcified plaque at the origin of left  vertebral artery resulting in 50% stenosis.    [place]: There is short segment high-grade stenosis of the  proximal right cavernous carotid artery. There is also moderate short  segment stenosis of the left supraclinoid carotid artery. The basilar  artery is patent. The proximal anterior, middle, and posterior  cerebral arteries are patent.    Other findings: Emphysematous changes along with increased  interstitial markings are seen in both lung apices. Degenerative  changes are seen in the spine.      Impression    IMPRESSION:  1. High-grade stenosis versus short segment occlusion of the right  proximal cavernous internal carotid artery.  2. Short segment moderate stenosis of the left supraclinoid internal  carotid artery.  3. Basilar artery and proximal anterior, middle, and posterior  cerebral arteries are widely patent.  4. 50% stenosis of proximal left subclavian artery and proximal left  vertebral artery.  5. 80% stenosis of proximal right internal carotid artery.    LM EDWARDS MD   CT Head Perfusion w Contrast    Narrative    CT HEAD PERFUSION WITH CONTRAST 6/25/2019 4:06 PM     HISTORY: Code stroke. Aphasia.    TECHNIQUE: Axial images were obtained through the brain with  intravenous contrast for CT brain perfusion imaging. 50 mL of  Isovue-370 was given. Radiation dose for this scan was reduced using  automated exposure control, adjustment of the mA and/or kV according  to patient size, or iterative reconstruction technique.    FINDINGS: Cerebral blood flow, cerebral blood volume, mean transit  time maps are symmetric. There is no evidence for ischemia or infarct.      Impression    IMPRESSION: Normal CT brain perfusion imaging.    LM EDWARDS MD       Physical Exam   Vital Signs with  Ranges:  Temp:  [97.9  F (36.6  C)] 97.9  F (36.6  C)  Pulse:  [39-89] 58  Heart Rate:  [60-64] 64  Resp:  [16-20] 16  BP: ()/(41-95) 150/82  SpO2:  [89 %-98 %] 94 %    Assessment & Plan   Acute neurological changes with intermittent expressive aphasia and object identification, suspicious for acute CVA (likely Lt frontal lobe).     I was called to the bedside for RRT, neurological changes status post Rt/Lt heart cath with PCI. Per RN, the patient was last known normal at 3pm around shift change. RN walked the patient around [id number] and noticed he was dragging his RLE worse than usual and subsequently developed expressive aphasia and word finding difficulties. Upon my arrival, the patient was laying in bed in NAD.     I also considered post anesthesia related effects in setting of dementia as another most likely cause of his symptoms.    INTERVENTIONS:  - normoglycemic, normotensive  - code stroke called  - stroke CT imaging ordered by neurology    Discussed with and defer further cares to cross [facility] Hospitalist.     Interval History   Suman Burton is a 83 year old male who was admitted on 6/25/2019 for Lt/Rt heart cath now s/p PCI.    Medical history significant for: afib, diabetes, hypertension, CKD stage III, HLD, CAD s/p PCI.    Physical Exam     General: Awake, alert, elderly gentleman who appears stated age. Looks comfortable laying in bed. No acute distress.  HEENT: Normocephalic, atraumatic. Extraocular movements intact.    Respiratory: Clear to auscultation bilaterally, no rales, wheezing, or rhonchi to anterior fields.  Cardiovascular: Irregular rate and rhythm, rate ~50s-60s, +S1 and S2, no murmur auscultated. Trace peripheral edema.   Gastrointestinal: Soft, non-tender, non-distended. Bowel sounds present.  Skin: Warm, dry. No obvious rashes or lesions on exposed skin. Dorsalis pedis pulses palpable bilaterally.  Musculoskeletal: No joint swelling, erythema or tenderness. Moves all  extremities equally.  Neurologic: AAO x2-3, unsure which hospital. Incorrectly identified keys. Cranial nerves 2-12 grossly intact, normal strength and sensation. No pronator drift. Mild expressive aphasia which is waxing and waning.  Psychiatric: Appropriate mood and affect. No obvious anxiety or depression.    Data   Recent Labs   Lab 06/25/19  0730 06/20/19  1017   WBC 7.8 8.4   HGB 13.8 15.1   MCV 91 92   * 167   INR 1.64*  --      Time Spent on this Encounter   I spent 60 minutes (1328 - 1428) of critical care time on the unit/floor managing the care of Suman Burton. Upon evaluation, this patient had a high probability of imminent or life-threatening deterioration due to acute neurological changes suspicious for acute stroke s/p PCI, which required my direct attention, intervention, and personal management. 100% of my time was spent at the bedside counseling the patient and/or coordinating care regarding services listed in this note. Code stroke conducted in association with CHINYERE Trevino, CNP.    Marguerite Harp PA-C  Hospitalist/House LEISA  Pager: [phone]      .

## 2019-06-25 NOTE — PROGRESS NOTES
Reviewed AVS with pt and wife. Pt wife verbalizes understanding of discharge instructions. Given Rx for Plavix. TR band with only 1cc left. VSS on 2L O2, attempted to wean but desats to 90% on 1L. Still a bit lethargic, forgetful. Ate half a sandwich and some water. Still due to void. Continue to monitor.

## 2019-06-25 NOTE — PROGRESS NOTES
Relieved Primary RN at 1500 as Primary RN had to float to another unit.  Pt needed to get up to the BR ~1510 and was having a hard time getting up and finding his words.  Pt needed an asst of 2 to get up. Pt was leaning more to the right and needing more asst to the right side.  In the BR pt having difficulty finding his words.  Spoke to pt spouse and she stated he has been like this more so since he has come back from his procedure. Pt back to his bed with an asst of 2.  RRT called ~1530. ITQ=135. Primary RN to come back to the unit to clarify some questions.  Primary RN stated pt did not have any difficulty finding his words today. This is a new development. Pt tx to CT with flying squad.  Family at bedside and has been updated.  Pt back to his room and Stroke team updated family and Dr. Garcia.  Plan to be admitted for observation.

## 2019-06-25 NOTE — PROGRESS NOTES
Pt arrived to CS for heart cath. Pt AO, VSS on RA ex hypertensive. Denies pain. IV started for IVF x 3 hr before procedure. Labs drawn. EKG done. Wife Lexis at bedside. Yet to be consented. Bedside . Absent pedal pulses, radial pulses +2. Continue to monitor.

## 2019-06-25 NOTE — PROGRESS NOTES
Pt back from heart cath. Pt lethargic. Tele A fib CVR. VSS on 3L O2 ex hypertensive. Pt given water and crackers. R radial site with TR brand at 10cc. R brachial site CDI. CMS intact, +2 radial pulses.

## 2019-06-25 NOTE — PROGRESS NOTES
"Rapid response team was called earlier this PM  because of concern about \"word finding difficulties\". I was notified after the patient was seen and sent for CT.  The patient underwent emergency CT with and without perfusion that according to preliminary reports are both normal. He has been seen by the neurologist who feels clinically the patient may have had a small stroke. On my  Exam I find  the patient is alert, but seems to have an expressive aphasia. His access sites look fine.     I have discussed our concern with the patient and his wife and plans are for admission with observation. We will of course follow the recommendations of the neurology service and continue present cardiac medications. I am awaiting a call back from the hospitalist service for admission. Unless there are objections by neurologist, our plans are to continue warfarin, asa 81 and clopidogrel 75 mg daily.    I will notify our rounding service of the admission. We are appreciative of the prompt evaluation by the Rapid Response Team, neurology service  and the Care Suites nursing staff.       "

## 2019-06-25 NOTE — PRE-PROCEDURE
I have examined the patient, reviewed the history, medications and pre procedural tests. His primary problem is exertional dyspnea. Although  elicited a history of chest pain suspicious for angina, I am unable to reproduce that history today. His memory is very limited and he and his wife agree he is not a candidate for surgery. He has documented vascular disease and chronic kidney disease ( Cr 1.5) . I have explained to the patient the risks of death, MI, stroke,renal insufficiency,  hematoma, possible urgent bypass surgery for failed PCI, use of stents, thienopyridine agents, possible peripheral vascular complications, arrhythmia, the use of FFR in clinical decision-making and alternative of medical therapy alone in regards to left heart catheterization, left ventriculography, coronary angiography, and possible percutaneous coronary intervention. The patient voiced understanding and wishes to proceed. The patient has a good right radial pulse, normal ulnar pulse and a normal Avery's sign.

## 2019-06-25 NOTE — Clinical Note
The first balloon was inserted into the right coronary artery and proximal right coronary artery.Max pressure = 14 néstor. Total duration = 23 seconds.

## 2019-06-25 NOTE — Clinical Note
Stent deployed in the proximal right coronary artery. Max pressure = 16 néstor. Total duration = 27 seconds.

## 2019-06-26 ENCOUNTER — TRANSFERRED RECORDS (OUTPATIENT)
Dept: HEALTH INFORMATION MANAGEMENT | Facility: CLINIC | Age: 84
End: 2019-06-26

## 2019-06-26 ENCOUNTER — APPOINTMENT (OUTPATIENT)
Dept: CARDIOLOGY | Facility: CLINIC | Age: 84
DRG: 981 | End: 2019-06-26
Attending: PHYSICIAN ASSISTANT
Payer: MEDICARE

## 2019-06-26 ENCOUNTER — APPOINTMENT (OUTPATIENT)
Dept: GENERAL RADIOLOGY | Facility: CLINIC | Age: 84
DRG: 981 | End: 2019-06-26
Attending: INTERNAL MEDICINE
Payer: MEDICARE

## 2019-06-26 ENCOUNTER — APPOINTMENT (OUTPATIENT)
Dept: PHYSICAL THERAPY | Facility: CLINIC | Age: 84
DRG: 981 | End: 2019-06-26
Attending: PHYSICIAN ASSISTANT
Payer: MEDICARE

## 2019-06-26 PROBLEM — R41.0 DELIRIUM: Status: ACTIVE | Noted: 2019-06-26

## 2019-06-26 LAB
ANION GAP SERPL CALCULATED.3IONS-SCNC: 10 MMOL/L (ref 3–14)
ANION GAP SERPL CALCULATED.3IONS-SCNC: 7 MMOL/L (ref 3–14)
BUN SERPL-MCNC: 23 MG/DL (ref 7–30)
BUN SERPL-MCNC: 24 MG/DL (ref 7–30)
CALCIUM SERPL-MCNC: 9.9 MG/DL (ref 8.5–10.1)
CALCIUM SERPL-MCNC: 9.9 MG/DL (ref 8.5–10.1)
CHLORIDE SERPL-SCNC: 109 MMOL/L (ref 94–109)
CHLORIDE SERPL-SCNC: 113 MMOL/L (ref 94–109)
CO2 SERPL-SCNC: 22 MMOL/L (ref 20–32)
CO2 SERPL-SCNC: 22 MMOL/L (ref 20–32)
CREAT SERPL-MCNC: 1.3 MG/DL (ref 0.66–1.25)
CREAT SERPL-MCNC: 1.33 MG/DL (ref 0.66–1.25)
ERYTHROCYTE [DISTWIDTH] IN BLOOD BY AUTOMATED COUNT: 12.7 % (ref 10–15)
GFR SERPL CREATININE-BSD FRML MDRD: 49 ML/MIN/{1.73_M2}
GFR SERPL CREATININE-BSD FRML MDRD: 50 ML/MIN/{1.73_M2}
GLUCOSE BLDC GLUCOMTR-MCNC: 128 MG/DL (ref 70–99)
GLUCOSE BLDC GLUCOMTR-MCNC: 151 MG/DL (ref 70–99)
GLUCOSE BLDC GLUCOMTR-MCNC: 87 MG/DL (ref 70–99)
GLUCOSE BLDC GLUCOMTR-MCNC: 99 MG/DL (ref 70–99)
GLUCOSE SERPL-MCNC: 157 MG/DL (ref 70–99)
GLUCOSE SERPL-MCNC: 90 MG/DL (ref 70–99)
HBA1C MFR BLD: 6.4 % (ref 0–5.6)
HCT VFR BLD AUTO: 41.7 % (ref 40–53)
HGB BLD-MCNC: 14.9 G/DL (ref 13.3–17.7)
INR PPP: 1.45 (ref 0.86–1.14)
INTERPRETATION ECG - MUSE: NORMAL
MCH RBC QN AUTO: 32.3 PG (ref 26.5–33)
MCHC RBC AUTO-ENTMCNC: 35.7 G/DL (ref 31.5–36.5)
MCV RBC AUTO: 91 FL (ref 78–100)
PLATELET # BLD AUTO: 114 10E9/L (ref 150–450)
POTASSIUM SERPL-SCNC: 3.8 MMOL/L (ref 3.4–5.3)
POTASSIUM SERPL-SCNC: 4.6 MMOL/L (ref 3.4–5.3)
RBC # BLD AUTO: 4.61 10E12/L (ref 4.4–5.9)
SODIUM SERPL-SCNC: 141 MMOL/L (ref 133–144)
SODIUM SERPL-SCNC: 142 MMOL/L (ref 133–144)
TROPONIN I SERPL-MCNC: 0.07 UG/L (ref 0–0.04)
TROPONIN I SERPL-MCNC: 0.08 UG/L (ref 0–0.04)
WBC # BLD AUTO: 6.6 10E9/L (ref 4–11)

## 2019-06-26 PROCEDURE — 40000264 ECHOCARDIOGRAM COMPLETE

## 2019-06-26 PROCEDURE — 99223 1ST HOSP IP/OBS HIGH 75: CPT | Mod: GC | Performed by: PSYCHIATRY & NEUROLOGY

## 2019-06-26 PROCEDURE — 80048 BASIC METABOLIC PNL TOTAL CA: CPT | Performed by: INTERNAL MEDICINE

## 2019-06-26 PROCEDURE — 12000000 ZZH R&B MED SURG/OB

## 2019-06-26 PROCEDURE — 71045 X-RAY EXAM CHEST 1 VIEW: CPT

## 2019-06-26 PROCEDURE — 00000146 ZZHCL STATISTIC GLUCOSE BY METER IP

## 2019-06-26 PROCEDURE — 25000132 ZZH RX MED GY IP 250 OP 250 PS 637: Mod: GY | Performed by: PHYSICIAN ASSISTANT

## 2019-06-26 PROCEDURE — 36416 COLLJ CAPILLARY BLOOD SPEC: CPT | Performed by: INTERNAL MEDICINE

## 2019-06-26 PROCEDURE — 93306 TTE W/DOPPLER COMPLETE: CPT | Mod: 26 | Performed by: INTERNAL MEDICINE

## 2019-06-26 PROCEDURE — 85610 PROTHROMBIN TIME: CPT | Performed by: PHYSICIAN ASSISTANT

## 2019-06-26 PROCEDURE — 84484 ASSAY OF TROPONIN QUANT: CPT | Performed by: INTERNAL MEDICINE

## 2019-06-26 PROCEDURE — 84484 ASSAY OF TROPONIN QUANT: CPT | Performed by: PHYSICIAN ASSISTANT

## 2019-06-26 PROCEDURE — G0378 HOSPITAL OBSERVATION PER HR: HCPCS

## 2019-06-26 PROCEDURE — 99233 SBSQ HOSP IP/OBS HIGH 50: CPT | Performed by: INTERNAL MEDICINE

## 2019-06-26 PROCEDURE — 25000132 ZZH RX MED GY IP 250 OP 250 PS 637: Mod: GY | Performed by: INTERNAL MEDICINE

## 2019-06-26 PROCEDURE — 85027 COMPLETE CBC AUTOMATED: CPT | Performed by: PHYSICIAN ASSISTANT

## 2019-06-26 PROCEDURE — 99222 1ST HOSP IP/OBS MODERATE 55: CPT | Performed by: INTERNAL MEDICINE

## 2019-06-26 PROCEDURE — 99207 ZZC NON-BILLABLE SERV PER CHARTING: CPT | Performed by: NURSE PRACTITIONER

## 2019-06-26 PROCEDURE — 83036 HEMOGLOBIN GLYCOSYLATED A1C: CPT | Performed by: PHYSICIAN ASSISTANT

## 2019-06-26 PROCEDURE — 25000128 H RX IP 250 OP 636

## 2019-06-26 PROCEDURE — 80048 BASIC METABOLIC PNL TOTAL CA: CPT | Performed by: PHYSICIAN ASSISTANT

## 2019-06-26 PROCEDURE — 25000128 H RX IP 250 OP 636: Performed by: NURSE PRACTITIONER

## 2019-06-26 PROCEDURE — 97161 PT EVAL LOW COMPLEX 20 MIN: CPT | Mod: GP

## 2019-06-26 RX ORDER — QUETIAPINE FUMARATE 25 MG/1
25 TABLET, FILM COATED ORAL AT BEDTIME
Status: DISCONTINUED | OUTPATIENT
Start: 2019-06-26 | End: 2019-06-28

## 2019-06-26 RX ORDER — OLANZAPINE 10 MG/2ML
10 INJECTION, POWDER, FOR SOLUTION INTRAMUSCULAR DAILY PRN
Status: DISCONTINUED | OUTPATIENT
Start: 2019-06-27 | End: 2019-07-02 | Stop reason: HOSPADM

## 2019-06-26 RX ORDER — OLANZAPINE 10 MG/2ML
10 INJECTION, POWDER, FOR SOLUTION INTRAMUSCULAR ONCE
Status: COMPLETED | OUTPATIENT
Start: 2019-06-26 | End: 2019-06-26

## 2019-06-26 RX ORDER — OLANZAPINE 10 MG/2ML
INJECTION, POWDER, FOR SOLUTION INTRAMUSCULAR
Status: COMPLETED
Start: 2019-06-26 | End: 2019-06-26

## 2019-06-26 RX ORDER — WARFARIN SODIUM 6 MG/1
6 TABLET ORAL
Status: DISCONTINUED | OUTPATIENT
Start: 2019-06-26 | End: 2019-06-27

## 2019-06-26 RX ORDER — OLANZAPINE 10 MG/2ML
5 INJECTION, POWDER, FOR SOLUTION INTRAMUSCULAR DAILY PRN
Status: DISCONTINUED | OUTPATIENT
Start: 2019-06-26 | End: 2019-06-26

## 2019-06-26 RX ORDER — OLANZAPINE 5 MG/1
10 TABLET, ORALLY DISINTEGRATING ORAL AT BEDTIME
Status: DISCONTINUED | OUTPATIENT
Start: 2019-06-26 | End: 2019-06-29

## 2019-06-26 RX ORDER — OLANZAPINE 10 MG/2ML
INJECTION, POWDER, FOR SOLUTION INTRAMUSCULAR
Status: DISPENSED
Start: 2019-06-26 | End: 2019-06-26

## 2019-06-26 RX ADMIN — ASPIRIN 81 MG 81 MG: 81 TABLET ORAL at 08:27

## 2019-06-26 RX ADMIN — METOPROLOL SUCCINATE 25 MG: 25 TABLET, EXTENDED RELEASE ORAL at 08:26

## 2019-06-26 RX ADMIN — OLANZAPINE 5 MG: 10 INJECTION, POWDER, FOR SOLUTION INTRAMUSCULAR at 18:42

## 2019-06-26 RX ADMIN — ACETAMINOPHEN 650 MG: 325 TABLET, FILM COATED ORAL at 08:26

## 2019-06-26 RX ADMIN — OXYBUTYNIN CHLORIDE 15 MG: 10 TABLET, EXTENDED RELEASE ORAL at 08:26

## 2019-06-26 RX ADMIN — OLANZAPINE 10 MG: 10 INJECTION, POWDER, FOR SOLUTION INTRAMUSCULAR at 03:35

## 2019-06-26 RX ADMIN — DIGOXIN 125 MCG: 0.12 TABLET ORAL at 08:27

## 2019-06-26 RX ADMIN — QUETIAPINE 12.5 MG: 25 TABLET, FILM COATED ORAL at 17:57

## 2019-06-26 ASSESSMENT — ACTIVITIES OF DAILY LIVING (ADL)
ADLS_ACUITY_SCORE: 16.5
ADLS_ACUITY_SCORE: 16.5

## 2019-06-26 NOTE — PROVIDER NOTIFICATION
"Text page to Dr. Garcia. \"Respiratory rate 24, pt reports intermittent SBO. Saturation in low 80s on RA. LS clear but would a portable chest xray be warranted as unable to wean off oxygen?\" Portable chest xray ordered.  "

## 2019-06-26 NOTE — PLAN OF CARE
OT: order received. Per discussion with PT, pt currently agitated and confused. Refused OOB mobility and only able to roll in bed for PT eval. Will hold OT eval this date due to limited participation.

## 2019-06-26 NOTE — PROGRESS NOTES
[facility]  Hospitalist Progress Note  Henrique Garcia MD  06/26/2019    Assessment & Plan   Suman Burton is a 83 year old male admitted on 6/25/2019. He was registered to the stroke unit under observation      Suspected small stroke vs TIA vs metabolic encephalopathy  Expressive aphasia, word finding difficulties, trouble with object identification, and RN noted dragging RLE during ambulation s/p Lt/Rt heart cath with PCI. Code stroke called. Head CT and perfusion unremarkable.   CT head neck with contrast revealed: High-grade stenosis versus short segment occlusion of the right proximal cavernous ICA. Short segment moderate stenosis of the left supraclinoid ICA. Basilar artery and proximal anterior, middle, and posterior cerebral arteries are widely patent. 50% stenosis of proximal left subclavian artery and proximal left vertebral artery. 80% stenosis of proximal Rt ICA. Reviewed preliminary imaging with Neurology, tPA not given as symptoms mild and quickly improving. Cardiology called for Hospitalist to admit the patient to observation overnight.   - register to observation on stroke unit, station 73  - stroke orderset, ECHO pending  - neuro checks Q 4 hrs  - permissive hypertension, PRNs available for SBP >220  - awaiting full neurology consultation  - PT/OT/SLP consults, swallow screen before eating  - patient agitated overnight, coello placed for retention  - patient not cooperative with RN but no obvious speech deficits  - start mod cho diet     CAD s/p HIWOT PCI to proximal RCA, 6/25/19  Proximal CX to Mid Cx 50% stenosed. Proximal RCA 95% stenosed.   Successful PCI to proximal RCA with HIWOT, noted mild pulmonary HTN, moderate proximal Cx lesion. - Per cardiology ASA 81mg daily, plavix 75 mg daily, and warfarin x1 month then continue with warfarin/plavix c12 months  - posting pharmacy to dose warfarin     Delirium  - will add prn seroquel and at bedtime seroquel  "scheduled.    Hypertension  - continue amlodipine      Chronic atrial fibrillation  - telemetry  - Resume warfarin, resume BB in AM with hold parameters     Type 2 diabetes  - Hold PTA glipizide, saxagliptin, and metformin, until eating consistently.   - sliding scale insulin medium dose. Hypoglycemia protocol. Fingerstick checks.     Bilateral conjunctivitis  -- abx eye gtt    Diet: mod cho  DVT Prophylaxis: Warfarin  [place] Catheter: not present  Code Status: Full        Disposition Plan  -- anticipate in 1-2 days     Expected discharge:    Interval History   -- chart reviewed  -- discussed with RN    -Data reviewed today: I reviewed all new labs and imaging over the last 24 hours. I personally reviewed no images or EKG's today.    Physical Exam   Heart Rate: 64, Blood pressure 174/84, pulse 67, temperature 98.3  F (36.8  C), temperature source Oral, resp. rate 20, height 1.727 m (5' 7.99\"), weight 66.2 kg (146 lb), SpO2 96 %.  Vitals:    06/25/19 0651   Weight: 66.2 kg (146 lb)     Vital Signs with Ranges  Temp:  [97.9  F (36.6  C)-98.4  F (36.9  C)] 98.3  F (36.8  C)  Pulse:  [39-67] 67  Heart Rate:  [] 64  Resp:  [16-20] 20  BP: ()/() 174/84  SpO2:  [84 %-98 %] 96 %  I/O's Last 24 hours  I/O last 3 completed shifts:  In: -   Out: 775 [Urine:775]    Constitutional: Awake, alert, cooperative, no apparent distress, oriented to self  Respiratory: Clear to auscultation bilaterally, no crackles or wheezing  Cardiovascular: Regular rate and rhythm, normal S1 and S2, and no murmur noted  GI: Normal bowel sounds, soft, non-distended, non-tender  Skin/Integumen: No rashes, no cyanosis, no edema  Other:      Medications   All medications I am responsible for were reviewed.    - MEDICATION INSTRUCTIONS -       - MEDICATION INSTRUCTIONS -       Percutaneous Coronary Intervention orders placed (this is information for BPA alerting)       ACE/ARB/ARNI NOT PRESCRIBED       Warfarin Therapy Reminder         " aspirin  81 mg Oral Daily     digoxin  125 mcg Oral Daily     insulin aspart  1-7 Units Subcutaneous TID AC     insulin aspart  1-5 Units Subcutaneous At Bedtime     metoprolol succinate ER  25 mg Oral Daily     mirtazapine  15 mg Oral At Bedtime     OLANZapine         oxybutynin ER  15 mg Oral Daily     rosuvastatin  20 mg Oral Daily     senna-docusate  1 tablet Oral BID    Or     senna-docusate  2 tablet Oral BID     warfarin  6 mg Oral ONCE at 18:00        Data   Recent Labs   Lab 06/26/19  0818 06/26/19  0347 06/25/19  0730 06/20/19  1017   WBC  --  6.6 7.8 8.4   HGB  --  14.9 13.8 15.1   MCV  --  91 91 92   PLT  --  114* 129* 167   INR  --  1.45* 1.64*  --     141 142 139   POTASSIUM 4.6 3.8 4.1 4.2   CHLORIDE 113* 109 112* 106   CO2 22 22 23 22   BUN 23 24 34* 37*   CR 1.33* 1.30* 1.35* 1.74*   ANIONGAP 7 10 7 11   VINCENT 9.9 9.9 10.1 10.0   * 90 164* 230*   ALBUMIN  --   --  3.4 3.5   PROTTOTAL  --   --  6.7* 7.4   BILITOTAL  --   --  0.8 0.5   ALKPHOS  --   --  74 84   ALT  --   --  20 20   AST  --   --  20 16   TROPI 0.078* 0.069*  --   --        Recent Results (from the past 24 hour(s))   CT Head w/o Contrast    Narrative    CT SCAN OF THE HEAD WITHOUT CONTRAST   6/25/2019 3:53 PM     HISTORY: Code stroke, aphasia.    TECHNIQUE: Axial images of the head and coronal reformations without  IV contrast material. Radiation dose for this scan was reduced using  automated exposure control, adjustment of the mA and/or kV according  to patient size, or iterative reconstruction technique.    COMPARISON: None.    FINDINGS: Moderate cerebral atrophy is present. There are some patchy  white matter changes in both hemispheres without mass effect. There is  no evidence for intracranial hemorrhage, mass effect, acute infarct,  or skull fracture. Vascular calcifications noted.      Impression    IMPRESSION: Chronic changes. No evidence for intracranial hemorrhage  or any acute process.    LM EDWARDS MD   CTA  Head Neck with Contrast    Narrative    CTA  HEAD AND NECK WITH CONTRAST 6/25/2019 3:56 PM     HISTORY: Code Stroke. Aphasia.    TECHNIQUE: Axial images were obtained through the head and neck with  intravenous contrast. 70 mL of Isovue-370 was given. Multiplanar  reconstructions were performed. 3-D reconstructions off a remote  workstation for CT angiography were also acquired. Carotid stenoses  were evaluated by comparing the caliber of the proximal internal  carotid artery to the caliber of the distal internal carotid artery.  Radiation dose for this scan was reduced using automated exposure  control, adjustment of the mA and/or kV according to patient size, or  iterative reconstruction technique.    FINDINGS:    Brachiocephalic vessels: There is extensive atherosclerotic plaque  involving the thoracic arch and proximal brachiocephalic vessels with  approximately 50% stenosis of the proximal left subclavian artery.    Right carotid system: Severe plaque is seen in the carotid bifurcation  region resulting in 80% % stenosis of the proximal internal carotid  artery.    Left carotid system: Moderate calcified plaque is seen in the proximal  internal carotid artery resulting in 50% stenosis.    Right vertebral artery: There is scattered calcified plaque but there  is no significant stenosis.    Left vertebral artery: There is calcified plaque at the origin of left  vertebral artery resulting in 50% stenosis.    [place]: There is short segment high-grade stenosis of the  proximal right cavernous carotid artery. There is also moderate short  segment stenosis of the left supraclinoid carotid artery. The basilar  artery is patent. The proximal anterior, middle, and posterior  cerebral arteries are patent.    Other findings: Emphysematous changes along with increased  interstitial markings are seen in both lung apices. Degenerative  changes are seen in the spine.      Impression    IMPRESSION:  1. High-grade  stenosis versus short segment occlusion of the right  proximal cavernous internal carotid artery.  2. Short segment moderate stenosis of the left supraclinoid internal  carotid artery.  3. Basilar artery and proximal anterior, middle, and posterior  cerebral arteries are widely patent.  4. 50% stenosis of proximal left subclavian artery and proximal left  vertebral artery.  5. 80% stenosis of proximal right internal carotid artery.    LM EDWARDS MD   CT Head Perfusion w Contrast    Narrative    CT HEAD PERFUSION WITH CONTRAST 6/25/2019 4:06 PM     HISTORY: Code stroke. Aphasia.    TECHNIQUE: Axial images were obtained through the brain with  intravenous contrast for CT brain perfusion imaging. 50 mL of  Isovue-370 was given. Radiation dose for this scan was reduced using  automated exposure control, adjustment of the mA and/or kV according  to patient size, or iterative reconstruction technique.    FINDINGS: Cerebral blood flow, cerebral blood volume, mean transit  time maps are symmetric. There is no evidence for ischemia or infarct.      Impression    IMPRESSION: Normal CT brain perfusion imaging.    MD Henrique TURPIN MD  Text Page  (7am to 6pm)

## 2019-06-26 NOTE — PHARMACY-ANTICOAGULATION SERVICE
Clinical Pharmacy - Warfarin Dosing Consult     Pharmacy has been consulted to manage this patient s warfarin therapy.  Indication: Atrial Fibrillation  Therapy Goal: INR 2-3  Warfarin Prior to Admission: Yes  Warfarin PTA Regimen: 4.5 mg Mondays, 3 mg row  Significant drug interactions: mirtazapine increases INR, aspirin increases bleeding risk    INR   Date Value Ref Range Status   06/25/2019 1.64 (H) 0.86 - 1.14 Final   12/04/2017 3.0  Final     Comment:     Adult Normal Range: 0.90 - 1.10  Therapeutic Range: 2.0 - 3.5         Recommend warfarin 5 mg today to get INR therapeutic more quickly due to possible stroke.  Pharmacy will monitor Suman M [place] daily and order warfarin doses to achieve specified goal.      Please contact pharmacy as soon as possible if the warfarin needs to be held for a procedure or if the warfarin goals change.      Lexie Pruitt,  PharmD  [phone]

## 2019-06-26 NOTE — CODE/RAPID RESPONSE
[facility]  RRT Note: CODE 21  6/26/2019   Time Called: 0321  Code Status: Full Code  Code 21 called for: agitation    Assessment & Plan   I was paged to the bedside to evaluate Suman Burton for an acute episode of agitation. On my initial exam, patient was notably confused and disoriented. Patient thought he was at home and couldn't understand why we are in his home. Patient had large volume noted on bladder scan.     INTERVENTIONS:  -- 10 mg IM zyprexa  -- straight cath x 1  -- restraints not needed at this time    Interval History     Suman Burton is a 83 year old male who was admitted on 6/25/2019 for PCI and stroke complication.    Medical history significant for:     Past Medical History:   Diagnosis Date     Atrial fibrillation (H)      Coronary artery disease 06/25/2019    s/p PCI      Diabetes (H)      Edema      Hypertension      Past Surgical History:   Procedure Laterality Date     CHOLECYSTECTOMY       CV HEART CATHETERIZATION WITH POSSIBLE INTERVENTION N/A 6/25/2019    Procedure: Coronary Angiogram;  Surgeon: Cachorro Garcia MD;  Location:  HEART CARDIAC CATH LAB     CV INSTANTANEOUS WAVE-FREE RATIO N/A 6/25/2019    Procedure: Instantaneous Wave-Free Ratio;  Surgeon: Cachorro Garcia MD;  Location:  HEART CARDIAC CATH LAB     CV PCI STENT DRUG ELUTING N/A 6/25/2019    Procedure: PCI Stent Drug Eluting;  Surgeon: Cachorro Garcia MD;  Location:  HEART CARDIAC CATH LAB     CV RIGHT HEART CATH N/A 6/25/2019    Procedure: Right Heart Cath;  Surgeon: Cachorro Garcia MD;  Location:  HEART CARDIAC CATH LAB     LAPAROSCOPIC APPENDECTOMY         Allergies   Allergies   Allergen Reactions     Nkda [No Known Drug Allergies]        Physical Exam   Vital Signs with Ranges:  Temp:  [97.9  F (36.6  C)-98.1  F (36.7  C)] 97.9  F (36.6  C)  Pulse:  [39-89] 67  Heart Rate:  [52-67] 67  Resp:  [16-20] 18  BP: ()/(41-95) 163/77  SpO2:  [89 %-98 %] 93 %  I/O  last 3 completed shifts:  In: -   Out: 375 [Urine:375]    Physical Exam   Constitutional: He appears well-developed. He appears distressed.   HENT:   Head: Normocephalic and atraumatic.   Eyes: Pupils are equal, round, and reactive to light.   Neck: Normal range of motion. No JVD present. No tracheal deviation present.   Cardiovascular: Normal rate and regular rhythm.   Pulmonary/Chest: Effort normal. No respiratory distress.   Abdominal: He exhibits no distension.   Musculoskeletal: Normal range of motion. He exhibits no edema.   Neurological: He is alert.   Skin: Skin is warm and dry.   Psychiatric: His mood appears anxious. His affect is angry. His speech is tangential. He is agitated, aggressive and hyperactive. Thought content is paranoid. Cognition and memory are impaired. He expresses impulsivity and inappropriate judgment. He is inattentive.       Data     IMAGING: (X-ray/CT/MRI)   Recent Results (from the past 24 hour(s))   CT Head w/o Contrast    Narrative    CT SCAN OF THE HEAD WITHOUT CONTRAST   6/25/2019 3:53 PM     HISTORY: Code stroke, aphasia.    TECHNIQUE: Axial images of the head and coronal reformations without  IV contrast material. Radiation dose for this scan was reduced using  automated exposure control, adjustment of the mA and/or kV according  to patient size, or iterative reconstruction technique.    COMPARISON: None.    FINDINGS: Moderate cerebral atrophy is present. There are some patchy  white matter changes in both hemispheres without mass effect. There is  no evidence for intracranial hemorrhage, mass effect, acute infarct,  or skull fracture. Vascular calcifications noted.      Impression    IMPRESSION: Chronic changes. No evidence for intracranial hemorrhage  or any acute process.    LM EDWARDS MD   CTA Head Neck with Contrast    Narrative    CTA  HEAD AND NECK WITH CONTRAST 6/25/2019 3:56 PM     HISTORY: Code Stroke. Aphasia.    TECHNIQUE: Axial images were obtained through the  head and neck with  intravenous contrast. 70 mL of Isovue-370 was given. Multiplanar  reconstructions were performed. 3-D reconstructions off a remote  workstation for CT angiography were also acquired. Carotid stenoses  were evaluated by comparing the caliber of the proximal internal  carotid artery to the caliber of the distal internal carotid artery.  Radiation dose for this scan was reduced using automated exposure  control, adjustment of the mA and/or kV according to patient size, or  iterative reconstruction technique.    FINDINGS:    Brachiocephalic vessels: There is extensive atherosclerotic plaque  involving the thoracic arch and proximal brachiocephalic vessels with  approximately 50% stenosis of the proximal left subclavian artery.    Right carotid system: Severe plaque is seen in the carotid bifurcation  region resulting in 80% % stenosis of the proximal internal carotid  artery.    Left carotid system: Moderate calcified plaque is seen in the proximal  internal carotid artery resulting in 50% stenosis.    Right vertebral artery: There is scattered calcified plaque but there  is no significant stenosis.    Left vertebral artery: There is calcified plaque at the origin of left  vertebral artery resulting in 50% stenosis.    [place]: There is short segment high-grade stenosis of the  proximal right cavernous carotid artery. There is also moderate short  segment stenosis of the left supraclinoid carotid artery. The basilar  artery is patent. The proximal anterior, middle, and posterior  cerebral arteries are patent.    Other findings: Emphysematous changes along with increased  interstitial markings are seen in both lung apices. Degenerative  changes are seen in the spine.      Impression    IMPRESSION:  1. High-grade stenosis versus short segment occlusion of the right  proximal cavernous internal carotid artery.  2. Short segment moderate stenosis of the left supraclinoid internal  carotid  artery.  3. Basilar artery and proximal anterior, middle, and posterior  cerebral arteries are widely patent.  4. 50% stenosis of proximal left subclavian artery and proximal left  vertebral artery.  5. 80% stenosis of proximal right internal carotid artery.    LM EDWARDS MD   CT Head Perfusion w Contrast    Narrative    CT HEAD PERFUSION WITH CONTRAST 6/25/2019 4:06 PM     HISTORY: Code stroke. Aphasia.    TECHNIQUE: Axial images were obtained through the brain with  intravenous contrast for CT brain perfusion imaging. 50 mL of  Isovue-370 was given. Radiation dose for this scan was reduced using  automated exposure control, adjustment of the mA and/or kV according  to patient size, or iterative reconstruction technique.    FINDINGS: Cerebral blood flow, cerebral blood volume, mean transit  time maps are symmetric. There is no evidence for ischemia or infarct.      Impression    IMPRESSION: Normal CT brain perfusion imaging.    LM EDWARDS MD       CBC with Diff:  Recent Labs   Lab Test 06/25/19  0730   WBC 7.8   HGB 13.8   MCV 91   *   INR 1.64*      No results found for: RETICABSCT  No results found for: RETP    Lactic Acid:    No results found for: LACT        Comprehensive Metabolic Panel:  Recent Labs   Lab 06/25/19  0730      POTASSIUM 4.1   CHLORIDE 112*   CO2 23   ANIONGAP 7   *   BUN 34*   CR 1.35*   GFRESTIMATED 48*   GFRESTBLACK 56*   VINCENT 10.1   PROTTOTAL 6.7*   ALBUMIN 3.4   BILITOTAL 0.8   ALKPHOS 74   AST 20   ALT 20       UA:  No results for input(s): COLOR, APPEARANCE, URINEGLC, URINEBILI, URINEKETONE, SG, UBLD, URINEPH, PROTEIN, UROBILINOGEN, NITRITE, LEUKEST, RBCU, WBCU in the last 168 hours.    Time Spent on this Encounter   I spent 35 minutes on the unit/floor managing the care of Suman Burton. 100% of my time was spent counseling the patient and/or coordinating care regarding services listed in this note.    Kwame Landrum, APRN, CNP  Hospitalist - House  [facility]  Text Page  [phone])

## 2019-06-26 NOTE — PROVIDER NOTIFICATION
Spoke with Frank BARTLETT regarding pt agitation. IM zyprexa ordered. PA unable to order restraints. Page sent to House Officer at [phone] for orders and assessment.

## 2019-06-26 NOTE — PLAN OF CARE
"PT: PT orders received, initial eval completed. Patient lives with is wife in a house. Dues to level of confusion, pt was unable to report is he has stairs to navigate. Previously independent with ADLs and mobility with a SEC per report given to mera. Pt presented with SOB, was diagnosed with severe CAD and underwent PCI with HIWOT. Upon recovery, pt exhibited word finding difficulty and RLE weakness. Work-up pending for possible small CVA vs TIA vs metabolic encephalopathy. Pt currently is confused, agitated, sitter present.    Discharge Planner PT   Patient plan for discharge: \"sleep\"  Current status: Pt in bed upon PT arrival, sleeping soundly. Pt did arouse to voice after multiple attempts. Oriented to self, reports that he is at home. Pt requires Ifeoma for rolling and repositioning in bed. Pt firmly declined any EOB or OOB mobility with agitation level increasing with dialog. Pt was able to move BLE against gravity spontaneously. Sitter present reports that pt has been up in the room with assist of 1-2 nursing staff.  Barriers to return to prior living situation: level of assist required, confusion, fall risk, medical status  Recommendations for discharge: TCU  Rationale for recommendations: Will update disch recs once mobility can be more fully assessed. If mentation improves and pt is able to mobilize with SBA, may be able to return home with his wife's supervision.       Entered by: Leah Naidu 06/26/2019 2:11 PM       "

## 2019-06-26 NOTE — PROGRESS NOTES
[facility]  Cardiology Progress Note    Outpatient cardiologist: Dr. Ruiz  Date of Service (when I saw the patient): 06/26/2019  Summary: Suman Burton is a 83 year old male with history of bilateral carotid artery disease, atrial fibrillation for at least 5 years and maintained on coumadin, chronic diastolic congestive heart failure who was recently referred to cardiology clinic for chest discomfort and shortness of breath. He was referred for coronary angiography on 6/25/19. He was found to have a 95% stenosis of the proximal RCA which was treated a HIWOT. Post procedure, he was noted to have expressive aphasia. Stroke code was called and he was admitted for further management.  Interval History   No chest pain or shortness of breath. He continues to report word finding difficulties at time. He was agitated overnight requiring Zyprexa. No family is at bedside.   Assessment & Plan   1.  Coronary artery disease. Angiogram 6/25 showed a 95% proximal RCA stenosis which was treated with a HIWOT. He had a 50% mid circumflex lesion which was negative by iFR.  -  Continue warfarin, plavix, and aspirin x 1 month, then continue plavix and warfarin for one year.  2.  Suspected small CVA vs TIA. Post angiogram. tPA not given as symptoms were mild and rapidly improved. Head CT negative.   -  Neurology has been consulted.   -  Echo shows preserved LVEF.   3.  Chronic atrial fibrillation. Maintained on warfarin for thromboembolic prevention. Warfarin was held three days prior to angiogram. INR is subtherapeutic today.   -  Rate controlled on metoprolol XL 25 mg daily and digoxin.   4.  Chronic diastolic congestive heart failure. maintained on lasix 80 mg daily. Will resume tomorrow if renal function stable.   5.  CKD stage III. Creatinine stable at 1.3 today after angiogram.     Kinga Rangel PA-C    Physical Exam   Temp: 98.3  F (36.8  C) Temp src: Oral BP: 174/84 Pulse: 67 Heart Rate: 64 Resp: 20 SpO2: 96  % O2 Device: None (Room air) Oxygen Delivery: 3 LPM  Vitals:    06/25/19 0651   Weight: 66.2 kg (146 lb)     Vital Signs with Ranges  Temp:  [97.9  F (36.6  C)-98.4  F (36.9  C)] 98.3  F (36.8  C)  Pulse:  [39-67] 67  Heart Rate:  [] 64  Resp:  [16-20] 20  BP: ()/() 174/84  SpO2:  [84 %-98 %] 96 %  06/21 0700 - 06/26 0659  In: -   Out: 775 [Urine:775]  Net: -775  Constitutional: NAD.   Respiratory: CTAB anteriorly  Cardiovascular: IRR, normal S1 and S2. No murmur. R radial access site is intact. No bruit.   Skin: warm, no rashes  Musculoskeletal: Moving all extremities  Neurologic: Alert. Answers questions appropriately.   Neuropsychiatric: Normal affect   Data   Results for orders placed or performed during the hospital encounter of 06/25/19 (from the past 24 hour(s))   Glucose by meter   Result Value Ref Range    Glucose 161 (H) 70 - 99 mg/dL   CT Head w/o Contrast    Narrative    CT SCAN OF THE HEAD WITHOUT CONTRAST   6/25/2019 3:53 PM     HISTORY: Code stroke, aphasia.    TECHNIQUE: Axial images of the head and coronal reformations without  IV contrast material. Radiation dose for this scan was reduced using  automated exposure control, adjustment of the mA and/or kV according  to patient size, or iterative reconstruction technique.    COMPARISON: None.    FINDINGS: Moderate cerebral atrophy is present. There are some patchy  white matter changes in both hemispheres without mass effect. There is  no evidence for intracranial hemorrhage, mass effect, acute infarct,  or skull fracture. Vascular calcifications noted.      Impression    IMPRESSION: Chronic changes. No evidence for intracranial hemorrhage  or any acute process.    LM EDWARDS MD   CTA Head Neck with Contrast    Narrative    CTA  HEAD AND NECK WITH CONTRAST 6/25/2019 3:56 PM     HISTORY: Code Stroke. Aphasia.    TECHNIQUE: Axial images were obtained through the head and neck with  intravenous contrast. 70 mL of Isovue-370 was given.  Multiplanar  reconstructions were performed. 3-D reconstructions off a remote  workstation for CT angiography were also acquired. Carotid stenoses  were evaluated by comparing the caliber of the proximal internal  carotid artery to the caliber of the distal internal carotid artery.  Radiation dose for this scan was reduced using automated exposure  control, adjustment of the mA and/or kV according to patient size, or  iterative reconstruction technique.    FINDINGS:    Brachiocephalic vessels: There is extensive atherosclerotic plaque  involving the thoracic arch and proximal brachiocephalic vessels with  approximately 50% stenosis of the proximal left subclavian artery.    Right carotid system: Severe plaque is seen in the carotid bifurcation  region resulting in 80% % stenosis of the proximal internal carotid  artery.    Left carotid system: Moderate calcified plaque is seen in the proximal  internal carotid artery resulting in 50% stenosis.    Right vertebral artery: There is scattered calcified plaque but there  is no significant stenosis.    Left vertebral artery: There is calcified plaque at the origin of left  vertebral artery resulting in 50% stenosis.    [place]: There is short segment high-grade stenosis of the  proximal right cavernous carotid artery. There is also moderate short  segment stenosis of the left supraclinoid carotid artery. The basilar  artery is patent. The proximal anterior, middle, and posterior  cerebral arteries are patent.    Other findings: Emphysematous changes along with increased  interstitial markings are seen in both lung apices. Degenerative  changes are seen in the spine.      Impression    IMPRESSION:  1. High-grade stenosis versus short segment occlusion of the right  proximal cavernous internal carotid artery.  2. Short segment moderate stenosis of the left supraclinoid internal  carotid artery.  3. Basilar artery and proximal anterior, middle, and  posterior  cerebral arteries are widely patent.  4. 50% stenosis of proximal left subclavian artery and proximal left  vertebral artery.  5. 80% stenosis of proximal right internal carotid artery.    LM EDWARDS MD   CT Head Perfusion w Contrast    Narrative    CT HEAD PERFUSION WITH CONTRAST 6/25/2019 4:06 PM     HISTORY: Code stroke. Aphasia.    TECHNIQUE: Axial images were obtained through the brain with  intravenous contrast for CT brain perfusion imaging. 50 mL of  Isovue-370 was given. Radiation dose for this scan was reduced using  automated exposure control, adjustment of the mA and/or kV according  to patient size, or iterative reconstruction technique.    FINDINGS: Cerebral blood flow, cerebral blood volume, mean transit  time maps are symmetric. There is no evidence for ischemia or infarct.      Impression    IMPRESSION: Normal CT brain perfusion imaging.    LM EDWARDS MD   Glucose by meter   Result Value Ref Range    Glucose 176 (H) 70 - 99 mg/dL   Glucose by meter   Result Value Ref Range    Glucose 87 70 - 99 mg/dL   Hemoglobin A1c   Result Value Ref Range    Hemoglobin A1C 6.4 (H) 0 - 5.6 %   Basic metabolic panel   Result Value Ref Range    Sodium 141 133 - 144 mmol/L    Potassium 3.8 3.4 - 5.3 mmol/L    Chloride 109 94 - 109 mmol/L    Carbon Dioxide 22 20 - 32 mmol/L    Anion Gap 10 3 - 14 mmol/L    Glucose 90 70 - 99 mg/dL    Urea Nitrogen 24 7 - 30 mg/dL    Creatinine 1.30 (H) 0.66 - 1.25 mg/dL    GFR Estimate 50 (L) >60 mL/min/[1.73_m2]    GFR Estimate If Black 58 (L) >60 mL/min/[1.73_m2]    Calcium 9.9 8.5 - 10.1 mg/dL   CBC with platelets   Result Value Ref Range    WBC 6.6 4.0 - 11.0 10e9/L    RBC Count 4.61 4.4 - 5.9 10e12/L    Hemoglobin 14.9 13.3 - 17.7 g/dL    Hematocrit 41.7 40.0 - 53.0 %    MCV 91 78 - 100 fl    MCH 32.3 26.5 - 33.0 pg    MCHC 35.7 31.5 - 36.5 g/dL    RDW 12.7 10.0 - 15.0 %    Platelet Count 114 (L) 150 - 450 10e9/L   INR   Result Value Ref Range    INR 1.45 (H) 0.86  - 1.14   Troponin I   Result Value Ref Range    Troponin I ES 0.069 (H) 0.000 - 0.045 ug/L   Glucose by meter   Result Value Ref Range    Glucose 151 (H) 70 - 99 mg/dL   Troponin I (now)   Result Value Ref Range    Troponin I ES 0.078 (H) 0.000 - 0.045 ug/L   Basic metabolic panel   Result Value Ref Range    Sodium 142 133 - 144 mmol/L    Potassium 4.6 3.4 - 5.3 mmol/L    Chloride 113 (H) 94 - 109 mmol/L    Carbon Dioxide 22 20 - 32 mmol/L    Anion Gap 7 3 - 14 mmol/L    Glucose 157 (H) 70 - 99 mg/dL    Urea Nitrogen 23 7 - 30 mg/dL    Creatinine 1.33 (H) 0.66 - 1.25 mg/dL    GFR Estimate 49 (L) >60 mL/min/[1.73_m2]    GFR Estimate If Black 57 (L) >60 mL/min/[1.73_m2]    Calcium 9.9 8.5 - 10.1 mg/dL   Echocardiogram Complete - Bubble study    Narrative    [id number]  [id number]  [id number]^TORIE^NAOMI^YURI           [facility]  Echocardiography Laboratory  [address] [place] [address]        Name: MOSES BONILLA  MRN: [id number]  : 1935  Study Date: 2019 10:24 AM  Age: 83 yrs  Gender: Male  Patient Location: [facility]  Reason For Study: CVA  Ordering Physician: NAOMI VOGT  Referring Physician: Jamie Guerrier  Performed By: Дмитрий Hernandez RDCS     BSA: 1.8 m2  Height: 68 in  Weight: 145 lb  HR: 85  BP: 177/88 mmHg  _____________________________________________________________________________  __        Procedure  Complete Portable Bubble Echo Adult.  _____________________________________________________________________________  __        Interpretation Summary     1. Normal biventricular size and function. Left ventricular ejection fraction  of 60-65%. No segmental wall motion abnormalities noted.  2. The left atrium is moderately dilated. The right atrium is mild to  moderately dilated.  3. No hemodynamically significant valvular disease.  4. No evidence of shuntting at the atrial level on suboptimal bubble study.  Compared to the previous echocardiogram on  2018, there are no significant  changes. Rhythm is atrial fibrillation.  The study was technically difficult.  _____________________________________________________________________________  __        Left Ventricle  The left ventricle is normal in size. There is concentric remodeling present.  Left ventricular systolic function is normal. The visual ejection fraction is  estimated at 60-65%. Diastolic function not assessed due to atrial  fibrillation. No regional wall motion abnormalities noted.     Right Ventricle  Normal right ventricle structure and size.     Atria  The left atrium is moderately dilated. The right atrium is mild to moderately  dilated.     Mitral Valve  The mitral valve leaflets appear thickened, but open well. There is mild  mitral annular calcification. There is trace mitral regurgitation.        Tricuspid Valve  Normal tricuspid valve. There is trace tricuspid regurgitation.     Aortic Valve  There is mild trileaflet aortic sclerosis.     Pulmonic Valve  The pulmonic valve is not well visualized.     Vessels  Normal size aorta. Normal size ascending aorta. IVC diameter <2.1 cm  collapsing >50% with sniff suggests a normal RA pressure of 3 mmHg.     Pericardium  There is no pericardial effusion.        Rhythm  The rhythm was atrial fibrillation.  _____________________________________________________________________________  __  MMode/2D Measurements & Calculations  IVSd: 1.3 cm     LVIDd: 3.5 cm  LVIDs: 2.2 cm  LVPWd: 1.3 cm  FS: 36.1 %  LV mass(C)d: 151.3 grams  LV mass(C)dI: 84.8 grams/m2  Ao root diam: 3.5 cm  LA dimension: 3.9 cm  asc Aorta Diam: 3.4 cm  LA/Ao: 1.1  LA Volume (BP): 50.3 ml  LA Volume Index (BP): 28.3 ml/m2  RWT: 0.73           Doppler Measurements & Calculations  MV E max seth: 121.0 cm/sec  MV dec time: 0.14 sec  PA acc time: 0.13 sec  E/E' av.4  Lateral E/e': 11.2  Medial E/e': 13.6            _____________________________________________________________________________  __           Report approved by: Deyanira Galicia 06/26/2019 11:50 AM         Tele AF with CVR    Medications     - MEDICATION INSTRUCTIONS -       - MEDICATION INSTRUCTIONS -       Percutaneous Coronary Intervention orders placed (this is information for BPA alerting)       ACE/ARB/ARNI NOT PRESCRIBED       Warfarin Therapy Reminder         aspirin  81 mg Oral Daily     digoxin  125 mcg Oral Daily     insulin aspart  1-7 Units Subcutaneous TID AC     insulin aspart  1-5 Units Subcutaneous At Bedtime     metoprolol succinate ER  25 mg Oral Daily     mirtazapine  15 mg Oral At Bedtime     OLANZapine         oxybutynin ER  15 mg Oral Daily     QUEtiapine  25 mg Oral At Bedtime     rosuvastatin  20 mg Oral Daily     senna-docusate  1 tablet Oral BID    Or     senna-docusate  2 tablet Oral BID     warfarin  6 mg Oral ONCE at 18:00

## 2019-06-26 NOTE — PROGRESS NOTES
hospitalist cross-cover: paged regarding worsening agitation, pt admitted with confusion, CVA vs TIA vs metabolic encephalopathy. Required evaluation overnight by house provider with IM zyprexa, good result. Paged with recurrent agitation.    - IM zyprexa 5mg PRN agitation ordered    If further escalation, recommend patient be evaluated by house provider.    Frank Henson PA-C  6/26/2019, 6:33 PM  Pager: [phone]

## 2019-06-26 NOTE — PLAN OF CARE
SLP: Orders received and chart reviewed. Attempted evaluation with his nurse, but continues to be agitated and currently not appropriate.

## 2019-06-26 NOTE — PROGRESS NOTES
[id number] A&O, aphasic/word finding difficulty at times. VS stable on O2 @ 2LPM. Passed bedside swallow, tolerated regular diet. Report called to KIRILL Phillips on station 73. Patient to transfer to station 73 this evening for overnight observation and further stroke workup. Patient's spouse given room and unit information.

## 2019-06-26 NOTE — PLAN OF CARE
Pt here with stroke w/u s/p PCI. R arm splint removed per pt, site remained intact through shift. A&O x4, forgetful in beginning, became disoriented and aggressive/combative during the shift. Pt was increasingly less directable, yelling at and  hitting staff, code 21 called 0321.  Sitter at bedside, mitts placed this AM.  Neuros intact when mentation better, baseline blurry vision and neuropathies. Requiring 2-4L NC overnight, pt was refusing O2 at times and pulling at tubing, baseline HINOJOSA. Tele Afib CVR. Reg diet, thin liquids. Takes pills whole. Up with 1-2, GB. Denies pain. Plan for echo, formal neuro consult, discharge home vs TCU pending.

## 2019-06-26 NOTE — CODE/RAPID RESPONSE
Brief house LEISA note:    Paged by nursing staff regarding patient agitation leading to a code green. Patient was palced in non-violent restraints x4 extremities. On my exam, patient was restrained in bed, in no acute distress. Patient has had multiple code 21's/code green's. We will medicate tonight with additional zyprexa.     INTERVENTIONS:  -- restraints bilateral upper and lower extremities  -- 10 mg SL zyprexa at bedtime    Please page me with concerns,    CHINYERE Nieves, CNP  Hospitalist - House [facility]ISA  Text Page  [phone])

## 2019-06-26 NOTE — CONSULTS
"VASCULAR NEUROLOGY ATTENDING ATTESTATION    Admission Summary  Suman Burton is an 83 year old male who presented for coronary PCI. Afterwards, had expressive aphasia.  Exam shows irritability, no aphasia, difficulty with moving legs L>R.  CTH shows shows abnormality.  Impression:   Probably iatrogenic ischemic stroke  Delirium  Afib    Recommendations:  -Cont Aspirin/Plavix/Coumadin as per Cardiology  -Statin  -Sleep hygiene    Vinicio Yates MD  Neurology    Please contact the stroke service with any questions: Feel free to call my cell phone    I, Vinicio Yates MD, was present with the medical student who participated in obtaining the history, performing the exam, and in the documentation of the note.  I have verified the history, personally reviewed the vitals, labs, neuroimaging, medications, examined the patient, and led medical decision making.  I agree with the assessment and plan documented in the student's note below.  My relevant summary and bowen findings are documented above.    [facility]      Stroke Consult Note    Reason for Consult:  Non-emergent consult request for acute onset right lower extremity weakness, word finding difficulty, and expressive aphasia    DUYEN Will \"Bradley\" KAREN Burton is a 83 year old male with history of bilateral carotid artery disease, atrial fibrilation on coumadin, HTN, DM, hyperlipidemia, left retinal artery occlusion, CKD, and dementia who was admitted on 6/25/19 for coronary angiography with proximal RCA stent placement. While admitted, he had an acute episode of word finding difficulty, right lower extremity weakness, rightward leaning while walking, and expressive aphasia and a stroke code was called on 6/25/19. CT head did not show an acute infarct.     History obtained from Bradley is limited as he is irritable, quite fatigued, and is not oriented. His wife states he is normally \"ornery\" but his agitation and irritability is much worse than his baseline. He " denies having a headache or being dizzy. He subjectively states he has weakness but is unable to describe where this exists. He is also unable to answer if he has had any visual changes, speech difficulties, numbness or tingling. His wife states he had slurred speech yesterday but this has improved today. He had an episode of agitation overnight which required Zyprexa and now has a sitter.    TPA Treatment  Not given due to minor / isolated / quickly resolving stroke symptoms.    Endovascular Treatment  Not initiated due to absence of proximal vessel occlusion     Work-Up  CT Head 6/25/19: Chronic changes, no evidence for intracranial hemorrhage or any acute process    CTA Head and Neck 6/25/19: high grade stenosis of right proximal cavernous ICA, moderate stenosis of the left supraclinoid ICA, 50% stenosis of proximal left subclavian artery and proximal left vertebral artery, 80% stenosis of proximal right ICA    CT Head Perfusion 6/25/19: Normal perfusion scan    TTE 6/26/19: LVEF 60-65%, LA moderately dilated, RA mild to moderately dilated, no evidence of atrial shunt on bubble study. No significant changes from previous echo 9/26/18. Rhythm noted as atrial fibrillation.    Impression  1- Aphasia, right lower extremity weakness and parasthesia of left lower extremity - possibly due to iatrogenic embolic infarct vs. delirium. Patients right BRADLEY and left BRADLEY originate from left ICA so iatrogenic embolism could cause both right lower extremity weakness and left lower extremity parasthesia along with language disturbances. Bradley also has dementia at baseline, so interrupted sleep overnight in combination with recent heart cath could be worsening cognition and interfering with accurate neurological exam.  2- Coronary artery disease  3- Atrial fibrillation  4- Diabetes Mellitus  5- Hyperlipidemia    Recommendations  #Aphasia, RLE weakness and parasthesia of LLE  - Neurochecks q4 hours with sleep hygeine overnight (No  neurochecks from 10PM-7AM tomorrow morning)  - Consider MRI if patient's cognition and symptoms are unchanged tomorrow  - Aspirin and plavix per cardiology's recommendation s/p RCA stent placement  - Stroke education not indicated at this time  - PT/OT/SLP    #Coronary artery disease  -  Aspirin, plavix, and warfarin per cardiology's recommendation s/p RCA stent placement    #Atrial fibrillation  - Continue warfarin per cardiology recommendations    #Diabetes Mellitus  - Hemoglobin A1c today 6.4%, follow recommendations of hospitalist  - Follow up with PCP      #Hyperlipidemia  - LDL 6/25/19 was 46, was on Crestor 20 mg prior to admission    Patient Follow-up    - final recommendation pending work-up    Please contact the Stroke Service with any questions.  _________________________________________________    Past Medical History:   Diagnosis Date     Atrial fibrillation (H)      Coronary artery disease 06/25/2019    s/p PCI      Diabetes (H)      Edema      Hypertension        Past Surgical History:   Procedure Laterality Date     CHOLECYSTECTOMY       CV HEART CATHETERIZATION WITH POSSIBLE INTERVENTION N/A 6/25/2019    Procedure: Coronary Angiogram;  Surgeon: Cachorro Garcia MD;  Location:  [facility] CATH [facility]     CV INSTANTANEOUS WAVE-FREE RATIO N/A 6/25/2019    Procedure: Instantaneous Wave-Free Ratio;  Surgeon: Cachorro Garcia MD;  Location:  [facility] C[facility]     CV PCI STENT DRUG ELUTING N/A 6/25/2019    Procedure: PCI Stent Drug Eluting;  Surgeon: Cachorro Garcia MD;  Location:  HEART [facility] CATH [facility]     CV RIGHT HEART CATH N/A 6/25/2019    Procedure: Right Heart Cath;  Surgeon: Cachorro Garcia MD;  Location:  HEART CARDIAC CATH LAB     LAPAROSCOPIC APPENDECTOMY         Medications   Medications Prior to Admission   Medication Sig Dispense Refill Last Dose     acetaminophen (TYLENOL) 500 MG tablet Take 2 tablets (1,000 mg) by mouth 3 times daily as needed for mild  pain 100 tablet 0 6/25/2019 at 0600     amLODIPine (NORVASC) 10 MG tablet Take 1 tablet (10 mg) by mouth daily 90 tablet 3 6/25/2019 at 0600     aspirin (ASA) 325 MG tablet Take 1 tablet (325 mg) by mouth daily 30 tablet 0 6/25/2019 at 0600     digoxin (LANOXIN) 125 MCG tablet Take 1 tablet (125 mcg) by mouth daily 90 tablet 3 6/25/2019 at 0600     furosemide (LASIX) 80 MG tablet Take 1 tablet (80 mg) by mouth daily 90 tablet 1 6/23/2019 at Unknown time     glipiZIDE (GLUCOTROL) 10 MG tablet Take 2 tablets (20 mg) by mouth 2 times daily Take 20mg in AM with breakfast and 20mg in PM with evening meal 360 tablet 0 6/24/2019 at 2000     isosorbide mononitrate (IMDUR) 30 MG 24 hr tablet Take 1 tablet (30 mg) by mouth daily 30 tablet 3 6/25/2019 at 0600     loperamide (IMODIUM A-D) 2 MG tablet Take 1 tablet (2 mg) by mouth 4 times daily as needed for diarrhea 100 tablet 5 6/25/2019 at 0600     metFORMIN (GLUCOPHAGE) 1000 MG tablet Take 1 tablet (1,000 mg) by mouth 2 times daily (with meals)   6/24/2019 at 2000     metoprolol succinate (TOPROL-XL) 50 MG 24 hr tablet Take 0.5 tablets (25 mg) by mouth daily   6/25/2019 at 0600     mirtazapine (REMERON) 15 MG tablet Take 1 tablet (15 mg) by mouth At Bedtime 30 tablet 5 6/24/2019 at 2200     oxybutynin ER (DITROPAN XL) 15 MG 24 hr tablet Take 1 tablet (15 mg) by mouth daily 30 tablet 5 6/25/2019 at 0600     rosuvastatin (CRESTOR) 20 MG tablet Take 1 tablet (20 mg) by mouth daily 30 tablet 3 6/24/2019 at 0600     saxagliptin (ONGLYZA) 2.5 MG TABS tablet Take 1 tablet (2.5 mg) by mouth daily 30 tablet 1 6/24/2019 at 0800     warfarin (COUMADIN) 3 MG tablet Take 1 tablet (3 mg) by mouth daily (Patient taking differently: Take 3 mg by mouth daily ) 90 tablet 3 6/21/2019 at 1800     Multiple Vitamin (MULTIVITAMINS PO) Take 1 tablet by mouth daily.   Unknown at Unknown time     ONE TOUCH FINE POINT LANCETS Check blood sugars twice a day.   Taking     ONETOUCH DELICA LANCETS 33G  MISC 1 strip 2 times daily 100 each 11 Taking     Pramoxine HCl (CERAVE ITCH RELIEF) 1 % CREA Externally apply 30 g topically daily 340 g 11 Unknown at Unknown time       Allergies   Allergies   Allergen Reactions     Nkda [No Known Drug Allergies]        Family History   Family History     No data available          Social History   Social History     Socioeconomic History     Marital status:      Spouse name: Not on file     Number of children: Not on file     Years of education: Not on file     Highest education level: Not on file   Occupational History     Not on file   Social Needs     Financial resource strain: Not on file     Food insecurity:     Worry: Not on file     Inability: Not on file     Transportation needs:     Medical: Not on file     Non-medical: Not on file   Tobacco Use     Smoking status: Former Smoker     Packs/day: 2.00     Types: Cigarettes     Start date:      Last attempt to quit:      Years since quittin.4     Smokeless tobacco: Never Used   Substance and Sexual Activity     Alcohol use: Yes     Alcohol/week: 0.0 oz     Comment: Occasionally.      Drug use: No     Sexual activity: Never   Lifestyle     Physical activity:     Days per week: Not on file     Minutes per session: Not on file     Stress: Not on file   Relationships     Social connections:     Talks on phone: Not on file     Gets together: Not on file     Attends Che[employer] service: Not on file     Active member of club or organization: Not on file     Attends meetings of clubs or organizations: Not on file     Relationship status: Not on file     Intimate partner violence:     Fear of current or ex partner: Not on file     Emotionally abused: Not on file     Physically abused: Not on file     Forced sexual activity: Not on file   Other Topics Concern     Parent/sibling w/ CABG, MI or angioplasty before 65F 55M? Not Asked   Social History Narrative     Not on file          ROS  The 10 point Review of Systems  is negative other than noted in the HPI or here.     PHYSICAL EXAMINATION  B/P:     174/84 (06/26/19 1137)    Heart Rate: 64 (06/26/19 1137)    Pulse: 67 (06/25/19 2330)   Resp:  20 (06/26/19 1137)  Temp: 98.3  F (36.8  C)   Oral (06/26/19 1137)    Neurologic  Mental Status:  alert, oriented to self, not oriented to place or time, follows simple commands (able to show 2 fingers with left hand and thumbs up with right hand, but unable to follow command for heel-to-shin testing), no dysarthria, naming and repetition normal, occasional paraphasic errors with substituting incorrect words in sentences  Cranial Nerves: visual fields intact, EOMI with normal smooth pursuit, facial sensation intact, facial movements symmetric, minimal if any left nasolabial fold flattening but wife states this is baseline for him, palate elevation symmetric and uvula midline, no dysarthria, shoulder shrug strong bilaterally, tongue protrusion midline  Motor:  normal muscle tone and bulk, no abnormal movements, able to move all limbs spontaneously, no pronator drift of bilateral upper extremities or left lower extremity, drift of right lower extremity but did not drift down to the bed  Reflexes:  toes up-going on the left, mute on the right   Sensory:  light touch sensation intact, but less on the left lower extremity, symmetric on the upper extremities, no extinction on double simultaneous stimulation   Coordination: Tremor noted with finger-to-nose testing bilaterally but no dysmetria, Unable to complete heel-to-shin testing bilaterally because patient was unable to follow commands for this  Station/Gait:  deferred     Stroke Scales: NIHSS  Interval baseline (06/25/19 1545)   Interval Comments     1a. Level of Consciousness 0-->Alert, keenly responsive   1b. LOC Questions 1-->Answers one question correctly   1c. LOC Commands 0-->Performs both tasks correctly   2.   Best Gaze 0-->Normal   3.   Visual 0-->No visual loss   4.   Facial Palsy  0-->Normal symmetrical movements   5a. Motor Arm, Left 0-->No drift, limb holds 90 (or 45) degrees for full 10 secs   5b. Motor Arm, Right 0-->No drift, limb holds 90 (or 45) degrees for full 10 secs   6a. Motor Leg, Left 0-->No drift, leg holds 30 degree position for full 5 secs   6b. Motor Leg, right 1-->Drift, leg falls by the end of the 5-sec period but does not hit bed   7.   Limb Ataxia 0-->Absent   8.   Sensory 1-->Mild-to-moderate sensory loss, patient feels pinprick is less sharp or is dull on the affected side, or there is a loss of superficial pain with pinprick, but patient is aware of being touched   9.   Best Language 1-->Mild-to-moderate aphasia, some obvious loss of fluency or facility of comprehension, without significant limitation on ideas expressed or form of expression. Reduction of speech and/or comprehension, however, makes conversation. . . (see row details)   10. Dysarthria 0-->Normal   11. Extinction and Inattention  0-->No abnormality   Total 4 (06/26/19 1449)       Labs/Imaging  Labs and imaging were reviewed and used in developing plan; pertinent results included.     Data   CBC  WBC (10e9/L)   Date Value   06/26/2019 6.6   06/25/2019 7.8   06/20/2019 8.4    RBC Count (10e12/L)   Date Value   06/26/2019 4.61   06/25/2019 4.33 (L)   06/20/2019 4.73    Hemoglobin (g/dL)   Date Value   06/26/2019 14.9   06/25/2019 13.8   06/20/2019 15.1      Hematocrit (%)   Date Value   06/26/2019 41.7   06/25/2019 39.5 (L)   06/20/2019 43.6    Platelet Count (10e9/L)   Date Value   06/26/2019 114 (L)   06/25/2019 129 (L)   06/20/2019 167         BMP  Sodium (mmol/L)   Date Value   06/26/2019 142   06/26/2019 141   06/25/2019 142    Potassium (mmol/L)   Date Value   06/26/2019 4.6   06/26/2019 3.8   06/25/2019 4.1    Chloride (mmol/L)   Date Value   06/26/2019 113 (H)   06/26/2019 109   06/25/2019 112 (H)      Carbon Dioxide (mmol/L)   Date Value   06/26/2019 22   06/26/2019 22   06/25/2019 23    Glucose  (mg/dL)   Date Value   06/26/2019 157 (H)   06/26/2019 90   06/25/2019 164 (H)    Urea Nitrogen (mg/dL)   Date Value   06/26/2019 23   06/26/2019 24   06/25/2019 34 (H)      Creatinine (mg/dL)   Date Value   06/26/2019 1.33 (H)   06/26/2019 1.30 (H)   06/25/2019 1.35 (H)    Calcium (mg/dL)   Date Value   06/26/2019 9.9   06/26/2019 9.9   06/25/2019 10.1         INR Troponin I A1C   INR (no units)   Date Value   06/26/2019 1.45 (H)   06/25/2019 1.64 (H)   12/04/2017 3.0    Troponin I ES (ug/L)   Date Value   06/26/2019 0.078 (H)   06/26/2019 0.069 (H)   11/09/2016     <0.015  The 99th percentile for upper reference range is 0.045 ug/L.  Troponin values in   the range of 0.045 - 0.120 ug/L may be associated with risks of adverse   clinical events.      Hemoglobin A1C (%)   Date Value   06/26/2019 6.4 (H)   06/25/2019 6.5 (H)   03/12/2019 6.7 (H)        Liver Panel  Protein Total (g/dL)   Date Value   06/25/2019 6.7 (L)   06/20/2019 7.4   04/17/2017 6.9    Albumin (g/dL)   Date Value   06/25/2019 3.4   06/20/2019 3.5   04/17/2017 4.1    Bilirubin Total (mg/dL)   Date Value   06/25/2019 0.8   06/20/2019 0.5   12/21/2016 0.7      Alkaline Phosphatase   Date Value   06/25/2019 74 U/L   06/20/2019 84 U/L   03/15/2012 200.0 u/l (H)    AST   Date Value   06/25/2019 20 U/L   06/20/2019 16 U/L   03/15/2012 103.0 u/l (H)   03/15/2012 103.0 u/l (H)    ALT   Date Value   06/25/2019 20 U/L   06/20/2019 20 U/L   03/15/2012 163.0 u/l (H)   03/15/2012 163.0 u/l (H)      Bilirubin Direct (mg/dL)   Date Value   11/15/2016 0.3   05/15/2015 0.2   03/23/2015 0.2          Lipid Profile  Cholesterol (mg/dL)   Date Value   06/25/2019 118   06/20/2019 155   05/01/2018 165    HDL Cholesterol (mg/dL)   Date Value   06/25/2019 32 (L)   06/20/2019 36 (L)   05/01/2018 36 (L)    LDL Cholesterol Calculated (mg/dL)   Date Value   06/25/2019 46   06/20/2019 71   05/01/2018 77      Triglycerides (mg/dL)   Date Value   06/25/2019 201 (H)   06/20/2019  239 (H)   05/01/2018 262 (H)    Cholesterol/HDL Ratio   Date Value   02/05/2013 3.8 RATIO   03/15/2012 3.9 ratio   01/11/2012 3.8 ratio            This was a non-emergent, non-tele stroke consult.

## 2019-06-26 NOTE — PROGRESS NOTES
"   06/26/19 1330   Quick Adds   Type of Visit Initial PT Evaluation   Living Environment   Lives With spouse   Living Arrangements house   Home Accessibility stairs within home   Number of Stairs, Within Home, Primary   (unable to report)   Transportation Anticipated family or friend will provide   Self-Care   Usual Activity Tolerance good   Current Activity Tolerance poor   Functional Level Prior   Ambulation 1-->assistive equipment  (SEC)   Transferring 1-->assistive equipment   Toileting 0-->independent   Bathing 0-->independent   Fall history within last six months yes   Number of times patient has fallen within last six months 1   Prior Functional Level Comment PLOF report limited due to pt's level of confusion   General Information   Onset of Illness/Injury or Date of Surgery - Date 06/25/19   Referring Physician Marguerite Harp PA-C   Patient/Family Goals Statement \"I need sleep\"   Pertinent History of Current Problem (include personal factors and/or comorbidities that impact the POC) 83 YOM presented with SOB. Diagnosed with CAD and underwent PCI with HIWOT x 6/25/19. While in recovery, pt developed difficulty with word finding, RLE weakness. Work-up pending. Possible small CVA vs TIA vs metabolic encephalopathy. PMH: CAD, HTN, a-fib, DM2   Precautions/Limitations fall precautions  (sitter present)   Weight-Bearing Status - LLE full weight-bearing   Weight-Bearing Status - RLE full weight-bearing   General Observations agitated, sitter present, confused   General Info Comments Activity: ambulate with assist   Cognitive Status Examination   Orientation person   Level of Consciousness lethargic/somnolent;confused;agitated   Follows Commands and Answers Questions 25% of the time;able to follow single-step instructions   Personal Safety and Judgment impaired   Memory impaired   Pain Assessment   Patient Currently in Pain No   Range of Motion (ROM)   ROM Comment BLEs WFL   Strength   Strength Comments " "Unable to formally test due to confusion but pt does move BLEs spontaneously and able to lift BLEs against gravity when in supine   Bed Mobility   Bed Mobility Comments Ifeoma for rolling   Transfer Skills   Transfer Comments Pt firmly declined getting OOB   Gait   Gait Comments Unable to assess on eval. Pt has been up in the room with assist of 1-2 nursing staff   General Therapy Interventions   Planned Therapy Interventions balance training;bed mobility training;gait training;strengthening;transfer training   Clinical Impression   Criteria for Skilled Therapeutic Intervention yes, treatment indicated   PT Diagnosis Impaired gait   Influenced by the following impairments confusion, fatigue, weakness   Functional limitations due to impairments bed mobility, transfers, gait   Clinical Presentation Evolving/Changing   Clinical Presentation Rationale confusion still present requiring a sitter   Clinical Decision Making (Complexity) Low complexity   Therapy Frequency Daily   Predicted Duration of Therapy Intervention (days/wks) 4 days   Anticipated Discharge Disposition Home with Assist;Transitional Care Facility   Risk & Benefits of therapy have been explained Yes   Patient, Family & other staff in agreement with plan of care Yes   Clinical Impression Comments Will update disch recs once mobility can be more fully assessed. If mobility improves as mentation improves, pt may be able to return home with wife.   [facility] [employer] TM \"6 Clicks\"   2016, Trustees of [facility], under license to [employer].  All rights reserved.   6 Clicks Short Forms Basic Mobility Inpatient Short Form   [facility] AM-PAC  \"6 Clicks\" V.2 Basic Mobility Inpatient Short Form   1. Turning from your back to your side while in a flat bed without using bedrails? 3 - A Little   2. Moving from lying on your back to sitting on the side of a flat bed without using bedrails? 2 - A Lot   3. Moving to and from a bed to a chair " (including a wheelchair)? 2 - A Lot   4. Standing up from a chair using your arms (e.g., wheelchair, or bedside chair)? 2 - A Lot   5. To walk in hospital room? 2 - A Lot   6. Climbing 3-5 steps with a railing? 2 - A Lot   Basic Mobility Raw Score (Score out of 24.Lower scores equate to lower levels of function) 13   Total Evaluation Time   Total Evaluation Time (Minutes) 14

## 2019-06-27 ENCOUNTER — APPOINTMENT (OUTPATIENT)
Dept: SPEECH THERAPY | Facility: CLINIC | Age: 84
DRG: 981 | End: 2019-06-27
Attending: PHYSICIAN ASSISTANT
Payer: MEDICARE

## 2019-06-27 ENCOUNTER — APPOINTMENT (OUTPATIENT)
Dept: OCCUPATIONAL THERAPY | Facility: CLINIC | Age: 84
DRG: 981 | End: 2019-06-27
Attending: PHYSICIAN ASSISTANT
Payer: MEDICARE

## 2019-06-27 LAB
ANION GAP SERPL CALCULATED.3IONS-SCNC: 6 MMOL/L (ref 3–14)
BUN SERPL-MCNC: 23 MG/DL (ref 7–30)
CALCIUM SERPL-MCNC: 10.4 MG/DL (ref 8.5–10.1)
CHLORIDE SERPL-SCNC: 113 MMOL/L (ref 94–109)
CO2 SERPL-SCNC: 24 MMOL/L (ref 20–32)
CREAT SERPL-MCNC: 1.43 MG/DL (ref 0.66–1.25)
ERYTHROCYTE [DISTWIDTH] IN BLOOD BY AUTOMATED COUNT: 13.1 % (ref 10–15)
GFR SERPL CREATININE-BSD FRML MDRD: 45 ML/MIN/{1.73_M2}
GLUCOSE BLDC GLUCOMTR-MCNC: 119 MG/DL (ref 70–99)
GLUCOSE BLDC GLUCOMTR-MCNC: 157 MG/DL (ref 70–99)
GLUCOSE BLDC GLUCOMTR-MCNC: 211 MG/DL (ref 70–99)
GLUCOSE BLDC GLUCOMTR-MCNC: 236 MG/DL (ref 70–99)
GLUCOSE BLDC GLUCOMTR-MCNC: 252 MG/DL (ref 70–99)
GLUCOSE SERPL-MCNC: 150 MG/DL (ref 70–99)
HCT VFR BLD AUTO: 43.5 % (ref 40–53)
HGB BLD-MCNC: 15 G/DL (ref 13.3–17.7)
INR PPP: 1.7 (ref 0.86–1.14)
INTERPRETATION ECG - MUSE: NORMAL
MCH RBC QN AUTO: 31.3 PG (ref 26.5–33)
MCHC RBC AUTO-ENTMCNC: 34.5 G/DL (ref 31.5–36.5)
MCV RBC AUTO: 91 FL (ref 78–100)
PLATELET # BLD AUTO: 128 10E9/L (ref 150–450)
POTASSIUM SERPL-SCNC: 3.9 MMOL/L (ref 3.4–5.3)
PROCALCITONIN SERPL-MCNC: 0.46 NG/ML
RBC # BLD AUTO: 4.8 10E12/L (ref 4.4–5.9)
SODIUM SERPL-SCNC: 143 MMOL/L (ref 133–144)
WBC # BLD AUTO: 8.7 10E9/L (ref 4–11)

## 2019-06-27 PROCEDURE — 97530 THERAPEUTIC ACTIVITIES: CPT | Mod: GO | Performed by: OCCUPATIONAL THERAPIST

## 2019-06-27 PROCEDURE — 25000132 ZZH RX MED GY IP 250 OP 250 PS 637: Mod: GY | Performed by: NURSE PRACTITIONER

## 2019-06-27 PROCEDURE — 85027 COMPLETE CBC AUTOMATED: CPT | Performed by: INTERNAL MEDICINE

## 2019-06-27 PROCEDURE — 25000132 ZZH RX MED GY IP 250 OP 250 PS 637: Mod: GY | Performed by: PSYCHIATRY & NEUROLOGY

## 2019-06-27 PROCEDURE — 25000128 H RX IP 250 OP 636: Performed by: INTERNAL MEDICINE

## 2019-06-27 PROCEDURE — 97535 SELF CARE MNGMENT TRAINING: CPT | Mod: GO | Performed by: OCCUPATIONAL THERAPIST

## 2019-06-27 PROCEDURE — 25000132 ZZH RX MED GY IP 250 OP 250 PS 637: Mod: GY | Performed by: INTERNAL MEDICINE

## 2019-06-27 PROCEDURE — 99233 SBSQ HOSP IP/OBS HIGH 50: CPT | Performed by: INTERNAL MEDICINE

## 2019-06-27 PROCEDURE — 25000132 ZZH RX MED GY IP 250 OP 250 PS 637: Mod: GY | Performed by: PHYSICIAN ASSISTANT

## 2019-06-27 PROCEDURE — 36415 COLL VENOUS BLD VENIPUNCTURE: CPT | Performed by: INTERNAL MEDICINE

## 2019-06-27 PROCEDURE — 25000131 ZZH RX MED GY IP 250 OP 636 PS 637: Mod: GY | Performed by: PHYSICIAN ASSISTANT

## 2019-06-27 PROCEDURE — 12000000 ZZH R&B MED SURG/OB

## 2019-06-27 PROCEDURE — 97165 OT EVAL LOW COMPLEX 30 MIN: CPT | Mod: GO | Performed by: OCCUPATIONAL THERAPIST

## 2019-06-27 PROCEDURE — 85610 PROTHROMBIN TIME: CPT | Performed by: INTERNAL MEDICINE

## 2019-06-27 PROCEDURE — 92610 EVALUATE SWALLOWING FUNCTION: CPT | Mod: GN | Performed by: SPEECH-LANGUAGE PATHOLOGIST

## 2019-06-27 PROCEDURE — 00000146 ZZHCL STATISTIC GLUCOSE BY METER IP

## 2019-06-27 PROCEDURE — 80048 BASIC METABOLIC PNL TOTAL CA: CPT | Performed by: INTERNAL MEDICINE

## 2019-06-27 PROCEDURE — 25000125 ZZHC RX 250: Performed by: INTERNAL MEDICINE

## 2019-06-27 PROCEDURE — 92526 ORAL FUNCTION THERAPY: CPT | Mod: GN | Performed by: SPEECH-LANGUAGE PATHOLOGIST

## 2019-06-27 PROCEDURE — 84145 PROCALCITONIN (PCT): CPT | Performed by: INTERNAL MEDICINE

## 2019-06-27 RX ORDER — WARFARIN SODIUM 6 MG/1
6 TABLET ORAL ONCE
Status: COMPLETED | OUTPATIENT
Start: 2019-06-27 | End: 2019-06-27

## 2019-06-27 RX ORDER — TOBRAMYCIN 3 MG/ML
1 SOLUTION/ DROPS OPHTHALMIC 4 TIMES DAILY
Status: DISCONTINUED | OUTPATIENT
Start: 2019-06-27 | End: 2019-06-30

## 2019-06-27 RX ORDER — FUROSEMIDE 10 MG/ML
40 INJECTION INTRAMUSCULAR; INTRAVENOUS
Status: DISCONTINUED | OUTPATIENT
Start: 2019-06-27 | End: 2019-06-28

## 2019-06-27 RX ORDER — WARFARIN SODIUM 7.5 MG/1
7.5 TABLET ORAL
Status: COMPLETED | OUTPATIENT
Start: 2019-06-27 | End: 2019-06-27

## 2019-06-27 RX ORDER — ROSUVASTATIN CALCIUM 10 MG/1
10 TABLET, COATED ORAL DAILY
Status: DISCONTINUED | OUTPATIENT
Start: 2019-06-27 | End: 2019-07-02 | Stop reason: HOSPADM

## 2019-06-27 RX ORDER — AMLODIPINE BESYLATE 5 MG/1
5 TABLET ORAL DAILY
Status: DISCONTINUED | OUTPATIENT
Start: 2019-06-27 | End: 2019-07-02

## 2019-06-27 RX ORDER — FUROSEMIDE 10 MG/ML
40 INJECTION INTRAMUSCULAR; INTRAVENOUS ONCE
Status: COMPLETED | OUTPATIENT
Start: 2019-06-27 | End: 2019-06-27

## 2019-06-27 RX ADMIN — AMLODIPINE BESYLATE 5 MG: 5 TABLET ORAL at 16:30

## 2019-06-27 RX ADMIN — SENNOSIDES AND DOCUSATE SODIUM 1 TABLET: 8.6; 5 TABLET ORAL at 08:14

## 2019-06-27 RX ADMIN — TOBRAMYCIN 1 DROP: 3 SOLUTION OPHTHALMIC at 20:03

## 2019-06-27 RX ADMIN — METOPROLOL SUCCINATE 25 MG: 25 TABLET, EXTENDED RELEASE ORAL at 08:14

## 2019-06-27 RX ADMIN — FUROSEMIDE 40 MG: 10 INJECTION, SOLUTION INTRAVENOUS at 04:36

## 2019-06-27 RX ADMIN — WARFARIN SODIUM 6 MG: 6 TABLET ORAL at 00:38

## 2019-06-27 RX ADMIN — FUROSEMIDE 40 MG: 10 INJECTION, SOLUTION INTRAVENOUS at 16:32

## 2019-06-27 RX ADMIN — INSULIN ASPART 1 UNITS: 100 INJECTION, SOLUTION INTRAVENOUS; SUBCUTANEOUS at 08:15

## 2019-06-27 RX ADMIN — QUETIAPINE 25 MG: 25 TABLET ORAL at 20:10

## 2019-06-27 RX ADMIN — INSULIN ASPART 2 UNITS: 100 INJECTION, SOLUTION INTRAVENOUS; SUBCUTANEOUS at 14:19

## 2019-06-27 RX ADMIN — ROSUVASTATIN CALCIUM 10 MG: 10 TABLET, FILM COATED ORAL at 20:11

## 2019-06-27 RX ADMIN — MIRTAZAPINE 15 MG: 15 TABLET, FILM COATED ORAL at 00:40

## 2019-06-27 RX ADMIN — TOBRAMYCIN 1 DROP: 3 SOLUTION OPHTHALMIC at 16:42

## 2019-06-27 RX ADMIN — INSULIN ASPART 3 UNITS: 100 INJECTION, SOLUTION INTRAVENOUS; SUBCUTANEOUS at 18:05

## 2019-06-27 RX ADMIN — FUROSEMIDE 40 MG: 10 INJECTION, SOLUTION INTRAVENOUS at 08:15

## 2019-06-27 RX ADMIN — ASPIRIN 81 MG 81 MG: 81 TABLET ORAL at 08:14

## 2019-06-27 RX ADMIN — OLANZAPINE 10 MG: 5 TABLET, ORALLY DISINTEGRATING ORAL at 20:12

## 2019-06-27 RX ADMIN — OXYBUTYNIN CHLORIDE 15 MG: 10 TABLET, EXTENDED RELEASE ORAL at 08:12

## 2019-06-27 RX ADMIN — DIGOXIN 125 MCG: 0.12 TABLET ORAL at 08:13

## 2019-06-27 RX ADMIN — MIRTAZAPINE 15 MG: 15 TABLET, FILM COATED ORAL at 20:07

## 2019-06-27 RX ADMIN — WARFARIN SODIUM 7.5 MG: 7.5 TABLET ORAL at 18:07

## 2019-06-27 ASSESSMENT — ACTIVITIES OF DAILY LIVING (ADL)
SWALLOWING: 0-->SWALLOWS FOODS/LIQUIDS WITHOUT DIFFICULTY
DRESS: 1-->ASSISTIVE EQUIPMENT
ADLS_ACUITY_SCORE: 22
RETIRED_EATING: 1-->ASSISTIVE EQUIPMENT
ADLS_ACUITY_SCORE: 15
ADLS_ACUITY_SCORE: 16.5
ADLS_ACUITY_SCORE: 16.5
ADLS_ACUITY_SCORE: 14
RETIRED_COMMUNICATION: 0-->UNDERSTANDS/COMMUNICATES WITHOUT DIFFICULTY
ADLS_ACUITY_SCORE: 22
COGNITION: 2 - DIFFICULTY WITH ORGANIZING THOUGHTS

## 2019-06-27 NOTE — PLAN OF CARE
Restrainers off at 0830. Incontinent off urine, no BM for my shift, tolerated food, followed commands, very sleepy after breakfast refused to eat lunch. Sitter at bedside. Family at bedside.

## 2019-06-27 NOTE — PLAN OF CARE
Elevated BP, within current parameters set by provider. Tel reading a. Fib with CVR. Low saturations on RA, currently on 4 LPM via simple face mask; chest xray completed. Confused, disoriented. Follows directions and participates in assessments only sporadically. Episodes of agitation including hitting, kicking, attempting to bite, and taking off oxygen. Refused evening medications including warfarin and seroquel. Episode of agitation this evening resulted in application of soft wrist and ankle restraints at 1840. Family notified and medications ordered by house PA and on call hospitalist.

## 2019-06-27 NOTE — PROGRESS NOTES
[facility]  Hospitalist Progress Note  Henrique Garcia MD  06/27/2019    Assessment & Plan   Suman Burton is a 83 year old male admitted on 6/25/2019. He was registered to the stroke unit under observation      Suspected iatrogenic ischemic stroke vs TIA vs metabolic encephalopathy  Expressive aphasia, word finding difficulties, trouble with object identification, and RN noted dragging RLE during ambulation s/p Lt/Rt heart cath with PCI. Code stroke called. Head CT and perfusion unremarkable.   CT head neck with contrast revealed: High-grade stenosis versus short segment occlusion of the right proximal cavernous ICA. Short segment moderate stenosis of the left supraclinoid ICA. Basilar artery and proximal anterior, middle, and posterior cerebral arteries are widely patent. 50% stenosis of proximal left subclavian artery and proximal left vertebral artery. 80% stenosis of proximal Rt ICA. Reviewed preliminary imaging with Neurology, tPA not given as symptoms mild and quickly improving. Cardiology called for Hospitalist to admit the patient to observation overnight.   - register to inpatient on stroke unit, station 73  - ECHO shows LVEF 60-65% with mild LAE, no shunt but sub-optimal bubble study  - permissive hypertension, PRNs available for SBP >180  - neurology consultation noted  - PT/OT/SLP following  - patient agitated overnight, coello placed for retention  - will plan for zyprex for agitation  - had multiple code 21 and was on restraints     CAD s/p HIWOT PCI to proximal RCA, 6/25/19  Proximal CX to Mid Cx 50% stenosed. Proximal RCA 95% stenosed.   Successful PCI to proximal RCA with HIWOT, noted mild pulmonary HTN, moderate proximal Cx lesion. - Per cardiology ASA 81mg daily, plavix 75 mg daily, and warfarin x1 month then continue with warfarin/plavix for 12 months  - posting pharmacy to dose warfarin     Delirium secondary to cvi  - slept most of the day but now is improved  - will have  "psychiatry eval for night time delirium  - plan for zyprexa tonight    Bilateral pulmonary infiltrates  -- cxr shows bilateral patchy infiltrates, edema vs atypica infection  -- will check procalcitonion  -- will treat with doxy    Hypertension  - continue amlodipine      Chronic atrial fibrillation  - telemetry  - Resume warfarin, resume BB in AM with hold parameters     Type 2 diabetes  - Hold PTA glipizide, saxagliptin, and metformin, until eating consistently.   - sliding scale insulin medium dose. Hypoglycemia protocol. Fingerstick checks.     Bilateral conjunctivitis  -- abx eye gtt    Diet: mod cho  DVT Prophylaxis: Warfarin  Hutchins Catheter: not present  Code Status: Full        Disposition Plan  -- anticipate in 1-2 days     Expected discharge:    Interval History   -- chart reviewed  -- discussed with RN    -Data reviewed today: I reviewed all new labs and imaging over the last 24 hours. I personally reviewed no images or EKG's today.    Physical Exam   Heart Rate: 64, Blood pressure (!) 205/80, pulse 75, temperature 98.3  F (36.8  C), temperature source Axillary, resp. rate 24, height 1.727 m (5' 7.99\"), weight 66.2 kg (146 lb), SpO2 94 %.  Vitals:    06/25/19 0651   Weight: 66.2 kg (146 lb)     Vital Signs with Ranges  Temp:  [97.8  F (36.6  C)-99  F (37.2  C)] 98.3  F (36.8  C)  Pulse:  [64-78] 75  Heart Rate:  [64-82] 64  Resp:  [20-35] 24  BP: (180-205)/(80-89) 205/80  SpO2:  [88 %-97 %] 94 %  I/O's Last 24 hours  I/O last 3 completed shifts:  In: 180 [P.O.:180]  Out: 100 [Urine:100]    Constitutional: Awake, alert, cooperative, no apparent distress, oriented to self  Respiratory: Clear to auscultation bilaterally, no crackles or wheezing  Cardiovascular: Regular rate and rhythm, normal S1 and S2, and no murmur noted  GI: Normal bowel sounds, soft, non-distended, non-tender  Skin/Integumen: No rashes, no cyanosis, no edema  Other:      Medications   All medications I am responsible for were " reviewed.    - MEDICATION INSTRUCTIONS -       - MEDICATION INSTRUCTIONS -       Percutaneous Coronary Intervention orders placed (this is information for BPA alerting)       ACE/ARB/ARNI NOT PRESCRIBED       Warfarin Therapy Reminder         amLODIPine  5 mg Oral Daily     aspirin  81 mg Oral Daily     digoxin  125 mcg Oral Daily     furosemide  40 mg Intravenous BID     insulin aspart  1-7 Units Subcutaneous TID AC     insulin aspart  1-5 Units Subcutaneous At Bedtime     metoprolol succinate ER  25 mg Oral Daily     mirtazapine  15 mg Oral At Bedtime     OLANZapine zydis  10 mg Oral At Bedtime     oxybutynin ER  15 mg Oral Daily     QUEtiapine  25 mg Oral At Bedtime     rosuvastatin  10 mg Oral Daily     senna-docusate  1 tablet Oral BID    Or     senna-docusate  2 tablet Oral BID     warfarin  7.5 mg Oral ONCE at 18:00        Data   Recent Labs   Lab 06/27/19  0726 06/26/19  0818 06/26/19  0347 06/25/19  0730   WBC 8.7  --  6.6 7.8   HGB 15.0  --  14.9 13.8   MCV 91  --  91 91   *  --  114* 129*   INR 1.70*  --  1.45* 1.64*    142 141 142   POTASSIUM 3.9 4.6 3.8 4.1   CHLORIDE 113* 113* 109 112*   CO2 24 22 22 23   BUN 23 23 24 34*   CR 1.43* 1.33* 1.30* 1.35*   ANIONGAP 6 7 10 7   VINCENT 10.4* 9.9 9.9 10.1   * 157* 90 164*   ALBUMIN  --   --   --  3.4   PROTTOTAL  --   --   --  6.7*   BILITOTAL  --   --   --  0.8   ALKPHOS  --   --   --  74   ALT  --   --   --  20   AST  --   --   --  20   TROPI  --  0.078* 0.069*  --        Recent Results (from the past 24 hour(s))   XR Chest Port 1 View    Narrative    XR CHEST PORT 1 VW 6/26/2019 4:12 PM    COMPARISON: 11/9/2016.    HISTORY: Shortness of breath.      Impression    IMPRESSION: Diffuse mixed interstitial and airspace opacities over  both lungs, edema versus atypical infection are most likely. No  pleural effusion or pneumothorax seen on either side.    MD Henrique RAE MD  Text Page  (7am to 6pm)

## 2019-06-27 NOTE — PROGRESS NOTES
"   06/27/19 1000   Quick Adds   Type of Visit Initial Occupational Therapy Evaluation   Living Environment   Lives With spouse   Living Arrangements house   Home Accessibility stairs to enter home;stairs within home   Transportation Anticipated family or friend will provide  (pt. reports he does not drive)   Living Environment Comment Pt. reports standard toilet/vanity support, walk-in shower   Self-Care   Usual Activity Tolerance good   Current Activity Tolerance poor   Activity/Exercise/Self-Care Comment Pt. reports he is indep. w/ basic ADL's;pt. stated spouse does househould tasks/IADL's +medication mgmt.   Functional Level   Ambulation 1-->assistive equipment  (walking stick)   Transferring 1-->assistive equipment   Toileting 0-->independent   Bathing 0-->independent   Dressing 0-->independent   Eating 0-->independent   Communication 0-->understands/communicates without difficulty   Swallowing 0-->swallows foods/liquids without difficulty   Fall history within last six months yes   Number of times patient has fallen within last six months 1   Prior Functional Level Comment Will need to verify PLOF, due to pt. confusion   General Information   Onset of Illness/Injury or Date of Surgery - Date 06/25/19   Referring Physician Marguerite Harp PA-C   Additional Occupational Profile Info/Pertinent History of Current Problem OT:Per chart--Suman \"Bradley\" M Josephine is a 83 year old male with history of bilateral carotid artery disease, atrial fibrilation on coumadin, HTN, DM, hyperlipidemia, left retinal artery occlusion, CKD, and dementia who was admitted on 6/25/19 for coronary angiography with proximal RCA stent placement. While admitted, he had an acute episode of word finding difficulty, right lower extremity weakness, rightward leaning while walking, and expressive aphasia and a stroke code was called on 6/25/19. CT head did not show an acute infarct.    Precautions/Limitations fall precautions;oxygen therapy " device and L/min   General Observations Pt. 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 reprorts he wears glasses for reading   Sensory Examination   Sensory Comments baseline neuropathy per pt.;no new numbness/tingling   Pain Assessment   Patient Currently in Pain No   Posture   Posture forward head position;protracted shoulders;kyphosis   Range of Motion (ROM)   ROM Comment unable to fully assess;pt. able to demo. AG movment B shoulder to grossly 40 degrees   Strength   Strength Comments gen. weakness;decreased activity tolerance   Hand Strength   Hand Strength Comments B  strength WFL   Coordination   Coordination Comments appears WFL   Mobility   Bed Mobility Comments mod. A supine-sit   Toilet Transfer   Toilet Transfer Comments standard toilet at home   Tub/Shower Transfer   Tub/Shower Transfer Comments walk-in shower at home   Balance   Balance Comments sitting balance impaired   Lower Body Dressing   Level of [place]: Dress Lower Body dependent (less than 25% patients effort)   Toileting   Level of [place]: Toilet maximum assist (25% patients effort)   Instrumental Activities of Daily Living (IADL)   Previous Responsibilities   (spouse completes per pt.)   Activities of Daily Living Analysis   Impairments Contributing to Impaired Activities of Daily Living balance impaired;cognition impaired;ROM  "decreased;strength decreased  (decreased activity tolerance)   General Therapy Interventions   Planned Therapy Interventions ADL retraining;balance training;cognition;neuromuscular re-education;ROM;strengthening;progressive activity/exercise   Clinical Impression   Criteria for Skilled Therapeutic Interventions Met yes, treatment indicated   OT Diagnosis Decline in ADL performance   Influenced by the following impairments weakness, decreased activity tolerance,  impaired cognition, impaired balance   Assessment of Occupational Performance 3-5 Performance Deficits   Identified Performance Deficits Currently below baseline w/ dressing,toileting,grooming, bathing, fx. transfers   Clinical Decision Making (Complexity) Low complexity   Therapy Frequency Daily   Predicted Duration of Therapy Intervention (days/wks) 3-5 days   Anticipated Discharge Disposition Transitional Care Facility;Acute Rehabilitation Facility   Risks and Benefits of Treatment have been explained. Yes   Patient, Family & other staff in agreement with plan of care Yes   [facility] TM \"6 Clicks\"   2016, Trustees of [facility], under license to [employer].  All rights reserved.   6 Clicks Short Forms Daily Activity Inpatient Short Form   [facility]  \"6 Clicks\" Daily Activity Inpatient Short Form   1. Putting on and taking off regular lower body clothing? 1 - Total   2. Bathing (including washing, rinsing, drying)? 1 - Total   3. Toileting, which includes using toilet, bedpan or urinal? 2 - A Lot   4. Putting on and taking off regular upper body clothing? 2 - A Lot   5. Taking care of personal grooming such as brushing teeth? 2 - A Lot   6. Eating meals? 3 - A Little   Daily Activity Raw Score (Score out of 24.Lower scores equate to lower levels of function) 11   Total Evaluation Time   Total Evaluation Time (Minutes) 15     "

## 2019-06-27 NOTE — PLAN OF CARE
Discharge Planner SLP   Patient plan for discharge: Not able to state.  Current status: Bedside swallow evaluation completed. Patient presents with moderate oral and pharyngeal dysphagia at bedside. Deficits include; mild to moderate oral motor deficits, reduced bolus control/coordination with reduced AP movement, delayed swallow response and laryngeal elevation. He was eating breakfast at time of the evaluation. Coughing with thin liquids by the straw and cup. He was able to tolerate nectar thick liquids by the cup. Mastication was prolonged for solids with mild to moderate oral residue on the right lateral sulci. Increased vocal wetness after the swallow and reports globus sensation. Recommend: 1. Downgrade diet to DDL 1 with nectar thick liquids. 2. Upright, assistance with eating, small bites/sips and alternate liquids/solids. Hold diet if coughing or other Sx of aspiration present. SLP will follow up for diet tolerance and advancement as indicated.   Barriers to return to prior living situation: Cognition and safety.  Recommendations for discharge: TCU  Rationale for recommendations: Patient will need on going ST needs for swallowing and complete a speech/language evaluation at next level of care. Patient is below his baseline.        Entered by: Marguerite Pruitt 06/27/2019 9:30 AM

## 2019-06-27 NOTE — PLAN OF CARE
"PT: Session attempted. Pt just received food tray. Family at bedside. RN and family report pt was more alert this morning but remains drowsy/lethargic at times, getting tired throughout the day but states pt is overall improving today. Attempted to engage pt in PT however with food tray arriving pt continues to state \"no\" to all attempts at EOB/OOB to chair activity despite encouragement from nursing, writer, and pt's family. Discussed plan to trial PT in the morning for improved alertness/participation, family in agreement.   "

## 2019-06-27 NOTE — PLAN OF CARE
"Pt here with L CVA s/p PCI to RCA. Pt inconsitently following commands for neuro assessment. WFD noted and tremors to BUE.Purposeful movement to all extremities when performing ROM to each limb during restraint checks.  At 2045 patient's restraints were assessed, free limb attempted to kick/hit requiring assist x2 to maintain staff safety. Pt also stated if staff did not cut these (restraints) off, he would bite staff. Patient yelling at staff \"fuck you, get out of my house\" and \"I want you to get the hell out.\" Unable to redirect/orient by RN or other staff members. 4-point restraints maintained with checks continuing q2h. Sitter at bedside. Pt awake at 0000 and reproached regarding refused medications. Took his Coumadin 6mg and Remeron. Other meds refused during this reapproach. Shortly after patient fell asleep. Incontinent throughout shift, changed and cooperated during cares. At 0330 patient's RR elevated to 35. Shallow breathing noted but pt denied SOB on 4.5L. Elevated BP, within parameters. Other VSS on 4.5L O2 via oxymask. Hospitalist notified and pt to begin 40mg IV Lasix BID, first dose tonight. RR decreased to 30 at 0530. Tele A.Fib w/ RVR.  Denies pain. Discharge to TCU pending placement. Continue monitoring.  "

## 2019-06-27 NOTE — PROGRESS NOTES
06/27/19 0857   General Information   Onset Date 06/25/19   Start of Care Date 06/27/19   Referring Physician Marguerite Harp   Patient Profile Review/OT: Additional Occupational Profile Info See Profile for full history and prior level of function   Patient/Family Goals Statement Not able to state.   Swallowing Evaluation Bedside swallow evaluation   Behaviorial Observations Alert;Confused;Distractible   Mode of current nutrition Oral diet   Type of oral diet Regular;Thin liquid   Respiratory Status O2 Supply   Type of O2 supply Nasal cannula   Comments Expressive aphasia, word finding difficulties, trouble with object identification, and RN noted dragging RLE during ambulation s/p Lt/Rt heart cath with PCI. Code stroke called. Head CT and perfusion unremarkable.   Clinical Swallow Evaluation   Oral Musculature anomalies present   Structural Abnormalities none present   Dentition present and adequate   Mucosal Quality adequate   Mandibular Strength and Mobility intact   Oral Labial Strength and Mobility impaired retraction;impaired pursing   Lingual Strength and Mobility impaired protrusion;impaired anterior elevation;impaired left lateral movement;impaired right lateral movement  (Deviated slithly to the right.)   Velar Elevation impaired   Buccal Strength and Mobility impaired   Laryngeal Function Cough;Throat clear;Swallow;Voicing initiated;Dry swallow palpated  (Delayed and reduced laryngeal elevation.)   Oral Musculature Comments Mild to moserate deficits.   Additional Documentation Yes   Swallow Eval   Feeding Assistance frequent cues/help required   Clinical Swallow Eval: Thin Liquid Texture Trial   Mode of Presentation, Thin Liquids straw;cup;self-fed   Volume of Liquid or Food Presented 3 oz of milk   Oral Phase of Swallow Poor AP movement;Premature pharyngeal entry   Pharyngeal Phase of Swallow impaired;reduction in laryngeal movement;repeated swallows;coughing/choking;wet vocal quality after swallow    Diagnostic Statement Sx of aspiration.   Clinical Swallow Eval: Nectar Thick Liquid Texture Trial   Mode of Presentation, Nectar cup;self-fed   Volume of Nectar Presented 4 oz of juice   Oral Phase, Nectar Premature pharyngeal entry   Pharyngeal Phase, Nectar impaired;reduction in laryngeal movement;repeated swallows   Diagnostic Statement No overt Sx of aspiration.   Clinical Swallow Eval: Puree Solid Texture Trial   Mode of Presentation, Puree spoon;self-fed   Volume of Puree Presented 5 teaspoons of apple sauce   Oral Phase, Puree Poor AP movement;Premature pharyngeal entry   Pharyngeal Phase, Puree impaired;reduction in laryngeal movement;repeated swallows   Diagnostic Statement No overt Sx of aspiration.   Clinical Swallow Eval: Solid Food Texture Trial   Mode of Presentation, Solid self-fed   Volume of Solid Food Presented Cereal and banana   Oral Phase, Solid Poor AP movement;Residue in oral cavity;Premature pharyngeal entry   Oral Residue, Solid right anterior lateral sulci;mid posterior tongue   Pharyngeal Phase, Solid impaired;reduction in laryngeal movement;repeated swallows;wet vocal quality after swallow   Diagnostic Statement Wet vocal quality after the swallows reports globus sensation.    Swallow Compensations   Swallow Compensations Alternate viscosity of consistencies;Pacing;Reduce amounts;Multiple swallow   Results Oral difficulties only;Suspect aspiration   General Therapy Interventions   Planned Therapy Interventions Dysphagia Treatment   Dysphagia treatment Modified diet education;Instruction of safe swallow strategies   Swallow Eval: Clinical Impressions   Skilled Criteria for Therapy Intervention Skilled criteria met.  Treatment indicated.   Functional Assessment Scale (FAS) 3   Treatment Diagnosis Moderate or and pharyngeal dysphagia   Diet texture recommendations Dysphagia diet level 1;Nectar thick liquids   Recommended Feeding/Eating Techniques alternate between small bites and sips of  food/liquid;check mouth frequently for oral residue/pocketing;hard swallow w/ each bite or sip;maintain upright posture during/after eating for 30 mins;no straws;small sips/bites   Therapy Frequency 5x/week   Predicted Duration of Therapy Intervention (days/wks) 1 week   Anticipated Discharge Disposition inpatient rehabilitation facility   Risks and Benefits of Treatment have been explained. Yes   Patient, family and/or staff in agreement with Plan of Care Yes   Clinical Impression Comments Patient presents with moderate oral and pharyngeal dysphagia at bedside. Deficits include; mild to moderate oral motor deficits, reduced bolus control/coordination with reduced AP movement, delayed swallow response and laryngeal elevation. He was eating breakfast at time of the evaluation. Coughing with thin liquids by the straw and cup. He was able to tolerate nectar thick liquids by the cup. Mastication was prolonged for solids with mild to moderate oral residue on the right lateral sulci. Increased vocal wetness after the swallow and reports globus sensation. Recommend: 1. Downgrade diet to DDL 1 with nectar thick liquids. 2. Upright, assistance with eating, small bites/sips and alternate liquids/solids. Hold diet if coughing or other Sx of aspiration present.    Total Evaluation Time   Total Evaluation Time (Minutes) 20

## 2019-06-27 NOTE — PLAN OF CARE
Discharge Planner OT     OT:Evaluation completed and treatment initiated. 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 Pt. currently confused, will need to verify PLOF information. Pt. did relate he uses a walking stick for mobility, indep. W/ basic ADL's, spouse completes IADL's + assist pt. W/ medication mgmt;pt. does not drive, noted has a h/o dementia.  Patient plan for discharge: Not stated  Current status: Pt.confused, stated he was at home, year as 1983, correct month;pt. following 1-step command, inconsistently, cooperative. Pt. able to come supine-sit w/ overall mod. A;Pt. sat EOB X 6 min. needing overall min-mod. A for balance, leaning R/posterior. Pt. needed overall max. A to use urinal/toileting. Pt. too fatigued to attempt stand;returned to supine w/ mod. A X 2.   Barriers to return to prior living situation: Current level of assist,weakness, decreased activity tolerance, impaired cognition  Recommendations for discharge: TCU  Rationale for recommendations: Pt. well below baseline w/ ADL's/mobility. Pt. would benefit from continued skilled OT to assist pt. in achieving maximal safety/indep. W/ADL's/fx.transfers.       Entered by: Kristin Borden 06/27/2019 1:20 PM

## 2019-06-27 NOTE — PROVIDER NOTIFICATION
"MD Notification    Notified Person: MD    Notified Person Name:  Jose    Notification Date/Time: 06/27/19 at 0354    Notification Interaction: web-based page    Purpose of Notification: \"703-1 - LP - patient's RR 35 on 4.5L oxymask. Please advise.  Guadalupe ROY *1969\"    Orders Received: Lasix 40mg IV BID    Notified again 6/27/19 at 0418: \"703-1 - LP - Lasix order for BID starting at 0800. Did you want to add a one time dose for now?   *1100 KIRILL Butcher\"    Comments: Order received for 40mg IV Lasix one time order for now  "

## 2019-06-28 ENCOUNTER — APPOINTMENT (OUTPATIENT)
Dept: SPEECH THERAPY | Facility: CLINIC | Age: 84
DRG: 981 | End: 2019-06-28
Payer: MEDICARE

## 2019-06-28 ENCOUNTER — APPOINTMENT (OUTPATIENT)
Dept: PHYSICAL THERAPY | Facility: CLINIC | Age: 84
DRG: 981 | End: 2019-06-28
Payer: MEDICARE

## 2019-06-28 ENCOUNTER — APPOINTMENT (OUTPATIENT)
Dept: OCCUPATIONAL THERAPY | Facility: CLINIC | Age: 84
DRG: 981 | End: 2019-06-28
Payer: MEDICARE

## 2019-06-28 LAB
ANION GAP SERPL CALCULATED.3IONS-SCNC: 7 MMOL/L (ref 3–14)
BUN SERPL-MCNC: 30 MG/DL (ref 7–30)
CALCIUM SERPL-MCNC: 10 MG/DL (ref 8.5–10.1)
CHLORIDE SERPL-SCNC: 110 MMOL/L (ref 94–109)
CO2 SERPL-SCNC: 25 MMOL/L (ref 20–32)
CREAT SERPL-MCNC: 1.52 MG/DL (ref 0.66–1.25)
ERYTHROCYTE [DISTWIDTH] IN BLOOD BY AUTOMATED COUNT: 12.9 % (ref 10–15)
GFR SERPL CREATININE-BSD FRML MDRD: 42 ML/MIN/{1.73_M2}
GLUCOSE BLDC GLUCOMTR-MCNC: 166 MG/DL (ref 70–99)
GLUCOSE BLDC GLUCOMTR-MCNC: 212 MG/DL (ref 70–99)
GLUCOSE BLDC GLUCOMTR-MCNC: 282 MG/DL (ref 70–99)
GLUCOSE BLDC GLUCOMTR-MCNC: 297 MG/DL (ref 70–99)
GLUCOSE SERPL-MCNC: 169 MG/DL (ref 70–99)
HCT VFR BLD AUTO: 40.6 % (ref 40–53)
HGB BLD-MCNC: 14.4 G/DL (ref 13.3–17.7)
INR PPP: 2.24 (ref 0.86–1.14)
MCH RBC QN AUTO: 32.4 PG (ref 26.5–33)
MCHC RBC AUTO-ENTMCNC: 35.5 G/DL (ref 31.5–36.5)
MCV RBC AUTO: 91 FL (ref 78–100)
PLATELET # BLD AUTO: 112 10E9/L (ref 150–450)
POTASSIUM SERPL-SCNC: 3.7 MMOL/L (ref 3.4–5.3)
RBC # BLD AUTO: 4.44 10E12/L (ref 4.4–5.9)
SODIUM SERPL-SCNC: 142 MMOL/L (ref 133–144)
WBC # BLD AUTO: 8.6 10E9/L (ref 4–11)

## 2019-06-28 PROCEDURE — 85610 PROTHROMBIN TIME: CPT | Performed by: INTERNAL MEDICINE

## 2019-06-28 PROCEDURE — 97530 THERAPEUTIC ACTIVITIES: CPT | Mod: GP

## 2019-06-28 PROCEDURE — 85027 COMPLETE CBC AUTOMATED: CPT | Performed by: INTERNAL MEDICINE

## 2019-06-28 PROCEDURE — 80048 BASIC METABOLIC PNL TOTAL CA: CPT | Performed by: INTERNAL MEDICINE

## 2019-06-28 PROCEDURE — 97530 THERAPEUTIC ACTIVITIES: CPT | Mod: GO | Performed by: OCCUPATIONAL THERAPY ASSISTANT

## 2019-06-28 PROCEDURE — 92526 ORAL FUNCTION THERAPY: CPT | Mod: GN | Performed by: SPEECH-LANGUAGE PATHOLOGIST

## 2019-06-28 PROCEDURE — 25000128 H RX IP 250 OP 636: Performed by: INTERNAL MEDICINE

## 2019-06-28 PROCEDURE — 25000132 ZZH RX MED GY IP 250 OP 250 PS 637: Mod: GY | Performed by: PSYCHIATRY & NEUROLOGY

## 2019-06-28 PROCEDURE — 25000132 ZZH RX MED GY IP 250 OP 250 PS 637: Mod: GY | Performed by: PHYSICIAN ASSISTANT

## 2019-06-28 PROCEDURE — 99221 1ST HOSP IP/OBS SF/LOW 40: CPT | Performed by: PSYCHIATRY & NEUROLOGY

## 2019-06-28 PROCEDURE — 99233 SBSQ HOSP IP/OBS HIGH 50: CPT | Performed by: INTERNAL MEDICINE

## 2019-06-28 PROCEDURE — 97535 SELF CARE MNGMENT TRAINING: CPT | Mod: GO | Performed by: OCCUPATIONAL THERAPY ASSISTANT

## 2019-06-28 PROCEDURE — 36415 COLL VENOUS BLD VENIPUNCTURE: CPT | Performed by: INTERNAL MEDICINE

## 2019-06-28 PROCEDURE — 25000132 ZZH RX MED GY IP 250 OP 250 PS 637: Mod: GY | Performed by: INTERNAL MEDICINE

## 2019-06-28 PROCEDURE — 00000146 ZZHCL STATISTIC GLUCOSE BY METER IP

## 2019-06-28 PROCEDURE — 12000000 ZZH R&B MED SURG/OB

## 2019-06-28 PROCEDURE — 25000132 ZZH RX MED GY IP 250 OP 250 PS 637: Mod: GY | Performed by: NURSE PRACTITIONER

## 2019-06-28 RX ORDER — LEVOFLOXACIN 500 MG/1
500 TABLET, FILM COATED ORAL ONCE
Status: COMPLETED | OUTPATIENT
Start: 2019-06-28 | End: 2019-06-28

## 2019-06-28 RX ORDER — ISOSORBIDE MONONITRATE 30 MG/1
30 TABLET, EXTENDED RELEASE ORAL DAILY
Status: DISCONTINUED | OUTPATIENT
Start: 2019-06-28 | End: 2019-07-02 | Stop reason: HOSPADM

## 2019-06-28 RX ORDER — LEVOFLOXACIN 250 MG/1
250 TABLET, FILM COATED ORAL EVERY 24 HOURS
Status: DISCONTINUED | OUTPATIENT
Start: 2019-06-29 | End: 2019-07-02 | Stop reason: HOSPADM

## 2019-06-28 RX ORDER — MULTIVITAMIN,THERAPEUTIC
TABLET ORAL DAILY
Status: DISCONTINUED | OUTPATIENT
Start: 2019-06-28 | End: 2019-07-02 | Stop reason: HOSPADM

## 2019-06-28 RX ORDER — WARFARIN SODIUM 3 MG/1
3 TABLET ORAL
Status: COMPLETED | OUTPATIENT
Start: 2019-06-28 | End: 2019-06-28

## 2019-06-28 RX ORDER — SAXAGLIPTIN 2.5 MG/1
2.5 TABLET, FILM COATED ORAL DAILY
Status: DISCONTINUED | OUTPATIENT
Start: 2019-06-28 | End: 2019-07-02 | Stop reason: HOSPADM

## 2019-06-28 RX ORDER — GLIPIZIDE 10 MG/1
10 TABLET ORAL
Status: DISCONTINUED | OUTPATIENT
Start: 2019-06-28 | End: 2019-07-01

## 2019-06-28 RX ADMIN — FUROSEMIDE 40 MG: 10 INJECTION, SOLUTION INTRAVENOUS at 08:44

## 2019-06-28 RX ADMIN — MIRTAZAPINE 15 MG: 15 TABLET, FILM COATED ORAL at 21:45

## 2019-06-28 RX ADMIN — OLANZAPINE 10 MG: 5 TABLET, ORALLY DISINTEGRATING ORAL at 21:45

## 2019-06-28 RX ADMIN — AMLODIPINE BESYLATE 5 MG: 5 TABLET ORAL at 09:02

## 2019-06-28 RX ADMIN — GLIPIZIDE 10 MG: 10 TABLET ORAL at 17:18

## 2019-06-28 RX ADMIN — SENNOSIDES AND DOCUSATE SODIUM 2 TABLET: 8.6; 5 TABLET ORAL at 09:10

## 2019-06-28 RX ADMIN — ASPIRIN 81 MG 81 MG: 81 TABLET ORAL at 09:08

## 2019-06-28 RX ADMIN — LOPERAMIDE HYDROCHLORIDE 2 MG: 2 CAPSULE ORAL at 13:38

## 2019-06-28 RX ADMIN — TOBRAMYCIN 1 DROP: 3 SOLUTION OPHTHALMIC at 21:46

## 2019-06-28 RX ADMIN — METOPROLOL SUCCINATE 25 MG: 25 TABLET, EXTENDED RELEASE ORAL at 09:05

## 2019-06-28 RX ADMIN — INSULIN ASPART 2 UNITS: 100 INJECTION, SOLUTION INTRAVENOUS; SUBCUTANEOUS at 17:24

## 2019-06-28 RX ADMIN — INSULIN ASPART 1 UNITS: 100 INJECTION, SOLUTION INTRAVENOUS; SUBCUTANEOUS at 08:43

## 2019-06-28 RX ADMIN — WARFARIN SODIUM 3 MG: 3 TABLET ORAL at 17:17

## 2019-06-28 RX ADMIN — ISOSORBIDE MONONITRATE 30 MG: 30 TABLET, EXTENDED RELEASE ORAL at 13:38

## 2019-06-28 RX ADMIN — ROSUVASTATIN CALCIUM 10 MG: 10 TABLET, FILM COATED ORAL at 20:23

## 2019-06-28 RX ADMIN — DIGOXIN 125 MCG: 0.12 TABLET ORAL at 09:06

## 2019-06-28 RX ADMIN — TOBRAMYCIN 1 DROP: 3 SOLUTION OPHTHALMIC at 13:45

## 2019-06-28 RX ADMIN — OXYBUTYNIN CHLORIDE 15 MG: 10 TABLET, EXTENDED RELEASE ORAL at 09:02

## 2019-06-28 RX ADMIN — TOBRAMYCIN 1 DROP: 3 SOLUTION OPHTHALMIC at 17:22

## 2019-06-28 RX ADMIN — SENNOSIDES AND DOCUSATE SODIUM 1 TABLET: 8.6; 5 TABLET ORAL at 20:23

## 2019-06-28 RX ADMIN — INSULIN ASPART 4 UNITS: 100 INJECTION, SOLUTION INTRAVENOUS; SUBCUTANEOUS at 13:24

## 2019-06-28 RX ADMIN — LEVOFLOXACIN 500 MG: 500 TABLET, FILM COATED ORAL at 13:45

## 2019-06-28 ASSESSMENT — ACTIVITIES OF DAILY LIVING (ADL)
ADLS_ACUITY_SCORE: 14

## 2019-06-28 NOTE — PLAN OF CARE
Discharge Planner SLP   Patient plan for discharge: Did not state  Current status: SLP: Meal follow up at breakfast. Pt expressed dislike of nectar thick liquids. Thin liquids trialed with impulsive rate of intake that resulted in gurgle/throat clearing. Pt was responsive to cues for small sips and slow rate with no overt s/sx of aspiration. No difficulty with self feeding puree solids. Continued impulsive rate, mild oral residue and throat clear with trials of mich cracker. Pt verbalized agreement with recommended: dysphagia diet level 3 and thin liquids (no straws) only when pt is alert and sitting fully upright. Supervision/assist as needed with small bites and sips with a slow rate of intake. Change back to nectar thick liquids if overt s/sx of aspiration.    Barriers to return to prior living situation: Cognition; dysphagia  Recommendations for discharge: TCU  Rationale for recommendations: Continue ST for dysphagia management       Entered by: Kinga Hinojosa 06/28/2019 10:23 AM

## 2019-06-28 NOTE — PLAN OF CARE
Discharge Planner PT   Patient plan for discharge: not stated  Current status:     FOcus on transfer training during session. Pt fatigues quickly. VSS    Pt seated in chair upon arrival reports he feels agitated with himself and inability to perform mobility. STS x 2 from chair, required 2 attempts each trial to successfully stand.     Cues for scooting towards edge of chair, min A x 1 to place BLE in proper position for improved mechanical advantage for transfer. STS with mod A x 2 and use of FWW, cues for proper hand placement. Pt demonstrates slow speed of transfer, difficulty with anterior wt shift and activating trunk extensors, gluts, quads to obatin full upright posture, PT on R side with assist at R glut and quad. Pt demonstrates ~ 45 sec standing tolerace with frequenct cues to improve glut activation and to look up. Seated rest. 2nd stand pt engaged in standing marches with min A x 1 to maintain standing balance, pt able to clear feet however very unsteady, not appropriate to initiate gait this day.     End of session pt left seated with sitter present       Barriers to return to prior living situation: A x 2, unable to ambulate, high fall risk  Recommendations for discharge: TCU  Rationale for recommendations: TCU to improve balance, strength, endurance for improved I with functional mobility as pt below baseline at this time             Entered by: Darling Mayers 06/28/2019 2:52 PM

## 2019-06-28 NOTE — PLAN OF CARE
Pt here with L CVA w/u s/p PCI to RCA. Pt pleasant, alert, and cooperative at shift start. Full neuro assessed with some WFD, tremors to BUE. No noted R field cut, however inconsistent when following finger with eyes. Sitter at bedside. Took all bedtime medications with encouragement (except for Senna). Incontinent throughout night. VSS on 3L O2 via NC. Tele A.Fib w/ CVR. Tolerating mod-carb diet with nectar thick liquids. Pills whole with thickened water. Slept throughout most of night. Discharge to TCU pending placement. Continue monitoring.

## 2019-06-28 NOTE — PLAN OF CARE
Discharge Planner OT   Patient plan for discharge: Not stated  Current status: pt /88, O2 on RA 95%. Pt mod/max A supine to sit EOB, pt had difficulty with maintaining sitting balance required modA with cues to sit upright and bring self forward pt progressed to CGA sitting EOB. Pt wanting to use urinal in standing. Pt max A of 2 sit to stand pt had right lateral lean, not able to tolerate standing > 45 sec.  Sat to finish use of urinal, dependent for clothing management. Pt stood on 2nd attempt with mod A of 2, cues to stand upright which pt was able to complete bed to chair transfer with mod A of 2, cues to sequence steps and use of FWW.   Barriers to return to prior living situation: Current level of assist,weakness, decreased activity tolerance, impaired cognition  Recommendations for discharge: TCU  per plan established by the Occupational THerapist  Rationale for recommendations: Pt. well below baseline w/ ADL's/mobility. Pt. would benefit from continued skilled OT to assist pt. in achieving maximal safety/indep. W/ADL's/fx.transfers.       Entered by: Saige Martel 06/28/2019 2:35 PM

## 2019-06-28 NOTE — PLAN OF CARE
Suspected small L CVA vs TIA s/p PCI to RCA. Pt A&O x3, confused to time with intermittent confusion to situation. VSS, weaned down to 1L NC, sating low mid 90's.. LS diminished. Tele Afib w/ CVR. Denies pain. Sitter at bedside. Calm and cooperative, following commands, questions all medications being administered, pleasant. BLE +1-+2 edema noted, BLE weakness, 4/5, RLE weaker than LLE, with baseline neuropathy in BUE/BLE otherwise CMS intact. Neuro's intact except for slight WFD. Up A1-2 GB and walker. Sitter at bedside. Voiding adequately, incont at time, uses urinal. +BS, passing flatus, no BM today. Mod CHO, advanced to DD3 with thin liquid diet, , 298, 212 sliding scale insulin given. Tolerates pills whole with water. Plans to discharge to TCU, pending. Nrsg will continue to monitor.

## 2019-06-28 NOTE — PROGRESS NOTES
[facility]  Hospitalist Progress Note  Henrique Garcia MD  06/28/2019    Assessment & Plan   Suman Burton is a 83 year old male admitted on 6/25/2019 for angina and underwent LHC. Patient was being monitored post heart cath when he had acute weakness, word finding difficulties and possible right sided weakness. He was formally admitted as having a possible stroke.     Suspected iatrogenic ischemic stroke vs TIA post LHC  Expressive aphasia, word finding difficulties, trouble with object identification, and RN noted dragging RLE during ambulation post Lt/Rt heart cath with PCI. Code stroke called. Head CT and perfusion unremarkable. CT head neck with contrast revealed: High-grade stenosis versus short segment occlusion of the right proximal cavernous ICA. Short segment moderate stenosis of the left supraclinoid ICA. Basilar artery and proximal anterior, middle, and posterior cerebral arteries are widely patent. 50% stenosis of proximal left subclavian artery and proximal left vertebral artery. 80% stenosis of proximal Rt ICA. Reviewed preliminary imaging with Neurology, tPA not given as symptoms mild and quickly improving. Cardiology called for Hospitalist to assume care.  - his symptoms have continued to improve.  - ECHO shows LVEF 60-65% with mild LAE, no shunt but sub-optimal bubble study  - permissive hypertension, blood pressures are improved.  - neurology consultation noted  - PT/OT/SLP following, on DD3 with nectar, will likely need TCU     CAD s/p HIWOT PCI to proximal RCA, 6/25/19  Proximal CX to Mid Cx 50% stenosed. Proximal RCA 95% stenosed.   Successful PCI to proximal RCA with HIWOT, noted mild pulmonary HTN, moderate proximal Cx lesion. - Per cardiology ASA 81mg daily, plavix 75 mg daily, and warfarin x1 month then continue with warfarin/plavix for 12 months  - posting pharmacy to dose warfarin     Delirium secondary to cvi/atypical pneumonia  - patient had some agitated episodes of  "delirium, coello placed for retention  - had multiple code 21 and was on restraints at times  - he has improved, psychiatry consulted  - he is clearing and improving  - zyprexa at night    Bilateral pulmonary infiltrates  -- cxr shows bilateral patchy infiltrates, edema vs atypical infection, echo is normal lvef  -- check procalcitonion is 0.45  -- will treat with levaquin    Hypertension  - continue amlodipine      Chronic atrial fibrillation  - telemetry  - Resume warfarin, INR therapeutic     Type 2 diabetes  - BG is uptrending as patient eating more  - resume glipizide, saxagliptin, but hold  metformin while inpatient.  - sliding scale insulin medium dose. Hypoglycemia protocol.       Bilateral conjunctivitis  -- tobra eye gtt    Diet: mod cho  DVT Prophylaxis: Warfarin/ INR therapeutic  Coello Catheter: not present  Code Status: Full        Disposition Plan  -- anticipate to TCU in 1-2 days       Interval History   -- discussed with RN  -- wife at bedside  -- eating improved  -- decrease in delirium symptoms    -Data reviewed today: I reviewed all new labs and imaging over the last 24 hours. I personally reviewed no images or EKG's today.    Physical Exam   Heart Rate: 76, Blood pressure 173/85, pulse 75, temperature 97.7  F (36.5  C), temperature source Oral, resp. rate 18, height 1.727 m (5' 7.99\"), weight 66.2 kg (146 lb), SpO2 96 %.  Vitals:    06/25/19 0651   Weight: 66.2 kg (146 lb)     Vital Signs with Ranges  Temp:  [97.7  F (36.5  C)-98.1  F (36.7  C)] 97.7  F (36.5  C)  Heart Rate:  [70-87] 76  Resp:  [18-24] 18  BP: (160-174)/(85-93) 173/85  SpO2:  [92 %-97 %] 96 %  I/O's Last 24 hours  I/O last 3 completed shifts:  In: 1020 [P.O.:1020]  Out: 575 [Urine:575]    Constitutional: Awake, alert, cooperative, no apparent distress, more alert and oriented  Respiratory: Clear to auscultation bilaterally, no crackles or wheezing  Cardiovascular: Regular rate and rhythm, normal S1 and S2, and no murmur " noted  GI: Normal bowel sounds, soft, non-distended, non-tender  Skin/Integumen: No rashes, no cyanosis, no edema  Other:      Medications   All medications I am responsible for were reviewed.    - MEDICATION INSTRUCTIONS -       - MEDICATION INSTRUCTIONS -       Percutaneous Coronary Intervention orders placed (this is information for BPA alerting)       ACE/ARB/ARNI NOT PRESCRIBED       Warfarin Therapy Reminder         amLODIPine  5 mg Oral Daily     aspirin  81 mg Oral Daily     digoxin  125 mcg Oral Daily     insulin aspart  1-7 Units Subcutaneous TID AC     insulin aspart  1-5 Units Subcutaneous At Bedtime     metoprolol succinate ER  25 mg Oral Daily     mirtazapine  15 mg Oral At Bedtime     OLANZapine zydis  10 mg Oral At Bedtime     oxybutynin ER  15 mg Oral Daily     rosuvastatin  10 mg Oral Daily     senna-docusate  1 tablet Oral BID    Or     senna-docusate  2 tablet Oral BID     tobramycin  1 drop Both Eyes 4x Daily     warfarin  3 mg Oral ONCE at 18:00        Data   Recent Labs   Lab 06/28/19  0713 06/27/19  0726 06/26/19  0818 06/26/19  0347 06/25/19  0730   WBC 8.6 8.7  --  6.6 7.8   HGB 14.4 15.0  --  14.9 13.8   MCV 91 91  --  91 91   * 128*  --  114* 129*   INR 2.24* 1.70*  --  1.45* 1.64*    143 142 141 142   POTASSIUM 3.7 3.9 4.6 3.8 4.1   CHLORIDE 110* 113* 113* 109 112*   CO2 25 24 22 22 23   BUN 30 23 23 24 34*   CR 1.52* 1.43* 1.33* 1.30* 1.35*   ANIONGAP 7 6 7 10 7   VINCENT 10.0 10.4* 9.9 9.9 10.1   * 150* 157* 90 164*   ALBUMIN  --   --   --   --  3.4   PROTTOTAL  --   --   --   --  6.7*   BILITOTAL  --   --   --   --  0.8   ALKPHOS  --   --   --   --  74   ALT  --   --   --   --  20   AST  --   --   --   --  20   TROPI  --   --  0.078* 0.069*  --        No results found for this or any previous visit (from the past 24 hour(s)).    Henrique Garcia MD  Text Page  (7am to 6pm)

## 2019-06-28 NOTE — PLAN OF CARE
Shift [id number]: VSS ex elevated BP. JANIE orientation--confused, arouses to pain/gentle touch/voice but inconsistent, speech is slow/quiet. Lungs: clear in upper lobes, diminished in bases throughout, 3 L O2 NC. BS hypo, no flatus, no BM. Voided: 75 ml in addition to urine occurrence x 1 following admin of IV lasix 40 mg-- urine incontinence. Up 1-2 assist. Diet: DD1; nectar thickened. Denies pain. Took pills w/ applesauce. Bedside attendant in place. Pt asleep for majority of shift. Tele: A-fib w/ CVR.

## 2019-06-28 NOTE — CONSULTS
Consult Date:  06/28/2019      PSYCHIATRIC CONSULTATION      REASON FOR CONSULTATION:  Post-CBI delirium.      REQUESTING CLINICIAN:   Julián Tucker MD      CHIEF COMPLAINT:  Post-CBI delirium.      HISTORY OF PRESENT ILLNESS:  Suman Burton is an 83-year-old male with no reported psychiatric history that was admitted to the hospital under observation on the stroke unit after a suspected iatrogenic ischemic stroke versus TIA versus metabolic encephalopathy.  The patient began demonstrating expressive aphasia, word finding difficulties, and trouble with object identification per Dr. Garcia's admission note.  He was also noted to be dragging his right lower extremity during ambulation status post heart catheterization with PCI and a code stroke was called.  CT head and neck with contrast demonstrated high-grade stenosis versus short segment occlusion of the right proximal cavernous ICA and short segment moderate stenosis of the left supraclinoid ICA.  Also revealed 50% stenosis of the proximal left subclavian artery and proximal left vertebral artery with 80% stenosis of proximal right ICA.  Findings were reviewed with Neurology and TPA was withheld as symptoms were reportedly mild and improving quickly, though plan will be to have a formal Neurology consultation which has yet to take place.  Our service was consulted given variable observations of agitation and confusion consistent with delirium.  Two days ago, the patient had multiple code 21s for agitation and confusion and received a collective dosing of 15 mg of olanzapine.  Yesterday, he was continued on an evening dose of olanzapine 10 mg and, by all reports, the patient slept through the night without incident.  He additionally received scheduled Seroquel 25 mg at night, which was a new medication for him on admission, as well as his prior to admission mirtazapine 15 mg.      On my interview this morning, the patient was calm and collected.  He is, however,  disoriented thinking that we were in [place] and the year was 2023.  He does, however, know he is in the hospital and understands the circumstances of his admission and, from his perspective, he was told that he may have had a stroke.  He tells me he lives in [place] and is here visiting his wife's family, though the actual incident informing his admission is a bit muddied in his mind.  He tells me that he is not able to verbalize as well at present and he tells me his vocabulary seems blunted.  He has no recollection of periods of dyscontrol nor periods of confusion or fearfulness.  We discussed plan to continue targeting treatment towards the delirium with special emphasis on maintaining good sleep through the night to help bolster his recovery and he was supportive of this.      PAST PSYCHIATRIC HISTORY:  As per HPI.      PAST MEDICAL HISTORY:  As per HPI.      FAMILY HISTORY:  Noncontributory.      SOCIAL HISTORY:  As per HPI.      REVIEW OF SYSTEMS:  Ten-point review of systems was not completed.      ALLERGIES:  No known drug allergies.      PRIOR TO ADMISSION MEDICATIONS:     1. Acetaminophen.     2. Amlodipine.     3. Aspirin.     4. Clopidogrel.     5. Digoxin.     6. Furosemide.     7. Glipizide.     8. Isosorbide mononitrate.     9. Loperamide.     10. Metformin.     11. Metoprolol.     12. Mirtazapine 50 mg each day at bedtime.   13. Oxybutynin.     14. Pramoxine.     15. Rosuvastatin.     16. Saxagliptin.     17. Warfarin.      MENTAL STATUS EXAMINATION:  Elderly male lying supine in hospital bed with nurse at bedside.  He was calm and cooperative with good eye contact.  He was awake and alert and oriented to situation and circumstance, but disoriented to state and year (thought we were in [place] and it was 2023).  He was cooperative and pleasant.  Mood was stable.  Affect was mood congruent, stable, and appropriately reactive.  Speech was fluent, spontaneous, clear and nonpressured.  Thoughts  were linear, logical and goal directed.  No observation of delusional content.  No observation of response to internal stimuli.  No fluctuation in cognition appreciated.  Memory intact to some recent and distant events, though muddied acutely.  Attendance and concentration were well maintained.  Insight and judgment are currently intact.  Denies suicidal or homicidal ideation, intent, or plan.      IMPRESSION:     1.  Delirium.   2.  Suspected iatrogenic ischemic stroke versus TIA versus metabolic encephalopathy (neuro workup pending).      ASSESSMENT:  Suman Bonilla is an 83-year-old male that was admitted to the hospital with concerns of a CBI.  He is on the neuro stroke observation unit and is pending evaluation by Neurology.  The patient has had episodic behavioral difficulties consistent with delirium.  A couple days prior, he received multiple code ones and a collective dosing of 15 mg of olanzapine.  Yesterday, he received an evening dose of olanzapine as well as a low dose of Seroquel and, per report, slept through the night.  He is calm and pleasant and well-controlled behaviorally at this time.  I would continue with the plan to use scheduled 10 mg of olanzapine at night as we want to maintain a consistent sleep schedule.  I will discontinue the evening Seroquel for now, however, as it is a low dose and redundant to the targets of olanzapine.  Pending his progress, we can pull back on the evening olanzapine versus utilizing a lower dose in a p.r.n. fashion at strategic times during the day.      PLAN:   1.  Continue olanzapine 10 mg at bedtime scheduled.   2.  Will discontinue Seroquel 25 mg at bedtime.   3.  Continue mirtazapine 50 mg at bedtime.   4.  Utilize olanzapine 2.5 mg b.i.d. p.r.n. for emerging agitation or dyscontrol.   5.  Reconsult Psychiatry; we will continue to follow.         GERARDO SHAH DO             D: 06/28/2019   T: 06/28/2019   MT: LILIBETH      Name:     SUMAN BONILLA   MRN:       [id number]        Account:       [id number]   :      1935           Consult Date:  2019      Document: [id number]       cc: Jamie Guerrier MD

## 2019-06-29 ENCOUNTER — APPOINTMENT (OUTPATIENT)
Dept: PHYSICAL THERAPY | Facility: CLINIC | Age: 84
DRG: 981 | End: 2019-06-29
Payer: MEDICARE

## 2019-06-29 LAB
GLUCOSE BLDC GLUCOMTR-MCNC: 166 MG/DL (ref 70–99)
GLUCOSE BLDC GLUCOMTR-MCNC: 184 MG/DL (ref 70–99)
GLUCOSE BLDC GLUCOMTR-MCNC: 239 MG/DL (ref 70–99)
INR PPP: 2.78 (ref 0.86–1.14)

## 2019-06-29 PROCEDURE — 12000000 ZZH R&B MED SURG/OB

## 2019-06-29 PROCEDURE — 25000132 ZZH RX MED GY IP 250 OP 250 PS 637: Mod: GY | Performed by: PHYSICIAN ASSISTANT

## 2019-06-29 PROCEDURE — 00000146 ZZHCL STATISTIC GLUCOSE BY METER IP

## 2019-06-29 PROCEDURE — 25000132 ZZH RX MED GY IP 250 OP 250 PS 637: Mod: GY | Performed by: INTERNAL MEDICINE

## 2019-06-29 PROCEDURE — 85610 PROTHROMBIN TIME: CPT | Performed by: INTERNAL MEDICINE

## 2019-06-29 PROCEDURE — 99233 SBSQ HOSP IP/OBS HIGH 50: CPT | Performed by: INTERNAL MEDICINE

## 2019-06-29 PROCEDURE — 36415 COLL VENOUS BLD VENIPUNCTURE: CPT | Performed by: INTERNAL MEDICINE

## 2019-06-29 PROCEDURE — 93005 ELECTROCARDIOGRAM TRACING: CPT

## 2019-06-29 PROCEDURE — 25000132 ZZH RX MED GY IP 250 OP 250 PS 637: Mod: GY | Performed by: PSYCHIATRY & NEUROLOGY

## 2019-06-29 PROCEDURE — 99221 1ST HOSP IP/OBS SF/LOW 40: CPT | Performed by: PSYCHIATRY & NEUROLOGY

## 2019-06-29 PROCEDURE — 93010 ELECTROCARDIOGRAM REPORT: CPT | Performed by: INTERNAL MEDICINE

## 2019-06-29 PROCEDURE — 97530 THERAPEUTIC ACTIVITIES: CPT | Mod: GP

## 2019-06-29 RX ORDER — OLANZAPINE 5 MG/1
20 TABLET, ORALLY DISINTEGRATING ORAL
Status: DISCONTINUED | OUTPATIENT
Start: 2019-06-29 | End: 2019-06-30

## 2019-06-29 RX ORDER — OLANZAPINE 5 MG/1
20 TABLET, ORALLY DISINTEGRATING ORAL AT BEDTIME
Status: DISCONTINUED | OUTPATIENT
Start: 2019-06-29 | End: 2019-06-29

## 2019-06-29 RX ORDER — OLANZAPINE 5 MG/1
5 TABLET, ORALLY DISINTEGRATING ORAL ONCE
Status: COMPLETED | OUTPATIENT
Start: 2019-06-29 | End: 2019-06-29

## 2019-06-29 RX ORDER — WARFARIN SODIUM 2 MG/1
2 TABLET ORAL
Status: COMPLETED | OUTPATIENT
Start: 2019-06-29 | End: 2019-06-29

## 2019-06-29 RX ORDER — MIRTAZAPINE 15 MG/1
30 TABLET, FILM COATED ORAL AT BEDTIME
Status: DISCONTINUED | OUTPATIENT
Start: 2019-06-29 | End: 2019-07-02 | Stop reason: HOSPADM

## 2019-06-29 RX ADMIN — TOBRAMYCIN 1 DROP: 3 SOLUTION OPHTHALMIC at 15:04

## 2019-06-29 RX ADMIN — TOBRAMYCIN 1 DROP: 3 SOLUTION OPHTHALMIC at 10:33

## 2019-06-29 RX ADMIN — METOPROLOL SUCCINATE 25 MG: 25 TABLET, EXTENDED RELEASE ORAL at 10:22

## 2019-06-29 RX ADMIN — LEVOFLOXACIN 250 MG: 250 TABLET, FILM COATED ORAL at 10:28

## 2019-06-29 RX ADMIN — GLIPIZIDE 10 MG: 10 TABLET ORAL at 10:28

## 2019-06-29 RX ADMIN — OLANZAPINE 5 MG: 5 TABLET, ORALLY DISINTEGRATING ORAL at 10:28

## 2019-06-29 RX ADMIN — ISOSORBIDE MONONITRATE 30 MG: 30 TABLET, EXTENDED RELEASE ORAL at 10:27

## 2019-06-29 RX ADMIN — GLIPIZIDE 10 MG: 10 TABLET ORAL at 15:59

## 2019-06-29 RX ADMIN — OXYBUTYNIN CHLORIDE 15 MG: 10 TABLET, EXTENDED RELEASE ORAL at 10:27

## 2019-06-29 RX ADMIN — TOBRAMYCIN 1 DROP: 3 SOLUTION OPHTHALMIC at 19:03

## 2019-06-29 RX ADMIN — ACETAMINOPHEN 650 MG: 325 TABLET, FILM COATED ORAL at 05:26

## 2019-06-29 RX ADMIN — WARFARIN SODIUM 2 MG: 2 TABLET ORAL at 19:00

## 2019-06-29 RX ADMIN — AMLODIPINE BESYLATE 5 MG: 5 TABLET ORAL at 10:27

## 2019-06-29 RX ADMIN — OLANZAPINE 20 MG: 5 TABLET, ORALLY DISINTEGRATING ORAL at 19:04

## 2019-06-29 RX ADMIN — DIGOXIN 125 MCG: 0.12 TABLET ORAL at 10:21

## 2019-06-29 RX ADMIN — SAXAGLIPTIN 2.5 MG: 2.5 TABLET, FILM COATED ORAL at 15:59

## 2019-06-29 RX ADMIN — ASPIRIN 81 MG 81 MG: 81 TABLET ORAL at 10:28

## 2019-06-29 ASSESSMENT — ACTIVITIES OF DAILY LIVING (ADL)
ADLS_ACUITY_SCORE: 14

## 2019-06-29 NOTE — CONSULTS
"[facility] Psychiatric Progress Note      Interval History:   Pt seen on station 66. His wife, sister in law and her  were also present.  He admits that he was confused last pm.  He thought the staff \"were criminals and were conspiring to kidnap him\".  He knows he is at [facility] and he knew who his guests were.  He did get zyprexa 5 mg this am and is scheduled to get 20 mg this evening.  His medical conditions may be contributing his delirium. He is also on remeron 30 mg at night. Oxybutynin may also be contributing to his confusion.     Review of systems:   The Review of Systems is negative other than noted in the HPI     Medications:     Current Facility-Administered Medications   Medication     acetaminophen (TYLENOL) Suppository 650 mg     acetaminophen (TYLENOL) tablet 650 mg     amLODIPine (NORVASC) tablet 5 mg     aspirin (ASA) chewable tablet 81 mg     bisacodyl (DULCOLAX) EC tablet 5 mg    Or     bisacodyl (DULCOLAX) EC tablet 10 mg    Or     bisacodyl (DULCOLAX) EC tablet 15 mg     glucose gel 15-30 g    Or     dextrose 50 % injection 25-50 mL    Or     glucagon injection 1 mg     digoxin (LANOXIN) tablet 125 mcg     glipiZIDE (GLUCOTROL) tablet 10 mg     Hold: metformin and metformin containing medications on day of the procedure and for 48 hours after IV contrast given- Patients with acute kidney injury or severe chronic kidney disease (stage IV or stage V; i.e., eGFR less than 30)     hydrALAZINE (APRESOLINE) injection 10-20 mg     insulin aspart (NovoLOG) inj (RAPID ACTING)     insulin aspart (NovoLOG) inj (RAPID ACTING)     isosorbide mononitrate (IMDUR) 24 hr tablet 30 mg     levofloxacin (LEVAQUIN) tablet 250 mg     loperamide (IMODIUM) capsule 2 mg     magnesium citrate solution 148 mL     magnesium hydroxide (MILK OF MAGNESIA) suspension 30 mL     Medication Instruction     metoprolol succinate ER (TOPROL-XL) 24 hr tablet 25 mg     mirtazapine (REMERON) tablet 30 mg     " multivitamin, therapeutic (THERA-VIT) tablet     naloxone (NARCAN) injection 0.1-0.4 mg     OLANZapine (zyPREXA) injection 10 mg     OLANZapine zydis (zyPREXA) ODT tab 20 mg     ondansetron (ZOFRAN-ODT) ODT tab 4 mg    Or     ondansetron (ZOFRAN) injection 4 mg     oxybutynin ER (DITROPAN-XL) 24 hr tablet 15 mg     Patient is already receiving anticoagulation with heparin, enoxaparin (LOVENOX), warfarin (COUMADIN)  or other anticoagulant medication     Percutaneous Coronary Intervention orders placed (this is information for BPA alerting)     prochlorperazine (COMPAZINE) injection 5 mg    Or     prochlorperazine (COMPAZINE) tablet 5 mg    Or     prochlorperazine (COMPAZINE) Suppository 12.5 mg     Reason ACE/ARB/ARNI order not selected     rosuvastatin (CRESTOR) tablet 10 mg     saxagliptin (ONGLYZA) tablet 2.5 mg     senna-docusate (SENOKOT-S/PERICOLACE) 8.6-50 MG per tablet 1 tablet    Or     senna-docusate (SENOKOT-S/PERICOLACE) 8.6-50 MG per tablet 2 tablet     tobramycin (TOBREX) 0.3 % ophthalmic solution 1 drop     Warfarin Therapy Reminder (Check START DATE - warfarin may be starting in the FUTURE)         Mental Status Examination:     Appearance:  dressed in hospital scrubs  Eye Contact:  fair  Speech:  paucity of speech  Psychomotor Behavior:  physical retardation  Mood:  depressed  Affect:  intensity is blunted  Thought Process:  linear no loose associations  Thought Content:  no evidence of suicidal ideation or homicidal ideation  Oriented to:  person and somewhat to place  Attention Span and Concentration:  limited  Recent and Remote Memory:  limited  Fund of Knowledge: appropriate  Muscle Strength and Tone: normal  Gait and Station: Normal  Insight:  partial  Judgment:  fair        Labs:     Recent Results (from the past 24 hour(s))   Glucose by meter    Collection Time: 06/28/19  5:07 PM   Result Value Ref Range    Glucose 212 (H) 70 - 99 mg/dL   Glucose by meter    Collection Time: 06/28/19  9:14 PM    Result Value Ref Range    Glucose 282 (H) 70 - 99 mg/dL   Glucose by meter    Collection Time: 06/29/19  1:47 AM   Result Value Ref Range    Glucose 184 (H) 70 - 99 mg/dL   EKG 12-lead, tracing only    Collection Time: 06/29/19 11:08 AM   Result Value Ref Range    Interpretation ECG Click View Image link to view waveform and result          Impression:   Delirium nos      DIagnoses:     Axis I: Delirium nos     Axis II: deferred    Axis III: see medical    Axis IV: social stressors, medical     Axis V: 38         Plan:   1. Written information given on medications. Side effects, risks, benefits reviewed.  2. zyprexa 20 mg po at bedtime for delirium - change to 6 pm for sundowners   3. remeron 30 mg for sleep  4. Consider if he needs oxybutynin as it could be contributing to confusion  5.  Call psychiatry for follow up if needed      Attestation:  Patient has been seen and evaluated by me,  Mauricio Huerta MD  Total amount of time: 30 minutes

## 2019-06-29 NOTE — PLAN OF CARE
Discharge Planner PT   Patient plan for discharge: did not state  Current status: Pt in bed upon PT arrival, sitter present. Pt was confused, oriented only to self but calmer than earlier this morning and agreeable to PT. Pt requires Ifeoma for supine>sit with increased time required and use of side rail. Flexed posture with R lateral lean in sitting at EOB but pt able to correct with verbal cues only. Sit<>stand from EOB to FWW with Ifeoma. Pt requested to use the urinal in standing. Dependent for urinal management, modA for standing balance with cues for upright posture and correcting R lateral lean. Pt was able to stand for 8 mins total. Bed>chair with FWW and modA of 2 for balance and walker management, cues for sequencing, turningand controlled descent.  Barriers to return to prior living situation: level of assist required, weakness, fall risk, confusion  Recommendations for discharge: TCU  Rationale for recommendations: Pt would benefit from PT at TCU to progress strength, balance and mobility so pt can return home safely.        Entered by: Leah Naidu 06/29/2019 10:05 AM

## 2019-06-29 NOTE — PROGRESS NOTES
[facility]  Hospitalist Progress Note  Henrique Garcia MD  06/29/2019    Assessment & Plan   Suman Burton is a 83 year old male admitted on 6/25/2019 for angina and underwent LHC. Patient was being monitored post heart cath when he had acute weakness, word finding difficulties and possible right sided weakness. He was formally admitted as having a possible stroke.     Suspected iatrogenic ischemic stroke vs TIA post LHC  Expressive aphasia, word finding difficulties, trouble with object identification, and RN noted dragging RLE during ambulation post Lt/Rt heart cath with PCI. Code stroke called. Head CT and perfusion unremarkable. CT head neck with contrast revealed: High-grade stenosis versus short segment occlusion of the right proximal cavernous ICA. Short segment moderate stenosis of the left supraclinoid ICA. Basilar artery and proximal anterior, middle, and posterior cerebral arteries are widely patent. 50% stenosis of proximal left subclavian artery and proximal left vertebral artery. 80% stenosis of proximal Rt ICA. Reviewed preliminary imaging with Neurology, tPA not given as symptoms mild and quickly improving. Cardiology called for Hospitalist to assume care.  - his symptoms have continued to improve.  - ECHO shows LVEF 60-65% with mild LAE, no shunt but sub-optimal bubble study  - permissive hypertension, blood pressures are improved.  - neurology consultation noted  - PT/OT/SLP following, on DD3 with nectar, will likely need TCU     CAD s/p HIWOT PCI to proximal RCA, 6/25/19  Proximal CX to Mid Cx 50% stenosed. Proximal RCA 95% stenosed.   Successful PCI to proximal RCA with HIWOT, noted mild pulmonary HTN, moderate proximal Cx lesion. - Per cardiology ASA 81mg daily, plavix 75 mg daily, and warfarin x1 month then continue with warfarin/plavix for 12 months  - posting pharmacy to dose warfarin     Delirium secondary to cvi/atypical pneumonia  - patient had some agitated episodes of  "delirium, coello placed for retention  - had multiple code 21 and was on restraints at times  - had overnight agitation again, details not documented  - refused blood draw and zyprexa  - re-consult psychiatry  - discontinue telemetry/oximetry and nighttime vitals  - increase remeron 15 to 30 mg and at bedtime zyprexa from 10 to 20 mg, will await psychiatry final recommendations.    Bilateral pulmonary infiltrates  -- cxr shows bilateral patchy infiltrates, edema vs atypical infection, echo is normal lvef  --  procalcitonion is 0.45  -- continue with levaquin    Hypertension  - continue amlodipine      Chronic atrial fibrillation  - discontinue telemetry with stable HR  - Resume warfarin, INR therapeutic     Type 2 diabetes  - BG is uptrending as patient eating more  - resume glipizide, saxagliptin, but hold  metformin while inpatient.  - sliding scale insulin medium dose. Hypoglycemia protocol.       Bilateral conjunctivitis  -- tobra eye gtt    Diet: mod cho  DVT Prophylaxis: Warfarin/ INR therapeutic  Coello Catheter: not present  Code Status: Full        Disposition Plan  -- anticipate to TCU in 1-2 days       Interval History   -- discussed with RN  -- wife at bedside  -- eating improved  -- decrease in delirium symptoms    -Data reviewed today: I reviewed all new labs and imaging over the last 24 hours. I personally reviewed no images or EKG's today.    Physical Exam   Heart Rate: 87, Blood pressure (!) 152/91, pulse 75, temperature 98.5  F (36.9  C), temperature source Oral, resp. rate 16, height 1.727 m (5' 7.99\"), weight 66.2 kg (146 lb), SpO2 (!) 89 %.  Vitals:    06/25/19 0651   Weight: 66.2 kg (146 lb)     Vital Signs with Ranges  Temp:  [97.6  F (36.4  C)-98.5  F (36.9  C)] 98.5  F (36.9  C)  Heart Rate:  [76-97] 87  Resp:  [16-18] 16  BP: (144-196)/() 152/91  SpO2:  [89 %-96 %] 89 %  I/O's Last 24 hours  I/O last 3 completed shifts:  In: 1040 [P.O.:1040]  Out: 925 [Urine:925]    Constitutional:now " calm and cooperative, wife at bediside  Respiratory: Clear to auscultation bilaterally, no crackles or wheezing  Cardiovascular: Regular rate and rhythm, normal S1 and S2, and no murmur noted  GI: Normal bowel sounds, soft, non-distended, non-tender  Skin/Integumen: No rashes, no cyanosis, no edema  Other:      Medications   All medications I am responsible for were reviewed.    - MEDICATION INSTRUCTIONS -       - MEDICATION INSTRUCTIONS -       Percutaneous Coronary Intervention orders placed (this is information for BPA alerting)       ACE/ARB/ARNI NOT PRESCRIBED       Warfarin Therapy Reminder         amLODIPine  5 mg Oral Daily     aspirin  81 mg Oral Daily     digoxin  125 mcg Oral Daily     glipiZIDE  10 mg Oral BID AC     insulin aspart  1-7 Units Subcutaneous TID AC     insulin aspart  1-5 Units Subcutaneous At Bedtime     isosorbide mononitrate  30 mg Oral Daily     levofloxacin  250 mg Oral Q24H     metoprolol succinate ER  25 mg Oral Daily     mirtazapine  30 mg Oral At Bedtime     multivitamin, therapeutic   Oral Daily     OLANZapine zydis  20 mg Oral At Bedtime     oxybutynin ER  15 mg Oral Daily     rosuvastatin  10 mg Oral Daily     saxagliptin  2.5 mg Oral Daily     senna-docusate  1 tablet Oral BID    Or     senna-docusate  2 tablet Oral BID     tobramycin  1 drop Both Eyes 4x Daily        Data   Recent Labs   Lab 06/28/19  0713 06/27/19  0726 06/26/19  0818 06/26/19  0347 06/25/19  0730   WBC 8.6 8.7  --  6.6 7.8   HGB 14.4 15.0  --  14.9 13.8   MCV 91 91  --  91 91   * 128*  --  114* 129*   INR 2.24* 1.70*  --  1.45* 1.64*    143 142 141 142   POTASSIUM 3.7 3.9 4.6 3.8 4.1   CHLORIDE 110* 113* 113* 109 112*   CO2 25 24 22 22 23   BUN 30 23 23 24 34*   CR 1.52* 1.43* 1.33* 1.30* 1.35*   ANIONGAP 7 6 7 10 7   VINCENT 10.0 10.4* 9.9 9.9 10.1   * 150* 157* 90 164*   ALBUMIN  --   --   --   --  3.4   PROTTOTAL  --   --   --   --  6.7*   BILITOTAL  --   --   --   --  0.8   ALKPHOS  --    --   --   --  74   ALT  --   --   --   --  20   AST  --   --   --   --  20   TROPI  --   --  0.078* 0.069*  --        No results found for this or any previous visit (from the past 24 hour(s)).    Henrique Garcia MD  Text Page  (7am to 6pm)

## 2019-06-29 NOTE — PLAN OF CARE
OT: attempted, pt using the urinal and spoke with nursing who said to hold off at this time. Pt is tired and needs to rest.

## 2019-06-29 NOTE — PROVIDER NOTIFICATION
"Paged Dr. Garcia, \"Pt becoming agitated & verbally threatening. Did not sleep well over night. IM Zyprexa available, wondering if we could get an ODT order as well. Thanks \"    ODT zyprexa ordered x1  "

## 2019-06-29 NOTE — PROVIDER NOTIFICATION
"Writer paged Dr. Garcia, notified, Patient confusion, insist \"he's not in the hospital and everyone has invaded his home,\" \"What the hell are you doing in here\" currently refusing lab draws, BG checks and refusing ALL medication, Writer called wife to have patient talk with patient. Able to redirect some, currently has been calm with no combative episodes. Are you come and talk with patient, wife will be here in 30-45min. Please advise.  "

## 2019-06-29 NOTE — PROGRESS NOTES
Care Transition Initial Assessment - SW     Met with: Patient and Family    Active Problems:    SOB (shortness of breath)    Chest pain due to myocardial ischemia, unspecified ischemic chest pain type    Status post coronary angiogram    Stroke-like symptom    Delirium       DATA  Lives With: spouse   Living Arrangements: house     Description of Support System: Supportive, Involved  Who is your support system?: Wif  Identified issues/concerns regarding health management: Discharge plans.   Transportation Anticipated: family or friend will provide(pt. reports he does not drive)    ASSESSMENT  Cognitive Status:  Unable to assess as patient sleeping.   Concerns to be addressed: Patient is 83 year old male, admitted inpatient for SOB on 06/25/19. Per record, patient admitted from home with spouse. Patient was independent with ADL prior to hospitalization. SW met with patient's spouse and 2 other family member to discuss discharge plans.  Reviewed therapy recommendation of TCU. Patients wife verbalized an understanding. TCU list provided. Family to review list and get back to SW.    PLAN  SW will continue to follow.

## 2019-06-29 NOTE — PLAN OF CARE
Suspected small L CVA vs TIA s/p PCI to RCA. Pt confused to place, time, and situation and refused all lab draws, meds, meals, cares and therapies, no combative episodes, became more cooperative when spouse arrive at 10am. MD notified, PRN Zyprexa given. Psych consulted again, see note. Last assessment patient A&O x3, confused to time. VSS, on 1L NC. LS diminished. Tele Afib w/ CVR prior to d/jerrod by Dr. Garcia. Denies pain. Sitter at bedside. Continues to question ALL meds being administered, and has refused some meds and BG checks. Neuro's and CMS unchanged, intact except BLE +1-+2 edema, BLE weakness, 4/5, RLE weaker than LLE, with baseline neuropathy in BUE/BLE otherwise CMS intact. Up A2 GB and walker to chair. Voiding adequately, incont at time, uses urinal. +BS, passing flatus, no BM today. DD3 Mod CHO with thin liquid diet, , 166, refused sliding scale insulin. Tolerates pills whole with water. INR 2.78. Plans to discharge to TCU, pending. Nrsg will continue to monitor.

## 2019-06-29 NOTE — PLAN OF CARE
VSS, tele afib, slight HTN this a.m. A/O x 4, pleasant. Neuro unchanged. 1LNC. R LL with fine crackles, voids, +BS going on day 4 since last BM.  at HS for 2u insulin. 184 at 0200. 1:1 attendant.

## 2019-06-30 ENCOUNTER — APPOINTMENT (OUTPATIENT)
Dept: SPEECH THERAPY | Facility: CLINIC | Age: 84
DRG: 981 | End: 2019-06-30
Payer: MEDICARE

## 2019-06-30 ENCOUNTER — APPOINTMENT (OUTPATIENT)
Dept: PHYSICAL THERAPY | Facility: CLINIC | Age: 84
DRG: 981 | End: 2019-06-30
Payer: MEDICARE

## 2019-06-30 ENCOUNTER — APPOINTMENT (OUTPATIENT)
Dept: OCCUPATIONAL THERAPY | Facility: CLINIC | Age: 84
DRG: 981 | End: 2019-06-30
Payer: MEDICARE

## 2019-06-30 LAB
ANION GAP SERPL CALCULATED.3IONS-SCNC: 9 MMOL/L (ref 3–14)
BUN SERPL-MCNC: 37 MG/DL (ref 7–30)
CALCIUM SERPL-MCNC: 9.9 MG/DL (ref 8.5–10.1)
CHLORIDE SERPL-SCNC: 112 MMOL/L (ref 94–109)
CO2 SERPL-SCNC: 21 MMOL/L (ref 20–32)
CREAT SERPL-MCNC: 1.47 MG/DL (ref 0.66–1.25)
ERYTHROCYTE [DISTWIDTH] IN BLOOD BY AUTOMATED COUNT: 12.7 % (ref 10–15)
GFR SERPL CREATININE-BSD FRML MDRD: 43 ML/MIN/{1.73_M2}
GLUCOSE BLDC GLUCOMTR-MCNC: 132 MG/DL (ref 70–99)
GLUCOSE BLDC GLUCOMTR-MCNC: 161 MG/DL (ref 70–99)
GLUCOSE BLDC GLUCOMTR-MCNC: 295 MG/DL (ref 70–99)
GLUCOSE BLDC GLUCOMTR-MCNC: 337 MG/DL (ref 70–99)
GLUCOSE SERPL-MCNC: 144 MG/DL (ref 70–99)
HCT VFR BLD AUTO: 40.5 % (ref 40–53)
HGB BLD-MCNC: 14.3 G/DL (ref 13.3–17.7)
INR PPP: 2.63 (ref 0.86–1.14)
MCH RBC QN AUTO: 31.8 PG (ref 26.5–33)
MCHC RBC AUTO-ENTMCNC: 35.3 G/DL (ref 31.5–36.5)
MCV RBC AUTO: 90 FL (ref 78–100)
PLATELET # BLD AUTO: 125 10E9/L (ref 150–450)
POTASSIUM SERPL-SCNC: 3.8 MMOL/L (ref 3.4–5.3)
RBC # BLD AUTO: 4.49 10E12/L (ref 4.4–5.9)
SODIUM SERPL-SCNC: 142 MMOL/L (ref 133–144)
WBC # BLD AUTO: 7.6 10E9/L (ref 4–11)

## 2019-06-30 PROCEDURE — 92526 ORAL FUNCTION THERAPY: CPT | Mod: GN

## 2019-06-30 PROCEDURE — 25000132 ZZH RX MED GY IP 250 OP 250 PS 637: Mod: GY

## 2019-06-30 PROCEDURE — 80048 BASIC METABOLIC PNL TOTAL CA: CPT | Performed by: INTERNAL MEDICINE

## 2019-06-30 PROCEDURE — 85027 COMPLETE CBC AUTOMATED: CPT | Performed by: INTERNAL MEDICINE

## 2019-06-30 PROCEDURE — 12000000 ZZH R&B MED SURG/OB

## 2019-06-30 PROCEDURE — 97535 SELF CARE MNGMENT TRAINING: CPT | Mod: GO

## 2019-06-30 PROCEDURE — 00000146 ZZHCL STATISTIC GLUCOSE BY METER IP

## 2019-06-30 PROCEDURE — 25000132 ZZH RX MED GY IP 250 OP 250 PS 637: Mod: GY | Performed by: PSYCHIATRY & NEUROLOGY

## 2019-06-30 PROCEDURE — 97530 THERAPEUTIC ACTIVITIES: CPT | Mod: GP

## 2019-06-30 PROCEDURE — 25000132 ZZH RX MED GY IP 250 OP 250 PS 637: Mod: GY | Performed by: PHYSICIAN ASSISTANT

## 2019-06-30 PROCEDURE — 97530 THERAPEUTIC ACTIVITIES: CPT | Mod: GO

## 2019-06-30 PROCEDURE — 85610 PROTHROMBIN TIME: CPT | Performed by: INTERNAL MEDICINE

## 2019-06-30 PROCEDURE — 36415 COLL VENOUS BLD VENIPUNCTURE: CPT | Performed by: INTERNAL MEDICINE

## 2019-06-30 PROCEDURE — 25000132 ZZH RX MED GY IP 250 OP 250 PS 637: Mod: GY | Performed by: INTERNAL MEDICINE

## 2019-06-30 PROCEDURE — 99233 SBSQ HOSP IP/OBS HIGH 50: CPT | Performed by: INTERNAL MEDICINE

## 2019-06-30 RX ORDER — WARFARIN SODIUM 3 MG/1
3 TABLET ORAL
Status: DISCONTINUED | OUTPATIENT
Start: 2019-06-30 | End: 2019-06-30

## 2019-06-30 RX ORDER — FUROSEMIDE 40 MG
80 TABLET ORAL DAILY
Status: DISCONTINUED | OUTPATIENT
Start: 2019-06-30 | End: 2019-07-02 | Stop reason: HOSPADM

## 2019-06-30 RX ORDER — WARFARIN SODIUM 3 MG/1
3 TABLET ORAL
Status: COMPLETED | OUTPATIENT
Start: 2019-06-30 | End: 2019-06-30

## 2019-06-30 RX ORDER — OLANZAPINE 5 MG/1
20 TABLET, ORALLY DISINTEGRATING ORAL EVERY EVENING
Status: DISCONTINUED | OUTPATIENT
Start: 2019-06-30 | End: 2019-07-02 | Stop reason: HOSPADM

## 2019-06-30 RX ADMIN — ASPIRIN 81 MG 81 MG: 81 TABLET ORAL at 11:46

## 2019-06-30 RX ADMIN — SAXAGLIPTIN 2.5 MG: 2.5 TABLET, FILM COATED ORAL at 11:56

## 2019-06-30 RX ADMIN — ACETAMINOPHEN 650 MG: 325 TABLET, FILM COATED ORAL at 15:07

## 2019-06-30 RX ADMIN — FUROSEMIDE 80 MG: 40 TABLET ORAL at 15:07

## 2019-06-30 RX ADMIN — GLIPIZIDE 10 MG: 10 TABLET ORAL at 11:51

## 2019-06-30 RX ADMIN — DIGOXIN 125 MCG: 0.12 TABLET ORAL at 11:45

## 2019-06-30 RX ADMIN — MIRTAZAPINE 30 MG: 15 TABLET, FILM COATED ORAL at 20:01

## 2019-06-30 RX ADMIN — WARFARIN SODIUM 3 MG: 3 TABLET ORAL at 17:27

## 2019-06-30 RX ADMIN — OLANZAPINE 20 MG: 5 TABLET, ORALLY DISINTEGRATING ORAL at 17:53

## 2019-06-30 RX ADMIN — ACETAMINOPHEN 650 MG: 325 TABLET, FILM COATED ORAL at 20:24

## 2019-06-30 RX ADMIN — OXYBUTYNIN CHLORIDE 15 MG: 10 TABLET, EXTENDED RELEASE ORAL at 11:49

## 2019-06-30 RX ADMIN — METOPROLOL SUCCINATE 25 MG: 25 TABLET, EXTENDED RELEASE ORAL at 11:46

## 2019-06-30 RX ADMIN — ROSUVASTATIN CALCIUM 10 MG: 10 TABLET, FILM COATED ORAL at 20:01

## 2019-06-30 RX ADMIN — METFORMIN HYDROCHLORIDE 1000 MG: 500 TABLET, FILM COATED ORAL at 17:27

## 2019-06-30 RX ADMIN — LEVOFLOXACIN 250 MG: 250 TABLET, FILM COATED ORAL at 11:50

## 2019-06-30 RX ADMIN — INSULIN ASPART 4 UNITS: 100 INJECTION, SOLUTION INTRAVENOUS; SUBCUTANEOUS at 17:31

## 2019-06-30 RX ADMIN — ISOSORBIDE MONONITRATE 30 MG: 30 TABLET, EXTENDED RELEASE ORAL at 11:50

## 2019-06-30 RX ADMIN — TOBRAMYCIN 1 DROP: 3 SOLUTION OPHTHALMIC at 13:10

## 2019-06-30 RX ADMIN — AMLODIPINE BESYLATE 5 MG: 5 TABLET ORAL at 11:48

## 2019-06-30 RX ADMIN — SENNOSIDES AND DOCUSATE SODIUM 2 TABLET: 8.6; 5 TABLET ORAL at 11:51

## 2019-06-30 RX ADMIN — GLIPIZIDE 10 MG: 10 TABLET ORAL at 17:27

## 2019-06-30 ASSESSMENT — ACTIVITIES OF DAILY LIVING (ADL)
ADLS_ACUITY_SCORE: 22

## 2019-06-30 NOTE — PLAN OF CARE
Suspected small L CVA vs TIA s/p C.  Pt A&O, 3-4, intermittent confusion to situation. VSS,unable to wean off O2, 1L NC,sating mid low 90's. LS diminished. Neuro's and CMS unchanged, intact except BLE +1-+2 edema, BLE weakness, 4/5, RLE weaker than LLE, with baseline neuropathy in BUE/BLE, and baseline BUE tremors Tylenol given for back pain. Pt more cooperative with taking meds. Up A2 GB and walker to chair, and hallway/lounge in wheel chair with spouse. Voiding adequately, incont at time, uses urinal. +BS, passing flatus, senna given, no BM today. DD3 Mod CHO with thin liquid diet, , 337, refused insulin at lunch otherwise sliding scale insulin given per order. Prefers to take pills whole with water. TCU, pending. Nrsg will continue to monitor.

## 2019-06-30 NOTE — PLAN OF CARE
Discharge Planner OT   Patient plan for discharge: None stated    Current status: Pt required min A x2 with FWW for functional transfers and urinal usage.     Barriers to return to prior living situation: Fall risk; Current Ax2 required; Stairs to enter and within home    Recommendations for discharge: TCU    Rationale for recommendations: Pt limited by impaired cognition, safety, balance, and activity tolerance; however, pt making improvements with OT. Pt is not safe to return home at this time and family in agreement with TCU.        Entered by: Walter Prado 06/30/2019 11:19 AM

## 2019-06-30 NOTE — PROGRESS NOTES
[facility]  Hospitalist Progress Note  Henrique Garcia MD  06/30/2019    Assessment & Plan   Suman Burton is a 83 year old male admitted on 6/25/2019 for angina and underwent LHC. Patient was being monitored post heart cath when he had acute weakness, word finding difficulties and possible right sided weakness. He was formally admitted as having a possible stroke.     Suspected iatrogenic ischemic stroke vs TIA post LHC  Expressive aphasia, word finding difficulties, trouble with object identification, and RN noted dragging RLE during ambulation post Lt/Rt heart cath with PCI. Code stroke called. Head CT and perfusion unremarkable. CT head neck with contrast revealed: High-grade stenosis versus short segment occlusion of the right proximal cavernous ICA. Short segment moderate stenosis of the left supraclinoid ICA. Basilar artery and proximal anterior, middle, and posterior cerebral arteries are widely patent. 50% stenosis of proximal left subclavian artery and proximal left vertebral artery. 80% stenosis of proximal Rt ICA. Reviewed preliminary imaging with Neurology, tPA not given as symptoms mild and quickly improving. Cardiology called for Hospitalist to assume care.  - his symptoms have continued to improve.  - ECHO shows LVEF 60-65% with mild LAE, no shunt but sub-optimal bubble study  - permissive hypertension, blood pressures are improved.  - neurology consultation noted  - PT/OT/SLP following, on DD3 with nectar  - to TCU     CAD s/p HIWOT PCI to proximal RCA, 6/25/19  Proximal CX to Mid Cx 50% stenosed. Proximal RCA 95% stenosed.   Successful PCI to proximal RCA with HIWOT, noted mild pulmonary HTN, moderate proximal Cx lesion.   - Per cardiology ASA 81mg daily, plavix 75 mg daily, and warfarin x1 month until 7/25/19 then continue with warfarin/plavix for 12 months  - pharmacy dosing warfarin, INR therapeutic     Delirium secondary to cvi/atypical pneumonia  - patient had some agitated  "episodes of delirium, coello placed for retention  - had multiple code 21 and was on restraints at times  - had overnight agitation again, details not documented  - refused blood draw and zyprexa  - discontinue telemetry/oximetry and nighttime vitals  - increased remeron 15 to 30 mg  - bedtime zyprexa increased from 10 to 20 mg   - patient had the best night, no aggressive or agitated behaviour  - will schedule zyprexa at 1800 hrs and continue at TCU    Bilateral pulmonary infiltrates  -- cxr shows bilateral patchy infiltrates, edema vs atypical infection, echo is normal lvef  --  procalcitonion is 0.45  -- continue with levaquin, day 2/5    Hypertension  - continue amlodipine      Chronic atrial fibrillation  - discontinue telemetry with stable HR  - Resumed warfarin, INR therapeutic     Type 2 diabetes  - BG better controlled as patient eating more  - continue glipizide(1/2 his usual dose) , saxagliptin, resume  metformin (1/2 his usual home dose) as he is eating well  - sliding scale insulin medium dose. Hypoglycemia protocol.       Bilateral conjunctivitis  -- s/p tobra eye gtt    Constipation  -- on bowel regimen  -- will make suppository available.    Diet: mod cho  DVT Prophylaxis: Warfarin/ INR therapeutic  Coello Catheter: not present  Code Status: Full        Disposition Plan  -- anticipate to TCU on 7/1       Interval History   -- much improved delirium symptoms  -- awake and cooperative    -Data reviewed today: I reviewed all new labs and imaging over the last 24 hours. I personally reviewed no images or EKG's today.    Physical Exam   Heart Rate: 80, Blood pressure 161/86, pulse 75, temperature 98.6  F (37  C), temperature source Oral, resp. rate 16, height 1.727 m (5' 7.99\"), weight 66.2 kg (146 lb), SpO2 95 %.  Vitals:    06/25/19 0651   Weight: 66.2 kg (146 lb)     Vital Signs with Ranges  Temp:  [97.6  F (36.4  C)-98.8  F (37.1  C)] 98.6  F (37  C)  Pulse:  [75] 75  Heart Rate:  [73-94] 80  Resp:  " [16-18] 16  BP: (136-161)/(70-86) 161/86  SpO2:  [88 %-95 %] 95 %  I/O's Last 24 hours  I/O last 3 completed shifts:  In: 360 [P.O.:360]  Out: 325 [Urine:325]    Constitutional: NAD, calm and oriented  Respiratory: Clear to auscultation bilaterally, no crackles or wheezing  Cardiovascular: Regular rate and rhythm, normal S1 and S2, and no murmur noted  GI: Normal bowel sounds, soft, non-distended, non-tender  Skin/Integumen: No rashes, no cyanosis, no edema  Other:      Medications   All medications I am responsible for were reviewed.    - MEDICATION INSTRUCTIONS -       - MEDICATION INSTRUCTIONS -       Percutaneous Coronary Intervention orders placed (this is information for BPA alerting)       ACE/ARB/ARNI NOT PRESCRIBED       Warfarin Therapy Reminder         amLODIPine  5 mg Oral Daily     aspirin  81 mg Oral Daily     digoxin  125 mcg Oral Daily     glipiZIDE  10 mg Oral BID AC     insulin aspart  1-7 Units Subcutaneous TID AC     insulin aspart  1-5 Units Subcutaneous At Bedtime     isosorbide mononitrate  30 mg Oral Daily     levofloxacin  250 mg Oral Q24H     metoprolol succinate ER  25 mg Oral Daily     mirtazapine  30 mg Oral At Bedtime     multivitamin, therapeutic   Oral Daily     OLANZapine zydis  20 mg Oral Daily at 8 pm     oxybutynin ER  15 mg Oral Daily     rosuvastatin  10 mg Oral Daily     saxagliptin  2.5 mg Oral Daily     senna-docusate  1 tablet Oral BID    Or     senna-docusate  2 tablet Oral BID     warfarin  3 mg Oral ONCE at 18:00        Data   Recent Labs   Lab 06/30/19  1008 06/30/19  0857 06/29/19  1436 06/28/19  0713 06/27/19  0726 06/26/19  0818 06/26/19  0347 06/25/19  0730   WBC  --  7.6  --  8.6 8.7  --  6.6 7.8   HGB  --  14.3  --  14.4 15.0  --  14.9 13.8   MCV  --  90  --  91 91  --  91 91   PLT  --  125*  --  112* 128*  --  114* 129*   INR 2.63*  --  2.78* 2.24* 1.70*  --  1.45* 1.64*   NA  --  142  --  142 143 142 141 142   POTASSIUM  --  3.8  --  3.7 3.9 4.6 3.8 4.1    CHLORIDE  --  112*  --  110* 113* 113* 109 112*   CO2  --  21  --  25 24 22 22 23   BUN  --  37*  --  30 23 23 24 34*   CR  --  1.47*  --  1.52* 1.43* 1.33* 1.30* 1.35*   ANIONGAP  --  9  --  7 6 7 10 7   VINCENT  --  9.9  --  10.0 10.4* 9.9 9.9 10.1   GLC  --  144*  --  169* 150* 157* 90 164*   ALBUMIN  --   --   --   --   --   --   --  3.4   PROTTOTAL  --   --   --   --   --   --   --  6.7*   BILITOTAL  --   --   --   --   --   --   --  0.8   ALKPHOS  --   --   --   --   --   --   --  74   ALT  --   --   --   --   --   --   --  20   AST  --   --   --   --   --   --   --  20   TROPI  --   --   --   --   --  0.078* 0.069*  --        No results found for this or any previous visit (from the past 24 hour(s)).    Henrique Garcia MD  Text Page  (7am to 6pm)

## 2019-06-30 NOTE — PLAN OF CARE
Discharge Planner SLP   Patient plan for discharge: Patient did not state  Current status: Patient seen for swallowing treatment while up in the chair. Pt consumed cereal, sausage with gravy, and muffin. Pt demonstrated impulsivity and quick rate of eating, but no choking despite overstuffing. Pt benefited and responded well to verbal cues (small bites, drink liquid). Pt intermittently drank thin liquids via cup with no overt s/sx of aspiration. Not appropriate for regular diet textures at this time.  Recommended continue dysphagia diet level 3 and thin liquids. Swallow precautions: No straws, upright in the chair, supervision/assist as needed with small bites and sips with a slow rate of intake. SLP to follow for diet tolerance and education.   Barriers to return to prior living situation: Cognition, level of assist  Recommendations for discharge: TCU  Rationale for recommendations: Will need short-term ST services for management of diet and education/follow through of swallow strategies.        Entered by: Yris Maldonado 06/30/2019 12:04 PM

## 2019-06-30 NOTE — PROGRESS NOTES
TORIE      D: SW following for discharge needs. TORIE met with patient's spouse. Lexis, patient's spouse request referrals to [facility], [facility], and [facility]. TORIE sent refferal via DOD, to check bed availability.     P: Will continue to follow

## 2019-06-30 NOTE — PLAN OF CARE
"Discharge Planner PT   Patient plan for discharge: home  Current status: Pt in bed upon PT arrival, eager to \"go for a ride in the w/c\" with the sitter. Pt requires Ifeoma for supine>sit. Able to sit at the EOB x 8 mins with CGA to take pills. He does need cues to correct posterior lean as he fatigues but chris to correct with cues only today. Pt requires Ifeoma for sit>stand and bed>w/c with cues for upright posture, turning fully and controlled descent.  Barriers to return to prior living situation: level of assist required, fall risk, weakness, limited mobility  Recommendations for discharge: TCU  Rationale for recommendations: Pt would benefit from PT at TCU to progress strength, balance and mobility so pt can return home safely.        Entered by: Leah Naidu 06/30/2019 3:25 PM       "

## 2019-06-30 NOTE — PLAN OF CARE
Pt here with suspected L CVA vs TIA. Pt lethargic after HS zyprexa. Awoke later in shift and oriented x 3, disoriented to time, forgetful. WFD. BUE tremors, general weakness. Baseline neuropathy in BUE/BLE. Trace edmea and ruddiness to BLE. VSS except 1 LPM O2. DD3 diet with thin liquids. Takes pills whole in applesauce. Up with with A2/GB/W. Incontinent bladder. Constipation, did not receive HS meds dt sleepiness. No s/s pain.  sliding scale insulin given. Bedside attendant discontinued at 2300. Discharge to TCU pending.

## 2019-07-01 ENCOUNTER — APPOINTMENT (OUTPATIENT)
Dept: OCCUPATIONAL THERAPY | Facility: CLINIC | Age: 84
DRG: 981 | End: 2019-07-01
Payer: MEDICARE

## 2019-07-01 ENCOUNTER — CARE COORDINATION (OUTPATIENT)
Dept: CARDIOLOGY | Facility: CLINIC | Age: 84
End: 2019-07-01

## 2019-07-01 ENCOUNTER — APPOINTMENT (OUTPATIENT)
Dept: SPEECH THERAPY | Facility: CLINIC | Age: 84
DRG: 981 | End: 2019-07-01
Payer: MEDICARE

## 2019-07-01 ENCOUNTER — APPOINTMENT (OUTPATIENT)
Dept: PHYSICAL THERAPY | Facility: CLINIC | Age: 84
DRG: 981 | End: 2019-07-01
Payer: MEDICARE

## 2019-07-01 LAB
GLUCOSE BLDC GLUCOMTR-MCNC: 147 MG/DL (ref 70–99)
INR PPP: 2.88 (ref 0.86–1.14)

## 2019-07-01 PROCEDURE — 00000146 ZZHCL STATISTIC GLUCOSE BY METER IP

## 2019-07-01 PROCEDURE — 25000132 ZZH RX MED GY IP 250 OP 250 PS 637: Mod: GY | Performed by: HOSPITALIST

## 2019-07-01 PROCEDURE — 97535 SELF CARE MNGMENT TRAINING: CPT | Mod: GO | Performed by: OCCUPATIONAL THERAPY ASSISTANT

## 2019-07-01 PROCEDURE — 97110 THERAPEUTIC EXERCISES: CPT | Mod: GP

## 2019-07-01 PROCEDURE — 25000132 ZZH RX MED GY IP 250 OP 250 PS 637: Mod: GY | Performed by: PHYSICIAN ASSISTANT

## 2019-07-01 PROCEDURE — 25000132 ZZH RX MED GY IP 250 OP 250 PS 637: Mod: GY | Performed by: INTERNAL MEDICINE

## 2019-07-01 PROCEDURE — 85610 PROTHROMBIN TIME: CPT | Performed by: INTERNAL MEDICINE

## 2019-07-01 PROCEDURE — 97530 THERAPEUTIC ACTIVITIES: CPT | Mod: GO | Performed by: OCCUPATIONAL THERAPY ASSISTANT

## 2019-07-01 PROCEDURE — 25000125 ZZHC RX 250

## 2019-07-01 PROCEDURE — 99232 SBSQ HOSP IP/OBS MODERATE 35: CPT | Performed by: HOSPITALIST

## 2019-07-01 PROCEDURE — 12000000 ZZH R&B MED SURG/OB

## 2019-07-01 PROCEDURE — 36415 COLL VENOUS BLD VENIPUNCTURE: CPT | Performed by: INTERNAL MEDICINE

## 2019-07-01 PROCEDURE — 92526 ORAL FUNCTION THERAPY: CPT | Mod: GN | Performed by: SPEECH-LANGUAGE PATHOLOGIST

## 2019-07-01 RX ORDER — WARFARIN SODIUM 2 MG/1
2 TABLET ORAL ONCE
Status: COMPLETED | OUTPATIENT
Start: 2019-07-01 | End: 2019-07-01

## 2019-07-01 RX ORDER — LABETALOL 20 MG/4 ML (5 MG/ML) INTRAVENOUS SYRINGE
10
Status: DISCONTINUED | OUTPATIENT
Start: 2019-07-01 | End: 2019-07-02 | Stop reason: HOSPADM

## 2019-07-01 RX ORDER — CLOPIDOGREL BISULFATE 75 MG/1
75 TABLET ORAL DAILY
Status: DISCONTINUED | OUTPATIENT
Start: 2019-07-01 | End: 2019-07-02 | Stop reason: HOSPADM

## 2019-07-01 RX ORDER — WARFARIN SODIUM 2 MG/1
2 TABLET ORAL
Status: DISCONTINUED | OUTPATIENT
Start: 2019-07-01 | End: 2019-07-01

## 2019-07-01 RX ORDER — LIDOCAINE HYDROCHLORIDE 20 MG/ML
JELLY TOPICAL
Status: COMPLETED
Start: 2019-07-01 | End: 2019-07-01

## 2019-07-01 RX ORDER — GLIPIZIDE 10 MG/1
20 TABLET ORAL
Status: DISCONTINUED | OUTPATIENT
Start: 2019-07-01 | End: 2019-07-02 | Stop reason: HOSPADM

## 2019-07-01 RX ADMIN — WARFARIN SODIUM 2 MG: 2 TABLET ORAL at 16:02

## 2019-07-01 RX ADMIN — METOPROLOL SUCCINATE 25 MG: 25 TABLET, EXTENDED RELEASE ORAL at 09:15

## 2019-07-01 RX ADMIN — MIRTAZAPINE 30 MG: 15 TABLET, FILM COATED ORAL at 21:35

## 2019-07-01 RX ADMIN — ACETAMINOPHEN 650 MG: 325 TABLET, FILM COATED ORAL at 03:30

## 2019-07-01 RX ADMIN — FUROSEMIDE 80 MG: 40 TABLET ORAL at 09:25

## 2019-07-01 RX ADMIN — AMLODIPINE BESYLATE 5 MG: 5 TABLET ORAL at 09:11

## 2019-07-01 RX ADMIN — ISOSORBIDE MONONITRATE 30 MG: 30 TABLET, EXTENDED RELEASE ORAL at 09:15

## 2019-07-01 RX ADMIN — GLIPIZIDE 20 MG: 10 TABLET ORAL at 16:01

## 2019-07-01 RX ADMIN — DIGOXIN 125 MCG: 0.12 TABLET ORAL at 09:17

## 2019-07-01 RX ADMIN — SAXAGLIPTIN 2.5 MG: 2.5 TABLET, FILM COATED ORAL at 09:29

## 2019-07-01 RX ADMIN — LEVOFLOXACIN 250 MG: 250 TABLET, FILM COATED ORAL at 09:22

## 2019-07-01 RX ADMIN — ACETAMINOPHEN 650 MG: 325 TABLET, FILM COATED ORAL at 09:21

## 2019-07-01 RX ADMIN — CLOPIDOGREL BISULFATE 75 MG: 75 TABLET ORAL at 10:45

## 2019-07-01 RX ADMIN — GLIPIZIDE 10 MG: 10 TABLET ORAL at 09:23

## 2019-07-01 RX ADMIN — OXYBUTYNIN CHLORIDE 15 MG: 10 TABLET, EXTENDED RELEASE ORAL at 09:26

## 2019-07-01 RX ADMIN — LIDOCAINE HYDROCHLORIDE: 20 JELLY TOPICAL at 16:06

## 2019-07-01 RX ADMIN — OLANZAPINE 20 MG: 5 TABLET, ORALLY DISINTEGRATING ORAL at 21:36

## 2019-07-01 ASSESSMENT — ACTIVITIES OF DAILY LIVING (ADL)
ADLS_ACUITY_SCORE: 22

## 2019-07-01 NOTE — PLAN OF CARE
Discharge Planner SLP   Patient plan for discharge: Not stated.   Current status: SLP: Patient seen for a meal follow up session for diet tolerance of DDL3 with thin liquids. Patient continues to be impulsive with eating and needs verbal cues to slow down. mastication is prolonged for solids with mild oral residue on mid tongue and soft palate. Tolerated swallows of thin liquids via the cup without Sx of aspiration. Not ready for a diet advancement. Recommend: 1. Continue on the DDL 3 with thin liquids. 2. Up in a chair for meals, no straws, small bites/sips, slow rate and alternate liquids/solids as needed. SLP will follow up with trials of regular textures.   Barriers to return to prior living situation: Cognition/safety.  Recommendations for discharge: TCU  Rationale for recommendations: May need short term ST needs for swallowing if goals not met as an IP. Patient is below his baseline.        Entered by: Marguerite Pruitt 07/01/2019 9:22 AM

## 2019-07-01 NOTE — PLAN OF CARE
Discharge Planner OT   Patient plan for discharge: None stated     Current status:  pt mod A of 2 sit to stand, max A of 1 and SBA to Ifeoma of1 with multiple cues to stand upright and keep walker closer to self pt amb with FWW to/from bathroom, max A for safety with turning and completing transfer to toilet with RTS. Pt fatigue.  Max A for safety to sit EOB and complete sit to supine.    Barriers to return to prior living situation: Fall risk; Current Ax2 required; Stairs to enter and within home     Recommendations for discharge: TCU per plan established by the occupational thera     Rationale for recommendations: Pt limited by impaired cognition, safety, balance, and activity tolerance; however, pt making improvements with OT. Pt is not safe to return home at this time and family in agreement with TCU.        Entered by: Saige Martel 07/01/2019 9:57 AM

## 2019-07-01 NOTE — PLAN OF CARE
"PT: Attempted PT session. Pt in bed, firmly declining any EOB or OOB mobility at this time. He did agree to \"walk later if you come back.\"  "

## 2019-07-01 NOTE — PROGRESS NOTES
"SPIRITUAL HEALTH SERVICES Progress Note  [facility]    Visit per LOS.  Pt expressed tiredness and some forgetfulness, wife said \"he is exhausted.\"  Pt said he is [employer], and requested prayer.  SH provided presence and prayer.  Pt said he is discharging tomorrow.  No plan to follow.  SH remains available as needs arise, and upon request.      Patricia Rudd  Chaplain Resident  "

## 2019-07-01 NOTE — PLAN OF CARE
Pt here with suspected L CVA vs TIA. Alert, disoriented to situation and time and place intermittently. Forgetful and confused. Uncooperative at times. WFD. BUE tremors, general weakness. Baseline neuropathy in BUE/BLE. Pt did not sleep during entire shift. Trace edmea and ruddiness to BLE. VSS except 1 LPM O2. DD3 diet with thin liquids. Takes pills whole with water. Up with with A2/GB/W. Incontinent bladder. Constipation, refused to take senna. C/o HA, tylenol given and improved.  sliding scale insulin given. Discharge to TCU pending.

## 2019-07-01 NOTE — PLAN OF CARE
Discharge Planner PT   Patient plan for discharge: wife reports plan for TCU, possible tomorrow  Current status: Pt was in bed upon PT arrival, wife present. Wife requested to let pt stay in bed and rest at this time. He has been up in a chair with nursing staff. Pt did participate with supine LE exercises.   Barriers to return to prior living situation: level of assist required, weakness, fall risk, limited ambulation  Recommendations for discharge: TCU  Rationale for recommendations: Pt would benefit from PT at TCU to progress strength, balance and mobility so pt can return home safely.        Entered by: Leah Naidu 07/01/2019 4:53 PM

## 2019-07-01 NOTE — PROGRESS NOTES
Patient is scheduled for 7/18/19 OV with Dr. Ruiz ([facility]). Hospital discharging RN or SW, please review Cardiology f/u appointments with pt/spouse.     Madison Sanders RN July 1, 2019, 2:32 PM  RN Care Coordinator  [facility]         Render Postcare In Note?: No Consent was obtained and risks were reviewed including but not limited to delayed wound healing, infection, need for multiple I and D's, and pain. Dressing: dry sterile dressing Epidermal Sutures: 4-0 Ethilon Epidermal Closure: simple interrupted Size Of Lesion In Cm (Optional But May Be Required For Some Insurances): 0 Detail Level: Detailed Curette Text (Optional): After the contents were expressed a curette was used to partially remove the cyst wall. Lesion Type: Cyst Post-Care Instructions: I reviewed with the patient in detail post-care instructions. Patient should keep wound covered and call the office should any redness, pain, swelling or worsening occur. Suture Text: The incision was partially closed with Method: sterile needle Anesthesia Volume In Cc: 1 Wound Care: Petrolatum Body Location Override (Optional - Billing Will Still Be Based On Selected Body Map Location If Applicable): right oracle of ear Anesthesia Type: 1% lidocaine with epinephrine Preparation Text: The area was prepped in the usual clean fashion.

## 2019-07-01 NOTE — PROGRESS NOTES
[facility]  Hospitalist Progress Note        Deangelo Escobar, DO  07/01/2019        Interval History:      Patient oriented to year and place, declining QID blood sugar checks and aspirin therapy despite encouragement and discussion of risks and benefits, verbalized understanding.          Assessment and Plan:        Suman Burton is a 83 year old male admitted on 6/25/2019 for angina and underwent LHC. Patient was being monitored post heart cath when he had acute weakness, word finding difficulties and possible right sided weakness. He was formally admitted as having a possible stroke.     Suspected iatrogenic ischemic stroke vs TIA post LHC Expressive aphasia, word finding difficulties, trouble with object identification, and RN noted dragging RLE during ambulation post Lt/Rt heart cath with PCI. Code stroke called. Head CT and perfusion unremarkable. CT head neck with contrast revealed: High-grade stenosis versus short segment occlusion of the right proximal cavernous ICA. Short segment moderate stenosis of the left supraclinoid ICA. Basilar artery and proximal anterior, middle, and posterior cerebral arteries are widely patent. 50% stenosis of proximal left subclavian artery and proximal left vertebral artery. 80% stenosis of proximal Rt ICA. Reviewed preliminary imaging with Neurology, tPA not given as symptoms mild and quickly improving. ECHO shows LVEF 60-65% with mild LAE, no shunt but sub-optimal bubble study  - Blood pressure monitoring and control.   - ASA, Plavix, Coumadin.   - Neurology consultation noted  - PT/OT/SLP following  - Close monitoring.      CAD s/p HIWOT PCI to proximal RCA, 6/25/19 Proximal CX to Mid Cx 50% stenosed. Proximal RCA 95% stenosed. Successful PCI to proximal RCA with HIWOT, noted mild pulmonary HTN, moderate proximal Cx lesion.   - Per cardiology ASA 81mg daily, plavix 75 mg daily, and warfarin x1 month until 7/25/19 then continue with warfarin/plavix for  "12 months  - pharmacy dosing warfarin     Delirium secondary to cvi/atypical pneumonia patient had some agitated episodes of delirium, coello placed for retention. had multiple code 21 and was on restraints at times  - Remeron   - zyprexa at bedtime.       Bilateral pulmonary infiltrates cxr showed bilateral patchy infiltrates, edema vs atypical infection, echo is normal lvef  - procalcitonion 0.45  - continue with levaquin, day 3/5     Hypertension  - continue amlodipine      Chronic atrial fibrillation  - Pharmacy to dose warfarin.      Type 2 diabetes BG better controlled progressively.   - continue glipizide, saxagliptin  - Hypoglycemia protocol.       Bilateral conjunctivitis  - s/p tobra eye gtt     Constipation  - bowel regimen  - suppository available.     DVT Prophylaxis: Warfarin    Code Status: Full    Disposition: TCU in 1-2 days when weaned off oxygen.                    Physical Exam:      Heart Rate: 82    Blood pressure (!) 189/99, pulse 75, temperature 97.9  F (36.6  C), temperature source Oral, resp. rate 16, height 1.727 m (5' 7.99\"), weight 66.2 kg (146 lb), SpO2 92 %.    Vitals:    19 0651   Weight: 66.2 kg (146 lb)       Vital Sign Ranges  Temperature Temp  Av.9  F (36.6  C)  Min: 97.6  F (36.4  C)  Max: 98.2  F (36.8  C)   Blood pressure Systolic (24hrs), Av , Min:119 , Max:195        Diastolic (24hrs), Av, Min:68, Max:99      Pulse No data recorded   Respirations Resp  Av  Min: 16  Max: 16   Pulse oximetry SpO2  Av.4 %  Min: 87 %  Max: 95 %     Vital Signs with Ranges  Temp:  [97.6  F (36.4  C)-98.2  F (36.8  C)] 97.9  F (36.6  C)  Heart Rate:  [72-94] 82  Resp:  [16] 16  BP: (119-195)/(68-99) 189/99  SpO2:  [87 %-95 %] 92 %    I/O Last 3 Shifts:   I/O last 3 completed shifts:  In: 430 [P.O.:430]  Out: 200 [Urine:200]    I/O past 24 hours:     Intake/Output Summary (Last 24 hours) at 2019 1572  Last data filed at 2019 0326  Gross per 24 hour   Intake 400 " ml   Output 200 ml   Net 200 ml     GENERAL: Alert. NAD. Conversational, appropriate.   HEENT: Normocephalic. EOMI. No icterus or injection. Nares normal.   LUNGS: Mild decrement to bases. No dyspnea at rest. NC in place.   HEART: Irregular rate. Extremities perfused.   ABDOMEN: Soft, nontender, and nondistended. Positive bowel sounds.   EXTREMITIES: No LE edema noted.   NEUROLOGIC: Moves extremities x4, follows commands.          Prior to Admission Medications:        Medications Prior to Admission   Medication Sig Dispense Refill Last Dose     acetaminophen (TYLENOL) 500 MG tablet Take 2 tablets (1,000 mg) by mouth 3 times daily as needed for mild pain 100 tablet 0 6/25/2019 at 0600     amLODIPine (NORVASC) 10 MG tablet Take 1 tablet (10 mg) by mouth daily 90 tablet 3 6/25/2019 at 0600     aspirin (ASA) 325 MG tablet Take 1 tablet (325 mg) by mouth daily 30 tablet 0 6/25/2019 at 0600     digoxin (LANOXIN) 125 MCG tablet Take 1 tablet (125 mcg) by mouth daily 90 tablet 3 6/25/2019 at 0600     furosemide (LASIX) 80 MG tablet Take 1 tablet (80 mg) by mouth daily 90 tablet 1 6/23/2019 at Unknown time     glipiZIDE (GLUCOTROL) 10 MG tablet Take 2 tablets (20 mg) by mouth 2 times daily Take 20mg in AM with breakfast and 20mg in PM with evening meal 360 tablet 0 6/24/2019 at 2000     isosorbide mononitrate (IMDUR) 30 MG 24 hr tablet Take 1 tablet (30 mg) by mouth daily 30 tablet 3 6/25/2019 at 0600     loperamide (IMODIUM A-D) 2 MG tablet Take 1 tablet (2 mg) by mouth 4 times daily as needed for diarrhea 100 tablet 5 6/25/2019 at 0600     metFORMIN (GLUCOPHAGE) 1000 MG tablet Take 1 tablet (1,000 mg) by mouth 2 times daily (with meals)   6/24/2019 at 2000     metoprolol succinate (TOPROL-XL) 50 MG 24 hr tablet Take 0.5 tablets (25 mg) by mouth daily   6/25/2019 at 0600     mirtazapine (REMERON) 15 MG tablet Take 1 tablet (15 mg) by mouth At Bedtime 30 tablet 5 6/24/2019 at 2200     oxybutynin ER (DITROPAN XL) 15 MG 24 hr  tablet Take 1 tablet (15 mg) by mouth daily 30 tablet 5 6/25/2019 at 0600     rosuvastatin (CRESTOR) 20 MG tablet Take 1 tablet (20 mg) by mouth daily 30 tablet 3 6/24/2019 at 0600     saxagliptin (ONGLYZA) 2.5 MG TABS tablet Take 1 tablet (2.5 mg) by mouth daily 30 tablet 1 6/24/2019 at 0800     warfarin (COUMADIN) 3 MG tablet Take 1 tablet (3 mg) by mouth daily (Patient taking differently: Take 3 mg by mouth daily ) 90 tablet 3 6/21/2019 at 1800     Multiple Vitamin (MULTIVITAMINS PO) Take 1 tablet by mouth daily.   Unknown at Unknown time     ONE TOUCH FINE POINT LANCETS Check blood sugars twice a day.   Taking     ONETOUCH DELICA LANCETS 33G MISC 1 strip 2 times daily 100 each 11 Taking     Pramoxine HCl (CERAVE ITCH RELIEF) 1 % CREA Externally apply 30 g topically daily 340 g 11 Unknown at Unknown time            Medications:        Current Facility-Administered Medications   Medication Last Rate     - MEDICATION INSTRUCTIONS -       - MEDICATION INSTRUCTIONS -       Percutaneous Coronary Intervention orders placed (this is information for BPA alerting)       ACE/ARB/ARNI NOT PRESCRIBED       Warfarin Therapy Reminder       Current Facility-Administered Medications   Medication Dose Route Frequency     amLODIPine  5 mg Oral Daily     aspirin  81 mg Oral Daily     digoxin  125 mcg Oral Daily     furosemide  80 mg Oral Daily     glipiZIDE  10 mg Oral BID AC     insulin aspart  1-7 Units Subcutaneous TID AC     insulin aspart  1-5 Units Subcutaneous At Bedtime     isosorbide mononitrate  30 mg Oral Daily     levofloxacin  250 mg Oral Q24H     metFORMIN  1,000 mg Oral Daily with supper     metoprolol succinate ER  25 mg Oral Daily     mirtazapine  30 mg Oral At Bedtime     multivitamin, therapeutic   Oral Daily     OLANZapine zydis  20 mg Oral QPM     oxybutynin ER  15 mg Oral Daily     rosuvastatin  10 mg Oral Daily     saxagliptin  2.5 mg Oral Daily     senna-docusate  1 tablet Oral BID    Or     senna-docusate   2 tablet Oral BID     Current Facility-Administered Medications   Medication Dose Route Frequency     acetaminophen  650 mg Rectal Q4H PRN     acetaminophen  650 mg Oral Q4H PRN     bisacodyl  5 mg Oral Daily PRN    Or     bisacodyl  10 mg Oral Daily PRN    Or     bisacodyl  15 mg Oral Daily PRN     glucose  15-30 g Oral Q15 Min PRN    Or     dextrose  25-50 mL Intravenous Q15 Min PRN    Or     glucagon  1 mg Subcutaneous Q15 Min PRN     HOLD MEDICATION   Does not apply HOLD     hydrALAZINE  10-20 mg Intravenous Q1H PRN     loperamide  2 mg Oral 4x Daily PRN     magnesium citrate  148 mL Oral Once PRN     magnesium hydroxide  30 mL Oral Daily PRN     - MEDICATION INSTRUCTIONS -   Does not apply Continuous PRN     naloxone  0.1-0.4 mg Intravenous Q2 Min PRN     OLANZapine  10 mg Intramuscular Daily PRN     ondansetron  4 mg Oral Q6H PRN    Or     ondansetron  4 mg Intravenous Q6H PRN     - MEDICATION INSTRUCTIONS -   Does not apply Continuous PRN     Percutaneous Coronary Intervention orders placed (this is information for BPA alerting)   Does not apply DOES NOT GO TO MAR     prochlorperazine  5 mg Intravenous Q6H PRN    Or     prochlorperazine  5 mg Oral Q6H PRN    Or     prochlorperazine  12.5 mg Rectal Q12H PRN     ACE/ARB/ARNI NOT PRESCRIBED   Other DOES NOT GO TO MAR     Warfarin Therapy Reminder  1 each Does not apply Continuous PRN            Data:      Lab data reviewed.   Recent Labs   Lab 07/01/19  0744 06/30/19  1008 06/30/19  0857 06/29/19  1436 06/28/19  0713 06/27/19  0726 06/26/19  0818 06/26/19  0347   HGB  --   --  14.3  --  14.4 15.0  --  14.9   MCV  --   --  90  --  91 91  --  91   PLT  --   --  125*  --  112* 128*  --  114*   INR 2.88* 2.63*  --  2.78* 2.24* 1.70*  --  1.45*   NA  --   --  142  --  142 143 142 141   POTASSIUM  --   --  3.8  --  3.7 3.9 4.6 3.8   CHLORIDE  --   --  112*  --  110* 113* 113* 109   CO2  --   --  21  --  25 24 22 22   BUN  --   --  37*  --  30 23 23 24   CR  --   --   1.47*  --  1.52* 1.43* 1.33* 1.30*   ANIONGAP  --   --  9  --  7 6 7 10   VINCENT  --   --  9.9  --  10.0 10.4* 9.9 9.9   GLC  --   --  144*  --  169* 150* 157* 90   TROPI  --   --   --   --   --   --  0.078* 0.069*           Imaging:      Imaging data reviewed.     Dr. Deangelo Escobar D.O.  [facility] Hospitalist  Pager [phone]

## 2019-07-02 ENCOUNTER — APPOINTMENT (OUTPATIENT)
Dept: GENERAL RADIOLOGY | Facility: CLINIC | Age: 84
DRG: 981 | End: 2019-07-02
Attending: HOSPITALIST
Payer: MEDICARE

## 2019-07-02 ENCOUNTER — APPOINTMENT (OUTPATIENT)
Dept: SPEECH THERAPY | Facility: CLINIC | Age: 84
DRG: 981 | End: 2019-07-02
Payer: MEDICARE

## 2019-07-02 VITALS
OXYGEN SATURATION: 91 % | HEIGHT: 68 IN | WEIGHT: 146 LBS | TEMPERATURE: 97.4 F | BODY MASS INDEX: 22.13 KG/M2 | DIASTOLIC BLOOD PRESSURE: 83 MMHG | RESPIRATION RATE: 18 BRPM | HEART RATE: 98 BPM | SYSTOLIC BLOOD PRESSURE: 143 MMHG

## 2019-07-02 LAB
ANION GAP SERPL CALCULATED.3IONS-SCNC: 5 MMOL/L (ref 3–14)
BUN SERPL-MCNC: 38 MG/DL (ref 7–30)
CALCIUM SERPL-MCNC: 10.1 MG/DL (ref 8.5–10.1)
CHLORIDE SERPL-SCNC: 109 MMOL/L (ref 94–109)
CO2 SERPL-SCNC: 27 MMOL/L (ref 20–32)
CREAT SERPL-MCNC: 1.51 MG/DL (ref 0.66–1.25)
ERYTHROCYTE [DISTWIDTH] IN BLOOD BY AUTOMATED COUNT: 12.4 % (ref 10–15)
GFR SERPL CREATININE-BSD FRML MDRD: 42 ML/MIN/{1.73_M2}
GLUCOSE BLDC GLUCOMTR-MCNC: 192 MG/DL (ref 70–99)
GLUCOSE SERPL-MCNC: 173 MG/DL (ref 70–99)
HCT VFR BLD AUTO: 39.7 % (ref 40–53)
HGB BLD-MCNC: 14.4 G/DL (ref 13.3–17.7)
INR PPP: 2.61 (ref 0.86–1.14)
INTERPRETATION ECG - MUSE: NORMAL
MCH RBC QN AUTO: 32.4 PG (ref 26.5–33)
MCHC RBC AUTO-ENTMCNC: 36.3 G/DL (ref 31.5–36.5)
MCV RBC AUTO: 89 FL (ref 78–100)
PLATELET # BLD AUTO: 148 10E9/L (ref 150–450)
POTASSIUM SERPL-SCNC: 3.7 MMOL/L (ref 3.4–5.3)
RBC # BLD AUTO: 4.44 10E12/L (ref 4.4–5.9)
SODIUM SERPL-SCNC: 141 MMOL/L (ref 133–144)
WBC # BLD AUTO: 8.4 10E9/L (ref 4–11)

## 2019-07-02 PROCEDURE — 25000132 ZZH RX MED GY IP 250 OP 250 PS 637: Mod: GY | Performed by: INTERNAL MEDICINE

## 2019-07-02 PROCEDURE — 25000132 ZZH RX MED GY IP 250 OP 250 PS 637: Mod: GY | Performed by: PHYSICIAN ASSISTANT

## 2019-07-02 PROCEDURE — 71045 X-RAY EXAM CHEST 1 VIEW: CPT

## 2019-07-02 PROCEDURE — 85610 PROTHROMBIN TIME: CPT | Performed by: HOSPITALIST

## 2019-07-02 PROCEDURE — 92526 ORAL FUNCTION THERAPY: CPT | Mod: GN | Performed by: SPEECH-LANGUAGE PATHOLOGIST

## 2019-07-02 PROCEDURE — 25000132 ZZH RX MED GY IP 250 OP 250 PS 637: Mod: GY | Performed by: HOSPITALIST

## 2019-07-02 PROCEDURE — 92609 USE OF SPEECH DEVICE SERVICE: CPT | Mod: GN | Performed by: SPEECH-LANGUAGE PATHOLOGIST

## 2019-07-02 PROCEDURE — 36415 COLL VENOUS BLD VENIPUNCTURE: CPT | Performed by: HOSPITALIST

## 2019-07-02 PROCEDURE — 00000146 ZZHCL STATISTIC GLUCOSE BY METER IP

## 2019-07-02 PROCEDURE — 80048 BASIC METABOLIC PNL TOTAL CA: CPT | Performed by: HOSPITALIST

## 2019-07-02 PROCEDURE — 85027 COMPLETE CBC AUTOMATED: CPT | Performed by: HOSPITALIST

## 2019-07-02 PROCEDURE — 99239 HOSP IP/OBS DSCHRG MGMT >30: CPT | Performed by: HOSPITALIST

## 2019-07-02 RX ORDER — ASPIRIN 81 MG/1
81 TABLET, CHEWABLE ORAL DAILY
DISCHARGE
Start: 2019-07-02 | End: 2019-07-30

## 2019-07-02 RX ORDER — ROSUVASTATIN CALCIUM 10 MG/1
10 TABLET, COATED ORAL DAILY
DISCHARGE
Start: 2019-07-02

## 2019-07-02 RX ORDER — AMLODIPINE BESYLATE 5 MG/1
5 TABLET ORAL ONCE
Status: COMPLETED | OUTPATIENT
Start: 2019-07-02 | End: 2019-07-02

## 2019-07-02 RX ORDER — METOPROLOL SUCCINATE 50 MG/1
50 TABLET, EXTENDED RELEASE ORAL DAILY
Status: DISCONTINUED | OUTPATIENT
Start: 2019-07-03 | End: 2019-07-02 | Stop reason: HOSPADM

## 2019-07-02 RX ORDER — LIDOCAINE HYDROCHLORIDE 20 MG/ML
10 JELLY TOPICAL ONCE
Status: DISCONTINUED | OUTPATIENT
Start: 2019-07-02 | End: 2019-07-02 | Stop reason: HOSPADM

## 2019-07-02 RX ORDER — METOPROLOL SUCCINATE 50 MG/1
50 TABLET, EXTENDED RELEASE ORAL DAILY
DISCHARGE
Start: 2019-07-03 | End: 2019-07-11

## 2019-07-02 RX ORDER — AMLODIPINE BESYLATE 10 MG/1
10 TABLET ORAL DAILY
DISCHARGE
Start: 2019-07-03

## 2019-07-02 RX ORDER — AMLODIPINE BESYLATE 10 MG/1
10 TABLET ORAL DAILY
Status: DISCONTINUED | OUTPATIENT
Start: 2019-07-03 | End: 2019-07-02 | Stop reason: HOSPADM

## 2019-07-02 RX ORDER — LEVOFLOXACIN 250 MG/1
250 TABLET, FILM COATED ORAL EVERY 24 HOURS
DISCHARGE
Start: 2019-07-02 | End: 2019-07-09

## 2019-07-02 RX ORDER — ONDANSETRON 4 MG/1
4 TABLET, ORALLY DISINTEGRATING ORAL EVERY 6 HOURS PRN
DISCHARGE
Start: 2019-07-02

## 2019-07-02 RX ORDER — BISACODYL 5 MG
5 TABLET, DELAYED RELEASE (ENTERIC COATED) ORAL DAILY PRN
DISCHARGE
Start: 2019-07-02

## 2019-07-02 RX ORDER — OLANZAPINE 20 MG/1
20 TABLET, ORALLY DISINTEGRATING ORAL EVERY EVENING
Status: ON HOLD | DISCHARGE
Start: 2019-07-02 | End: 2019-08-05

## 2019-07-02 RX ORDER — TAMSULOSIN HYDROCHLORIDE 0.4 MG/1
0.4 CAPSULE ORAL DAILY
DISCHARGE
Start: 2019-07-02 | End: 2019-08-07

## 2019-07-02 RX ORDER — CLOPIDOGREL BISULFATE 75 MG/1
75 TABLET ORAL DAILY
DISCHARGE
Start: 2019-07-02 | End: 2019-08-13

## 2019-07-02 RX ORDER — ACETAMINOPHEN 325 MG/1
650 TABLET ORAL EVERY 4 HOURS PRN
DISCHARGE
Start: 2019-07-02 | End: 2019-07-11

## 2019-07-02 RX ORDER — TAMSULOSIN HYDROCHLORIDE 0.4 MG/1
0.4 CAPSULE ORAL DAILY
Status: DISCONTINUED | OUTPATIENT
Start: 2019-07-02 | End: 2019-07-02 | Stop reason: HOSPADM

## 2019-07-02 RX ORDER — MIRTAZAPINE 30 MG/1
30 TABLET, FILM COATED ORAL AT BEDTIME
Status: ON HOLD | DISCHARGE
Start: 2019-07-02 | End: 2019-08-05

## 2019-07-02 RX ORDER — WARFARIN SODIUM 3 MG/1
3 TABLET ORAL
Status: DISCONTINUED | OUTPATIENT
Start: 2019-07-02 | End: 2019-07-02 | Stop reason: HOSPADM

## 2019-07-02 RX ORDER — METOPROLOL TARTRATE 25 MG/1
25 TABLET, FILM COATED ORAL ONCE
Status: COMPLETED | OUTPATIENT
Start: 2019-07-02 | End: 2019-07-02

## 2019-07-02 RX ORDER — MULTIVITAMIN,THERAPEUTIC
1 TABLET ORAL DAILY
DISCHARGE
Start: 2019-07-02

## 2019-07-02 RX ADMIN — METOPROLOL TARTRATE 25 MG: 25 TABLET ORAL at 10:50

## 2019-07-02 RX ADMIN — DIGOXIN 125 MCG: 0.12 TABLET ORAL at 09:19

## 2019-07-02 RX ADMIN — FUROSEMIDE 80 MG: 40 TABLET ORAL at 08:28

## 2019-07-02 RX ADMIN — LEVOFLOXACIN 250 MG: 250 TABLET, FILM COATED ORAL at 09:17

## 2019-07-02 RX ADMIN — GLIPIZIDE 20 MG: 10 TABLET ORAL at 09:15

## 2019-07-02 RX ADMIN — TAMSULOSIN HYDROCHLORIDE 0.4 MG: 0.4 CAPSULE ORAL at 10:54

## 2019-07-02 RX ADMIN — ISOSORBIDE MONONITRATE 30 MG: 30 TABLET, EXTENDED RELEASE ORAL at 08:28

## 2019-07-02 RX ADMIN — SAXAGLIPTIN 2.5 MG: 2.5 TABLET, FILM COATED ORAL at 09:19

## 2019-07-02 RX ADMIN — AMLODIPINE BESYLATE 5 MG: 5 TABLET ORAL at 10:53

## 2019-07-02 RX ADMIN — CLOPIDOGREL BISULFATE 75 MG: 75 TABLET ORAL at 09:18

## 2019-07-02 RX ADMIN — METOPROLOL SUCCINATE 25 MG: 25 TABLET, EXTENDED RELEASE ORAL at 08:28

## 2019-07-02 RX ADMIN — SENNOSIDES AND DOCUSATE SODIUM 1 TABLET: 8.6; 5 TABLET ORAL at 09:15

## 2019-07-02 RX ADMIN — AMLODIPINE BESYLATE 5 MG: 5 TABLET ORAL at 08:28

## 2019-07-02 RX ADMIN — OXYBUTYNIN CHLORIDE 15 MG: 10 TABLET, EXTENDED RELEASE ORAL at 09:13

## 2019-07-02 RX ADMIN — ASPIRIN 81 MG 81 MG: 81 TABLET ORAL at 09:14

## 2019-07-02 RX ADMIN — ACETAMINOPHEN 650 MG: 325 TABLET, FILM COATED ORAL at 10:45

## 2019-07-02 ASSESSMENT — ACTIVITIES OF DAILY LIVING (ADL)
ADLS_ACUITY_SCORE: 22

## 2019-07-02 NOTE — PROGRESS NOTES
Per Dr. Escobar patient does not need Ziopatch.  Contacted [facility] and notified them that order has been cancelled.

## 2019-07-02 NOTE — PLAN OF CARE
PT: Pt discharged prior to PT session today.    Physical Therapy Discharge Summary    Reason for therapy discharge:    Discharged to transitional care facility.    Progress towards therapy goal(s). See goals on Care Plan in [facility] electronic health record for goal details.  Goals not met.  Barriers to achieving goals:   discharge from facility.    Therapy recommendation(s):    Continued therapy is recommended.  Rationale/Recommendations:  eval and treat at TCU.

## 2019-07-02 NOTE — PROGRESS NOTES
Mary Progress Note  Chart Reviewed, Pt discussed in Interdisciplinary Rounds.   Pt anticipated to discharge to [facility] when medically cleared.    Intervention:   HE stretcher ride set up for pt due to O2 needs that pt cannot self regulate and pt lacks the core strength to remain upright   In wheelchair. Ride was changed from 10:30 to 1:00pm for additional time needed to plan discharge. Pt needing discharge orders, ziopatch etc     Team Members notified: CTRN,RN,HUC,BB. Ainsley at [facility]    Plan: Discharge to [facility] via HE stretcher at 1:00pm today.    11:30: Pt and family changed their mind regarding private room for pt and are agreeable to the additional charges of $45.00 per day. MARY contacted Ainsley at [facility] regarding request and she returned call indicating that pt has been reassigned to a private room. Discharge orders sent via DOD.  ..PAS-RR    D: Per [facility] regulation, MARY completed and submitted PAS-RR to [place] [employer] Direct Connect via the Senior LinkAge Line.  PAS-RR confirmation # is :[id number]  P: Further questions may be directed to [facility] LinkAge Line at #[phone], option #4 for PAS-RR staff.      ALEXIA Farmer  [facility]  Phone: [phone]

## 2019-07-02 NOTE — PLAN OF CARE
OT: pt was in bed busy with nursing when OT attempted for session, pt declined OT. Noted pt will be discharging to TCU today at 1pm, will defer further OT to next level of care, GOALS NOT MET, see discharge summary

## 2019-07-02 NOTE — PLAN OF CARE
Pt here with suspected L CVA vs TIA. Alert and oriented x2, disoriented to situation and time. Forgetful/confused. WFD. Baseline neuropathy noted with BUE tremors. Generalized weakness and difficulty with repositioning self. Weight shifting provided throughout night. Pt removed O2 x2 during night, O2 sats decreased to 85% RA. Encouraged O2 tubing and explained why, patient then compliant intermittently throughout night but required 1.5L O2 via NC to maintain >90%. Elevated BP, but within parameters upon recheck, sustaining in 170s. DD3 diet, thin liquids. Intermittently coughing after fluids, diminished LS but no crackles heard. Takes pills whole with water, requires encouragement and repetitive explanations due to patient being insistent of not needing to take meds. Refused scheduled senna and statin. Remained in bed throughout shift, incontinent. Bladder scanned for 437 at 0500 and straight cath'd for 300mL.Changed approx q2h. Denies pain. Plan to discharge to [facility] at 1030 today. Stretcher transport set-up for this AM. Continue monitoring.

## 2019-07-02 NOTE — DISCHARGE SUMMARY
[facility]    Discharge Summary  Hospitalist    Date of Admission:  6/25/2019  Date of Discharge:  7/2/2019  Discharging Provider: Deangelo Escobar  Date of Service (when I saw the patient): 07/02/19    History of Present Illness   Suman Burton is a 83 year old male with afib on coumadin, diabetes, hypertension, CKD stage III, HLD, CAD s/p PCI on date of admission who developed word finding difficulties, right-sided weakness during ambulation, and mild expressive aphasia with difficulty identifying certain objects.  RRT was called and subsequently code stroke activated. I evaluated patient at bedside and stroke neurology came when code stroke called. We reviewed preliminary imaging together, no LVO noted. tPA not given as the patient's symptoms were mild and seemed to be improving. Head CT without contrast unremarkable for any acute changes. CT head neck with contrast revealed: High-grade stenosis versus short segment occlusion of the right proximal cavernous internal carotid artery. Short segment moderate stenosis of the left supraclinoid internal carotid artery. Basilar artery and proximal anterior, middle, and posterior cerebral arteries are widely patent. 50% stenosis of proximal left subclavian artery and proximal left vertebral artery. 80% stenosis of proximal right internal carotid artery. CT Head perfusion with contrast was wnl.      I d/w Dr. Yates of stroke neurology and Dr. Garcia of interventional cardiology and the decision was made for admission as observation to the stroke unit station 73 for neuro checks, BP monitoring, and formal stroke neuro consultation. I d/w pt and wife at bedside. Currently feeling well overall but frustrated he is having word finding difficulty. No chest pain, palpitations, HA, or blurry vision. Baseline [employer] and decreased vision related to hx of retinal problem per wife. No SOB. Normotensive and normoglycemic.       Hospital Course   Suman Burton  was admitted on 6/25/2019.  The following problems were addressed during his hospitalization:    Suman Burton is a 83 year old male admitted on 6/25/2019 for angina and underwent LHC. Patient was being monitored post heart cath when he had acute weakness, word finding difficulties and possible right sided weakness. He was formally admitted as having a possible stroke.     Suspected iatrogenic ischemic stroke vs TIA post LHC Expressive aphasia, word finding difficulties, trouble with object identification, and RN noted dragging RLE during ambulation post Lt/Rt heart cath with PCI. Code stroke called. Head CT and perfusion unremarkable. CT head neck with contrast revealed: High-grade stenosis versus short segment occlusion of the right proximal cavernous ICA. Short segment moderate stenosis of the left supraclinoid ICA. Basilar artery and proximal anterior, middle, and posterior cerebral arteries are widely patent. 50% stenosis of proximal left subclavian artery and proximal left vertebral artery. 80% stenosis of proximal Rt ICA. Reviewed preliminary imaging with Neurology, tPA not given as symptoms mild and quickly improving. ECHO shows LVEF 60-65% with mild LAE, no shunt but sub-optimal bubble study  - Blood pressure medications gradually increased, will continue to require monitoring and adjustment.   - ASA, Plavix, Coumadin.   - Neurology consultation completed during inpatient stay.   - PT/OT/SLP continued.   - Close outpatient follow up.   - Per cardiology ASA 81mg daily, plavix 75 mg daily, and warfarin x1 month until 7/25/19 then continue with warfarin/plavix for 12 months     CAD s/p HIWOT PCI to proximal RCA, 6/25/19 Proximal CX to Mid Cx 50% stenosed. Proximal RCA 95% stenosed. Successful PCI to proximal RCA with HIWOT, noted mild pulmonary HTN, moderate proximal Cx lesion.   - Per cardiology ASA 81mg daily, plavix 75 mg daily, and warfarin x1 month until 7/25/19 then continue with warfarin/plavix for 12  months     Delirium secondary to cvi/atypical pneumonia patient had some agitated episodes of delirium, coello placed for retention. had multiple code 21 and was on restraints at times  - Remeron   - zyprexa  - Improved.       Bilateral pulmonary infiltrates cxr showed bilateral patchy infiltrates, edema vs atypical infection, echo is normal lvef  - procalcitonion 0.45  - continue with levaquin course to completion.   - CXR improved      Hypertension  - continue amlodipine      Chronic atrial fibrillation  - warfarin.   - Patient and wife declined ziopatch or other monitor on discharge, verbalized understanding of risk and benefit from spuse.      Type 2 diabetes BG better controlled progressively.   - continue glipizide, saxagliptin     Bilateral conjunctivitis  - s/p tobra eye gtt     Constipation  - bowel regimen     Code Status   Full Code       Primary Care Physician   Jamie Guerrier    Physical Exam   Temp: 97.4  F (36.3  C) Temp src: Oral BP: (!) 176/94 Pulse: 98 Heart Rate: 68 Resp: 18 SpO2: 91 % O2 Device: Nasal cannula Oxygen Delivery: 1.5 LPM  Vitals:    06/25/19 0651   Weight: 66.2 kg (146 lb)     Vital Signs with Ranges  Temp:  [96.4  F (35.8  C)-98.6  F (37  C)] 97.4  F (36.3  C)  Pulse:  [87-98] 98  Heart Rate:  [] 68  Resp:  [16-18] 18  BP: (153-200)/() 176/94  SpO2:  [85 %-95 %] 91 %  I/O last 3 completed shifts:  In: 120 [P.O.:120]  Out: 800 [Urine:800]    GENERAL: Alert. NAD. Conversational, appropriate. Pleasant.   HEENT: Normocephalic. EOMI. No icterus or injection. Nares normal.   LUNGS: Mild decrement to bases, improved. No dyspnea at rest. NC in place.   HEART: Irregular rate. Extremities perfused.   ABDOMEN: Soft, nontender, and nondistended. Positive bowel sounds.   EXTREMITIES: No LE edema noted.   NEUROLOGIC: Moves extremities x4, follows commands.     Discharge Disposition   Discharged to TCU  Condition at discharge: Stable    Consultations This Hospital Stay    HOSPITALIST IP CONSULT  NEUROLOGY IP CONSULT  PHYSICAL THERAPY ADULT IP CONSULT  OCCUPATIONAL THERAPY ADULT IP CONSULT  SPEECH LANGUAGE PATH ADULT IP CONSULT  SWALLOW EVAL SPEECH PATH AT BEDSIDE IP CONSULT  SMOKING CESSATION PROGRAM IP CONSULT  PATIENT LEARNING CENTER IP CONSULT  PHARMACY TO DOSE WARFARIN  PHARMACY TO DOSE WARFARIN  PSYCHIATRY IP CONSULT  PSYCHIATRY IP CONSULT  PHYSICAL THERAPY ADULT IP CONSULT  OCCUPATIONAL THERAPY ADULT IP CONSULT  SPEECH LANGUAGE PATH ADULT IP CONSULT    Time Spent on this Encounter   I, Deangelo Yates Shawn, personally saw the patient today and spent greater than 30 minutes discharging this patient.    Discharge Orders      CARDIAC REHAB REFERRAL      Discharge Instructions - IF on Metformin (Glucophage or Glucovance) or Metformin containing medications    IF on Metformin (Glucophage or Glucovance) or Metformin containing medications , schedule a Basic Metabolic Panel at [facility] Primary Clinic in 48 - 72 hours post procedure and PRIOR TO resuming the Metformin or Metformin containing medications.  Hold Metformin (Glucophage or Glucovance) or Metformin containing medications until after the Basic Metabolic Panel on the 2nd or 3rd day following the procedure.  May resume after blood draw is complete.     General info for SNF    Length of Stay Estimate: Short Term Care: Estimated # of Days <30  Condition at Discharge: Improving  Level of care:skilled   Rehabilitation Potential: Good  Admission H&P remains valid and up-to-date: Yes  Recent Chemotherapy: N/A  Use Nursing Home Standing Orders: Yes     Mantoux instructions    Give two-step Mantoux (PPD) Per Facility Policy Yes     Reason for your hospital stay    Coronary artery disease.     Daily weights    Call Provider for weight gain of more than 2 pounds per day or 5 pounds per week.     Follow Up and recommended labs and tests    Follow up with senior living physician.  The following labs/tests are recommended:  cbc/bmp/inr.  Follow up with primary care provider.   Follow up with Cardiology as directed.   Follow up with Neurology as directed.     Activity - Up with nursing assistance     Bladder scan and straight catheterization for urinary retention     Full Code     Physical Therapy Adult Consult    Evaluate and treat as clinically indicated.    Reason:  Physical deconditioning.     Occupational Therapy Adult Consult    Evaluate and treat as clinically indicated.    Reason:  Physical deconditioning.     Speech Language Path Adult Consult    Evaluate and treat as clinically indicated.    Reason:  Dysphagia.     Oxygen - Nasal cannula    2 Lpm by nasal cannula to keep O2 sats 92% or greater.     Fall precautions     Advance Diet as Tolerated    Follow this diet upon discharge: Orders Placed This Encounter  Orders Placed This Encounter      Advance Diet as Tolerated      Combination Diet Dysphagia Diet Level 2: Mechan Altered; Thin Liquids (water, ice chips, juice, milk gelatin, ice cream, etc) (NO STRAWS)     Discharge Medications   Current Discharge Medication List      START taking these medications    Details   aspirin (ASA) 81 MG chewable tablet Take 1 tablet (81 mg) by mouth daily    Associated Diagnoses: Chronic diastolic congestive heart failure (H)      bisacodyl (DULCOLAX) 5 MG EC tablet Take 1 tablet (5 mg) by mouth daily as needed for constipation    Associated Diagnoses: Chronic diastolic congestive heart failure (H)      clopidogrel (PLAVIX) 75 MG tablet Take 1 tablet (75 mg) by mouth daily    Associated Diagnoses: Chronic diastolic congestive heart failure (H)      levofloxacin (LEVAQUIN) 250 MG tablet Take 1 tablet (250 mg) by mouth every 24 hours for 3 days    Associated Diagnoses: Chronic diastolic congestive heart failure (H)      multivitamin, therapeutic (THERA-VIT) TABS tablet Take 1 tablet by mouth daily    Associated Diagnoses: Chronic diastolic congestive heart failure (H)      OLANZapine zydis  (ZYPREXA) 20 MG ODT Take 1 tablet (20 mg) by mouth every evening    Associated Diagnoses: Chronic diastolic congestive heart failure (H)      ondansetron (ZOFRAN-ODT) 4 MG ODT tab Take 1 tablet (4 mg) by mouth every 6 hours as needed for nausea or vomiting    Associated Diagnoses: Chronic diastolic congestive heart failure (H)      tamsulosin (FLOMAX) 0.4 MG capsule Take 1 capsule (0.4 mg) by mouth daily    Associated Diagnoses: Chronic diastolic congestive heart failure (H)         CONTINUE these medications which have CHANGED    Details   acetaminophen (TYLENOL) 325 MG tablet Take 2 tablets (650 mg) by mouth every 4 hours as needed for mild pain    Associated Diagnoses: Chronic diastolic congestive heart failure (H)      amLODIPine (NORVASC) 10 MG tablet Take 1 tablet (10 mg) by mouth daily    Associated Diagnoses: Chronic diastolic congestive heart failure (H)      metoprolol succinate ER (TOPROL-XL) 50 MG 24 hr tablet Take 1 tablet (50 mg) by mouth daily    Associated Diagnoses: Chronic diastolic congestive heart failure (H)      mirtazapine (REMERON) 30 MG tablet Take 1 tablet (30 mg) by mouth At Bedtime    Associated Diagnoses: Chronic diastolic congestive heart failure (H)      rosuvastatin (CRESTOR) 10 MG tablet Take 1 tablet (10 mg) by mouth daily    Associated Diagnoses: Chronic diastolic congestive heart failure (H)         CONTINUE these medications which have NOT CHANGED    Details   digoxin (LANOXIN) 125 MCG tablet Take 1 tablet (125 mcg) by mouth daily  Qty: 90 tablet, Refills: 3    Associated Diagnoses: Chronic atrial fibrillation (H)      furosemide (LASIX) 80 MG tablet Take 1 tablet (80 mg) by mouth daily  Qty: 90 tablet, Refills: 1    Associated Diagnoses: Dependent edema      glipiZIDE (GLUCOTROL) 10 MG tablet Take 2 tablets (20 mg) by mouth 2 times daily Take 20mg in AM with breakfast and 20mg in PM with evening meal  Qty: 360 tablet, Refills: 0    Associated Diagnoses: Type 2 diabetes mellitus  with hyperglycemia, with long-term current use of insulin (H)      isosorbide mononitrate (IMDUR) 30 MG 24 hr tablet Take 1 tablet (30 mg) by mouth daily  Qty: 30 tablet, Refills: 3    Associated Diagnoses: SOB (shortness of breath); Atrial fibrillation, unspecified type (H); Chronic diastolic congestive heart failure (H); Bilateral carotid artery stenosis; Chest pain due to myocardial ischemia, unspecified ischemic chest pain type      metFORMIN (GLUCOPHAGE) 1000 MG tablet Take 1 tablet (1,000 mg) by mouth 2 times daily (with meals)    Associated Diagnoses: Type 2 diabetes mellitus with hyperglycemia, with long-term current use of insulin (H)      saxagliptin (ONGLYZA) 2.5 MG TABS tablet Take 1 tablet (2.5 mg) by mouth daily  Qty: 30 tablet, Refills: 1    Associated Diagnoses: Uncontrolled type 2 diabetes mellitus with hyperglycemia (H)      warfarin (COUMADIN) 3 MG tablet Take 1 tablet (3 mg) by mouth daily  Qty: 90 tablet, Refills: 3    Associated Diagnoses: Chronic atrial fibrillation (H)      !! ONE TOUCH FINE POINT LANCETS Check blood sugars twice a day.      !! ONETOUCH DELICA LANCETS 33G MISC 1 strip 2 times daily  Qty: 100 each, Refills: 11    Associated Diagnoses: Type 2 diabetes mellitus with hyperglycemia, with long-term current use of insulin (H)       !! - Potential duplicate medications found. Please discuss with provider.      STOP taking these medications       aspirin (ASA) 325 MG tablet Comments:   Reason for Stopping:         loperamide (IMODIUM A-D) 2 MG tablet Comments:   Reason for Stopping:         Multiple Vitamin (MULTIVITAMINS PO) Comments:   Reason for Stopping:         oxybutynin ER (DITROPAN XL) 15 MG 24 hr tablet Comments:   Reason for Stopping:         Pramoxine HCl (CERAVE ITCH RELIEF) 1 % CREA Comments:   Reason for Stopping:             Allergies   Allergies   Allergen Reactions     Nkda [No Known Drug Allergies]      Data   Most Recent 3 CBC's:  Recent Labs   Lab Test  07/02/19  0718 06/30/19  0857 06/28/19  0713   WBC 8.4 7.6 8.6   HGB 14.4 14.3 14.4   MCV 89 90 91   * 125* 112*      Most Recent 3 BMP's:  Recent Labs   Lab Test 07/02/19  0718 06/30/19  0857 06/28/19  0713    142 142   POTASSIUM 3.7 3.8 3.7   CHLORIDE 109 112* 110*   CO2 27 21 25   BUN 38* 37* 30   CR 1.51* 1.47* 1.52*   ANIONGAP 5 9 7   VINCENT 10.1 9.9 10.0   * 144* 169*     Most Recent 2 LFT's:  Recent Labs   Lab Test 06/25/19  0730 06/20/19  1017   AST 20 16   ALT 20 20   ALKPHOS 74 84   BILITOTAL 0.8 0.5     Most Recent INR's and Anticoagulation Dosing History:  Anticoagulation Dose History     Recent Dosing and Labs Latest Ref Rng & Units 6/26/2019 6/27/2019 6/28/2019 6/29/2019 6/30/2019 7/1/2019 7/2/2019    Warfarin 2 mg - - - - 2 mg - 2 mg -    Warfarin 3 mg - - - 3 mg - 3 mg - -    Warfarin 6 mg - - 6 mg - - - - -    Warfarin 7.5 mg - - 7.5 mg - - - - -    INR 0.86 - 1.14 1.45(H) 1.70(H) 2.24(H) 2.78(H) 2.63(H) 2.88(H) 2.61(H)        Most Recent 3 Troponin's:  Recent Labs   Lab Test 06/26/19  0818 06/26/19 0347 11/09/16  2017   TROPI 0.078* 0.069* <0.015  The 99th percentile for upper reference range is 0.045 ug/L.  Troponin values in   the range of 0.045 - 0.120 ug/L may be associated with risks of adverse   clinical events.       Most Recent Cholesterol Panel:  Recent Labs   Lab Test 06/25/19  0730   CHOL 118   LDL 46   HDL 32*   TRIG 201*     Most Recent 6 Bacteria Isolates From Any Culture (See EPIC Reports for Culture Details):No lab results found.  Most Recent TSH, T4 and A1c Labs:  Recent Labs   Lab Test 06/26/19  0347  05/01/18  1345   TSH  --   --  5.28*   T4  --   --  0.96   A1C 6.4*   < > 9.1*    < > = values in this interval not displayed.     Results for orders placed or performed during the hospital encounter of 06/25/19   CT Head w/o Contrast    Narrative    CT SCAN OF THE HEAD WITHOUT CONTRAST   6/25/2019 3:53 PM     HISTORY: Code stroke, aphasia.    TECHNIQUE: Axial  images of the head and coronal reformations without  IV contrast material. Radiation dose for this scan was reduced using  automated exposure control, adjustment of the mA and/or kV according  to patient size, or iterative reconstruction technique.    COMPARISON: None.    FINDINGS: Moderate cerebral atrophy is present. There are some patchy  white matter changes in both hemispheres without mass effect. There is  no evidence for intracranial hemorrhage, mass effect, acute infarct,  or skull fracture. Vascular calcifications noted.      Impression    IMPRESSION: Chronic changes. No evidence for intracranial hemorrhage  or any acute process.    LM EDWARDS MD   CT Head Perfusion w Contrast    Narrative    CT HEAD PERFUSION WITH CONTRAST 6/25/2019 4:06 PM     HISTORY: Code stroke. Aphasia.    TECHNIQUE: Axial images were obtained through the brain with  intravenous contrast for CT brain perfusion imaging. 50 mL of  Isovue-370 was given. Radiation dose for this scan was reduced using  automated exposure control, adjustment of the mA and/or kV according  to patient size, or iterative reconstruction technique.    FINDINGS: Cerebral blood flow, cerebral blood volume, mean transit  time maps are symmetric. There is no evidence for ischemia or infarct.      Impression    IMPRESSION: Normal CT brain perfusion imaging.    LM EDWARDS MD   CTA Head Neck with Contrast    Narrative    CTA  HEAD AND NECK WITH CONTRAST 6/25/2019 3:56 PM     HISTORY: Code Stroke. Aphasia.    TECHNIQUE: Axial images were obtained through the head and neck with  intravenous contrast. 70 mL of Isovue-370 was given. Multiplanar  reconstructions were performed. 3-D reconstructions off a remote  workstation for CT angiography were also acquired. Carotid stenoses  were evaluated by comparing the caliber of the proximal internal  carotid artery to the caliber of the distal internal carotid artery.  Radiation dose for this scan was reduced using automated  exposure  control, adjustment of the mA and/or kV according to patient size, or  iterative reconstruction technique.    FINDINGS:    Brachiocephalic vessels: There is extensive atherosclerotic plaque  involving the thoracic arch and proximal brachiocephalic vessels with  approximately 50% stenosis of the proximal left subclavian artery.    Right carotid system: Severe plaque is seen in the carotid bifurcation  region resulting in 80% % stenosis of the proximal internal carotid  artery.    Left carotid system: Moderate calcified plaque is seen in the proximal  internal carotid artery resulting in 50% stenosis.    Right vertebral artery: There is scattered calcified plaque but there  is no significant stenosis.    Left vertebral artery: There is calcified plaque at the origin of left  vertebral artery resulting in 50% stenosis.    [place]: There is short segment high-grade stenosis of the  proximal right cavernous carotid artery. There is also moderate short  segment stenosis of the left supraclinoid carotid artery. The basilar  artery is patent. The proximal anterior, middle, and posterior  cerebral arteries are patent.    Other findings: Emphysematous changes along with increased  interstitial markings are seen in both lung apices. Degenerative  changes are seen in the spine.      Impression    IMPRESSION:  1. High-grade stenosis versus short segment occlusion of the right  proximal cavernous internal carotid artery.  2. Short segment moderate stenosis of the left supraclinoid internal  carotid artery.  3. Basilar artery and proximal anterior, middle, and posterior  cerebral arteries are widely patent.  4. 50% stenosis of proximal left subclavian artery and proximal left  vertebral artery.  5. 80% stenosis of proximal right internal carotid artery.    LM EDWARDS MD   XR Chest Port 1 View    Narrative    XR CHEST PORT 1 VW 6/26/2019 4:12 PM    COMPARISON: 11/9/2016.    HISTORY: Shortness of breath.       Impression    IMPRESSION: Diffuse mixed interstitial and airspace opacities over  both lungs, edema versus atypical infection are most likely. No  pleural effusion or pneumothorax seen on either side.    ROXANNA VALE MD

## 2019-07-02 NOTE — PLAN OF CARE
Discharge Planner SLP   Patient plan for discharge: TCU  Current status: SLP: Patient was seen for a meal f/u session on the DDL 3 and thin liquids. Patient with his head leaning to the left. Could lift it to center for approximately 20 seconds. He was impulsively feeding himself scrambled eggs with insufficient mastication and AP movement of the bolus. Not following verbal cues to slow rate and clear oral cavity before the next bite, so the eggs were removed. He took only 3 swallows of thin liquids with premature entry but no immediate coughing. Improved mastication and bolus formation with small bite size pieces of banana. Per his wife and patient he disliked the thick liquids and refused to drink them. Patient is at an elevated risk for aspiration with thin liquids and solids if impulsive eating is not controled. Recommend: 1. Downgrade diet to a DDL 2 with thin liquids (per patient's wishes despite increased risk. Wife is aware of this.) 2. Up in a chair for all meals, no straws, Put small amount on his plate at one time or feed him. Check for oral residue and alternate liquids/solids as needed.   Barriers to return to prior living situation: Cognition and safety.   Recommendations for discharge: TCU  Rationale for recommendations: Continue ST needs for diet tolerance and safe advancement as warranted.        Entered by: Marguerite Pruitt 07/02/2019 10:59 AM

## 2019-07-02 NOTE — PLAN OF CARE
Alert and Oriented to self only. Confused and forgetful. Lethargic at times. Neuro intact except soft, slightly slurred speech, WFD, forgetfulness, leaning to the left when up (MD made aware- no concerns). Tolerating DD2 diet with thin liquids. Pills given in applesauce. BP elevated this AM, improved after scheduled medications. Continues to retain urine, incontinent also. Last straight cathed at 1230 for 450mL. Skin intact except ecchymosis. Plan for discharge today to TCU at 1300. Discharge NIH 4. MRS 5. Education on medications and follow up care reviewed with spouse. Sent with belongings.

## 2019-07-02 NOTE — PLAN OF CARE
Disorientated to situation this AM, orientated only to self this afternoon with rambling thoughts.  Baseline neuropathy in hands and feet, tremors in all extremities.  Up with strong assist of 2/GB/W to chair, hunches over when walking.  Shallow breath sounds, unable to wean O2, sats 90-91% on 1 liter O2, 95% on 2LPM, refuses acapella.  Declines ASA, took Plavix after much discussion about need for medication, written information provided. Retaining urine, straight cathed for 500cc, otherwise incontinent.  On DD3 with thin liquids, coughs when taking pills with water, declines applesauce.  Plan for discharge to [facility] at 1030 tomorrow AM.

## 2019-07-03 ENCOUNTER — NURSING HOME VISIT (OUTPATIENT)
Dept: GERIATRICS | Facility: CLINIC | Age: 84
End: 2019-07-03
Payer: MEDICARE

## 2019-07-03 VITALS
BODY MASS INDEX: 22.2 KG/M2 | RESPIRATION RATE: 16 BRPM | TEMPERATURE: 97.1 F | OXYGEN SATURATION: 94 % | HEART RATE: 80 BPM | DIASTOLIC BLOOD PRESSURE: 70 MMHG | SYSTOLIC BLOOD PRESSURE: 150 MMHG | WEIGHT: 146 LBS

## 2019-07-03 DIAGNOSIS — E11.22 TYPE 2 DIABETES MELLITUS WITH STAGE 3 CHRONIC KIDNEY DISEASE, WITHOUT LONG-TERM CURRENT USE OF INSULIN (H): ICD-10-CM

## 2019-07-03 DIAGNOSIS — I10 BENIGN HYPERTENSION: ICD-10-CM

## 2019-07-03 DIAGNOSIS — N18.30 TYPE 2 DIABETES MELLITUS WITH STAGE 3 CHRONIC KIDNEY DISEASE, WITHOUT LONG-TERM CURRENT USE OF INSULIN (H): ICD-10-CM

## 2019-07-03 DIAGNOSIS — R41.0 DELIRIUM: ICD-10-CM

## 2019-07-03 DIAGNOSIS — I48.20 CHRONIC ATRIAL FIBRILLATION (H): ICD-10-CM

## 2019-07-03 DIAGNOSIS — I63.9 CEREBROVASCULAR ACCIDENT (CVA), UNSPECIFIED MECHANISM (H): Primary | ICD-10-CM

## 2019-07-03 DIAGNOSIS — G47.10 HYPERSOMNIA: ICD-10-CM

## 2019-07-03 DIAGNOSIS — R53.81 PHYSICAL DECONDITIONING: ICD-10-CM

## 2019-07-03 DIAGNOSIS — I69.320 APHASIA AS LATE EFFECT OF STROKE: ICD-10-CM

## 2019-07-03 DIAGNOSIS — J18.9 PNEUMONIA DUE TO INFECTIOUS ORGANISM, UNSPECIFIED LATERALITY, UNSPECIFIED PART OF LUNG: ICD-10-CM

## 2019-07-03 PROCEDURE — 99310 SBSQ NF CARE HIGH MDM 45: CPT | Performed by: NURSE PRACTITIONER

## 2019-07-03 NOTE — PROGRESS NOTES
[place] [facility]  PRIMARY CARE PROVIDER AND CLINIC:  Jamie Guerrier MD, [address] [facility] / [facility] [address]  Chief Complaint   Patient presents with     Hospital F/U     [place] Medical Record Number:  [id number]  Place of Service where encounter took place:  [facility] ([facility]) [[id number]]    Suman Burton  is a 83 year old  (1935), admitted to the above facility from  [facility]. Hospital stay 6/25/19 through 7/2/19..  Admitted to this facility for  rehab, medical management and nursing care.    HPI:    HPI information obtained from: facility chart records, facility staff, patient report and [facility] chart review.   Brief Summary of Hospital Course: while ambulating with the nurse after LHC, she noticed he was having difficulty with speaking and was dragging his right leg.  She called a stroke code.  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  He was admitted to the stroke unit and underwent testing.  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  He did not receive TPA.  He did have some delirium, agitation due to his frustration for word finding difficulties.  He is now on Olanzepine.    Updates on Status Since Skilled nursing Admission: he is seen today in his bed and he responds very slowly and doesn't keep his eyes open very long.  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  Otherwise he denies chest pain, nausea, diarrhea, or constipation.  Due to his somnolence his review of systems is unreliable.  I am able to work with him slightly to take deep breaths and cough so he can get off the oxygen.   Blood pressures and blood sugars are within reasonable parameters.    CODE STATUS/ADVANCE DIRECTIVES DISCUSSION:   CPR/Full code   Patient's living condition: lives with spouse  ALLERGIES: Nkda [no known drug allergies]  PAST MEDICAL HISTORY:  has a past medical history of Atrial fibrillation (H), Cancer (H),  Cerebral infarction (H), Chronic kidney disease, Coronary artery disease (06/25/2019), Diabetes (H), Diabetes mellitus, type 2 (H), Edema, and Hypertension.  PAST SURGICAL HISTORY:   has a past surgical history that includes Laparoscopic appendectomy; Cholecystectomy; Heart Catheterization with Possible Intervention (N/A, 6/25/2019); Right Heart Cath (N/A, 6/25/2019); PCI Stent Drug Eluting (N/A, 6/25/2019); and Instantaneous Wave-Free Ratio (N/A, 6/25/2019).  FAMILY HISTORY: family history is not on file.  SOCIAL HISTORY:   reports that he quit smoking about 11 years ago. His smoking use included cigarettes. He started smoking about 70 years ago. He smoked 2.00 packs per day. He has never used smokeless tobacco. He reports that he drinks alcohol. He reports that he does not use drugs.    Post Discharge Medication Reconciliation Status: discharge medications reconciled, continue medications without change    Current Outpatient Medications   Medication Sig Dispense Refill     acetaminophen (TYLENOL) 325 MG tablet Take 2 tablets (650 mg) by mouth every 4 hours as needed for mild pain       amLODIPine (NORVASC) 10 MG tablet Take 1 tablet (10 mg) by mouth daily       aspirin (ASA) 81 MG chewable tablet Take 1 tablet (81 mg) by mouth daily       bisacodyl (DULCOLAX) 5 MG EC tablet Take 1 tablet (5 mg) by mouth daily as needed for constipation       clopidogrel (PLAVIX) 75 MG tablet Take 1 tablet (75 mg) by mouth daily       digoxin (LANOXIN) 125 MCG tablet Take 1 tablet (125 mcg) by mouth daily 90 tablet 3     furosemide (LASIX) 80 MG tablet Take 1 tablet (80 mg) by mouth daily 90 tablet 1     glipiZIDE (GLUCOTROL) 10 MG tablet Take 2 tablets (20 mg) by mouth 2 times daily Take 20mg in AM with breakfast and 20mg in PM with evening meal 360 tablet 0     isosorbide mononitrate (IMDUR) 30 MG 24 hr tablet Take 1 tablet (30 mg) by mouth daily 30 tablet 3     metFORMIN (GLUCOPHAGE) 1000 MG tablet Take 1 tablet (1,000 mg) by  mouth 2 times daily (with meals)       metoprolol succinate ER (TOPROL-XL) 50 MG 24 hr tablet Take 1 tablet (50 mg) by mouth daily       mirtazapine (REMERON) 30 MG tablet Take 1 tablet (30 mg) by mouth At Bedtime       multivitamin, therapeutic (THERA-VIT) TABS tablet Take 1 tablet by mouth daily       OLANZapine zydis (ZYPREXA) 20 MG ODT Take 1 tablet (20 mg) by mouth every evening       ondansetron (ZOFRAN-ODT) 4 MG ODT tab Take 1 tablet (4 mg) by mouth every 6 hours as needed for nausea or vomiting       ONE TOUCH FINE POINT LANCETS Check blood sugars twice a day.       ONETOUCH DELICA LANCETS 33G MISC 1 strip 2 times daily 100 each 11     rosuvastatin (CRESTOR) 10 MG tablet Take 1 tablet (10 mg) by mouth daily       saxagliptin (ONGLYZA) 2.5 MG TABS tablet Take 1 tablet (2.5 mg) by mouth daily 30 tablet 1     tamsulosin (FLOMAX) 0.4 MG capsule Take 1 capsule (0.4 mg) by mouth daily       warfarin (COUMADIN) 3 MG tablet Take 1 tablet (3 mg) by mouth daily (Patient taking differently: Take 3 mg by mouth daily ) 90 tablet 3       ROS:  Limited secondary to aphasia impairment but today pt reports he is very tired and weak    Vitals:  /70   Pulse 80   Temp 97.1  F (36.2  C)   Resp 16   Wt 66.2 kg (146 lb)   SpO2 94%   BMI 22.20 kg/m    Exam:  GENERAL APPEARANCE:  somnolent, appears ill  ENT:  oral mucous membranes moist  EYES:  Conjunctiva and lids normal  NECK:  No adenopathy,masses or thyromegaly  RESP:  diminished breath sounds throughout, rales anterior  CV:  Palpation and auscultation of heart done , regular rate and rhythm, no murmur, rub, or gallop  ABDOMEN:  normal bowel sounds, soft, nontender, no hepatosplenomegaly or other masses  M/S:   Gait and station abnormal not observed, body is supported in bed with HOB elevate  Digits and nails abnormal arthritic changes  SKIN:  Inspection of skin and subcutaneous tissue baseline, Palpation of skin and subcutaneous tissue baseline  NEURO:   unable to  determine due to lethargy  PSYCH:  unable to determine due to lethargy    Lab/Diagnostic data:  Recent labs in [facility] reviewed by me today.     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  Comment: severe functional status changes  Plan: PT/OT/ST  Monitor Blood Pressures BID  Weekly BMP (renal / electrolyte function)  ASA, Coumadin (goal 2-3), Plavix until 7/25 then discontinue the ASA and continue Plavix and Coumadin for 12 months  Continue statin, Norvasc, Furosemide    (J18.9) Pneumonia due to infectious organism, unspecified laterality, unspecified part of lung  Comment: oral antibiotic complete on 7/5  Plan: Incentive spirometer at lease 4 times daily  Continue Oxygen therapy with goal to wean off when he is more alert    (R41.0) Delirium  Comment: acute mental status change  Plan: Olanzapine for agitation, wean off as soon as able  Keep on routine schedule    (G47.10) Hypersomnia  Comment: acute mental status change  Plan: get up out of bed for meals, formulate routine, family visits  Try to keep him awake during the daytime    (I48.2) Chronic atrial fibrillation (H)  Comment: INR goal 2-3  Plan: warfarin is chronic medication.  Continue Digoxin, Metoprolol,    (I10) Benign hypertension  Comment: labile, unstable  Plan: bid Blood pressure checks  Continue Amlodipine, Furosemide, and Metoprolol  Follow BMP    (E11.22,  N18.3) Type 2 diabetes mellitus with stage 3 chronic kidney disease, without long-term current use of insulin (H)  Comment:  high risk for hypo or hyper glycemia due to Hypersomnia and varied food intake  Plan: QID BGT, continue Glipizide and Saxagliptin.    (R53.81) Physical deconditioning  Comment: severe  Plan: PT/OT, fall precautions.   Care conference with patient and family for the progress of rehab and disposition issues will be discussed as planned.   Rehab evaluation and other  evaluations including CPT are at rehab logs, to be reviewed separately.           Discharge goal is unstated at today's encounter due to lethargy.  Facility goal is for him to return to his PTA living situation with his wife.      Electronically signed by:  CHINYERE Wray CNP

## 2019-07-03 NOTE — LETTER
7/3/2019        RE: Suman Burton  [address] [facility] [address]        [place] [facility]  PRIMARY CARE PROVIDER AND CLINIC:  Jamie Guerrier MD, [address] [facility] [address]  Chief Complaint   Patient presents with     Hospital F/U     [place] Medical Record Number:  [id number]  Place of Service where encounter took place:  [facility] ([facility]) [[id number]]    Suman Burton  is a 83 year old  (1935), admitted to the above facility from  [facility]. Hospital stay 6/25/19 through 7/2/19..  Admitted to this facility for  rehab, medical management and nursing care.    HPI:    HPI information obtained from: facility chart records, facility staff, patient report and [facility] chart review.   Brief Summary of Hospital Course: while ambulating with the nurse after LHC, she noticed he was having difficulty with speaking and was dragging his right leg.  She called a stroke code.  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  He was admitted to the stroke unit and underwent testing.  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  He did not receive TPA.  He did have some delirium, agitation due to his frustration for word finding difficulties.  He is now on Olanzepine.    Updates on Status Since Skilled nursing Admission: he is seen today in his bed and he responds very slowly and doesn't keep his eyes open very long.  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  Otherwise he denies chest pain, nausea, diarrhea, or constipation.  Due to his somnolence his review of systems is unreliable.  I am able to work with him slightly to take deep breaths and cough so he can get off the oxygen.   Blood pressures and blood sugars are within reasonable parameters.    CODE STATUS/ADVANCE DIRECTIVES DISCUSSION:   CPR/Full code   Patient's living condition: lives with spouse  ALLERGIES: Nkda [no known drug  allergies]  PAST MEDICAL HISTORY:  has a past medical history of Atrial fibrillation (H), Cancer (H), Cerebral infarction (H), Chronic kidney disease, Coronary artery disease (06/25/2019), Diabetes (H), Diabetes mellitus, type 2 (H), Edema, and Hypertension.  PAST SURGICAL HISTORY:   has a past surgical history that includes Laparoscopic appendectomy; Cholecystectomy; Heart Catheterization with Possible Intervention (N/A, 6/25/2019); Right Heart Cath (N/A, 6/25/2019); PCI Stent Drug Eluting (N/A, 6/25/2019); and Instantaneous Wave-Free Ratio (N/A, 6/25/2019).  FAMILY HISTORY: family history is not on file.  SOCIAL HISTORY:   reports that he quit smoking about 11 years ago. His smoking use included cigarettes. He started smoking about 70 years ago. He smoked 2.00 packs per day. He has never used smokeless tobacco. He reports that he drinks alcohol. He reports that he does not use drugs.    Post Discharge Medication Reconciliation Status: discharge medications reconciled, continue medications without change    Current Outpatient Medications   Medication Sig Dispense Refill     acetaminophen (TYLENOL) 325 MG tablet Take 2 tablets (650 mg) by mouth every 4 hours as needed for mild pain       amLODIPine (NORVASC) 10 MG tablet Take 1 tablet (10 mg) by mouth daily       aspirin (ASA) 81 MG chewable tablet Take 1 tablet (81 mg) by mouth daily       bisacodyl (DULCOLAX) 5 MG EC tablet Take 1 tablet (5 mg) by mouth daily as needed for constipation       clopidogrel (PLAVIX) 75 MG tablet Take 1 tablet (75 mg) by mouth daily       digoxin (LANOXIN) 125 MCG tablet Take 1 tablet (125 mcg) by mouth daily 90 tablet 3     furosemide (LASIX) 80 MG tablet Take 1 tablet (80 mg) by mouth daily 90 tablet 1     glipiZIDE (GLUCOTROL) 10 MG tablet Take 2 tablets (20 mg) by mouth 2 times daily Take 20mg in AM with breakfast and 20mg in PM with evening meal 360 tablet 0     isosorbide mononitrate (IMDUR) 30 MG 24 hr tablet Take 1 tablet (30  mg) by mouth daily 30 tablet 3     metFORMIN (GLUCOPHAGE) 1000 MG tablet Take 1 tablet (1,000 mg) by mouth 2 times daily (with meals)       metoprolol succinate ER (TOPROL-XL) 50 MG 24 hr tablet Take 1 tablet (50 mg) by mouth daily       mirtazapine (REMERON) 30 MG tablet Take 1 tablet (30 mg) by mouth At Bedtime       multivitamin, therapeutic (THERA-VIT) TABS tablet Take 1 tablet by mouth daily       OLANZapine zydis (ZYPREXA) 20 MG ODT Take 1 tablet (20 mg) by mouth every evening       ondansetron (ZOFRAN-ODT) 4 MG ODT tab Take 1 tablet (4 mg) by mouth every 6 hours as needed for nausea or vomiting       ONE TOUCH FINE POINT LANCETS Check blood sugars twice a day.       ONETOUCH DELICA LANCETS 33G MISC 1 strip 2 times daily 100 each 11     rosuvastatin (CRESTOR) 10 MG tablet Take 1 tablet (10 mg) by mouth daily       saxagliptin (ONGLYZA) 2.5 MG TABS tablet Take 1 tablet (2.5 mg) by mouth daily 30 tablet 1     tamsulosin (FLOMAX) 0.4 MG capsule Take 1 capsule (0.4 mg) by mouth daily       warfarin (COUMADIN) 3 MG tablet Take 1 tablet (3 mg) by mouth daily (Patient taking differently: Take 3 mg by mouth daily ) 90 tablet 3       ROS:  Limited secondary to aphasia impairment but today pt reports he is very tired and weak    Vitals:  /70   Pulse 80   Temp 97.1  F (36.2  C)   Resp 16   Wt 66.2 kg (146 lb)   SpO2 94%   BMI 22.20 kg/m     Exam:  GENERAL APPEARANCE:  somnolent, appears ill  ENT:  oral mucous membranes moist  EYES:  Conjunctiva and lids normal  NECK:  No adenopathy,masses or thyromegaly  RESP:  diminished breath sounds throughout, rales anterior  CV:  Palpation and auscultation of heart done , regular rate and rhythm, no murmur, rub, or gallop  ABDOMEN:  normal bowel sounds, soft, nontender, no hepatosplenomegaly or other masses  M/S:   Gait and station abnormal not observed, body is supported in bed with HOB elevate  Digits and nails abnormal arthritic changes  SKIN:  Inspection of skin and  subcutaneous tissue baseline, Palpation of skin and subcutaneous tissue baseline  NEURO:   unable to determine due to lethargy  PSYCH:  unable to determine due to lethargy    Lab/Diagnostic data:  Recent labs in [facility] reviewed by me today.     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  Comment: severe functional status changes  Plan: PT/OT/ST  Monitor Blood Pressures BID  Weekly BMP (renal / electrolyte function)  ASA, Coumadin (goal 2-3), Plavix until 7/25 then discontinue the ASA and continue Plavix and Coumadin for 12 months  Continue statin, Norvasc, Furosemide    (J18.9) Pneumonia due to infectious organism, unspecified laterality, unspecified part of lung  Comment: oral antibiotic complete on 7/5  Plan: Incentive spirometer at lease 4 times daily  Continue Oxygen therapy with goal to wean off when he is more alert    (R41.0) Delirium  Comment: acute mental status change  Plan: Olanzapine for agitation, wean off as soon as able  Keep on routine schedule    (G47.10) Hypersomnia  Comment: acute mental status change  Plan: get up out of bed for meals, formulate routine, family visits  Try to keep him awake during the daytime    (I48.2) Chronic atrial fibrillation (H)  Comment: INR goal 2-3  Plan: warfarin is chronic medication.  Continue Digoxin, Metoprolol,    (I10) Benign hypertension  Comment: labile, unstable  Plan: bid Blood pressure checks  Continue Amlodipine, Furosemide, and Metoprolol  Follow BMP    (E11.22,  N18.3) Type 2 diabetes mellitus with stage 3 chronic kidney disease, without long-term current use of insulin (H)  Comment:  high risk for hypo or hyper glycemia due to Hypersomnia and varied food intake  Plan: QID BGT, continue Glipizide and Saxagliptin.    (R53.81) Physical deconditioning  Comment: severe  Plan: PT/OT, fall precautions.   Care conference with patient and family for  the progress of rehab and disposition issues will be discussed as planned.   Rehab evaluation and other evaluations including CPT are at rehab logs, to be reviewed separately.           Discharge goal is unstated at today's encounter due to lethargy.  Facility goal is for him to return to his PTA living situation with his wife.      Electronically signed by:  CHINYERE Wray CNP                         Sincerely,        CHINYERE Wray CNP

## 2019-07-08 VITALS
DIASTOLIC BLOOD PRESSURE: 83 MMHG | TEMPERATURE: 96.8 F | SYSTOLIC BLOOD PRESSURE: 145 MMHG | WEIGHT: 134.6 LBS | RESPIRATION RATE: 18 BRPM | HEART RATE: 68 BPM | BODY MASS INDEX: 20.47 KG/M2 | OXYGEN SATURATION: 92 %

## 2019-07-08 RX ORDER — BISACODYL 10 MG
10 SUPPOSITORY, RECTAL RECTAL DAILY PRN
COMMUNITY
End: 2019-08-07

## 2019-07-08 RX ORDER — SENNOSIDES 8.6 MG
TABLET ORAL
COMMUNITY
End: 2019-08-01

## 2019-07-08 RX ORDER — ALBUTEROL SULFATE 0.83 MG/ML
2.5 SOLUTION RESPIRATORY (INHALATION) 2 TIMES DAILY
COMMUNITY
End: 2019-08-07

## 2019-07-08 RX ORDER — ACETAMINOPHEN 500 MG
1000 TABLET ORAL 3 TIMES DAILY
COMMUNITY
End: 2019-07-11

## 2019-07-08 NOTE — PROGRESS NOTES
[facility]  [place] Medical Record Number:  [id number]  Place of Service where encounter took place:  [facility] ([facility]) [[id number]]  Chief Complaint   Patient presents with     RECHECK       HPI:    Suman Burton  is a 83 year old (1935), who is being seen today for an episodic care visit.  HPI information obtained from: facility chart records, facility staff, patient report and [facility] chart review. Today's concern is:  CVD/aphagia: working with therapy,  Pneumonia: denies SOB, cough or congestion, SaO2 > 90% on room air.   Fall with right hip and low back pain: patient had a fall over the weekend, has been having increased low back pain and right hip pain since fall, c/o 10/10 pain at time of exam, states pain is intermittent and positional.   HTN: BP as follows 145/83, 153/78, 173/76 with HR in 60-90 range, admit weight 136lbs and current weight is 134.6lbs    Past Medical and Surgical History reviewed in Epic today.    MEDICATIONS:  Current Outpatient Medications   Medication Sig Dispense Refill     acetaminophen (TYLENOL) 325 MG tablet Take 2 tablets (650 mg) by mouth every 4 hours as needed for mild pain       acetaminophen (TYLENOL) 500 MG tablet Take 1,000 mg by mouth 3 times daily       albuterol (PROVENTIL) (2.5 MG/3ML) 0.083% neb solution 1 vial inhale orally via nebulizer two times a day for dyspnea/ Low O2 sats/pneumonia Make sure he deep breaths and coughs with each treatment AND 1 vial inhale orally via nebulizer every 4 hours as needed for dyspnea/low O2 sats/ pneumonia Make sure he deep breaths and coughs with each treatment       amLODIPine (NORVASC) 10 MG tablet Take 1 tablet (10 mg) by mouth daily       aspirin (ASA) 81 MG chewable tablet Take 1 tablet (81 mg) by mouth daily       bisacodyl (DULCOLAX) 10 MG suppository Place 10 mg rectally daily as needed for constipation       bisacodyl (DULCOLAX) 5 MG EC tablet Take 1 tablet (5 mg) by mouth daily as  needed for constipation       clopidogrel (PLAVIX) 75 MG tablet Take 1 tablet (75 mg) by mouth daily       digoxin (LANOXIN) 125 MCG tablet Take 1 tablet (125 mcg) by mouth daily 90 tablet 3     furosemide (LASIX) 80 MG tablet Take 1 tablet (80 mg) by mouth daily 90 tablet 1     glipiZIDE (GLUCOTROL) 10 MG tablet Take 2 tablets (20 mg) by mouth 2 times daily Take 20mg in AM with breakfast and 20mg in PM with evening meal 360 tablet 0     isosorbide mononitrate (IMDUR) 30 MG 24 hr tablet Take 1 tablet (30 mg) by mouth daily 30 tablet 3     metFORMIN (GLUCOPHAGE) 1000 MG tablet Take 1 tablet (1,000 mg) by mouth 2 times daily (with meals)       metoprolol succinate ER (TOPROL-XL) 50 MG 24 hr tablet Take 1 tablet (50 mg) by mouth daily       mirtazapine (REMERON) 30 MG tablet Take 1 tablet (30 mg) by mouth At Bedtime       multivitamin, therapeutic (THERA-VIT) TABS tablet Take 1 tablet by mouth daily       OLANZapine zydis (ZYPREXA) 20 MG ODT Take 1 tablet (20 mg) by mouth every evening       ondansetron (ZOFRAN-ODT) 4 MG ODT tab Take 1 tablet (4 mg) by mouth every 6 hours as needed for nausea or vomiting       ONE TOUCH FINE POINT LANCETS Check blood sugars twice a day.       ONETOUCH DELICA LANCETS 33G MISC 1 strip 2 times daily 100 each 11     rosuvastatin (CRESTOR) 10 MG tablet Take 1 tablet (10 mg) by mouth daily       saxagliptin (ONGLYZA) 2.5 MG TABS tablet Take 1 tablet (2.5 mg) by mouth daily 30 tablet 1     sennosides (SENOKOT) 8.6 MG tablet Take 1 tablet by mouth 2 times daily       SODIUM PHOSPHATES RE Place rectally daily as needed       tamsulosin (FLOMAX) 0.4 MG capsule Take 1 capsule (0.4 mg) by mouth daily       warfarin (COUMADIN) 3 MG tablet Take 1 tablet (3 mg) by mouth daily (Patient taking differently: Take 3 mg by mouth daily ) 90 tablet 3         REVIEW OF SYSTEMS:  10 point ROS of systems including Constitutional, Eyes, Respiratory, Cardiovascular, Gastroenterology, Genitourinary, Integumentary,  Musculoskeletal, Psychiatric were all negative except for pertinent positives noted in my HPI.    Objective:  /83   Pulse 68   Temp 96.8  F (36  C)   Resp 18   Wt 61.1 kg (134 lb 9.6 oz)   SpO2 92%   BMI 20.47 kg/m    Exam:  GENERAL APPEARANCE:  Alert, in mild distress due to pain  ENT:  Mouth and posterior oropharynx normal, moist mucous membranes, [place]  EYES:  EOM, conjunctivae, lids, pupils and irises normal, PERRL  RESP:  respiratory effort and palpation of chest normal, lungs clear to auscultation , no respiratory distress  CV:  Palpation and auscultation of heart done , regular rate and rhythm, no murmur, rub, or gallop, no edema  ABDOMEN:  normal bowel sounds, soft, nontender, no hepatosplenomegaly or other masses  M/S:   Examination of:   right upper extremity, left upper extremity, right lower extremity and left lower extremity  Inspection, ROM, stability and muscle strength normal and generalized weakness noted  SKIN:  Inspection of skin and subcutaneous tissue baseline  NEURO:   Cranial nerves 2-12 are normal tested and grossly at patient's baseline  PSYCH:  affect and mood normal    Labs:   Recent labs in [facility] reviewed by me today.     ASSESSMENT/PLAN:  Cerebrovascular accident (CVA), unspecified mechanism (H)  Aphasia as late effect of stroke  Physical deconditioning  Acute/ongoing: PT, OT and ST ongoing, continue crestor 10mg qhs, coumadin as directed INR goal of 2-3, plavix 75mg QD, ASA 81mg QD    Pneumonia due to infectious organism, unspecified laterality, unspecified part of lung  Stable: Abx complete, monitor SaO2 at rest and with activity, keep SaO2 > 90%    Delirium  Acute: patient did have a fall over the weekend, monitor and continue zyprexa 20mg qhs    Fall, initial encounter  Hip pain, right  Acute/ongoin view x-ray of lumbar spine and right hip  norco 5/325mg 1 PO q 4 hours prn, decrease tylenol to 650mg TID scheduled and q 6 hours prn, do not exceed 4gm acetaminophen in 24  hours.   Aqua K pad prn for comfort    Chronic atrial fibrillation (H)  Benign hypertension  Acute/ongoing: vitals daily and prn, BMP follow: increase toprol xl to 75mg QD, Imdur 30mg QD, lasix 80mg QD, digoxin 125mcg QD, continue norvasc to 10mg BID     Type 2 diabetes mellitus with stage 3 chronic kidney disease, without long-term current use of insulin (H)  Ongoing: follow BMP, continue glipizide 20mg BID, metformin 1000mg BID, saxagliptin 2.5mg QD       Orders written by provider at facility  2 view x-ray of lumbar spine and right hip and pelvis  Increase toprol xl to 75mg QD  norco 5/325mg q 4 hours prn  Decrease tylenol to 650mg TID and q 6 hours prn    Total time spent with patient visit at the skilled nursing facility was 45 min including patient visit and review of past records. Greater than 50% of total time spent with counseling and coordinating care due to update orders, x-ray orders, pain management  Electronically signed by:  Tonya Lynn Haase, APRN CNP

## 2019-07-09 ENCOUNTER — NURSING HOME VISIT (OUTPATIENT)
Dept: GERIATRICS | Facility: CLINIC | Age: 84
End: 2019-07-09
Payer: MEDICARE

## 2019-07-09 ENCOUNTER — TRANSFERRED RECORDS (OUTPATIENT)
Dept: HEALTH INFORMATION MANAGEMENT | Facility: CLINIC | Age: 84
End: 2019-07-09

## 2019-07-09 DIAGNOSIS — I69.320 APHASIA AS LATE EFFECT OF STROKE: ICD-10-CM

## 2019-07-09 DIAGNOSIS — R53.81 PHYSICAL DECONDITIONING: ICD-10-CM

## 2019-07-09 DIAGNOSIS — I10 BENIGN HYPERTENSION: ICD-10-CM

## 2019-07-09 DIAGNOSIS — J18.9 PNEUMONIA DUE TO INFECTIOUS ORGANISM, UNSPECIFIED LATERALITY, UNSPECIFIED PART OF LUNG: ICD-10-CM
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
## 2019-07-09 DIAGNOSIS — E11.22 TYPE 2 DIABETES MELLITUS WITH STAGE 3 CHRONIC KIDNEY DISEASE, WITHOUT LONG-TERM CURRENT USE OF INSULIN (H): ICD-10-CM

## 2019-07-09 DIAGNOSIS — I48.20 CHRONIC ATRIAL FIBRILLATION (H): ICD-10-CM

## 2019-07-09 DIAGNOSIS — M25.551 HIP PAIN, RIGHT: ICD-10-CM

## 2019-07-09 DIAGNOSIS — N18.30 TYPE 2 DIABETES MELLITUS WITH STAGE 3 CHRONIC KIDNEY DISEASE, WITHOUT LONG-TERM CURRENT USE OF INSULIN (H): ICD-10-CM

## 2019-07-09 DIAGNOSIS — I63.9 CEREBROVASCULAR ACCIDENT (CVA), UNSPECIFIED MECHANISM (H): Primary | ICD-10-CM

## 2019-07-09 DIAGNOSIS — R41.0 DELIRIUM: ICD-10-CM

## 2019-07-09 PROCEDURE — 99309 SBSQ NF CARE MODERATE MDM 30: CPT | Performed by: NURSE PRACTITIONER

## 2019-07-09 PROCEDURE — 99207 ZZC CDG-MDM COMPONENT: MEETS LOW - DOWN CODED: CPT | Performed by: NURSE PRACTITIONER

## 2019-07-09 NOTE — LETTER
7/9/2019        RE: Suman Burton  [address]  [facility] [address]        [place] [facility]  [place] [facility] Record Number:  [id number]  Place of Service where encounter took place:  [facility] ([facility] [[id number]]  Chief Complaint   Patient presents with     RECHECK       HPI:    Suman Burton  is a 83 year old (1935), who is being seen today for an episodic care visit.  HPI information obtained from: facility chart records, facility staff, patient report and [facility] chart review. Today's concern is:  CVD/aphagia: working with therapy,  Pneumonia: denies SOB, cough or congestion, SaO2 > 90% on room air.   Fall with right hip and low back pain: patient had a fall over the weekend, has been having increased low back pain and right hip pain since fall, c/o 10/10 pain at time of exam, states pain is intermittent and positional.   HTN: BP as follows 145/83, 153/78, 173/76 with HR in 60-90 range, admit weight 136lbs and current weight is 134.6lbs    Past Medical and Surgical History reviewed in Epic today.    MEDICATIONS:  Current Outpatient Medications   Medication Sig Dispense Refill     acetaminophen (TYLENOL) 325 MG tablet Take 2 tablets (650 mg) by mouth every 4 hours as needed for mild pain       acetaminophen (TYLENOL) 500 MG tablet Take 1,000 mg by mouth 3 times daily       albuterol (PROVENTIL) (2.5 MG/3ML) 0.083% neb solution 1 vial inhale orally via nebulizer two times a day for dyspnea/ Low O2 sats/pneumonia Make sure he deep breaths and coughs with each treatment AND 1 vial inhale orally via nebulizer every 4 hours as needed for dyspnea/low O2 sats/ pneumonia Make sure he deep breaths and coughs with each treatment       amLODIPine (NORVASC) 10 MG tablet Take 1 tablet (10 mg) by mouth daily       aspirin (ASA) 81 MG chewable tablet Take 1 tablet (81 mg) by mouth daily       bisacodyl (DULCOLAX) 10 MG suppository Place 10 mg rectally daily as needed for  constipation       bisacodyl (DULCOLAX) 5 MG EC tablet Take 1 tablet (5 mg) by mouth daily as needed for constipation       clopidogrel (PLAVIX) 75 MG tablet Take 1 tablet (75 mg) by mouth daily       digoxin (LANOXIN) 125 MCG tablet Take 1 tablet (125 mcg) by mouth daily 90 tablet 3     furosemide (LASIX) 80 MG tablet Take 1 tablet (80 mg) by mouth daily 90 tablet 1     glipiZIDE (GLUCOTROL) 10 MG tablet Take 2 tablets (20 mg) by mouth 2 times daily Take 20mg in AM with breakfast and 20mg in PM with evening meal 360 tablet 0     isosorbide mononitrate (IMDUR) 30 MG 24 hr tablet Take 1 tablet (30 mg) by mouth daily 30 tablet 3     metFORMIN (GLUCOPHAGE) 1000 MG tablet Take 1 tablet (1,000 mg) by mouth 2 times daily (with meals)       metoprolol succinate ER (TOPROL-XL) 50 MG 24 hr tablet Take 1 tablet (50 mg) by mouth daily       mirtazapine (REMERON) 30 MG tablet Take 1 tablet (30 mg) by mouth At Bedtime       multivitamin, therapeutic (THERA-VIT) TABS tablet Take 1 tablet by mouth daily       OLANZapine zydis (ZYPREXA) 20 MG ODT Take 1 tablet (20 mg) by mouth every evening       ondansetron (ZOFRAN-ODT) 4 MG ODT tab Take 1 tablet (4 mg) by mouth every 6 hours as needed for nausea or vomiting       ONE TOUCH FINE POINT LANCETS Check blood sugars twice a day.       ONETOUCH DELICA LANCETS 33G MISC 1 strip 2 times daily 100 each 11     rosuvastatin (CRESTOR) 10 MG tablet Take 1 tablet (10 mg) by mouth daily       saxagliptin (ONGLYZA) 2.5 MG TABS tablet Take 1 tablet (2.5 mg) by mouth daily 30 tablet 1     sennosides (SENOKOT) 8.6 MG tablet Take 1 tablet by mouth 2 times daily       SODIUM PHOSPHATES RE Place rectally daily as needed       tamsulosin (FLOMAX) 0.4 MG capsule Take 1 capsule (0.4 mg) by mouth daily       warfarin (COUMADIN) 3 MG tablet Take 1 tablet (3 mg) by mouth daily (Patient taking differently: Take 3 mg by mouth daily ) 90 tablet 3         REVIEW OF SYSTEMS:  10 point ROS of systems including  Constitutional, Eyes, Respiratory, Cardiovascular, Gastroenterology, Genitourinary, Integumentary, Musculoskeletal, Psychiatric were all negative except for pertinent positives noted in my HPI.    Objective:  /83   Pulse 68   Temp 96.8  F (36  C)   Resp 18   Wt 61.1 kg (134 lb 9.6 oz)   SpO2 92%   BMI 20.47 kg/m     Exam:  GENERAL APPEARANCE:  Alert, in mild distress due to pain  ENT:  Mouth and posterior oropharynx normal, moist mucous membranes, [place]  EYES:  EOM, conjunctivae, lids, pupils and irises normal, PERRL  RESP:  respiratory effort and palpation of chest normal, lungs clear to auscultation , no respiratory distress  CV:  Palpation and auscultation of heart done , regular rate and rhythm, no murmur, rub, or gallop, no edema  ABDOMEN:  normal bowel sounds, soft, nontender, no hepatosplenomegaly or other masses  M/S:   Examination of:   right upper extremity, left upper extremity, right lower extremity and left lower extremity  Inspection, ROM, stability and muscle strength normal and generalized weakness noted  SKIN:  Inspection of skin and subcutaneous tissue baseline  NEURO:   Cranial nerves 2-12 are normal tested and grossly at patient's baseline  PSYCH:  affect and mood normal    Labs:   Recent labs in [facility] reviewed by me today.     ASSESSMENT/PLAN:  Cerebrovascular accident (CVA), unspecified mechanism (H)  Aphasia as late effect of stroke  Physical deconditioning  Acute/ongoing: PT, OT and ST ongoing, continue crestor 10mg qhs, coumadin as directed INR goal of 2-3, plavix 75mg QD, ASA 81mg QD    Pneumonia due to infectious organism, unspecified laterality, unspecified part of lung  Stable: Abx complete, monitor SaO2 at rest and with activity, keep SaO2 > 90%    Delirium  Acute: patient did have a fall over the weekend, monitor and continue zyprexa 20mg qhs    Fall, initial encounter  Hip pain, right  Acute/ongoin view x-ray of lumbar spine and right hip  norco 5/325mg 1 PO q 4 hours  prn, decrease tylenol to 650mg TID scheduled and q 6 hours prn, do not exceed 4gm acetaminophen in 24 hours.   Aqua K pad prn for comfort    Chronic atrial fibrillation (H)  Benign hypertension  Acute/ongoing: vitals daily and prn, BMP follow: increase toprol xl to 75mg QD, Imdur 30mg QD, lasix 80mg QD, digoxin 125mcg QD, continue norvasc to 10mg BID     Type 2 diabetes mellitus with stage 3 chronic kidney disease, without long-term current use of insulin (H)  Ongoing: follow BMP, continue glipizide 20mg BID, metformin 1000mg BID, saxagliptin 2.5mg QD       Orders written by provider at facility  2 view x-ray of lumbar spine and right hip and pelvis  Increase toprol xl to 75mg QD  norco 5/325mg q 4 hours prn  Decrease tylenol to 650mg TID and q 6 hours prn    Total time spent with patient visit at the [facility] nursing facility was 45 min including patient visit and review of past records. Greater than 50% of total time spent with counseling and coordinating care due to update orders, x-ray orders, pain management  Electronically signed by:  Tonya Lynn Haase, APRN CNP               Sincerely,        Tonya Lynn Haase, APRN CNP

## 2019-07-11 ENCOUNTER — NURSING HOME VISIT (OUTPATIENT)
Dept: GERIATRICS | Facility: CLINIC | Age: 84
End: 2019-07-11
Payer: MEDICARE

## 2019-07-11 VITALS
RESPIRATION RATE: 18 BRPM | SYSTOLIC BLOOD PRESSURE: 142 MMHG | OXYGEN SATURATION: 93 % | WEIGHT: 138.1 LBS | HEART RATE: 72 BPM | BODY MASS INDEX: 21 KG/M2 | TEMPERATURE: 97.3 F | DIASTOLIC BLOOD PRESSURE: 67 MMHG

## 2019-07-11 DIAGNOSIS — I63.9 CEREBROVASCULAR ACCIDENT (CVA), UNSPECIFIED MECHANISM (H): Primary | ICD-10-CM

## 2019-07-11 DIAGNOSIS — I69.320 APHASIA AS LATE EFFECT OF STROKE: ICD-10-CM

## 2019-07-11 DIAGNOSIS — I48.20 CHRONIC ATRIAL FIBRILLATION (H): ICD-10-CM

## 2019-07-11 DIAGNOSIS — G47.10 HYPERSOMNIA: ICD-10-CM

## 2019-07-11 DIAGNOSIS — J18.9 PNEUMONIA DUE TO INFECTIOUS ORGANISM, UNSPECIFIED LATERALITY, UNSPECIFIED PART OF LUNG: ICD-10-CM

## 2019-07-11 DIAGNOSIS — E11.22 TYPE 2 DIABETES MELLITUS WITH STAGE 3 CHRONIC KIDNEY DISEASE, WITHOUT LONG-TERM CURRENT USE OF INSULIN (H): ICD-10-CM

## 2019-07-11 DIAGNOSIS — K59.01 SLOW TRANSIT CONSTIPATION: ICD-10-CM

## 2019-07-11 DIAGNOSIS — R41.0 DELIRIUM: ICD-10-CM

## 2019-07-11 DIAGNOSIS — I10 BENIGN HYPERTENSION: ICD-10-CM

## 2019-07-11 DIAGNOSIS — R53.81 PHYSICAL DECONDITIONING: ICD-10-CM

## 2019-07-11 DIAGNOSIS — N18.30 TYPE 2 DIABETES MELLITUS WITH STAGE 3 CHRONIC KIDNEY DISEASE, WITHOUT LONG-TERM CURRENT USE OF INSULIN (H): ICD-10-CM

## 2019-07-11 PROCEDURE — 99310 SBSQ NF CARE HIGH MDM 45: CPT | Performed by: NURSE PRACTITIONER

## 2019-07-11 RX ORDER — ACETAMINOPHEN 325 MG/1
650 TABLET ORAL 3 TIMES DAILY
COMMUNITY

## 2019-07-11 RX ORDER — HYDROCODONE BITARTRATE AND ACETAMINOPHEN 5; 325 MG/1; MG/1
1 TABLET ORAL EVERY 4 HOURS PRN
COMMUNITY
End: 2019-08-07

## 2019-07-11 NOTE — PROGRESS NOTES
[place] [facility]  [place] [facility] Record Number:  [id number]  Place of Service where encounter took place:  [facility] ([facility] [[id number]]  Chief Complaint   Patient presents with     Nursing Home Acute       HPI:    Suman Burton  is a 83 year old (1935), who is being seen today for an episodic care visit.  HPI information obtained from: facility chart records, facility staff, patient report and [facility] chart review.     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  He was admitted to the stroke unit and underwent testing.  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  He did not receive TPA.  He did have some delirium, agitation due to his frustration for word finding difficulties.  He is now on Olanzapine.  Due to CVD/aphagia is working with therapy. Patient on Crestor 10mg qhs, coumadin, Plavix 75mg QD, ASA 81mg every day. Completed antibiotics for pneumonia. Increased Toprol XL to 75mg every day due to HTN.     Today's concern is:  Seen today for episodic follow up at TCU. Patient has crackles in lung bases but no fevers or dyspnea. Sats are 93% on o2 per NC today. Note weight up 2 lbs since admission. SBP remains elevated range 124-187. Denies headaches or dizziness. No chest pain and no dyspnea. Bowels regular, no bladder issues. Patient still not sleeping well at night and is confused on and off. Patient reports stools are hard, constipated.   Per pharmacist review:  --patient on Zyprexa - meds still appropriate and needed to help with delusions at night. No changes will be made in dosage at this point. Monitor for side effects  --Patient on Plavix, ASA and Coumadin - per cards note of 6/26 Dr Block OK to stop ASA in 30 days, continue other two unchanged. Will plan on stopping ASA per cards recommendations.     Past Medical and Surgical  History reviewed in Epic today.    MEDICATIONS:  Current Outpatient Medications   Medication Sig Dispense Refill     acetaminophen (TYLENOL) 325 MG tablet Give 650 mg by mouth three times a day for pain Do not exceed 4gm in 24 hours. AND Give 650 mg by mouth every 6 hours as needed for pain (do not exceed 4gm acetaminophen in 24 hrs)       albuterol (PROVENTIL) (2.5 MG/3ML) 0.083% neb solution 1 vial inhale orally via nebulizer two times a day for dyspnea/ Low O2 sats/pneumonia Make sure he deep breaths and coughs with each treatment AND 1 vial inhale orally via nebulizer every 4 hours as needed for dyspnea/low O2 sats/ pneumonia Make sure he deep breaths and coughs with each treatment       amLODIPine (NORVASC) 10 MG tablet Take 1 tablet (10 mg) by mouth daily       aspirin (ASA) 81 MG chewable tablet Take 1 tablet (81 mg) by mouth daily (Patient taking differently: Take 81 mg by mouth daily Stop on 7/26/19)       bisacodyl (DULCOLAX) 10 MG suppository Place 10 mg rectally daily as needed for constipation       bisacodyl (DULCOLAX) 5 MG EC tablet Take 1 tablet (5 mg) by mouth daily as needed for constipation       clopidogrel (PLAVIX) 75 MG tablet Take 1 tablet (75 mg) by mouth daily       digoxin (LANOXIN) 125 MCG tablet Take 1 tablet (125 mcg) by mouth daily 90 tablet 3     furosemide (LASIX) 80 MG tablet Take 1 tablet (80 mg) by mouth daily 90 tablet 1     glipiZIDE (GLUCOTROL) 10 MG tablet Take 2 tablets (20 mg) by mouth 2 times daily Take 20mg in AM with breakfast and 20mg in PM with evening meal 360 tablet 0     HYDROcodone-acetaminophen (NORCO) 5-325 MG tablet Take 1 tablet by mouth every 4 hours as needed for severe pain       isosorbide mononitrate (IMDUR) 30 MG 24 hr tablet Take 1 tablet (30 mg) by mouth daily 30 tablet 3     metFORMIN (GLUCOPHAGE) 1000 MG tablet Take 1 tablet (1,000 mg) by mouth 2 times daily (with meals)       METOPROLOL SUCCINATE ER PO Take 100 mg by mouth daily        mirtazapine  (REMERON) 30 MG tablet Take 1 tablet (30 mg) by mouth At Bedtime       multivitamin, therapeutic (THERA-VIT) TABS tablet Take 1 tablet by mouth daily       OLANZapine zydis (ZYPREXA) 20 MG ODT Take 1 tablet (20 mg) by mouth every evening       ondansetron (ZOFRAN-ODT) 4 MG ODT tab Take 1 tablet (4 mg) by mouth every 6 hours as needed for nausea or vomiting       ONE TOUCH FINE POINT LANCETS Check blood sugars twice a day.       ONETOUCH DELICA LANCETS 33G MISC 1 strip 2 times daily 100 each 11     rosuvastatin (CRESTOR) 10 MG tablet Take 1 tablet (10 mg) by mouth daily       saxagliptin (ONGLYZA) 2.5 MG TABS tablet Take 1 tablet (2.5 mg) by mouth daily 30 tablet 1     sennosides (SENOKOT) 8.6 MG tablet Take 3 tablets by mouth 2 times daily        SODIUM PHOSPHATES RE Place rectally daily as needed       tamsulosin (FLOMAX) 0.4 MG capsule Take 1 capsule (0.4 mg) by mouth daily       warfarin (COUMADIN) 3 MG tablet Take 1 tablet (3 mg) by mouth daily (Patient taking differently: Take 3 mg by mouth daily ) 90 tablet 3       REVIEW OF SYSTEMS:  10 point ROS of systems including Constitutional, Eyes, Respiratory, Cardiovascular, Gastroenterology, Genitourinary, Integumentary, Musculoskeletal, Psychiatric were all negative except for pertinent positives noted in my HPI.    Objective:  /67   Pulse 72   Temp 97.3  F (36.3  C)   Resp 18   Wt 62.6 kg (138 lb 1.6 oz)   SpO2 93%   BMI 21.00 kg/m    Exam:  GENERAL APPEARANCE:  Alert, in no distress, pleasant, cooperative, oriented x self and place  EYES:  EOM, lids, pupils and irises normal, sclera clear and conjunctiva normal, no discharge or mattering on lids or lashes noted  ENT:  Mouth normal, moist mucous membranes, nose normal without drainage or crusting, external ears without lesions, hearing acuity impaired  NECK: supple, symmetrical, trachea midline  RESP:  respiratory effort normal, no chest wall tenderness, no respiratory distress, Lung sounds crackles,  patient is on room air today  CV:  Auscultation of heart done, rate and rhythm controlled and regular, no murmur, no rub or gallop. Edema none bilateral lower extremities. VASCULAR: no open areas lower legs  ABDOMEN:  Hypoactive bowel sounds, soft, nontender, no palpable masses.  M/S:   Gait and station walks with assist , no tenderness or swelling of the joints; able to move all extremities, digits normal  NEURO: no facial asymmetry, no speech deficits and able to follow directions, moves all extremities symmetrically  PSYCH:  insight and judgement and memory impaired, affect and mood normal     Labs:     Most Recent 3 CBC's:  Recent Labs   Lab Test 07/02/19  0718 06/30/19  0857 06/28/19  0713   WBC 8.4 7.6 8.6   HGB 14.4 14.3 14.4   MCV 89 90 91   * 125* 112*     Most Recent 3 BMP's:  Recent Labs   Lab Test 07/02/19 0718 06/30/19  0857 06/28/19  0713    142 142   POTASSIUM 3.7 3.8 3.7   CHLORIDE 109 112* 110*   CO2 27 21 25   BUN 38* 37* 30   CR 1.51* 1.47* 1.52*   ANIONGAP 5 9 7   VINCENT 10.1 9.9 10.0   * 144* 169*       ASSESSMENT/PLAN:  Cerebrovascular accident (CVA), unspecified mechanism (H)  Aphasia as late effect of stroke  Chronic atrial fibrillation (H)  Physical deconditioning  Acute on chronic, continues therapies - making some mclean, no discharge date yet. Continue meds as above, vs, wt as ordered. Stop ASA 7/26. Neuro/cards f/u as recommended.     Pneumonia due to infectious organism, unspecified laterality, unspecified part of lung  Completed antibiotics, no fevers. Did have some base crackles both lungs today and some edema/wt gain. Give one time dose diuretic today. Encourage incentive spirometer as ordered.     Delirium  Hypersomnia  Ongoing issue with underlying cognitive impairment. Zyprexa to be continued at current dose as benefit outweighs risk. Monitor for side effects.     Benign hypertension  Chronic, not well controlled. Increase Toprol dose. Monitor vs, wt as ordered.      Type 2 diabetes mellitus with stage 3 chronic kidney disease, without long-term current use of insulin (H)  Chronic, adequately managed with current measures. Monitor BG per routine.   Lab Results   Component Value Date    A1C 6.4 06/26/2019     Slow transit constipation  Ongoing issue, reports hard stools. Increase senna s dose and monitor.    Orders written by provider at facility  1. Zaroxolyn 2.5 mg PO today one time dose due to edema and weight gain  2. Per 6/26 cards note stop ASA on 7/26. Continue Plavix and coumadin as ordered.   3. CBC, BMP on 7/15 diagnosis anemia, HTN  4. Increase Toprol XL to 100 mg PO daily diagnosis HTN  5. Continue Zyprexa at  - monitor for side effects and effectiveness.   6. Increase senna s to 3 tabs PO BID diagnosis constipation    Total time spent with patient visit at the [facility] nursing facility was over 36 min including patient visit, review of past records and follow up phone conversation with staff with updates on patient's weight. Greater than 50% of total time spent with counseling and coordinating care due to coordinating care with nurse on current Zyprexa order and effectiveness as well as patient behaviors/agitation at night, coordinating care with follow up labs and appointments, counseling patient/family use of incentive spirometry and BP management, current medication reviewed as well as review of pharmacist's recommendation and recent past lab and imaging results and subsequent treatment plan.    Electronically signed by:  CHINYERE Rojas CNP

## 2019-07-11 NOTE — LETTER
7/11/2019        RE: Suman Burton  [address]  [facility] [address]        [place] [facility]  [place] [facility] Record Number:  [id number]  Place of Service where encounter took place:  [facility] ([facility] [[id number]]  Chief Complaint   Patient presents with     Nursing Home Acute       HPI:    Suman Burton  is a 83 year old (1935), who is being seen today for an episodic care visit.  HPI information obtained from: facility chart records, facility staff, patient report and [facility] chart review.     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  He was admitted to the stroke unit and underwent testing.  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  He did not receive TPA.  He did have some delirium, agitation due to his frustration for word finding difficulties.  He is now on Olanzapine.  Due to CVD/aphagia is working with therapy. Patient on Crestor 10mg qhs, coumadin, Plavix 75mg QD, ASA 81mg every day. Completed antibiotics for pneumonia. Increased Toprol XL to 75mg every day due to HTN.     Today's concern is:  Seen today for episodic follow up at TCU. Patient has crackles in lung bases but no fevers or dyspnea. Sats are 93% on o2 per NC today. Note weight up 2 lbs since admission. SBP remains elevated range 124-187. Denies headaches or dizziness. No chest pain and no dyspnea. Bowels regular, no bladder issues. Patient still not sleeping well at night and is confused on and off. Patient reports stools are hard, constipated.   Per pharmacist review:  --patient on Zyprexa - meds still appropriate and needed to help with delusions at night. No changes will be made in dosage at this point. Monitor for side effects  --Patient on Plavix, ASA and Coumadin - per cards note of 6/26 Dr Mckayla SALVADOR to stop ASA in 30 days, continue other two  unchanged. Will plan on stopping ASA per cards recommendations.     Past Medical and Surgical History reviewed in Epic today.    MEDICATIONS:  Current Outpatient Medications   Medication Sig Dispense Refill     acetaminophen (TYLENOL) 325 MG tablet Give 650 mg by mouth three times a day for pain Do not exceed 4gm in 24 hours. AND Give 650 mg by mouth every 6 hours as needed for pain (do not exceed 4gm acetaminophen in 24 hrs)       albuterol (PROVENTIL) (2.5 MG/3ML) 0.083% neb solution 1 vial inhale orally via nebulizer two times a day for dyspnea/ Low O2 sats/pneumonia Make sure he deep breaths and coughs with each treatment AND 1 vial inhale orally via nebulizer every 4 hours as needed for dyspnea/low O2 sats/ pneumonia Make sure he deep breaths and coughs with each treatment       amLODIPine (NORVASC) 10 MG tablet Take 1 tablet (10 mg) by mouth daily       aspirin (ASA) 81 MG chewable tablet Take 1 tablet (81 mg) by mouth daily (Patient taking differently: Take 81 mg by mouth daily Stop on 7/26/19)       bisacodyl (DULCOLAX) 10 MG suppository Place 10 mg rectally daily as needed for constipation       bisacodyl (DULCOLAX) 5 MG EC tablet Take 1 tablet (5 mg) by mouth daily as needed for constipation       clopidogrel (PLAVIX) 75 MG tablet Take 1 tablet (75 mg) by mouth daily       digoxin (LANOXIN) 125 MCG tablet Take 1 tablet (125 mcg) by mouth daily 90 tablet 3     furosemide (LASIX) 80 MG tablet Take 1 tablet (80 mg) by mouth daily 90 tablet 1     glipiZIDE (GLUCOTROL) 10 MG tablet Take 2 tablets (20 mg) by mouth 2 times daily Take 20mg in AM with breakfast and 20mg in PM with evening meal 360 tablet 0     HYDROcodone-acetaminophen (NORCO) 5-325 MG tablet Take 1 tablet by mouth every 4 hours as needed for severe pain       isosorbide mononitrate (IMDUR) 30 MG 24 hr tablet Take 1 tablet (30 mg) by mouth daily 30 tablet 3     metFORMIN (GLUCOPHAGE) 1000 MG tablet Take 1 tablet (1,000 mg) by mouth 2 times daily  (with meals)       METOPROLOL SUCCINATE ER PO Take 100 mg by mouth daily        mirtazapine (REMERON) 30 MG tablet Take 1 tablet (30 mg) by mouth At Bedtime       multivitamin, therapeutic (THERA-VIT) TABS tablet Take 1 tablet by mouth daily       OLANZapine zydis (ZYPREXA) 20 MG ODT Take 1 tablet (20 mg) by mouth every evening       ondansetron (ZOFRAN-ODT) 4 MG ODT tab Take 1 tablet (4 mg) by mouth every 6 hours as needed for nausea or vomiting       ONE TOUCH FINE POINT LANCETS Check blood sugars twice a day.       ONETOUCH DELICA LANCETS 33G MISC 1 strip 2 times daily 100 each 11     rosuvastatin (CRESTOR) 10 MG tablet Take 1 tablet (10 mg) by mouth daily       saxagliptin (ONGLYZA) 2.5 MG TABS tablet Take 1 tablet (2.5 mg) by mouth daily 30 tablet 1     sennosides (SENOKOT) 8.6 MG tablet Take 3 tablets by mouth 2 times daily        SODIUM PHOSPHATES RE Place rectally daily as needed       tamsulosin (FLOMAX) 0.4 MG capsule Take 1 capsule (0.4 mg) by mouth daily       warfarin (COUMADIN) 3 MG tablet Take 1 tablet (3 mg) by mouth daily (Patient taking differently: Take 3 mg by mouth daily ) 90 tablet 3       REVIEW OF SYSTEMS:  10 point ROS of systems including Constitutional, Eyes, Respiratory, Cardiovascular, Gastroenterology, Genitourinary, Integumentary, Musculoskeletal, Psychiatric were all negative except for pertinent positives noted in my HPI.    Objective:  /67   Pulse 72   Temp 97.3  F (36.3  C)   Resp 18   Wt 62.6 kg (138 lb 1.6 oz)   SpO2 93%   BMI 21.00 kg/m     Exam:  GENERAL APPEARANCE:  Alert, in no distress, pleasant, cooperative, oriented x self and place  EYES:  EOM, lids, pupils and irises normal, sclera clear and conjunctiva normal, no discharge or mattering on lids or lashes noted  ENT:  Mouth normal, moist mucous membranes, nose normal without drainage or crusting, external ears without lesions, hearing acuity impaired  NECK: supple, symmetrical, trachea midline  RESP:   respiratory effort normal, no chest wall tenderness, no respiratory distress, Lung sounds crackles, patient is on room air today  CV:  Auscultation of heart done, rate and rhythm controlled and regular, no murmur, no rub or gallop. Edema none bilateral lower extremities. VASCULAR: no open areas lower legs  ABDOMEN:  Hypoactive bowel sounds, soft, nontender, no palpable masses.  M/S:   Gait and station walks with assist , no tenderness or swelling of the joints; able to move all extremities, digits normal  NEURO: no facial asymmetry, no speech deficits and able to follow directions, moves all extremities symmetrically  PSYCH:  insight and judgement and memory impaired, affect and mood normal     Labs:     Most Recent 3 CBC's:  Recent Labs   Lab Test 07/02/19  0718 06/30/19  0857 06/28/19  0713   WBC 8.4 7.6 8.6   HGB 14.4 14.3 14.4   MCV 89 90 91   * 125* 112*     Most Recent 3 BMP's:  Recent Labs   Lab Test 07/02/19 0718 06/30/19  0857 06/28/19  0713    142 142   POTASSIUM 3.7 3.8 3.7   CHLORIDE 109 112* 110*   CO2 27 21 25   BUN 38* 37* 30   CR 1.51* 1.47* 1.52*   ANIONGAP 5 9 7   VINCENT 10.1 9.9 10.0   * 144* 169*       ASSESSMENT/PLAN:  Cerebrovascular accident (CVA), unspecified mechanism (H)  Aphasia as late effect of stroke  Chronic atrial fibrillation (H)  Physical deconditioning  Acute on chronic, continues therapies - making some mclean, no discharge date yet. Continue meds as above, vs, wt as ordered. Stop ASA 7/26. Neuro/cards f/u as recommended.     Pneumonia due to infectious organism, unspecified laterality, unspecified part of lung  Completed antibiotics, no fevers. Did have some base crackles both lungs today and some edema/wt gain. Give one time dose diuretic today. Encourage incentive spirometer as ordered.     Delirium  Hypersomnia  Ongoing issue with underlying cognitive impairment. Zyprexa to be continued at current dose as benefit outweighs risk. Monitor for side effects.      Benign hypertension  Chronic, not well controlled. Increase Toprol dose. Monitor vs, wt as ordered.     Type 2 diabetes mellitus with stage 3 chronic kidney disease, without long-term current use of insulin (H)  Chronic, adequately managed with current measures. Monitor BG per routine.   Lab Results   Component Value Date    A1C 6.4 06/26/2019     Slow transit constipation  Ongoing issue, reports hard stools. Increase senna s dose and monitor.    Orders written by provider at facility  1. Zaroxolyn 2.5 mg PO today one time dose due to edema and weight gain  2. Per 6/26 cards note stop ASA on 7/26. Continue Plavix and coumadin as ordered.   3. CBC, BMP on 7/15 diagnosis anemia, HTN  4. Increase Toprol XL to 100 mg PO daily diagnosis HTN  5. Continue Zyprexa at HS - monitor for side effects and effectiveness.   6. Increase senna s to 3 tabs PO BID diagnosis constipation    Total time spent with patient visit at the [facility] was over 36 min including patient visit, review of past records and follow up phone conversation with staff with updates on patient's weight. Greater than 50% of total time spent with counseling and coordinating care due to coordinating care with nurse on current Zyprexa order and effectiveness as well as patient behaviors/agitation at night, coordinating care with follow up labs and appointments, counseling patient/family use of incentive spirometry and BP management, current medication reviewed as well as review of pharmacist's recommendation and recent past lab and imaging results and subsequent treatment plan.    Electronically signed by:  CHINYERE Rojas CNP               Sincerely,        CHINYERE Rojas CNP

## 2019-07-13 ENCOUNTER — NURSING HOME VISIT (OUTPATIENT)
Dept: GERIATRICS | Facility: CLINIC | Age: 84
End: 2019-07-13
Payer: MEDICARE

## 2019-07-13 DIAGNOSIS — I10 BENIGN HYPERTENSION: ICD-10-CM

## 2019-07-13 DIAGNOSIS — N18.30 TYPE 2 DIABETES MELLITUS WITH STAGE 3 CHRONIC KIDNEY DISEASE, WITHOUT LONG-TERM CURRENT USE OF INSULIN (H): ICD-10-CM

## 2019-07-13 DIAGNOSIS — R41.0 DELIRIUM: ICD-10-CM

## 2019-07-13 DIAGNOSIS — I63.9 CEREBROVASCULAR ACCIDENT (CVA), UNSPECIFIED MECHANISM (H): Primary | ICD-10-CM

## 2019-07-13 DIAGNOSIS — I48.20 CHRONIC ATRIAL FIBRILLATION (H): ICD-10-CM

## 2019-07-13 DIAGNOSIS — I69.320 APHASIA AS LATE EFFECT OF STROKE: ICD-10-CM

## 2019-07-13 DIAGNOSIS — E11.22 TYPE 2 DIABETES MELLITUS WITH STAGE 3 CHRONIC KIDNEY DISEASE, WITHOUT LONG-TERM CURRENT USE OF INSULIN (H): ICD-10-CM

## 2019-07-13 DIAGNOSIS — J18.9 PNEUMONIA DUE TO INFECTIOUS ORGANISM, UNSPECIFIED LATERALITY, UNSPECIFIED PART OF LUNG: ICD-10-CM

## 2019-07-13 PROCEDURE — 99305 1ST NF CARE MODERATE MDM 35: CPT | Performed by: INTERNAL MEDICINE

## 2019-07-14 NOTE — PROGRESS NOTES
[place] [facility]  PRIMARY CARE PROVIDER AND CLINIC:  Jamie Guerrier MD, [address] [facility] / [facility] [address]    Patient was seen by Dr. Garcia at the [facility] on July 13, 2019, for an initial TCU visit.          Suman Burton  is a 83 year old  (1935), admitted to the above facility from  [facility]. Hospital stay 6/25/19 through 7/2/19..      Admitted to this facility for  rehab, medical management and nursing care.    Hospital course was reviewed by me, is as per the hospital discharge summary, neurology consultation and TCU nurse practitioner notes.    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  He had symptoms of right-sided weakness with ambulation and mild expressive a phasic.  A stroke code was called.  CT of the head revealed no evidence of a CVA.  He did not receive TPA as his symptoms seem to be improving shortly after the onset.  He was found to have a high-grade right internal carotid stenotic area as well as mild stenosis of the left internal carotid artery.  He was maintained on aspirin, Plavix and warfarin.  He does have a history of atrial fibrillation and is on Coumadin chronically.  He will be receiving aspirin 81 mg daily, Plavix 75 mg daily and warfarin for 1 month total, then will be maintained on warfarin and Plavix for 12 months.    Hospital course was complicated by possible pneumonia with chest x-ray revealing bilateral pulmonary infiltrates.  He also developed delirium, felt to be secondary to underlying dementia with acute neurologic event as well as possible pneumonia.    Patient fell 7 days ago, after admission to the TCU, has had right low back and side pain since that event.  He states this is gradually improving.    Patient states he continues to  feel weak in both legs primarily.  He is walking short distances with therapy but needs assistance to come to standing position.  He notes his speech feels off.  Complains of dry mouth.  He denies chest pain, shortness of breath, abdominal pain.  He has an occasional nonproductive cough.    He has been afebrile.  Blood pressure has been intimately elevated.  He has not been sleeping well at night.  He is been constipated.  He is frustrated with his current condition and asked me when he can go home.      CODE STATUS/ADVANCE DIRECTIVES DISCUSSION:   CPR/Full code   Patient's living condition: lives with spouse  ALLERGIES: Nkda [no known drug allergies]  PAST MEDICAL HISTORY:  has a past medical history of Atrial fibrillation (H), Cancer (H), Cerebral infarction (H), Chronic kidney disease, Coronary artery disease (06/25/2019), Diabetes (H), Diabetes mellitus, type 2 (H), Edema, and Hypertension.  PAST SURGICAL HISTORY:   has a past surgical history that includes Laparoscopic appendectomy; Cholecystectomy; Heart Catheterization with Possible Intervention (N/A, 6/25/2019); Right Heart Cath (N/A, 6/25/2019); PCI Stent Drug Eluting (N/A, 6/25/2019); and Instantaneous Wave-Free Ratio (N/A, 6/25/2019).  FAMILY HISTORY: family history is not on file.  SOCIAL HISTORY:   reports that he quit smoking about 11 years ago. His smoking use included cigarettes. He started smoking about 70 years ago. He smoked 2.00 packs per day. He has never used smokeless tobacco. He reports that he drinks alcohol. He reports that he does not use drugs.      Current Outpatient Medications   Medication Sig Dispense Refill     acetaminophen (TYLENOL) 325 MG tablet Give 650 mg by mouth three times a day for pain Do not exceed 4gm in 24 hours. AND Give 650 mg by mouth every 6 hours as needed for pain (do not exceed 4gm acetaminophen in 24 hrs)       albuterol (PROVENTIL) (2.5 MG/3ML) 0.083% neb solution 1 vial inhale orally via nebulizer two times a  day for dyspnea/ Low O2 sats/pneumonia Make sure he deep breaths and coughs with each treatment AND 1 vial inhale orally via nebulizer every 4 hours as needed for dyspnea/low O2 sats/ pneumonia Make sure he deep breaths and coughs with each treatment       amLODIPine (NORVASC) 10 MG tablet Take 1 tablet (10 mg) by mouth daily       aspirin (ASA) 81 MG chewable tablet Take 1 tablet (81 mg) by mouth daily (Patient taking differently: Take 81 mg by mouth daily Stop on 7/26/19)       bisacodyl (DULCOLAX) 10 MG suppository Place 10 mg rectally daily as needed for constipation       bisacodyl (DULCOLAX) 5 MG EC tablet Take 1 tablet (5 mg) by mouth daily as needed for constipation       clopidogrel (PLAVIX) 75 MG tablet Take 1 tablet (75 mg) by mouth daily       digoxin (LANOXIN) 125 MCG tablet Take 1 tablet (125 mcg) by mouth daily 90 tablet 3     furosemide (LASIX) 80 MG tablet Take 1 tablet (80 mg) by mouth daily 90 tablet 1     glipiZIDE (GLUCOTROL) 10 MG tablet Take 2 tablets (20 mg) by mouth 2 times daily Take 20mg in AM with breakfast and 20mg in PM with evening meal 360 tablet 0     HYDROcodone-acetaminophen (NORCO) 5-325 MG tablet Take 1 tablet by mouth every 4 hours as needed for severe pain       isosorbide mononitrate (IMDUR) 30 MG 24 hr tablet Take 1 tablet (30 mg) by mouth daily 30 tablet 3     metFORMIN (GLUCOPHAGE) 1000 MG tablet Take 1 tablet (1,000 mg) by mouth 2 times daily (with meals)       METOPROLOL SUCCINATE ER PO Take 100 mg by mouth daily        mirtazapine (REMERON) 30 MG tablet Take 1 tablet (30 mg) by mouth At Bedtime       multivitamin, therapeutic (THERA-VIT) TABS tablet Take 1 tablet by mouth daily       OLANZapine zydis (ZYPREXA) 20 MG ODT Take 1 tablet (20 mg) by mouth every evening       ondansetron (ZOFRAN-ODT) 4 MG ODT tab Take 1 tablet (4 mg) by mouth every 6 hours as needed for nausea or vomiting       ONE TOUCH FINE POINT LANCETS Check blood sugars twice a day.       ONETOUCH DELICA  LANCETS 33G MISC 1 strip 2 times daily 100 each 11     rosuvastatin (CRESTOR) 10 MG tablet Take 1 tablet (10 mg) by mouth daily       saxagliptin (ONGLYZA) 2.5 MG TABS tablet Take 1 tablet (2.5 mg) by mouth daily 30 tablet 1     sennosides (SENOKOT) 8.6 MG tablet Take 3 tablets by mouth 2 times daily        SODIUM PHOSPHATES RE Place rectally daily as needed       tamsulosin (FLOMAX) 0.4 MG capsule Take 1 capsule (0.4 mg) by mouth daily       warfarin (COUMADIN) 3 MG tablet Take 1 tablet (3 mg) by mouth daily (Patient taking differently: Take 3 mg by mouth daily ) 90 tablet 3       ROS:  10 point review of systems negative except as noted above.      Vitals:    Exam:  GENERAL APPEARANCE: Sleepy, easily alerted, lying in bed, well-nourished appearing.  He appears comfortable  ENT:  oral mucous membranes moist  EYES: No eye redness or drainage  NECK: Supple  RESP: Respiratory rate 14, unlabored.  Faint bibasilar crackles.    CV: Irregular, heart rate 70s  ABDOMEN: Soft, nontender  M/S: No lower extremity edema.  No pain with range of motion right hip.  Gait was not attempted.  SKIN: No rash  NEURO: Patient is alert, oriented to person place and general circumstances.  Speech is dysarthric.  He has mild word finding difficulties.  There is no gross focal motor weakness.  There appears to mild discoordination of the right hand.    PSYCH: Pleasant, mildly anxious, frustrated.  No evidence of delusions this morning.      ASSESSMENT/PLAN:    (I63.9) Cerebrovascular accident (CVA), unspecified mechanism (H)  (primary encounter diagnosis)  (I69.320) Aphasia as late effect of stroke  Generalized weakness, gait instability persist  Cognitive deficits likely superimposed upon underlying mild dementia per chart.  Plan PT/OT/speech therapy.  Monitor blood pressure.  Avoid hypotension.  Anticoagulation as noted above.  Continue statin.      (J18.9) Pneumonia due to infectious organism, unspecified laterality, unspecified part of  lung  Comment: Mild cough but otherwise no respiratory symptoms.  Vital signs have been stable.  O2 sats 90s on room air.  Patient has completed a course of Levaquin.  Plan monitor respiratory status, monitor vitals, monitor swallowing function.    (R41.0) Delirium  Comment: Patient primarily with delusions at night which have been interfering with sleep  Plan: Continue olanzapine, wean as possible.    (I48.2) Chronic atrial fibrillation (H)  Hypertension  Comment: Heart rate controlled.  Blood pressure has been intermittent elevated  Plan: Chronic warfarin, continue digoxin and metoprolol, amlodipine. Monitor heart rate at rest with activity.    (E11.22,  N18.3) Type 2 diabetes mellitus with stage 3 chronic kidney disease, without long-term current use of insulin (H)  Comment: Blood glucoses have been stable  Plan continue current medications.  Monitor for hypoglycemia    Recent fall with subsequent right low back and hip discomfort.  Exam unremarkable today  Plan monitor pain, monitor vitals, continue therapies.  Avoid orthostatic hypotension.        Pepe Garcia MD

## 2019-07-15 ENCOUNTER — TRANSFERRED RECORDS (OUTPATIENT)
Dept: HEALTH INFORMATION MANAGEMENT | Facility: CLINIC | Age: 84
End: 2019-07-15

## 2019-07-15 LAB
ANION GAP SERPL CALCULATED.3IONS-SCNC: 19 MMOL/L (ref 7–16)
BUN SERPL-MCNC: 81 MG/DL (ref 7–26)
CALCIUM SERPL-MCNC: 10.8 MG/DL (ref 8.4–10.4)
CHLORIDE SERPLBLD-SCNC: 99 MMOL/L (ref 98–109)
CO2 SERPL-SCNC: 24 MMOL/L (ref 20–29)
CREAT SERPL-MCNC: 2.02 MG/DL (ref 0.73–1.18)
ERYTHROCYTE [DISTWIDTH] IN BLOOD BY AUTOMATED COUNT: 13.2 % (ref 11.9–15.5)
GFR SERPL CREATININE-BSD FRML MDRD: 30 ML/MIN/1.73M2
GLUCOSE SERPL-MCNC: 144 MG/DL (ref 70–100)
HCT VFR BLD AUTO: 46.9 % (ref 38.8–50)
HEMOGLOBIN: 15.4 G/DL (ref 13.5–17.5)
MCH RBC QN AUTO: 31.4 PG (ref 27.6–33.3)
MCHC RBC AUTO-ENTMCNC: 32.8 G/DL (ref 31.5–35.2)
MCV RBC AUTO: 95.5 FL (ref 80–100)
PLATELET # BLD AUTO: 276 (ref 150–450)
POTASSIUM SERPL-SCNC: 4.3 MMOL/L (ref 3.5–5.1)
RBC # BLD AUTO: 4.91 X10(12)/L (ref 4.32–5.72)
SODIUM SERPL-SCNC: 142 MMOL/L (ref 136–145)
WBC # BLD AUTO: 10 X10(9)/L (ref 3.5–10.5)

## 2019-07-17 VITALS
WEIGHT: 135.8 LBS | BODY MASS INDEX: 20.65 KG/M2 | OXYGEN SATURATION: 94 % | DIASTOLIC BLOOD PRESSURE: 68 MMHG | HEART RATE: 68 BPM | RESPIRATION RATE: 20 BRPM | TEMPERATURE: 97.3 F | SYSTOLIC BLOOD PRESSURE: 128 MMHG

## 2019-07-18 ENCOUNTER — NURSING HOME VISIT (OUTPATIENT)
Dept: GERIATRICS | Facility: CLINIC | Age: 84
End: 2019-07-18
Payer: MEDICARE

## 2019-07-18 DIAGNOSIS — I10 BENIGN HYPERTENSION: ICD-10-CM

## 2019-07-18 DIAGNOSIS — I48.20 CHRONIC ATRIAL FIBRILLATION (H): ICD-10-CM

## 2019-07-18 DIAGNOSIS — R41.0 DELIRIUM: ICD-10-CM

## 2019-07-18 DIAGNOSIS — K59.01 SLOW TRANSIT CONSTIPATION: ICD-10-CM

## 2019-07-18 DIAGNOSIS — R53.81 PHYSICAL DECONDITIONING: ICD-10-CM

## 2019-07-18 DIAGNOSIS — I63.9 CEREBROVASCULAR ACCIDENT (CVA), UNSPECIFIED MECHANISM (H): Primary | ICD-10-CM

## 2019-07-18 DIAGNOSIS — J18.9 PNEUMONIA DUE TO INFECTIOUS ORGANISM, UNSPECIFIED LATERALITY, UNSPECIFIED PART OF LUNG: ICD-10-CM

## 2019-07-18 DIAGNOSIS — I69.320 APHASIA AS LATE EFFECT OF STROKE: ICD-10-CM

## 2019-07-18 PROCEDURE — 99310 SBSQ NF CARE HIGH MDM 45: CPT | Performed by: NURSE PRACTITIONER

## 2019-07-18 NOTE — LETTER
7/18/2019        RE: Suman Burton  [address]  [facility] [id number]        [place] [facility]  [facility] Record Number:  [id number]  Place of Service where encounter took place:  [facility] ([facility]) [[id number]]  Chief Complaint   Patient presents with     RECHECK       HPI:    Suman Burton  is a 83 year old (1935), who is being seen today for an episodic care visit.  HPI information obtained from: facility chart records, facility staff, patient report and [facility] chart review. Today's concern is:  CVD/aphagia: working with therapy, walking up to 40 feet using a RW CTG assist, needing max assist with ADL's  Pneumonia: denies SOB, cough or congestion, SaO2 > 90% on room air. Therapy states patient is SOB on exertion, patient has loose cough on exam  Fall with right hip and low back pain: has improved per patient. .   HTN: BP as follows 136/76, 128/68, 138/66 with HR in 60-80 range, admit weight 136lbs and current weight is 135.8lbs    Past Medical and Surgical History reviewed in Epic today.    MEDICATIONS:  Current Outpatient Medications   Medication Sig Dispense Refill     acetaminophen (TYLENOL) 325 MG tablet Give 650 mg by mouth three times a day for pain Do not exceed 4gm in 24 hours. AND Give 650 mg by mouth every 6 hours as needed for pain (do not exceed 4gm acetaminophen in 24 hrs)       albuterol (PROVENTIL) (2.5 MG/3ML) 0.083% neb solution 1 vial inhale orally via nebulizer two times a day for dyspnea/ Low O2 sats/pneumonia Make sure he deep breaths and coughs with each treatment AND 1 vial inhale orally via nebulizer every 4 hours as needed for dyspnea/low O2 sats/ pneumonia Make sure he deep breaths and coughs with each treatment       amLODIPine (NORVASC) 10 MG tablet Take 1 tablet (10 mg) by mouth daily       aspirin (ASA) 81 MG chewable tablet Take 1 tablet (81 mg) by mouth daily (Patient taking differently: Take 81 mg by mouth daily Stop on  7/26/19)       bisacodyl (DULCOLAX) 10 MG suppository Place 10 mg rectally daily as needed for constipation       bisacodyl (DULCOLAX) 5 MG EC tablet Take 1 tablet (5 mg) by mouth daily as needed for constipation       clopidogrel (PLAVIX) 75 MG tablet Take 1 tablet (75 mg) by mouth daily       digoxin (LANOXIN) 125 MCG tablet Take 1 tablet (125 mcg) by mouth daily 90 tablet 3     furosemide (LASIX) 80 MG tablet Take 1 tablet (80 mg) by mouth daily 90 tablet 1     glipiZIDE (GLUCOTROL) 10 MG tablet Take 2 tablets (20 mg) by mouth 2 times daily Take 20mg in AM with breakfast and 20mg in PM with evening meal 360 tablet 0     HYDROcodone-acetaminophen (NORCO) 5-325 MG tablet Take 1 tablet by mouth every 4 hours as needed for severe pain       isosorbide mononitrate (IMDUR) 30 MG 24 hr tablet Take 1 tablet (30 mg) by mouth daily 30 tablet 3     metFORMIN (GLUCOPHAGE) 1000 MG tablet Take 1 tablet (1,000 mg) by mouth 2 times daily (with meals)       METOPROLOL SUCCINATE ER PO Take 100 mg by mouth daily        mirtazapine (REMERON) 30 MG tablet Take 1 tablet (30 mg) by mouth At Bedtime       multivitamin, therapeutic (THERA-VIT) TABS tablet Take 1 tablet by mouth daily       OLANZapine zydis (ZYPREXA) 20 MG ODT Take 1 tablet (20 mg) by mouth every evening       ondansetron (ZOFRAN-ODT) 4 MG ODT tab Take 1 tablet (4 mg) by mouth every 6 hours as needed for nausea or vomiting       ONE TOUCH FINE POINT LANCETS Check blood sugars twice a day.       ONETOUCH DELICA LANCETS 33G MISC 1 strip 2 times daily 100 each 11     rosuvastatin (CRESTOR) 10 MG tablet Take 1 tablet (10 mg) by mouth daily       saxagliptin (ONGLYZA) 2.5 MG TABS tablet Take 1 tablet (2.5 mg) by mouth daily 30 tablet 1     sennosides (SENOKOT) 8.6 MG tablet Take 3 tablets by mouth 2 times daily        SODIUM PHOSPHATES RE Place rectally daily as needed       tamsulosin (FLOMAX) 0.4 MG capsule Take 1 capsule (0.4 mg) by mouth daily       warfarin (COUMADIN) 3  MG tablet Take 1 tablet (3 mg) by mouth daily (Patient taking differently: Take 3 mg by mouth daily ) 90 tablet 3         REVIEW OF SYSTEMS:  10 point ROS of systems including Constitutional, Eyes, Respiratory, Cardiovascular, Gastroenterology, Genitourinary, Integumentary, Musculoskeletal, Psychiatric were all negative except for pertinent positives noted in my HPI.    Objective:  /68   Pulse 68   Temp 97.3  F (36.3  C)   Resp 20   Wt 61.6 kg (135 lb 12.8 oz)   SpO2 94%   BMI 20.65 kg/m     Exam:  GENERAL APPEARANCE:  Alert, in mild distress due to pain  ENT:  Mouth and posterior oropharynx normal, moist mucous membranes, [place]  EYES:  EOM, conjunctivae, lids, pupils and irises normal, PERRL  RESP:  respiratory effort and palpation of chest normal, lungs clear to auscultation , no respiratory distress  CV:  Palpation and auscultation of heart done , regular rate and rhythm, no murmur, rub, or gallop, no edema  ABDOMEN:  normal bowel sounds, soft, nontender, no hepatosplenomegaly or other masses  M/S:   Examination of:   right upper extremity, left upper extremity, right lower extremity and left lower extremity  Inspection, ROM, stability and muscle strength normal and generalized weakness noted  SKIN:  Inspection of skin and subcutaneous tissue baseline  NEURO:   Cranial nerves 2-12 are normal tested and grossly at patient's baseline  PSYCH:  affect and mood normal    Labs:   Recent labs in [facility] reviewed by me today.     ASSESSMENT/PLAN:  Cerebrovascular accident (CVA), unspecified mechanism (H)  Aphasia as late effect of stroke  Physical deconditioning  Acute/ongoing: PT, OT and ST ongoing, continue crestor 10mg qhs, coumadin as directed INR goal of 2-3, plavix 75mg QD, ASA 81mg QD    Pneumonia due to infectious organism, unspecified laterality, unspecified part of lung  Stable: Abx complete, monitor SaO2 at rest and with activity, keep SaO2 > 90%    Delirium  Acute: no further falls continue zyprexa  20mg qhs    Hip pain, right  Acute/ongoing:   Continue norco 5/325mg 1 PO q 4 hours prn,  tylenol  650mg TID scheduled and q 6 hours prn, do not exceed 4gm acetaminophen in 24 hours.   Aqua K pad prn for comfort    Chronic atrial fibrillation (H)  Benign hypertension  Acute/ongoing: vitals daily and prn, BMP follow: increase toprol xl to 100mg QD, Imdur 30mg QD, lasix 80mg QD, digoxin 125mcg QD, continue norvasc to 10mg BID     Type 2 diabetes mellitus with stage 3 chronic kidney disease, without long-term current use of insulin (H)  Ongoing: follow BMP, continue glipizide 20mg BID, metformin 1000mg BID, saxagliptin 2.5mg QD       Orders written by provider at facility  No new orders      Total time spent with patient visit at the skilled nursing facility was 45 min including patient visit and review of past records. Greater than 50% of total time spent with counseling and coordinating care, discussed discharge disposition with nurse manager, discussed function in IDT.   Electronically signed by:  Tonya Lynn Haase, APRN CNP             Sincerely,        Tonya Lynn Haase, APRN CNP

## 2019-07-18 NOTE — PROGRESS NOTES
[place] [facility]  [facility] Record Number:  [id number]  Place of Service where encounter took place:  [facility] ([facility]) [[id number]]  Chief Complaint   Patient presents with     RECHECK       HPI:    Suman Burton  is a 83 year old (1935), who is being seen today for an episodic care visit.  HPI information obtained from: facility chart records, facility staff, patient report and [facility] chart review. Today's concern is:  CVD/aphagia: working with therapy, walking up to 40 feet using a RW CTG assist, needing max assist with ADL's  Pneumonia: denies SOB, cough or congestion, SaO2 > 90% on room air. Therapy states patient is SOB on exertion, patient has loose cough on exam  Fall with right hip and low back pain: has improved per patient. .   HTN: BP as follows 136/76, 128/68, 138/66 with HR in 60-80 range, admit weight 136lbs and current weight is 135.8lbs    Past Medical and Surgical History reviewed in Epic today.    MEDICATIONS:  Current Outpatient Medications   Medication Sig Dispense Refill     acetaminophen (TYLENOL) 325 MG tablet Give 650 mg by mouth three times a day for pain Do not exceed 4gm in 24 hours. AND Give 650 mg by mouth every 6 hours as needed for pain (do not exceed 4gm acetaminophen in 24 hrs)       albuterol (PROVENTIL) (2.5 MG/3ML) 0.083% neb solution 1 vial inhale orally via nebulizer two times a day for dyspnea/ Low O2 sats/pneumonia Make sure he deep breaths and coughs with each treatment AND 1 vial inhale orally via nebulizer every 4 hours as needed for dyspnea/low O2 sats/ pneumonia Make sure he deep breaths and coughs with each treatment       amLODIPine (NORVASC) 10 MG tablet Take 1 tablet (10 mg) by mouth daily       aspirin (ASA) 81 MG chewable tablet Take 1 tablet (81 mg) by mouth daily (Patient taking differently: Take 81 mg by mouth daily Stop on 7/26/19)       bisacodyl (DULCOLAX) 10 MG suppository Place 10 mg rectally daily as needed for  constipation       bisacodyl (DULCOLAX) 5 MG EC tablet Take 1 tablet (5 mg) by mouth daily as needed for constipation       clopidogrel (PLAVIX) 75 MG tablet Take 1 tablet (75 mg) by mouth daily       digoxin (LANOXIN) 125 MCG tablet Take 1 tablet (125 mcg) by mouth daily 90 tablet 3     furosemide (LASIX) 80 MG tablet Take 1 tablet (80 mg) by mouth daily 90 tablet 1     glipiZIDE (GLUCOTROL) 10 MG tablet Take 2 tablets (20 mg) by mouth 2 times daily Take 20mg in AM with breakfast and 20mg in PM with evening meal 360 tablet 0     HYDROcodone-acetaminophen (NORCO) 5-325 MG tablet Take 1 tablet by mouth every 4 hours as needed for severe pain       isosorbide mononitrate (IMDUR) 30 MG 24 hr tablet Take 1 tablet (30 mg) by mouth daily 30 tablet 3     metFORMIN (GLUCOPHAGE) 1000 MG tablet Take 1 tablet (1,000 mg) by mouth 2 times daily (with meals)       METOPROLOL SUCCINATE ER PO Take 100 mg by mouth daily        mirtazapine (REMERON) 30 MG tablet Take 1 tablet (30 mg) by mouth At Bedtime       multivitamin, therapeutic (THERA-VIT) TABS tablet Take 1 tablet by mouth daily       OLANZapine zydis (ZYPREXA) 20 MG ODT Take 1 tablet (20 mg) by mouth every evening       ondansetron (ZOFRAN-ODT) 4 MG ODT tab Take 1 tablet (4 mg) by mouth every 6 hours as needed for nausea or vomiting       ONE TOUCH FINE POINT LANCETS Check blood sugars twice a day.       ONETOUCH DELICA LANCETS 33G MISC 1 strip 2 times daily 100 each 11     rosuvastatin (CRESTOR) 10 MG tablet Take 1 tablet (10 mg) by mouth daily       saxagliptin (ONGLYZA) 2.5 MG TABS tablet Take 1 tablet (2.5 mg) by mouth daily 30 tablet 1     sennosides (SENOKOT) 8.6 MG tablet Take 3 tablets by mouth 2 times daily        SODIUM PHOSPHATES RE Place rectally daily as needed       tamsulosin (FLOMAX) 0.4 MG capsule Take 1 capsule (0.4 mg) by mouth daily       warfarin (COUMADIN) 3 MG tablet Take 1 tablet (3 mg) by mouth daily (Patient taking differently: Take 3 mg by mouth  daily ) 90 tablet 3         REVIEW OF SYSTEMS:  10 point ROS of systems including Constitutional, Eyes, Respiratory, Cardiovascular, Gastroenterology, Genitourinary, Integumentary, Musculoskeletal, Psychiatric were all negative except for pertinent positives noted in my HPI.    Objective:  /68   Pulse 68   Temp 97.3  F (36.3  C)   Resp 20   Wt 61.6 kg (135 lb 12.8 oz)   SpO2 94%   BMI 20.65 kg/m    Exam:  GENERAL APPEARANCE:  Alert, in mild distress due to pain  ENT:  Mouth and posterior oropharynx normal, moist mucous membranes, [place]  EYES:  EOM, conjunctivae, lids, pupils and irises normal, PERRL  RESP:  respiratory effort and palpation of chest normal, lungs clear to auscultation , no respiratory distress  CV:  Palpation and auscultation of heart done , regular rate and rhythm, no murmur, rub, or gallop, no edema  ABDOMEN:  normal bowel sounds, soft, nontender, no hepatosplenomegaly or other masses  M/S:   Examination of:   right upper extremity, left upper extremity, right lower extremity and left lower extremity  Inspection, ROM, stability and muscle strength normal and generalized weakness noted  SKIN:  Inspection of skin and subcutaneous tissue baseline  NEURO:   Cranial nerves 2-12 are normal tested and grossly at patient's baseline  PSYCH:  affect and mood normal    Labs:   Recent labs in [facility] reviewed by me today.     ASSESSMENT/PLAN:  Cerebrovascular accident (CVA), unspecified mechanism (H)  Aphasia as late effect of stroke  Physical deconditioning  Acute/ongoing: PT, OT and ST ongoing, continue crestor 10mg qhs, coumadin as directed INR goal of 2-3, plavix 75mg QD, ASA 81mg QD    Pneumonia due to infectious organism, unspecified laterality, unspecified part of lung  Stable: Abx complete, monitor SaO2 at rest and with activity, keep SaO2 > 90%    Delirium  Acute: no further falls continue zyprexa 20mg qhs    Hip pain, right  Acute/ongoing:   Continue norco 5/325mg 1 PO q 4 hours prn,  tylenol   650mg TID scheduled and q 6 hours prn, do not exceed 4gm acetaminophen in 24 hours.   Aqua K pad prn for comfort    Chronic atrial fibrillation (H)  Benign hypertension  Acute/ongoing: vitals daily and prn, BMP follow: increase toprol xl to 100mg QD, Imdur 30mg QD, lasix 80mg QD, digoxin 125mcg QD, continue norvasc to 10mg BID     Type 2 diabetes mellitus with stage 3 chronic kidney disease, without long-term current use of insulin (H)  Ongoing: follow BMP, continue glipizide 20mg BID, metformin 1000mg BID, saxagliptin 2.5mg QD       Orders written by provider at facility  No new orders      Total time spent with patient visit at the skilled nursing facility was 45 min including patient visit and review of past records. Greater than 50% of total time spent with counseling and coordinating care, discussed discharge disposition with nurse manager, discussed function in IDT.   Electronically signed by:  Tonya Lynn Haase, APRN CNP

## 2019-07-23 VITALS
BODY MASS INDEX: 20.84 KG/M2 | SYSTOLIC BLOOD PRESSURE: 117 MMHG | TEMPERATURE: 96.7 F | DIASTOLIC BLOOD PRESSURE: 73 MMHG | OXYGEN SATURATION: 96 % | HEART RATE: 75 BPM | WEIGHT: 137 LBS | RESPIRATION RATE: 18 BRPM

## 2019-07-24 ENCOUNTER — NURSING HOME VISIT (OUTPATIENT)
Dept: GERIATRICS | Facility: CLINIC | Age: 84
End: 2019-07-24
Payer: MEDICARE

## 2019-07-24 DIAGNOSIS — I10 BENIGN HYPERTENSION: ICD-10-CM

## 2019-07-24 DIAGNOSIS — K59.01 SLOW TRANSIT CONSTIPATION: ICD-10-CM

## 2019-07-24 DIAGNOSIS — N18.30 TYPE 2 DIABETES MELLITUS WITH STAGE 3 CHRONIC KIDNEY DISEASE, WITHOUT LONG-TERM CURRENT USE OF INSULIN (H): ICD-10-CM

## 2019-07-24 DIAGNOSIS — M25.551 HIP PAIN, RIGHT: ICD-10-CM

## 2019-07-24 DIAGNOSIS — I50.32 CHRONIC DIASTOLIC CONGESTIVE HEART FAILURE (H): ICD-10-CM

## 2019-07-24 DIAGNOSIS — N18.30 CHRONIC KIDNEY DISEASE, STAGE III (MODERATE) (H): ICD-10-CM

## 2019-07-24 DIAGNOSIS — I48.20 CHRONIC ATRIAL FIBRILLATION (H): ICD-10-CM

## 2019-07-24 DIAGNOSIS — R53.81 PHYSICAL DECONDITIONING: ICD-10-CM

## 2019-07-24 DIAGNOSIS — I63.9 CEREBROVASCULAR ACCIDENT (CVA), UNSPECIFIED MECHANISM (H): Primary | ICD-10-CM

## 2019-07-24 DIAGNOSIS — J18.9 PNEUMONIA DUE TO INFECTIOUS ORGANISM, UNSPECIFIED LATERALITY, UNSPECIFIED PART OF LUNG: ICD-10-CM

## 2019-07-24 DIAGNOSIS — E11.22 TYPE 2 DIABETES MELLITUS WITH STAGE 3 CHRONIC KIDNEY DISEASE, WITHOUT LONG-TERM CURRENT USE OF INSULIN (H): ICD-10-CM

## 2019-07-24 DIAGNOSIS — I69.320 APHASIA AS LATE EFFECT OF STROKE: ICD-10-CM

## 2019-07-24 PROCEDURE — 99310 SBSQ NF CARE HIGH MDM 45: CPT | Performed by: NURSE PRACTITIONER

## 2019-07-24 NOTE — LETTER
7/24/2019        RE: Suman Burton  [address]  [facility] [id number]        [place] [facility]  [place] Medical Record Number:  [id number]  Place of Service where encounter took place:  [facility] ([facility]) [[id number]]  Chief Complaint   Patient presents with     RECHECK       HPI:    Suman Burton  is a 83 year old (1935), who is being seen today for an episodic care visit.  HPI information obtained from: facility chart records, facility staff, patient report and [facility] chart review. Today's concern is:  CVD/aphagia: working with therapy, walking up to 125 feet using a RW CTG assist, needing max assist with ADL's, therapy states making little progress  Pneumonia: denies SOB, cough or congestion, SaO2 > 90% on room air.   Fall with right hip and low back pain: has improved some, continues to use norco prn  HTN: BP as follows 117/73, 159/85, 148/76 with HR in 60-80 range, admit weight 136lbs and current weight is 137lbs  DCHF: weight stable, patient states he gets SOB when laying flat in bed, has HOB elevated when in bed.     Past Medical and Surgical History reviewed in Epic today.    MEDICATIONS:  Current Outpatient Medications   Medication Sig Dispense Refill     acetaminophen (TYLENOL) 325 MG tablet Give 650 mg by mouth three times a day for pain Do not exceed 4gm in 24 hours. AND Give 650 mg by mouth every 6 hours as needed for pain (do not exceed 4gm acetaminophen in 24 hrs)       albuterol (PROVENTIL) (2.5 MG/3ML) 0.083% neb solution 1 vial inhale orally via nebulizer two times a day for dyspnea/ Low O2 sats/pneumonia Make sure he deep breaths and coughs with each treatment AND 1 vial inhale orally via nebulizer every 4 hours as needed for dyspnea/low O2 sats/ pneumonia Make sure he deep breaths and coughs with each treatment       amLODIPine (NORVASC) 10 MG tablet Take 1 tablet (10 mg) by mouth daily       aspirin (ASA) 81 MG chewable tablet Take 1 tablet (81  mg) by mouth daily (Patient taking differently: Take 81 mg by mouth daily Stop on 7/26/19)       bisacodyl (DULCOLAX) 10 MG suppository Place 10 mg rectally daily as needed for constipation       bisacodyl (DULCOLAX) 5 MG EC tablet Take 1 tablet (5 mg) by mouth daily as needed for constipation       clopidogrel (PLAVIX) 75 MG tablet Take 1 tablet (75 mg) by mouth daily       digoxin (LANOXIN) 125 MCG tablet Take 1 tablet (125 mcg) by mouth daily 90 tablet 3     furosemide (LASIX) 80 MG tablet Take 1 tablet (80 mg) by mouth daily 90 tablet 1     glipiZIDE (GLUCOTROL) 10 MG tablet Take 2 tablets (20 mg) by mouth 2 times daily Take 20mg in AM with breakfast and 20mg in PM with evening meal 360 tablet 0     HYDROcodone-acetaminophen (NORCO) 5-325 MG tablet Take 1 tablet by mouth every 4 hours as needed for severe pain       isosorbide mononitrate (IMDUR) 30 MG 24 hr tablet Take 1 tablet (30 mg) by mouth daily 30 tablet 3     metFORMIN (GLUCOPHAGE) 1000 MG tablet Take 1 tablet (1,000 mg) by mouth 2 times daily (with meals)       METOPROLOL SUCCINATE ER PO Take 100 mg by mouth daily        mirtazapine (REMERON) 30 MG tablet Take 1 tablet (30 mg) by mouth At Bedtime       multivitamin, therapeutic (THERA-VIT) TABS tablet Take 1 tablet by mouth daily       OLANZapine zydis (ZYPREXA) 20 MG ODT Take 1 tablet (20 mg) by mouth every evening       ondansetron (ZOFRAN-ODT) 4 MG ODT tab Take 1 tablet (4 mg) by mouth every 6 hours as needed for nausea or vomiting       ONE TOUCH FINE POINT LANCETS Check blood sugars twice a day.       ONETOUCH DELICA LANCETS 33G MISC 1 strip 2 times daily 100 each 11     rosuvastatin (CRESTOR) 10 MG tablet Take 1 tablet (10 mg) by mouth daily       saxagliptin (ONGLYZA) 2.5 MG TABS tablet Take 1 tablet (2.5 mg) by mouth daily 30 tablet 1     sennosides (SENOKOT) 8.6 MG tablet Give 1 tablet by mouth as needed for constipation BID and PRN AND Give 1 tablet by mouth in the morning for constipation  HOLD For loose stools       SODIUM PHOSPHATES RE Place rectally daily as needed       tamsulosin (FLOMAX) 0.4 MG capsule Take 1 capsule (0.4 mg) by mouth daily       warfarin (COUMADIN) 3 MG tablet Take 1 tablet (3 mg) by mouth daily (Patient taking differently: Take 3 mg by mouth daily ) 90 tablet 3         REVIEW OF SYSTEMS:  10 point ROS of systems including Constitutional, Eyes, Respiratory, Cardiovascular, Gastroenterology, Genitourinary, Integumentary, Musculoskeletal, Psychiatric were all negative except for pertinent positives noted in my HPI.    Objective:  /73   Pulse 75   Temp 96.7  F (35.9  C)   Resp 18   Wt 62.1 kg (137 lb)   SpO2 96%   BMI 20.84 kg/m     Exam:  GENERAL APPEARANCE:  Alert, in mild distress due to pain  ENT:  Mouth and posterior oropharynx normal, moist mucous membranes, [place]  EYES:  EOM, conjunctivae, lids, pupils and irises normal, PERRL  RESP:  respiratory effort and palpation of chest normal, lungs clear to auscultation , no respiratory distress  CV:  Palpation and auscultation of heart done , regular rate and rhythm, no murmur, rub, or gallop, 1+ edema LE bilaterally  ABDOMEN:  normal bowel sounds, soft, nontender, no hepatosplenomegaly or other masses  M/S:   Examination of:   right upper extremity, left upper extremity, right lower extremity and left lower extremity  Inspection, ROM, stability and muscle strength normal and generalized weakness noted  SKIN:  Inspection of skin and subcutaneous tissue baseline  NEURO:   Cranial nerves 2-12 are normal tested and grossly at patient's baseline  PSYCH:  affect and mood normal    Labs:   Recent labs in [facility] reviewed by me today.     ASSESSMENT/PLAN:  Cerebrovascular accident (CVA), unspecified mechanism (H)  Aphasia as late effect of stroke  Physical deconditioning  Acute/ongoing: PT, OT and ST ongoing, continue crestor 10mg qhs, coumadin as directed INR goal of 2-3, plavix 75mg QD, ASA 81mg QD    Pneumonia due to infectious  organism, unspecified laterality, unspecified part of lung  Stable: Abx complete, monitor SaO2 at rest and with activity, keep SaO2 > 90%    Hip pain, right  Acute/ongoing:   Continue norco 5/325mg 1 PO q 4 hours prn,  tylenol  650mg TID scheduled and q 6 hours prn, do not exceed 4gm acetaminophen in 24 hours.   Aqua K pad prn for comfort    Chronic atrial fibrillation (H)  Benign hypertension  Diastolic heart failure  Acute/ongoing: vitals daily and prn, BMP follow: continue toprol xl to 100mg QD, Imdur 30mg QD, lasix 80mg QD, digoxin 125mcg QD, continue norvasc to 10mg BID     Type 2 diabetes mellitus with stage 3 chronic kidney disease, without long-term current use of insulin (H)  Ongoing: follow BMP, continue glipizide 20mg BID, metformin 1000mg BID, saxagliptin 2.5mg QD     Mechanical Wheelchair  Size: 16X18 standard  Corresponding cushion: Yes: comfort curve  Standard foot rests: Yes  Elevating leg rests: No  Arm rests: Yes: full length  Lap tray: No  Dose patient use oxygen? No   Able to propel w/c? Yes If no why not? n/a And is there someone who can?yes  Mobility related ADL that are affected in the home:  Needing CTG assist with ambulation  The wheelchair is suitable and necessary for use in the patient's home.  Reason why a cane or walker will not meet the patient's needs. (ie: balance, tolerance, level of assistance) poor balance, needing ctg assist with mobility  The patient has expressed willingness to use the wheelchair in the home and does have the physical and cognitive ability to maneuver the equipment or has a caregiver who is available, willing, and able to provide assistance in the home with the wheelchair.   Patients functional mobility deficit can be sufficiently resolved by the use of the above wheelchair      Face to Face and Medical Necessity Statement for DME Provider visit    Demographic Information on Suman M [place]:  Gender: male  : 1935  [address] [facility]  [address]  [phone] (home)     Medical Record: [id number]  Social Security Number: [id number]  Primary Care Provider: Jamie Guerrier  Insurance: Payor: MEDICARE / Plan: MEDICARE / Product Type: Medicare /     HPI:   Suman Burton is a 83 year old  (1935), who is being seen today for a face to face provider visit at [facility]; medical necessity statement for DME included. This patient requires the following:  DME Ordered and Medical Necessity Statement   Hospital bed: [facility] bed s        Pt needing above DME with expected length of need of 6 months   months  due to medical necessity associated with following diagnosis:     Cerebrovascular accident (CVA), unspecified mechanism (H)  Aphasia as late effect of stroke  Pneumonia due to infectious organism, unspecified laterality, unspecified part of lung  Chronic atrial fibrillation (H)  Benign hypertension  Physical deconditioning  Type 2 diabetes mellitus with stage 3 chronic kidney disease, without long-term current use of insulin (H)  Hip pain, right  Slow transit constipation  Chronic kidney disease, stage III (moderate) (H)  Chronic diastolic congestive heart failure (H)      PMH   has a past medical history of Atrial fibrillation (H), Cancer (H), Cerebral infarction (H), Chronic kidney disease, Coronary artery disease (06/25/2019), Diabetes (H), Diabetes mellitus, type 2 (H), Edema, and Hypertension.    ROS:10 point ROS of systems including Constitutional, Eyes, Respiratory, Cardiovascular, Gastroenterology, Genitourinary, Integumentary, Musculoskeletal, Psychiatric were all negative except for pertinent positives noted in my HPI.    EXAM  Vitals: /73   Pulse 75   Temp 96.7  F (35.9  C)   Resp 18   Wt 62.1 kg (137 lb)   SpO2 96%   BMI 20.84 kg/m   ;BMI= Body mass index is 20.84 kg/m .  See PE above     ASSESSMENT/PLAN:  1. Cerebrovascular accident (CVA), unspecified mechanism (H)    2. Aphasia as late effect of  stroke    3. Pneumonia due to infectious organism, unspecified laterality, unspecified part of lung    4. Chronic atrial fibrillation (H)    5. Benign hypertension    6. Physical deconditioning    7. Type 2 diabetes mellitus with stage 3 chronic kidney disease, without long-term current use of insulin (H)    8. Hip pain, right    9. Slow transit constipation    10. Chronic kidney disease, stage III (moderate) (H)    11. Chronic diastolic congestive heart failure (H)        Orders:  1. Facility staff/TC to contact DME company to get their order form for provider to fill out done    ELECTRONICALLY SIGNED BY Kinde CERTIFIED PROVIDER:  Tonya Lynn Haase, APRN CNP   NPI: [id number]  [place] [facility]  [facility] [address], [address]  [place] [address]        Orders written by provider at facility  No new orders      Total time spent with patient visit at the skilled nursing facility was 45 min including patient visit and review of past records. Greater than 50% of total time spent with counseling and coordinating care, discussed discharge disposition with nurse manager, discussed function in IDT.   Electronically signed by:  Tonya Lynn Haase, APRN CNP           Sincerely,        Tonya Lynn Haase, APRN CNP

## 2019-07-24 NOTE — PROGRESS NOTES
[place] [facility]  [place] Medical Record Number:  [id number]  Place of Service where encounter took place:  [facility] ([facility]) [[id number]]  Chief Complaint   Patient presents with     RECHECK       HPI:    Suman Burton  is a 83 year old (1935), who is being seen today for an episodic care visit.  HPI information obtained from: facility chart records, facility staff, patient report and [facility] chart review. Today's concern is:  CVD/aphagia: working with therapy, walking up to 125 feet using a RW CTG assist, needing max assist with ADL's, therapy states making little progress  Pneumonia: denies SOB, cough or congestion, SaO2 > 90% on room air.   Fall with right hip and low back pain: has improved some, continues to use norco prn  HTN: BP as follows 117/73, 159/85, 148/76 with HR in 60-80 range, admit weight 136lbs and current weight is 137lbs  DCHF: weight stable, patient states he gets SOB when laying flat in bed, has HOB elevated when in bed.     Past Medical and Surgical History reviewed in Epic today.    MEDICATIONS:  Current Outpatient Medications   Medication Sig Dispense Refill     acetaminophen (TYLENOL) 325 MG tablet Give 650 mg by mouth three times a day for pain Do not exceed 4gm in 24 hours. AND Give 650 mg by mouth every 6 hours as needed for pain (do not exceed 4gm acetaminophen in 24 hrs)       albuterol (PROVENTIL) (2.5 MG/3ML) 0.083% neb solution 1 vial inhale orally via nebulizer two times a day for dyspnea/ Low O2 sats/pneumonia Make sure he deep breaths and coughs with each treatment AND 1 vial inhale orally via nebulizer every 4 hours as needed for dyspnea/low O2 sats/ pneumonia Make sure he deep breaths and coughs with each treatment       amLODIPine (NORVASC) 10 MG tablet Take 1 tablet (10 mg) by mouth daily       aspirin (ASA) 81 MG chewable tablet Take 1 tablet (81 mg) by mouth daily (Patient taking differently: Take 81 mg by mouth daily Stop on 7/26/19)        bisacodyl (DULCOLAX) 10 MG suppository Place 10 mg rectally daily as needed for constipation       bisacodyl (DULCOLAX) 5 MG EC tablet Take 1 tablet (5 mg) by mouth daily as needed for constipation       clopidogrel (PLAVIX) 75 MG tablet Take 1 tablet (75 mg) by mouth daily       digoxin (LANOXIN) 125 MCG tablet Take 1 tablet (125 mcg) by mouth daily 90 tablet 3     furosemide (LASIX) 80 MG tablet Take 1 tablet (80 mg) by mouth daily 90 tablet 1     glipiZIDE (GLUCOTROL) 10 MG tablet Take 2 tablets (20 mg) by mouth 2 times daily Take 20mg in AM with breakfast and 20mg in PM with evening meal 360 tablet 0     HYDROcodone-acetaminophen (NORCO) 5-325 MG tablet Take 1 tablet by mouth every 4 hours as needed for severe pain       isosorbide mononitrate (IMDUR) 30 MG 24 hr tablet Take 1 tablet (30 mg) by mouth daily 30 tablet 3     metFORMIN (GLUCOPHAGE) 1000 MG tablet Take 1 tablet (1,000 mg) by mouth 2 times daily (with meals)       METOPROLOL SUCCINATE ER PO Take 100 mg by mouth daily        mirtazapine (REMERON) 30 MG tablet Take 1 tablet (30 mg) by mouth At Bedtime       multivitamin, therapeutic (THERA-VIT) TABS tablet Take 1 tablet by mouth daily       OLANZapine zydis (ZYPREXA) 20 MG ODT Take 1 tablet (20 mg) by mouth every evening       ondansetron (ZOFRAN-ODT) 4 MG ODT tab Take 1 tablet (4 mg) by mouth every 6 hours as needed for nausea or vomiting       ONE TOUCH FINE POINT LANCETS Check blood sugars twice a day.       ONETOUCH DELICA LANCETS 33G MISC 1 strip 2 times daily 100 each 11     rosuvastatin (CRESTOR) 10 MG tablet Take 1 tablet (10 mg) by mouth daily       saxagliptin (ONGLYZA) 2.5 MG TABS tablet Take 1 tablet (2.5 mg) by mouth daily 30 tablet 1     sennosides (SENOKOT) 8.6 MG tablet Give 1 tablet by mouth as needed for constipation BID and PRN AND Give 1 tablet by mouth in the morning for constipation HOLD For loose stools       SODIUM PHOSPHATES RE Place rectally daily as needed       tamsulosin  (FLOMAX) 0.4 MG capsule Take 1 capsule (0.4 mg) by mouth daily       warfarin (COUMADIN) 3 MG tablet Take 1 tablet (3 mg) by mouth daily (Patient taking differently: Take 3 mg by mouth daily ) 90 tablet 3         REVIEW OF SYSTEMS:  10 point ROS of systems including Constitutional, Eyes, Respiratory, Cardiovascular, Gastroenterology, Genitourinary, Integumentary, Musculoskeletal, Psychiatric were all negative except for pertinent positives noted in my HPI.    Objective:  /73   Pulse 75   Temp 96.7  F (35.9  C)   Resp 18   Wt 62.1 kg (137 lb)   SpO2 96%   BMI 20.84 kg/m    Exam:  GENERAL APPEARANCE:  Alert, in mild distress due to pain  ENT:  Mouth and posterior oropharynx normal, moist mucous membranes, [place]  EYES:  EOM, conjunctivae, lids, pupils and irises normal, PERRL  RESP:  respiratory effort and palpation of chest normal, lungs clear to auscultation , no respiratory distress  CV:  Palpation and auscultation of heart done , regular rate and rhythm, no murmur, rub, or gallop, 1+ edema LE bilaterally  ABDOMEN:  normal bowel sounds, soft, nontender, no hepatosplenomegaly or other masses  M/S:   Examination of:   right upper extremity, left upper extremity, right lower extremity and left lower extremity  Inspection, ROM, stability and muscle strength normal and generalized weakness noted  SKIN:  Inspection of skin and subcutaneous tissue baseline  NEURO:   Cranial nerves 2-12 are normal tested and grossly at patient's baseline  PSYCH:  affect and mood normal    Labs:   Recent labs in [facility] reviewed by me today.     ASSESSMENT/PLAN:  Cerebrovascular accident (CVA), unspecified mechanism (H)  Aphasia as late effect of stroke  Physical deconditioning  Acute/ongoing: PT, OT and ST ongoing, continue crestor 10mg qhs, coumadin as directed INR goal of 2-3, plavix 75mg QD, ASA 81mg QD    Pneumonia due to infectious organism, unspecified laterality, unspecified part of lung  Stable: Abx complete, monitor SaO2 at  rest and with activity, keep SaO2 > 90%    Hip pain, right  Acute/ongoing:   Continue norco 5/325mg 1 PO q 4 hours prn,  tylenol  650mg TID scheduled and q 6 hours prn, do not exceed 4gm acetaminophen in 24 hours.   Aqua K pad prn for comfort    Chronic atrial fibrillation (H)  Benign hypertension  Diastolic heart failure  Acute/ongoing: vitals daily and prn, BMP follow: continue toprol xl to 100mg QD, Imdur 30mg QD, lasix 80mg QD, digoxin 125mcg QD, continue norvasc to 10mg BID     Type 2 diabetes mellitus with stage 3 chronic kidney disease, without long-term current use of insulin (H)  Ongoing: follow BMP, continue glipizide 20mg BID, metformin 1000mg BID, saxagliptin 2.5mg QD     Mechanical Wheelchair  Size: 16X18 standard  Corresponding cushion: Yes: comfort curve  Standard foot rests: Yes  Elevating leg rests: No  Arm rests: Yes: full length  Lap tray: No  Dose patient use oxygen? No   Able to propel w/c? Yes If no why not? n/a And is there someone who can?yes  Mobility related ADL that are affected in the home:  Needing CTG assist with ambulation  The wheelchair is suitable and necessary for use in the patient's home.  Reason why a cane or walker will not meet the patient's needs. (ie: balance, tolerance, level of assistance) poor balance, needing ctg assist with mobility  The patient has expressed willingness to use the wheelchair in the home and does have the physical and cognitive ability to maneuver the equipment or has a caregiver who is available, willing, and able to provide assistance in the home with the wheelchair.   Patients functional mobility deficit can be sufficiently resolved by the use of the above wheelchair      Face to Face and Medical Necessity Statement for DME Provider visit    Demographic Information on Suman M [place]:  Gender: male  : 1935  [address] [id number] [phone] (home)     Medical Record: [id number]  Social Security Number:  [id number]  Primary Care Provider: Jamie Guerrier  Insurance: Payor: MEDICARE / Plan: MEDICARE / Product Type: Medicare /     HPI:   Suman Burton is a 83 year old  (1935), who is being seen today for a face to face provider visit at [facility]; medical necessity statement for DME included. This patient requires the following:  DME Ordered and Medical Necessity Statement   Hospital bed: [facility] bed s        Pt needing above DME with expected length of need of 6 months   months  due to medical necessity associated with following diagnosis:     Cerebrovascular accident (CVA), unspecified mechanism (H)  Aphasia as late effect of stroke  Pneumonia due to infectious organism, unspecified laterality, unspecified part of lung  Chronic atrial fibrillation (H)  Benign hypertension  Physical deconditioning  Type 2 diabetes mellitus with stage 3 chronic kidney disease, without long-term current use of insulin (H)  Hip pain, right  Slow transit constipation  Chronic kidney disease, stage III (moderate) (H)  Chronic diastolic congestive heart failure (H)      PMH   has a past medical history of Atrial fibrillation (H), Cancer (H), Cerebral infarction (H), Chronic kidney disease, Coronary artery disease (06/25/2019), Diabetes (H), Diabetes mellitus, type 2 (H), Edema, and Hypertension.    ROS:10 point ROS of systems including Constitutional, Eyes, Respiratory, Cardiovascular, Gastroenterology, Genitourinary, Integumentary, Musculoskeletal, Psychiatric were all negative except for pertinent positives noted in my HPI.    EXAM  Vitals: /73   Pulse 75   Temp 96.7  F (35.9  C)   Resp 18   Wt 62.1 kg (137 lb)   SpO2 96%   BMI 20.84 kg/m  ;BMI= Body mass index is 20.84 kg/m .  See PE above     ASSESSMENT/PLAN:  1. Cerebrovascular accident (CVA), unspecified mechanism (H)    2. Aphasia as late effect of stroke    3. Pneumonia due to infectious organism, unspecified laterality, unspecified  part of lung    4. Chronic atrial fibrillation (H)    5. Benign hypertension    6. Physical deconditioning    7. Type 2 diabetes mellitus with stage 3 chronic kidney disease, without long-term current use of insulin (H)    8. Hip pain, right    9. Slow transit constipation    10. Chronic kidney disease, stage III (moderate) (H)    11. Chronic diastolic congestive heart failure (H)        Orders:  1. Facility staff/TC to contact DME company to get their order form for provider to fill out done    ELECTRONICALLY SIGNED BY ADRIANO CERTIFIED PROVIDER:  Tonya Lynn Haase, APRN CNP   NPI: [id number]  [facility]  [address] [place] [address], [address]  [place] [address]        Orders written by provider at facility  No new orders      Total time spent with patient visit at the skilled nursing facility was 45 min including patient visit and review of past records. Greater than 50% of total time spent with counseling and coordinating care, discussed discharge disposition with nurse manager, discussed function in IDT.   Electronically signed by:  Tonya Lynn Haase, APRN CNP

## 2019-07-30 ENCOUNTER — TELEPHONE (OUTPATIENT)
Dept: FAMILY MEDICINE | Facility: CLINIC | Age: 84
End: 2019-07-30

## 2019-07-30 ENCOUNTER — DISCHARGE SUMMARY NURSING HOME (OUTPATIENT)
Dept: GERIATRICS | Facility: CLINIC | Age: 84
End: 2019-07-30
Payer: MEDICARE

## 2019-07-30 VITALS
WEIGHT: 138.2 LBS | SYSTOLIC BLOOD PRESSURE: 165 MMHG | DIASTOLIC BLOOD PRESSURE: 75 MMHG | TEMPERATURE: 97 F | RESPIRATION RATE: 16 BRPM | HEART RATE: 71 BPM | OXYGEN SATURATION: 91 % | BODY MASS INDEX: 21.02 KG/M2

## 2019-07-30 DIAGNOSIS — R19.8 ALTERNATING CONSTIPATION AND DIARRHEA: ICD-10-CM

## 2019-07-30 DIAGNOSIS — I10 BENIGN ESSENTIAL HYPERTENSION: ICD-10-CM

## 2019-07-30 DIAGNOSIS — I63.9 LEFT-SIDED CEREBROVASCULAR ACCIDENT (CVA) (H): Primary | ICD-10-CM

## 2019-07-30 DIAGNOSIS — I48.20 CHRONIC ATRIAL FIBRILLATION (H): ICD-10-CM

## 2019-07-30 DIAGNOSIS — E11.22 CONTROLLED TYPE 2 DIABETES MELLITUS WITH STAGE 3 CHRONIC KIDNEY DISEASE, UNSPECIFIED WHETHER LONG TERM INSULIN USE (H): ICD-10-CM

## 2019-07-30 DIAGNOSIS — R41.0 DELIRIUM: ICD-10-CM

## 2019-07-30 DIAGNOSIS — I65.29 INTERNAL CAROTID ARTERY STENOSIS, UNSPECIFIED LATERALITY: ICD-10-CM

## 2019-07-30 DIAGNOSIS — R53.81 PHYSICAL DECONDITIONING: ICD-10-CM

## 2019-07-30 DIAGNOSIS — I50.32 CHRONIC DIASTOLIC HEART FAILURE (H): ICD-10-CM

## 2019-07-30 DIAGNOSIS — N18.30 CONTROLLED TYPE 2 DIABETES MELLITUS WITH STAGE 3 CHRONIC KIDNEY DISEASE, UNSPECIFIED WHETHER LONG TERM INSULIN USE (H): ICD-10-CM

## 2019-07-30 PROCEDURE — 99316 NF DSCHRG MGMT 30 MIN+: CPT | Performed by: NURSE PRACTITIONER

## 2019-07-30 NOTE — PROGRESS NOTES
[place] GERIATRIC SERVICES DISCHARGE SUMMARY  PATIENT'S NAME: Suman JONES [place]  YOB: 1935  MEDICAL RECORD NUMBER:  [id number]  Place of Service where encounter took place:  [facility] ([facility]) [[id number]]    PRIMARY CARE PROVIDER AND CLINIC RESPONSIBLE AFTER TRANSFER:   Jamie Guerrier MD, [address] [facility] [address]    [facility] Provider     Transferring providers: CHINYERE Rojas CNP, Dr. Jose MD  Recent Hospitalization/ED:  [facility] Hospital stay 6/25/19 to 7/2/19.  Date of SNF Admission: July / 02 / 2019  Date of SNF (anticipated) Discharge: July / 31 / 2019  Discharged to: new assisted living for [facility]  Cognitive Scores: BIMS: 11/15 SBT: 4/28  Physical Function: Ambulating 125 ft with RW  DME: [facility]    CODE STATUS/ADVANCE DIRECTIVES DISCUSSION:  Full Code  ALLERGIES: Nkda [no known drug allergies]     Per recent TCU provider progress notes:  83 year old male hospitalized with dysarthria and dragging his right leg and found to have a Left Iatrogenic CVA. 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  He did not receive TPA.  He did have some delirium, agitation due to his frustration for word finding difficulties.  He is now on Olanzapine.  Due to CVD/aphagia is working with therapy. Patient on Crestor 10mg qhs, coumadin, Plavix 75mg QD, ASA 81mg every day. Completed antibiotics for pneumonia. Increased Toprol XL to 75mg every day due to HTN. Per cards note of 6/26 Dr Block OK to stop ASA in 30 days, continue others unchanged. Now walking up to 125 feet using a RW CTG assist, needing max assist with ADL's, therapy states making little progress. Has chronic atrial fibrillation, benign hypertension and dHF takes Toprol xl to 100mg QD, Imdur 30mg QD, lasix 80mg QD, digoxin 125mcg QD, norvasc to 10mg BID. Diabetic with CKD 3 on glipizide 20mg BID, metformin 1000mg BID, saxagliptin 2.5mg every day.     DISCHARGE  DIAGNOSIS/NURSING FACILITY COURSE:   Left-sided cerebrovascular accident (CVA) (H)  Internal carotid artery stenosis, unspecified laterality  Chronic atrial fibrillation (H)  Physical deconditioning  Acute on chronic issues. Patient progressed well in therapies, up with walker - ready to discharge to [facility] for ongoing therapies, RN, AKHILA and SW services. Continue cardiac meds as noted - due to increased BPs in AM will switch Norvasc admin time to HS. F/U cards per routine. VS, wt per routine. Review BPs at PCP f/u. INR check on 8/1 and further management per PCP.     Delirium  Clear mentation during the day, occasional delirium and confusion at HS. Continue HS Zyprexa - monitor. Also on Remeron.     Benign essential hypertension  Recent SBP elevated in AM range 145-178. Will switch Norvasc admin time to HS, f/u with PCP for BP review.     Chronic diastolic heart failure (H)  Chronic, fluid balanced. No edema on sats 91% on room air.     Controlled type 2 diabetes mellitus with stage 3 chronic kidney disease, unspecified whether long term insulin use (H)  Last BG 130s. Continues oral meds. F/U with PCP for further DM management.   Lab Results   Component Value Date    A1C 6.4 06/26/2019     Alternating constipation and diarrhea  At this time takes Senna S daily but at times diarrhea. OK to have PRN imodium as needed for loose stools.       Past Medical History:  has a past medical history of Atrial fibrillation (H), Cancer (H), Cerebral infarction (H), Chronic kidney disease, Coronary artery disease (06/25/2019), Diabetes (H), Diabetes mellitus, type 2 (H), Edema, and Hypertension.    Discharge Medications:  Current Outpatient Medications   Medication Sig Dispense Refill     acetaminophen (TYLENOL) 325 MG tablet Give 650 mg by mouth three times a day for pain Do not exceed 4gm in 24 hours. AND Give 650 mg by mouth every 6 hours as needed for pain (do not exceed 4gm acetaminophen in 24 hrs)       albuterol  (PROVENTIL) (2.5 MG/3ML) 0.083% neb solution 1 vial inhale orally via nebulizer two times a day for dyspnea/ Low O2 sats/pneumonia Make sure he deep breaths and coughs with each treatment AND 1 vial inhale orally via nebulizer every 4 hours as needed for dyspnea/low O2 sats/ pneumonia Make sure he deep breaths and coughs with each treatment       amLODIPine (NORVASC) 10 MG tablet Take 1 tablet (10 mg) by mouth daily (Patient taking differently: Take 10 mg by mouth At Bedtime )       bisacodyl (DULCOLAX) 10 MG suppository Place 10 mg rectally daily as needed for constipation       bisacodyl (DULCOLAX) 5 MG EC tablet Take 1 tablet (5 mg) by mouth daily as needed for constipation       clopidogrel (PLAVIX) 75 MG tablet Take 1 tablet (75 mg) by mouth daily       digoxin (LANOXIN) 125 MCG tablet Take 1 tablet (125 mcg) by mouth daily 90 tablet 3     furosemide (LASIX) 80 MG tablet Take 1 tablet (80 mg) by mouth daily 90 tablet 1     glipiZIDE (GLUCOTROL) 10 MG tablet Take 2 tablets (20 mg) by mouth 2 times daily Take 20mg in AM with breakfast and 20mg in PM with evening meal 360 tablet 0     HYDROcodone-acetaminophen (NORCO) 5-325 MG tablet Take 1 tablet by mouth every 4 hours as needed for severe pain       isosorbide mononitrate (IMDUR) 30 MG 24 hr tablet Take 1 tablet (30 mg) by mouth daily 30 tablet 3     metFORMIN (GLUCOPHAGE) 1000 MG tablet Take 1 tablet (1,000 mg) by mouth 2 times daily (with meals)       METOPROLOL SUCCINATE ER PO Take 100 mg by mouth daily        mirtazapine (REMERON) 30 MG tablet Take 1 tablet (30 mg) by mouth At Bedtime       multivitamin, therapeutic (THERA-VIT) TABS tablet Take 1 tablet by mouth daily       OLANZapine zydis (ZYPREXA) 20 MG ODT Take 1 tablet (20 mg) by mouth every evening       ondansetron (ZOFRAN-ODT) 4 MG ODT tab Take 1 tablet (4 mg) by mouth every 6 hours as needed for nausea or vomiting       ONE TOUCH FINE POINT LANCETS Check blood sugars twice a day.       ONETOUCH  DELICA LANCETS 33G MISC 1 strip 2 times daily 100 each 11     rosuvastatin (CRESTOR) 10 MG tablet Take 1 tablet (10 mg) by mouth daily       saxagliptin (ONGLYZA) 2.5 MG TABS tablet Take 1 tablet (2.5 mg) by mouth daily 30 tablet 1     sennosides (SENOKOT) 8.6 MG tablet Give 1 tablet by mouth as needed for constipation BID and PRN AND Give 1 tablet by mouth in the morning for constipation HOLD For loose stools       SODIUM PHOSPHATES RE Place rectally daily as needed       tamsulosin (FLOMAX) 0.4 MG capsule Take 1 capsule (0.4 mg) by mouth daily       warfarin (COUMADIN) 3 MG tablet Take 1 tablet (3 mg) by mouth daily (Patient taking differently: Take 3 mg by mouth daily ) 90 tablet 3     Medication Changes/Rationale:     Change norvasc admin time to HS    Stopped ASA on 7/26 per cards recommendation    Increased Toprol XL to 100 mg daily diagnosis HTN    Controlled medications sent with patient:   Script for norco 5/325 mg medication for 20 tabs and 0 refills given to patient at discharge to have them fill at their out patient pharmacy     ROS:   10 point ROS of systems including Constitutional, Eyes, Respiratory, Cardiovascular, Gastroenterology, Genitourinary, Integumentary, Musculoskeletal, Psychiatric were all negative except for pertinent positives noted in my HPI.    Physical Exam:   Vitals: BP (!) 165/75   Pulse 71   Temp 97  F (36.1  C)   Resp 16   Wt 62.7 kg (138 lb 3.2 oz)   SpO2 91%   BMI 21.02 kg/m    BMI= Body mass index is 21.02 kg/m .  GENERAL APPEARANCE:  Alert, in no distress, pleasant, cooperative, oriented x self and place  EYES:  EOM, lids, pupils and irises normal, sclera clear and conjunctiva normal, no discharge or mattering on lids or lashes noted  ENT:  Mouth normal, moist mucous membranes, nose normal without drainage or crusting, external ears without lesions, hearing acuity at baseline  RESP:  respiratory effort normal, no chest wall tenderness, no respiratory distress, Lung sounds  clear, patient is on room air today  CV:  Auscultation of heart done, rate and rhythm controlled and regular, no murmur, no rub or gallop. Edema none bilateral lower extremities.  ABDOMEN:  Hypoactive bowel sounds, soft, nontender, no palpable masses.  M/S:   Gait and station walks with assist , no tenderness or swelling of the joints; able to move all extremities, digits normal  NEURO: no facial asymmetry, no speech deficits and able to follow directions, moves all extremities symmetrically  PSYCH:  insight and judgement and memory impaired, affect and mood normal     SNF labs:   Most Recent 3 CBC's:  Recent Labs   Lab Test 07/15/19 07/02/19  0718 06/30/19  0857   WBC 10.0 8.4 7.6   HGB 15.4 14.4 14.3   MCV 95.5 89 90    148* 125*     Most Recent 3 BMP's:  Recent Labs   Lab Test 07/15/19 07/02/19  0718 06/30/19  0857    141 142   POTASSIUM 4.3 3.7 3.8   CHLORIDE 99 109 112*   CO2 24 27 21   BUN 81* 38* 37*   CR 2.02* 1.51* 1.47*   ANIONGAP 19* 5 9   VINCENT 10.8* 10.1 9.9   * 173* 144*     Most Recent TSH and T4:  Recent Labs   Lab Test 05/01/18  1345   TSH 5.28*   T4 0.96     Most Recent Hemoglobin A1c:  Recent Labs   Lab Test 06/26/19  0347   A1C 6.4*     Date INR New Dose/Orders   7/29 2.7 Coumadin 3 mg 7/29 and 7/31 and 2 mg on 7/30. INR on 8/1 7/25 2.6 Coumadin 2 mg on 7/25 and 7/27 and 3 mg 7/26 and 7/28 7/22 2.3 Coumadin 3 mg on 7/22 and 7/24 and 2 mg on 7/23            DISCHARGE PLAN:    Follow up labs: INR on 8/1 to be drawn by home care and further management via PCP. BMP re-check at next PCP visit    Medical Follow Up:      Follow up with primary care provider in 2-3 weeks for continuity of care  Follow up with neurology as scheduled.     MTM referral needed and placed by this provider: No    Current [place] scheduled appointments:   No future appointments.     Discharge Services: Home Care:  Occupational Therapy, Physical Therapy, Registered Nurse, Home Health Aide,   and From:  [facility]    Discharge Instructions Verbalized to Patient at Discharge:     None      TOTAL DISCHARGE TIME:   Greater than 30 minutes  Electronically signed by:  CHINYERE Rojas CNP     Home care Face to Face documentation done in [facility] attached to Home care orders for [facility].

## 2019-07-30 NOTE — LETTER
7/30/2019        RE: Suman JONES [place]  [address]  [place] [address]        [place] GERIATRIC SERVICES DISCHARGE SUMMARY  PATIENT'S NAME: Suman JONES [place]  YOB: 1935  MEDICAL RECORD NUMBER:  [id number]  Place of Service where encounter took place:  [facility] [[id number]]    PRIMARY CARE PROVIDER AND CLINIC RESPONSIBLE AFTER TRANSFER:   Jamie Guerrier MD, [address] / [facility] [id number]    [facility] [place]     Transferring providers: CHINYERE Rojas CNP, Dr. Jose MD  Recent Hospitalization/ED:  [facility] Hospital stay 6/25/19 to 7/2/19.  Date of SNF Admission: July / 02 / 2019  Date of SNF (anticipated) Discharge: July / 31 / 2019  Discharged to: [facility]  Cognitive Scores: BIMS: 11/15 SBT: 4/28  Physical Function: Ambulating 125 ft with RW  DME: [facility]    CODE STATUS/ADVANCE DIRECTIVES DISCUSSION:  Full Code  ALLERGIES: Nkda [no known drug allergies]     Per recent TCU provider progress notes:  83 year old male hospitalized with dysarthria and dragging his right leg and found to have a Left Iatrogenic CVA. 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  He did not receive TPA.  He did have some delirium, agitation due to his frustration for word finding difficulties.  He is now on Olanzapine.  Due to CVD/aphagia is working with therapy. Patient on Crestor 10mg qhs, coumadin, Plavix 75mg QD, ASA 81mg every day. Completed antibiotics for pneumonia. Increased Toprol XL to 75mg every day due to HTN. Per cards note of 6/26 Dr Block OK to stop ASA in 30 days, continue others unchanged. Now walking up to 125 feet using a RW CTG assist, needing max assist with ADL's, therapy states making little progress. Has chronic atrial fibrillation, benign hypertension and dHF takes Toprol xl to 100mg QD, Imdur 30mg QD, lasix 80mg QD, digoxin 125mcg QD, norvasc to 10mg BID. Diabetic with CKD 3  on glipizide 20mg BID, metformin 1000mg BID, saxagliptin 2.5mg every day.     DISCHARGE DIAGNOSIS/NURSING FACILITY COURSE:   Left-sided cerebrovascular accident (CVA) (H)  Internal carotid artery stenosis, unspecified laterality  Chronic atrial fibrillation (H)  Physical deconditioning  Acute on chronic issues. Patient progressed well in therapies, up with walker - ready to discharge to [facility] for ongoing therapies, RN, AKHILA and SW services. Continue cardiac meds as noted - due to increased BPs in AM will switch Norvasc admin time to HS. F/U cards per routine. VS, wt per routine. Review BPs at PCP f/u. INR check on 8/1 and further management per PCP.     Delirium  Clear mentation during the day, occasional delirium and confusion at HS. Continue HS Zyprexa - monitor. Also on Remeron.     Benign essential hypertension  Recent SBP elevated in AM range 145-178. Will switch Norvasc admin time to HS, f/u with PCP for BP review.     Chronic diastolic heart failure (H)  Chronic, fluid balanced. No edema on sats 91% on room air.     Controlled type 2 diabetes mellitus with stage 3 chronic kidney disease, unspecified whether long term insulin use (H)  Last BG 130s. Continues oral meds. F/U with PCP for further DM management.   Lab Results   Component Value Date    A1C 6.4 06/26/2019     Alternating constipation and diarrhea  At this time takes Senna S daily but at times diarrhea. OK to have PRN imodium as needed for loose stools.       Past Medical History:  has a past medical history of Atrial fibrillation (H), Cancer (H), Cerebral infarction (H), Chronic kidney disease, Coronary artery disease (06/25/2019), Diabetes (H), Diabetes mellitus, type 2 (H), Edema, and Hypertension.    Discharge Medications:  Current Outpatient Medications   Medication Sig Dispense Refill     acetaminophen (TYLENOL) 325 MG tablet Give 650 mg by mouth three times a day for pain Do not exceed 4gm in 24 hours. AND Give 650 mg by mouth every 6  hours as needed for pain (do not exceed 4gm acetaminophen in 24 hrs)       albuterol (PROVENTIL) (2.5 MG/3ML) 0.083% neb solution 1 vial inhale orally via nebulizer two times a day for dyspnea/ Low O2 sats/pneumonia Make sure he deep breaths and coughs with each treatment AND 1 vial inhale orally via nebulizer every 4 hours as needed for dyspnea/low O2 sats/ pneumonia Make sure he deep breaths and coughs with each treatment       amLODIPine (NORVASC) 10 MG tablet Take 1 tablet (10 mg) by mouth daily (Patient taking differently: Take 10 mg by mouth At Bedtime )       bisacodyl (DULCOLAX) 10 MG suppository Place 10 mg rectally daily as needed for constipation       bisacodyl (DULCOLAX) 5 MG EC tablet Take 1 tablet (5 mg) by mouth daily as needed for constipation       clopidogrel (PLAVIX) 75 MG tablet Take 1 tablet (75 mg) by mouth daily       digoxin (LANOXIN) 125 MCG tablet Take 1 tablet (125 mcg) by mouth daily 90 tablet 3     furosemide (LASIX) 80 MG tablet Take 1 tablet (80 mg) by mouth daily 90 tablet 1     glipiZIDE (GLUCOTROL) 10 MG tablet Take 2 tablets (20 mg) by mouth 2 times daily Take 20mg in AM with breakfast and 20mg in PM with evening meal 360 tablet 0     HYDROcodone-acetaminophen (NORCO) 5-325 MG tablet Take 1 tablet by mouth every 4 hours as needed for severe pain       isosorbide mononitrate (IMDUR) 30 MG 24 hr tablet Take 1 tablet (30 mg) by mouth daily 30 tablet 3     metFORMIN (GLUCOPHAGE) 1000 MG tablet Take 1 tablet (1,000 mg) by mouth 2 times daily (with meals)       METOPROLOL SUCCINATE ER PO Take 100 mg by mouth daily        mirtazapine (REMERON) 30 MG tablet Take 1 tablet (30 mg) by mouth At Bedtime       multivitamin, therapeutic (THERA-VIT) TABS tablet Take 1 tablet by mouth daily       OLANZapine zydis (ZYPREXA) 20 MG ODT Take 1 tablet (20 mg) by mouth every evening       ondansetron (ZOFRAN-ODT) 4 MG ODT tab Take 1 tablet (4 mg) by mouth every 6 hours as needed for nausea or vomiting        ONE TOUCH FINE POINT LANCETS Check blood sugars twice a day.       ONETOUCH DELICA LANCETS 33G MISC 1 strip 2 times daily 100 each 11     rosuvastatin (CRESTOR) 10 MG tablet Take 1 tablet (10 mg) by mouth daily       saxagliptin (ONGLYZA) 2.5 MG TABS tablet Take 1 tablet (2.5 mg) by mouth daily 30 tablet 1     sennosides (SENOKOT) 8.6 MG tablet Give 1 tablet by mouth as needed for constipation BID and PRN AND Give 1 tablet by mouth in the morning for constipation HOLD For loose stools       SODIUM PHOSPHATES RE Place rectally daily as needed       tamsulosin (FLOMAX) 0.4 MG capsule Take 1 capsule (0.4 mg) by mouth daily       warfarin (COUMADIN) 3 MG tablet Take 1 tablet (3 mg) by mouth daily (Patient taking differently: Take 3 mg by mouth daily ) 90 tablet 3     Medication Changes/Rationale:     Change norvasc admin time to HS    Stopped ASA on 7/26 per cards recommendation    Increased Toprol XL to 100 mg daily diagnosis HTN    Controlled medications sent with patient:   Script for norco 5/325 mg medication for 20 tabs and 0 refills given to patient at discharge to have them fill at their out patient pharmacy     ROS:   10 point ROS of systems including Constitutional, Eyes, Respiratory, Cardiovascular, Gastroenterology, Genitourinary, Integumentary, Musculoskeletal, Psychiatric were all negative except for pertinent positives noted in my HPI.    Physical Exam:   Vitals: BP (!) 165/75   Pulse 71   Temp 97  F (36.1  C)   Resp 16   Wt 62.7 kg (138 lb 3.2 oz)   SpO2 91%   BMI 21.02 kg/m     BMI= Body mass index is 21.02 kg/m .  GENERAL APPEARANCE:  Alert, in no distress, pleasant, cooperative, oriented x self and place  EYES:  EOM, lids, pupils and irises normal, sclera clear and conjunctiva normal, no discharge or mattering on lids or lashes noted  ENT:  Mouth normal, moist mucous membranes, nose normal without drainage or crusting, external ears without lesions, hearing acuity at baseline  RESP:   respiratory effort normal, no chest wall tenderness, no respiratory distress, Lung sounds clear, patient is on room air today  CV:  Auscultation of heart done, rate and rhythm controlled and regular, no murmur, no rub or gallop. Edema none bilateral lower extremities.  ABDOMEN:  Hypoactive bowel sounds, soft, nontender, no palpable masses.  M/S:   Gait and station walks with assist , no tenderness or swelling of the joints; able to move all extremities, digits normal  NEURO: no facial asymmetry, no speech deficits and able to follow directions, moves all extremities symmetrically  PSYCH:  insight and judgement and memory impaired, affect and mood normal     SNF labs:   Most Recent 3 CBC's:  Recent Labs   Lab Test 07/15/19 07/02/19  0718 06/30/19  0857   WBC 10.0 8.4 7.6   HGB 15.4 14.4 14.3   MCV 95.5 89 90    148* 125*     Most Recent 3 BMP's:  Recent Labs   Lab Test 07/15/19 07/02/19  0718 06/30/19  0857    141 142   POTASSIUM 4.3 3.7 3.8   CHLORIDE 99 109 112*   CO2 24 27 21   BUN 81* 38* 37*   CR 2.02* 1.51* 1.47*   ANIONGAP 19* 5 9   VINCENT 10.8* 10.1 9.9   * 173* 144*     Most Recent TSH and T4:  Recent Labs   Lab Test 05/01/18  1345   TSH 5.28*   T4 0.96     Most Recent Hemoglobin A1c:  Recent Labs   Lab Test 06/26/19  0347   A1C 6.4*     Date INR New Dose/Orders   7/29 2.7 Coumadin 3 mg 7/29 and 7/31 and 2 mg on 7/30. INR on 8/1 7/25 2.6 Coumadin 2 mg on 7/25 and 7/27 and 3 mg 7/26 and 7/28 7/22 2.3 Coumadin 3 mg on 7/22 and 7/24 and 2 mg on 7/23            DISCHARGE PLAN:    Follow up labs: INR on 8/1 to be drawn by home care and further management via PCP. BMP re-check at next PCP visit    Medical Follow Up:      Follow up with primary care provider in 2-3 weeks for continuity of care  Follow up with neurology as scheduled.     MTM referral needed and placed by this provider: No    Current [place] scheduled appointments:   No future appointments.     Discharge Services: Home Care:   Occupational Therapy, Physical Therapy, Registered Nurse, Home Health Aide,  and From:  [facility]    Discharge Instructions Verbalized to Patient at Discharge:     None      TOTAL DISCHARGE TIME:   Greater than 30 minutes  Electronically signed by:  CHINYERE Rojas CNP     Home care Face to Face documentation done in [facility] attached to Home care orders for [facility].                       Sincerely,        CHINYERE Rojas CNP

## 2019-07-31 ENCOUNTER — TELEPHONE (OUTPATIENT)
Dept: FAMILY MEDICINE | Facility: CLINIC | Age: 84
End: 2019-07-31

## 2019-07-31 NOTE — TELEPHONE ENCOUNTER
Our goal is to have forms completed within 72 hours, however some forms may require a visit or additional information.    What clinic location was the form placed at [facility] or [place].?     Who is the form from?   Where did the form come from? Faxed to clinic   The form was placed in the inbox of Jamie Guerrier MD      Please fax to [phone]  Phone number: [phone]    Additional comments: [facility] admission paperwork     Call take on 7/31/2019 at 9:43 AM by Ca Conteh

## 2019-08-01 ENCOUNTER — HOSPITAL ENCOUNTER (INPATIENT)
Facility: CLINIC | Age: 84
LOS: 4 days | Discharge: NURSING, REHAB OR LONG-TERM CARE | DRG: 084 | End: 2019-08-05
Attending: NURSE PRACTITIONER | Admitting: INTERNAL MEDICINE
Payer: MEDICARE

## 2019-08-01 ENCOUNTER — APPOINTMENT (OUTPATIENT)
Dept: MRI IMAGING | Facility: CLINIC | Age: 84
DRG: 084 | End: 2019-08-01
Attending: NURSE PRACTITIONER
Payer: MEDICARE

## 2019-08-01 ENCOUNTER — APPOINTMENT (OUTPATIENT)
Dept: CT IMAGING | Facility: CLINIC | Age: 84
DRG: 084 | End: 2019-08-01
Attending: NURSE PRACTITIONER
Payer: MEDICARE

## 2019-08-01 DIAGNOSIS — I50.32 CHRONIC DIASTOLIC CONGESTIVE HEART FAILURE (H): ICD-10-CM

## 2019-08-01 DIAGNOSIS — E53.8 VITAMIN B12 DEFICIENCY (NON ANEMIC): Primary | ICD-10-CM

## 2019-08-01 DIAGNOSIS — S06.5XAA SUBDURAL HEMATOMA (H): ICD-10-CM

## 2019-08-01 DIAGNOSIS — Z79.01 ANTICOAGULATED: ICD-10-CM

## 2019-08-01 DIAGNOSIS — R29.6 FALLS FREQUENTLY: ICD-10-CM

## 2019-08-01 DIAGNOSIS — G93.40 ENCEPHALOPATHY: ICD-10-CM

## 2019-08-01 LAB
ALBUMIN SERPL-MCNC: 3.1 G/DL (ref 3.4–5)
ALBUMIN UR-MCNC: 10 MG/DL
ALP SERPL-CCNC: 94 U/L (ref 40–150)
ALT SERPL W P-5'-P-CCNC: 21 U/L (ref 0–70)
ANION GAP SERPL CALCULATED.3IONS-SCNC: 11 MMOL/L (ref 3–14)
APPEARANCE UR: CLEAR
AST SERPL W P-5'-P-CCNC: 18 U/L (ref 0–45)
BASOPHILS # BLD AUTO: 0 10E9/L (ref 0–0.2)
BASOPHILS NFR BLD AUTO: 0.2 %
BILIRUB SERPL-MCNC: 0.7 MG/DL (ref 0.2–1.3)
BILIRUB UR QL STRIP: NEGATIVE
BUN SERPL-MCNC: 32 MG/DL (ref 7–30)
CALCIUM SERPL-MCNC: 9.7 MG/DL (ref 8.5–10.1)
CHLORIDE SERPL-SCNC: 113 MMOL/L (ref 94–109)
CO2 SERPL-SCNC: 23 MMOL/L (ref 20–32)
COLOR UR AUTO: ABNORMAL
CREAT SERPL-MCNC: 1.7 MG/DL (ref 0.66–1.25)
DIFFERENTIAL METHOD BLD: ABNORMAL
EOSINOPHIL # BLD AUTO: 0.2 10E9/L (ref 0–0.7)
EOSINOPHIL NFR BLD AUTO: 2.3 %
ERYTHROCYTE [DISTWIDTH] IN BLOOD BY AUTOMATED COUNT: 14.4 % (ref 10–15)
GFR SERPL CREATININE-BSD FRML MDRD: 36 ML/MIN/{1.73_M2}
GLUCOSE BLDC GLUCOMTR-MCNC: 128 MG/DL (ref 70–99)
GLUCOSE SERPL-MCNC: 131 MG/DL (ref 70–99)
GLUCOSE UR STRIP-MCNC: NEGATIVE MG/DL
HCT VFR BLD AUTO: 37.1 % (ref 40–53)
HGB BLD-MCNC: 12.6 G/DL (ref 13.3–17.7)
HGB UR QL STRIP: NEGATIVE
IMM GRANULOCYTES # BLD: 0 10E9/L (ref 0–0.4)
IMM GRANULOCYTES NFR BLD: 0.6 %
INR PPP: 2.22 (ref 0.86–1.14)
KETONES UR STRIP-MCNC: NEGATIVE MG/DL
LEUKOCYTE ESTERASE UR QL STRIP: NEGATIVE
LYMPHOCYTES # BLD AUTO: 1 10E9/L (ref 0.8–5.3)
LYMPHOCYTES NFR BLD AUTO: 15.7 %
MCH RBC QN AUTO: 32.5 PG (ref 26.5–33)
MCHC RBC AUTO-ENTMCNC: 34 G/DL (ref 31.5–36.5)
MCV RBC AUTO: 96 FL (ref 78–100)
MONOCYTES # BLD AUTO: 0.9 10E9/L (ref 0–1.3)
MONOCYTES NFR BLD AUTO: 13.4 %
NEUTROPHILS # BLD AUTO: 4.4 10E9/L (ref 1.6–8.3)
NEUTROPHILS NFR BLD AUTO: 67.8 %
NITRATE UR QL: NEGATIVE
NRBC # BLD AUTO: 0 10*3/UL
NRBC BLD AUTO-RTO: 0 /100
PH UR STRIP: 6.5 PH (ref 5–7)
PLATELET # BLD AUTO: 124 10E9/L (ref 150–450)
POTASSIUM SERPL-SCNC: 4.2 MMOL/L (ref 3.4–5.3)
PROT SERPL-MCNC: 7 G/DL (ref 6.8–8.8)
RBC # BLD AUTO: 3.88 10E12/L (ref 4.4–5.9)
RBC #/AREA URNS AUTO: <1 /HPF (ref 0–2)
SODIUM SERPL-SCNC: 147 MMOL/L (ref 133–144)
SOURCE: ABNORMAL
SP GR UR STRIP: 1.01 (ref 1–1.03)
UROBILINOGEN UR STRIP-MCNC: NORMAL MG/DL (ref 0–2)
WBC # BLD AUTO: 6.4 10E9/L (ref 4–11)
WBC #/AREA URNS AUTO: 1 /HPF (ref 0–5)

## 2019-08-01 PROCEDURE — 81001 URINALYSIS AUTO W/SCOPE: CPT | Performed by: INTERNAL MEDICINE

## 2019-08-01 PROCEDURE — 70551 MRI BRAIN STEM W/O DYE: CPT

## 2019-08-01 PROCEDURE — 99223 1ST HOSP IP/OBS HIGH 75: CPT | Mod: AI | Performed by: INTERNAL MEDICINE

## 2019-08-01 PROCEDURE — 96360 HYDRATION IV INFUSION INIT: CPT

## 2019-08-01 PROCEDURE — 25800030 ZZH RX IP 258 OP 636: Performed by: NURSE PRACTITIONER

## 2019-08-01 PROCEDURE — 85025 COMPLETE CBC W/AUTO DIFF WBC: CPT | Performed by: NURSE PRACTITIONER

## 2019-08-01 PROCEDURE — 12000000 ZZH R&B MED SURG/OB

## 2019-08-01 PROCEDURE — 70450 CT HEAD/BRAIN W/O DYE: CPT

## 2019-08-01 PROCEDURE — 99285 EMERGENCY DEPT VISIT HI MDM: CPT | Mod: 25

## 2019-08-01 PROCEDURE — 00000146 ZZHCL STATISTIC GLUCOSE BY METER IP

## 2019-08-01 PROCEDURE — 70544 MR ANGIOGRAPHY HEAD W/O DYE: CPT

## 2019-08-01 PROCEDURE — 80053 COMPREHEN METABOLIC PANEL: CPT | Performed by: NURSE PRACTITIONER

## 2019-08-01 PROCEDURE — 85610 PROTHROMBIN TIME: CPT | Performed by: NURSE PRACTITIONER

## 2019-08-01 PROCEDURE — 99207 ZZC APP CREDIT; MD BILLING SHARED VISIT: CPT | Performed by: NURSE PRACTITIONER

## 2019-08-01 RX ORDER — AMOXICILLIN 250 MG
1 CAPSULE ORAL 2 TIMES DAILY PRN
Status: DISCONTINUED | OUTPATIENT
Start: 2019-08-01 | End: 2019-08-05 | Stop reason: HOSPADM

## 2019-08-01 RX ORDER — BISACODYL 10 MG
10 SUPPOSITORY, RECTAL RECTAL DAILY PRN
Status: DISCONTINUED | OUTPATIENT
Start: 2019-08-01 | End: 2019-08-01

## 2019-08-01 RX ORDER — BISACODYL 10 MG
10 SUPPOSITORY, RECTAL RECTAL DAILY PRN
Status: DISCONTINUED | OUTPATIENT
Start: 2019-08-01 | End: 2019-08-05 | Stop reason: HOSPADM

## 2019-08-01 RX ORDER — POLYETHYLENE GLYCOL 3350 17 G/17G
17 POWDER, FOR SOLUTION ORAL DAILY PRN
Status: DISCONTINUED | OUTPATIENT
Start: 2019-08-01 | End: 2019-08-05 | Stop reason: HOSPADM

## 2019-08-01 RX ORDER — NALOXONE HYDROCHLORIDE 0.4 MG/ML
.1-.4 INJECTION, SOLUTION INTRAMUSCULAR; INTRAVENOUS; SUBCUTANEOUS
Status: DISCONTINUED | OUTPATIENT
Start: 2019-08-01 | End: 2019-08-05 | Stop reason: HOSPADM

## 2019-08-01 RX ORDER — AMOXICILLIN 250 MG
1 CAPSULE ORAL DAILY
Status: DISCONTINUED | OUTPATIENT
Start: 2019-08-02 | End: 2019-08-05 | Stop reason: HOSPADM

## 2019-08-01 RX ORDER — ROSUVASTATIN CALCIUM 5 MG/1
10 TABLET, COATED ORAL EVERY EVENING
Status: DISCONTINUED | OUTPATIENT
Start: 2019-08-01 | End: 2019-08-05 | Stop reason: HOSPADM

## 2019-08-01 RX ORDER — ACETAMINOPHEN 325 MG/1
650 TABLET ORAL 3 TIMES DAILY
Status: DISCONTINUED | OUTPATIENT
Start: 2019-08-01 | End: 2019-08-05 | Stop reason: HOSPADM

## 2019-08-01 RX ORDER — ONDANSETRON 4 MG/1
4 TABLET, ORALLY DISINTEGRATING ORAL EVERY 6 HOURS PRN
Status: DISCONTINUED | OUTPATIENT
Start: 2019-08-01 | End: 2019-08-05 | Stop reason: HOSPADM

## 2019-08-01 RX ORDER — AMLODIPINE BESYLATE 10 MG/1
10 TABLET ORAL AT BEDTIME
Status: DISCONTINUED | OUTPATIENT
Start: 2019-08-01 | End: 2019-08-05 | Stop reason: HOSPADM

## 2019-08-01 RX ORDER — AMOXICILLIN 250 MG
1 CAPSULE ORAL 2 TIMES DAILY PRN
COMMUNITY
End: 2019-08-07

## 2019-08-01 RX ORDER — METOPROLOL SUCCINATE 50 MG/1
100 TABLET, EXTENDED RELEASE ORAL DAILY
COMMUNITY

## 2019-08-01 RX ORDER — LOPERAMIDE HCL 2 MG
2 CAPSULE ORAL EVERY 6 HOURS PRN
Status: DISCONTINUED | OUTPATIENT
Start: 2019-08-01 | End: 2019-08-04

## 2019-08-01 RX ORDER — AMOXICILLIN 250 MG
1 CAPSULE ORAL DAILY
COMMUNITY

## 2019-08-01 RX ORDER — DIGOXIN 125 MCG
125 TABLET ORAL DAILY
Status: DISCONTINUED | OUTPATIENT
Start: 2019-08-02 | End: 2019-08-05 | Stop reason: HOSPADM

## 2019-08-01 RX ORDER — AMOXICILLIN 250 MG
2 CAPSULE ORAL 2 TIMES DAILY PRN
Status: DISCONTINUED | OUTPATIENT
Start: 2019-08-01 | End: 2019-08-05 | Stop reason: HOSPADM

## 2019-08-01 RX ORDER — ALBUTEROL SULFATE 0.83 MG/ML
2.5 SOLUTION RESPIRATORY (INHALATION) EVERY 4 HOURS PRN
COMMUNITY
End: 2019-08-07

## 2019-08-01 RX ORDER — ACETAMINOPHEN 325 MG/1
650 TABLET ORAL EVERY 6 HOURS PRN
COMMUNITY
End: 2019-08-07

## 2019-08-01 RX ORDER — LIDOCAINE 40 MG/G
CREAM TOPICAL
Status: DISCONTINUED | OUTPATIENT
Start: 2019-08-01 | End: 2019-08-05 | Stop reason: HOSPADM

## 2019-08-01 RX ORDER — BISACODYL 5 MG
5 TABLET, DELAYED RELEASE (ENTERIC COATED) ORAL DAILY PRN
Status: DISCONTINUED | OUTPATIENT
Start: 2019-08-01 | End: 2019-08-05 | Stop reason: HOSPADM

## 2019-08-01 RX ORDER — LOPERAMIDE HYDROCHLORIDE 2 MG/1
2 TABLET ORAL EVERY 6 HOURS PRN
COMMUNITY
End: 2019-08-07

## 2019-08-01 RX ORDER — CLOPIDOGREL BISULFATE 75 MG/1
75 TABLET ORAL DAILY
Status: DISCONTINUED | OUTPATIENT
Start: 2019-08-02 | End: 2019-08-02

## 2019-08-01 RX ORDER — ONDANSETRON 2 MG/ML
4 INJECTION INTRAMUSCULAR; INTRAVENOUS EVERY 6 HOURS PRN
Status: DISCONTINUED | OUTPATIENT
Start: 2019-08-01 | End: 2019-08-05 | Stop reason: HOSPADM

## 2019-08-01 RX ORDER — ALBUTEROL SULFATE 0.83 MG/ML
2.5 SOLUTION RESPIRATORY (INHALATION) 2 TIMES DAILY
Status: DISCONTINUED | OUTPATIENT
Start: 2019-08-01 | End: 2019-08-02

## 2019-08-01 RX ORDER — ISOSORBIDE MONONITRATE 30 MG/1
30 TABLET, EXTENDED RELEASE ORAL DAILY
Status: DISCONTINUED | OUTPATIENT
Start: 2019-08-02 | End: 2019-08-05 | Stop reason: HOSPADM

## 2019-08-01 RX ORDER — TAMSULOSIN HYDROCHLORIDE 0.4 MG/1
0.4 CAPSULE ORAL DAILY
Status: DISCONTINUED | OUTPATIENT
Start: 2019-08-02 | End: 2019-08-05 | Stop reason: HOSPADM

## 2019-08-01 RX ORDER — METOPROLOL SUCCINATE 100 MG/1
100 TABLET, EXTENDED RELEASE ORAL DAILY
Status: DISCONTINUED | OUTPATIENT
Start: 2019-08-02 | End: 2019-08-05 | Stop reason: HOSPADM

## 2019-08-01 RX ORDER — ACETAMINOPHEN 325 MG/1
650 TABLET ORAL EVERY 6 HOURS PRN
Status: DISCONTINUED | OUTPATIENT
Start: 2019-08-01 | End: 2019-08-05 | Stop reason: HOSPADM

## 2019-08-01 RX ORDER — ALBUTEROL SULFATE 0.83 MG/ML
2.5 SOLUTION RESPIRATORY (INHALATION) EVERY 4 HOURS PRN
Status: DISCONTINUED | OUTPATIENT
Start: 2019-08-01 | End: 2019-08-02

## 2019-08-01 RX ADMIN — SODIUM CHLORIDE, POTASSIUM CHLORIDE, SODIUM LACTATE AND CALCIUM CHLORIDE 1000 ML: 600; 310; 30; 20 INJECTION, SOLUTION INTRAVENOUS at 13:48

## 2019-08-01 ASSESSMENT — ACTIVITIES OF DAILY LIVING (ADL): ADLS_ACUITY_SCORE: 21

## 2019-08-01 ASSESSMENT — MIFFLIN-ST. JEOR: SCORE: 1311.34

## 2019-08-01 NOTE — PHARMACY-ADMISSION MEDICATION HISTORY
Admission medication history interview status for the 8/1/2019  admission is complete. See EPIC admission navigator for prior to admission medications     Medication history source reliability:Good    Actions taken by pharmacist (provider contacted, etc):Verified all medications with patient's nursing home list from [facility] in [place]. Called patient's nurse to double check last doses. Patient just moved into [facility] yesterday.      Additional medication history information not noted on PTA med list :None    Medication reconciliation/reorder completed by provider prior to medication history? No    Time spent in this activity: 20 minutes    Prior to Admission medications    Medication Sig Last Dose Taking? Auth Provider   acetaminophen (TYLENOL) 325 MG tablet Take 650 mg by mouth every 6 hours as needed for mild pain (Max of 4000mg in 24 hours.) prn med Yes Unknown, Entered By History   acetaminophen (TYLENOL) 325 MG tablet Take 650 mg by mouth 3 times daily Do not exceed 4gm in 24 hours. AND Give 650 mg by mouth every 6 hours as needed for pain (do not exceed 4gm acetaminophen in 24 hrs) 0800, 1400, 2000 8/1/2019 at 0800 Yes Reported, Patient   albuterol (PROVENTIL) (2.5 MG/3ML) 0.083% neb solution Take 2.5 mg by nebulization every 4 hours as needed for shortness of breath / dyspnea or wheezing prn med Yes Unknown, Entered By History   albuterol (PROVENTIL) (2.5 MG/3ML) 0.083% neb solution Take 2.5 mg by nebulization 2 times daily Make sure he deep breaths and coughs with each treatment . 8/1/2019 at 0800 Yes Reported, Patient   amLODIPine (NORVASC) 10 MG tablet Take 1 tablet (10 mg) by mouth daily  Patient taking differently: Take 10 mg by mouth At Bedtime  7/31/2019 at 2000 Yes Deangelo Escobar, DO   bisacodyl (DULCOLAX) 10 MG suppository Place 10 mg rectally daily as needed for constipation prn med Yes Reported, Patient   bisacodyl (DULCOLAX) 5 MG EC tablet Take 1 tablet (5 mg) by mouth daily  as needed for constipation prn med Yes Deangelo Escobar DO   clopidogrel (PLAVIX) 75 MG tablet Take 1 tablet (75 mg) by mouth daily 8/1/2019 at 0800 Yes Deangelo Escobar DO   digoxin (LANOXIN) 125 MCG tablet Take 1 tablet (125 mcg) by mouth daily 8/1/2019 at 0800 Yes Jamie Guerrier MD   glipiZIDE (GLUCOTROL) 10 MG tablet Take 2 tablets (20 mg) by mouth 2 times daily Take 20mg in AM with breakfast and 20mg in PM with evening meal 8/1/2019 at 0800 Yes Ej Villarreal MD   HYDROcodone-acetaminophen (NORCO) 5-325 MG tablet Take 1 tablet by mouth every 4 hours as needed for severe pain prn med Yes Reported, Patient   isosorbide mononitrate (IMDUR) 30 MG 24 hr tablet Take 1 tablet (30 mg) by mouth daily 8/1/2019 at 0800 Yes Judy Ruiz MD   loperamide (IMODIUM A-D) 2 MG tablet Take 2 mg by mouth every 6 hours as needed for diarrhea prn med Yes Unknown, Entered By History   metFORMIN (GLUCOPHAGE) 1000 MG tablet Take 1 tablet (1,000 mg) by mouth 2 times daily (with meals) 8/1/2019 at 0800 Yes Jamie Guerrier MD   metoprolol succinate ER (TOPROL-XL) 50 MG 24 hr tablet Take 100 mg by mouth daily 8/1/2019 at 0800 Yes Unknown, Entered By History   mirtazapine (REMERON) 30 MG tablet Take 1 tablet (30 mg) by mouth At Bedtime 7/31/2019 at 2000 Yes Deangelo Escobar DO   multivitamin, therapeutic (THERA-VIT) TABS tablet Take 1 tablet by mouth daily 8/1/2019 at 0800 Yes Deangelo Escobar DO   OLANZapine zydis (ZYPREXA) 20 MG ODT Take 1 tablet (20 mg) by mouth every evening 7/31/2019 at 2000 Yes Deangelo Escobar DO   ondansetron (ZOFRAN-ODT) 4 MG ODT tab Take 1 tablet (4 mg) by mouth every 6 hours as needed for nausea or vomiting prn med Yes Deangelo Escobar DO   rosuvastatin (CRESTOR) 10 MG tablet Take 1 tablet (10 mg) by mouth daily 7/31/2019 at 2000 Yes Deangelo Escobar DO   saxagliptin (ONGLYZA) 2.5 MG TABS tablet Take 1 tablet (2.5 mg) by mouth daily 8/1/2019 at 0800  Yes Jamie Guerrier MD   senna-docusate (SENOKOT-S/PERICOLACE) 8.6-50 MG tablet Take 1 tablet by mouth daily Hold for loose stools. 8/1/2019 at 0800 Yes Unknown, Entered By History   senna-docusate (SENOKOT-S/PERICOLACE) 8.6-50 MG tablet Take 1 tablet by mouth 2 times daily as needed for constipation prn med Yes Unknown, Entered By History   tamsulosin (FLOMAX) 0.4 MG capsule Take 1 capsule (0.4 mg) by mouth daily 8/1/2019 at 0800 Yes Deangelo Escobar,    warfarin (COUMADIN) 3 MG tablet Take 1 tablet (3 mg) by mouth daily  Patient taking differently: Take 3 mg by mouth daily (Patient would occasionally take 4.5mg on Mondays but not all of the time. Patient just moved to [facility] and plan was for patient to take 2mg this evening. Otherwise dose has been 3mg daily.) 7/31/2019 at pm Yes Ela Guan PA-C   ONE TOUCH [place] LANCETS Check blood sugars twice a day.   Reported, Patient   ONETOUCH DELICA LANCETS 33G MISC 1 strip 2 times daily   Ej Villarreal MD

## 2019-08-01 NOTE — ED PROVIDER NOTES
History     Chief Complaint:  Altered Mental Status and Fall    The history is provided by the patient, medical records and a relative.      Suman Burton is a 83 year old male who presents via EMS for altered mental status and falls. The patient had a proximal RCA stent placed on 6/25 and developed aphasia after the catheterization and was hospitalized until 7/2. The patient was placed in rehab until yesterday when he was moved into assisted living until his home could be set up to care for him. The patients daughter states that he has had increased frequency of falls. The patient was seen at the neurology clinic this morning and sent here for decreased cognitive function. His daughter reports some confusion and difficulty with speech. The daughter states that his falls happen only at night so she is not sure if he is trying to get around on his own or if staff is not getting to him fast enough. The patient denies any headache or vision changes. Family reports some recent weight loss.     Allergies:  No known drug allergies.     Medications:    Albuterol  Norvasc  Dulcolax  Plavix  Lanoxin  Lasix  Glucotrol  Norco  Imdur  Remeron  Zyprexa  Crestor  Onglyza  Coumadin    Past Medical History:    Atrial fibrillation   Cancer   Cerebral infarction   Chronic kidney disease   Coronary artery disease   Diabetes mellitus, type 2   Edema   Hypertension   Adenocarcinoma   Hyperlipidemia  Lumbago  Thoracic or lumbosacral neuritis or radiculitis, unspecified  Transient visual loss  Chronic pain syndrome  Hypersomnia  Persistent insomnia  Dystrophic nail  Retinal artery occlusion  SOB (shortness of breath)  Urinary incontinence without sensory awareness  Moderate major depression   Mitral regurgitation  Tricuspid regurgitation  Chest pain due to myocardial ischemia, unspecified ischemic chest pain type  Stroke-like symptom  Delirium    Past Surgical History:    Cholecystectomy  Heart  "catheterization  Appendectomy    Family History:    History reviewed. No pertinent family history.    Social History:  Patient is   Tobacco Use: Former smoker  Alcohol Use: Yes  PCP: Jamie Guerrier     Review of Systems   Unable to perform ROS: Acuity of condition     Physical Exam   First Vitals:  Patient Vitals for the past 24 hrs:   BP Temp Temp src Pulse Heart Rate Resp SpO2 Height Weight   08/01/19 1610 (!) 174/86 -- -- 80 -- 18 92 % -- --   08/01/19 1600 -- -- -- -- -- -- 91 % -- --   08/01/19 1545 (!) 181/115 -- -- -- -- -- -- -- --   08/01/19 1430 (!) 166/70 -- -- 79 -- 13 91 % -- --   08/01/19 1415 (!) 161/80 -- -- 70 -- 13 -- -- --   08/01/19 1400 (!) 168/73 -- -- 70 -- -- -- -- --   08/01/19 1345 -- -- -- -- -- -- 92 % -- --   08/01/19 1300 139/73 -- -- 68 -- 17 95 % -- --   08/01/19 1229 -- 98.7  F (37.1  C) Oral -- -- -- -- -- --   08/01/19 1227 (!) 147/70 -- -- -- 68 18 93 % 1.753 m (5' 9\") 62.6 kg (138 lb)     Physical Exam  Physical Exam   Constitutional:  Laying in bed comfortably, nontoxic appearing.   Head: Head moves freely with normal range of motion. Bruise to left forehead and top of head.  ENT: Oropharynx is clear and moist.   Eyes: Conjunctivae pink. EOMs intact. PERRL. No horizontal or vertical nystagmus.   Neck: Normal range of motion. No nuchal rigidity.   Cardiovascular: Regular rate and rhythm. Normal heart sounds. No concerning murmur. Intact distal pulses: radial pulses 2+ on the right, 2+ on the left.   Pulmonary/Chest: No respiratory distress. No decreased breath sounds. Lungs clear throughout.   Abdominal: Soft. Non-tender. No rebound, no guarding.   Musculoskeletal: No peripheral edema. Distal capillary refill and sensation intact.   Neurological: Oriented to person, place, and time. No focal deficits. CN II-XII intact. No facial asymmetry. No dysarthria but nonsensical speech at times. No trunchal instability. Upper and lower extremity strength is 5/5 and equal " bilaterally.   Skin: Skin is warm and normal in color. No diaphoresis. No rash noted. Multiple bruises to trunk and extremities.     Emergency Department Course     Imaging:  Radiographic findings were communicated with the patient and family who voiced understanding of the findings.  CT head w/o contrast:  No evidence of acute intracranial hemorrhage, mass, or  Herniation per radiology read.   MR brain w/o contrast:  1. Thin (4 to 5 mm) subacute subdural hematoma along the left  posterior convexity. No significant mass effect.  2. No evidence of acute ischemia.  3. Volume loss and white matter T2 hyperintensities likely reflecting  chronic small vessel ischemic change. Per radiology read.   MR head w/o contrast angiogram:  1. 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  2. Otherwise, normal [place] MRA. Per radiology read.     Laboratory:  CBC:  WBC 6.4, HGB 12.6 low,  low  CMP: Na 147 high, Chloride 113 high, Glucose 131 high, BUN 32 high, Creatinine 1.70 high, GFR 36 low, Albumin 3.1 low, o/w WNL.   INR: 2.22    Interventions:  1348: LR 1L IV    Emergency Department Course:  12:23 PM Nursing notes and vitals reviewed.  I performed an exam of the patient as documented above.     3:25 PM I spoke with the family and they feel comfortable with the patient going back to his assisted living home if the MRI is normal. They already have a scheduled appointment with neurology.      3:45 PM I consulted with Dr. Neves of radiology regarding the patient.    3:57 PM I consulted with the NP from Dr. Harley of neurology office regarding the patient.    4:04 PM I rechecked on and updated the patient and family.    4:34 PM I discussed the patient with Dr. Lang of the hospitalist service, who will admit the patient to a neurology bed for further monitoring, evaluation, and treatment.    Impression & Plan      Medical Decision Making:  Suman Burton is a  83 year old male with confusion arrives here today from Neurology clinic. He clearly has encephalopathy on my initial exam and is having visual hallucinations. Recently hospitalized 6/25 and RCA stent placed unfortunately after heart cath he developed aphasia from ischemic stroke versus TIA. He is on ASA, Plavix and Coumadin. Daughter notes increasing confusion and frequent falls. He has bruising to forehead and I am concerned for ICH. CT is negative. MRI reveals a small 4-5 mm subacute hematoma with no midline shift. He has had labile blood pressure here and I was about to start Nicardipine when his BP then normalized. I am concerned about his encephalopathy and unsure of the cause of this. No metabolic cause found. Medications will need to be thoroughly reviewed. The patient is a retired physician and can give many details of his medical history but then will talk about a muskrat in his room. He is at risk for repeated falls and his INR is 2.2. I discussed admission with Dr Lang who will admit him to the Neurology floor. Patient daughter amenable to plan. The patient is adamant that he is DNR/DNI and if he should have a major head bleed he wants no intervention.       Diagnosis:    ICD-10-CM    1. Subdural hematoma (H) S06.5X9A    2. Anticoagulated Z79.01    3. Falls frequently R29.6    4. Encephalopathy G93.40        Disposition:  Admitted to the hospitalist    I, Bradley Aasen, am serving as a scribe on 8/1/2019 at 12:23 PM to personally document services performed by Molly Crandall NP based on my observations and the provider's statements to me.          Molly Crandall APRN CNP  08/01/19 2017

## 2019-08-01 NOTE — ED NOTES
"[facility]  ED Nurse Handoff Report    ED Chief complaint: Altered Mental Status and Fall      ED Diagnosis:   Final diagnoses:   Subdural hematoma (H)   Anticoagulated   Falls frequently   Encephalopathy       Code Status: not discussed by ED provider.     Allergies:   Allergies   Allergen Reactions     Nkda [No Known Drug Allergies]        Activity level - Baseline/Home:  Stand with Assist  Activity Level - Current:   Unable to Assess, pt has not gotten out of bed in ER.     Patient's Preferred language: English   Needed?: No    Isolation: No  Infection: Not Applicable  Bariatric?: No    Vital Signs:   Vitals:    08/01/19 1227 08/01/19 1229 08/01/19 1300   BP: (!) 147/70  139/73   Pulse:   68   Resp: 18  17   Temp:  98.7  F (37.1  C)    TempSrc:  Oral    SpO2: 93%  95%   Weight: 62.6 kg (138 lb)     Height: 1.753 m (5' 9\")         Cardiac Rhythm: ,   Cardiac  Cardiac Rhythm: Atrial fibrillation    Pain level: Denies    Is this patient confused?: Yes   Does this patient have a guardian?  No, daughter states her and mom collaborate on health care decisions for patient.          If yes, is there guardianship documents in the Epic \"Code/ACP\" activity?  No         Guardian Notified?  N/A  Early - Suicide Severity Rating Scale Completed?  Yes  If yes, what color did the patient score?  White    Patient Report: Initial Complaint: pt. Reports to ED via EMS from neurology clinic for altered mental status and frequent falls.   Focused Assessment: pt. Was seen at neurology clinic this am for a follow up apt for a stroke on 6/25/19. At clinic, pt's cognitive function was abnormal with delusions so they sent pt to ED. Daughter states over the last month patient has fallen 3-4 times and last night pt. Fell and hit his head.   Tests Performed: CT, Labs, and MRI  Abnormal Results:   Results for orders placed or performed during the hospital encounter of 08/01/19   Head CT w/o contrast    Narrative "    CT SCAN OF THE HEAD WITHOUT CONTRAST   8/1/2019 1:37 PM     HISTORY: falls, confusion, anticoagulated    TECHNIQUE:  Axial images of the head and coronal reformations without  IV contrast material. Radiation dose for this scan was reduced using  automated exposure control, adjustment of the mA and/or kV according  to patient size, or iterative reconstruction technique.    COMPARISON: CT head 6/25/2019.    FINDINGS: There is no evidence of intracranial hemorrhage, mass, acute  infarct or anomaly. The ventricles are normal in size, shape and  configuration. Moderate diffuse parenchymal volume loss, commensurate  with the patient's age. Mild patchy periventricular white matter  hypodensities which are nonspecific, but likely related to chronic  microvascular ischemic disease. Scattered intracranial vascular  calcifications, most pronounced in the carotid siphons and vertebral  arteries.    The visualized portions of the sinuses and mastoids appear normal. The  bony calvarium and bones of the skull base appear intact. Severe right  temporomandibular joint osteoarthritis.      Impression    IMPRESSION:   No evidence of acute intracranial hemorrhage, mass, or  herniation.      LARRY CASILLAS MD   MR Head w/o Contrast Angiogram    Narrative    MR ANGIOGRAM OF THE HEAD WITHOUT CONTRAST   8/1/2019 3:34 PM     COMPARISON: Head and neck CTA 6/25/2019    HISTORY: Stroke, follow-up.    TECHNIQUE: 3D time-of-flight MR angiogram of the head without  contrast. MIP reconstruction of all MR angiographic data was  performed.    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 The left distal internal carotid,  basilar, bilateral anterior cerebral, bilateral middle cerebral and  bilateral posterior cerebral arteries are patent and unremarkable with  no evidence for cerebral artery stenosis or aneurysm. The anterior  communicating artery is patent and unremarkable.        Impression    IMPRESSION:    1. 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  2. Otherwise, normal [place] MRA.   MR Brain w/o Contrast    Narrative    MRI BRAIN WITHOUT CONTRAST  8/1/2019 3:35 PM    HISTORY: Stroke, follow-up.    TECHNIQUE: Multiplanar, multisequence MRI of the brain without  gadolinium IV contrast material.      COMPARISON: Head CT 8/1/2019    FINDINGS:   The exam is mild to moderately limited due to motion artifact.  Moderate volume loss is present. Frontoparietal predominant  periventricular and subcortical T2 FLAIR hyperintensities likely  represent chronic small vessel ischemic change. A thin left posterior  convexity subdural fluid hematoma is present measuring approximately  4-5 mm thickness, new compared to the prior exam. Signal  characteristics demonstrate T1 hyperintensity and T2 hypointensity  compatible with subacute intracellular methemoglobin, not convincingly  appreciated in retrospect on prior CT. The visualized calvarium,  tympanic cavities, and extracranial soft tissues are unremarkable.  There is trace opacification of the left mastoid cavity, likely  inflammatory. Submucosal/Tornwaldt cyst is present within the  posterior nasopharynx. Bilateral lens replacements are present.      Impression    IMPRESSION:  1. Thin (4 to 5 mm) subacute subdural hematoma along the left  posterior convexity. No significant mass effect.  2. No evidence of acute ischemia.  3. Volume loss and white matter T2 hyperintensities likely reflecting  chronic small vessel ischemic change.    Findings were discussed by phone between Dr. Neves and nurse  practitioner Raz at 3:45 PM on 8/1/2019.    ZEKE NEVES MD   CBC with platelets + differential   Result Value Ref Range    WBC 6.4 4.0 - 11.0 10e9/L    RBC Count 3.88 (L) 4.4 - 5.9 10e12/L    Hemoglobin 12.6 (L) 13.3 - 17.7 g/dL    Hematocrit 37.1 (L) 40.0 - 53.0 %    MCV 96 78 - 100 fl     MCH 32.5 26.5 - 33.0 pg    MCHC 34.0 31.5 - 36.5 g/dL    RDW 14.4 10.0 - 15.0 %    Platelet Count 124 (L) 150 - 450 10e9/L    Diff Method Automated Method     % Neutrophils 67.8 %    % Lymphocytes 15.7 %    % Monocytes 13.4 %    % Eosinophils 2.3 %    % Basophils 0.2 %    % Immature Granulocytes 0.6 %    Nucleated RBCs 0 0 /100    Absolute Neutrophil 4.4 1.6 - 8.3 10e9/L    Absolute Lymphocytes 1.0 0.8 - 5.3 10e9/L    Absolute Monocytes 0.9 0.0 - 1.3 10e9/L    Absolute Eosinophils 0.2 0.0 - 0.7 10e9/L    Absolute Basophils 0.0 0.0 - 0.2 10e9/L    Abs Immature Granulocytes 0.0 0 - 0.4 10e9/L    Absolute Nucleated RBC 0.0    Comprehensive metabolic panel   Result Value Ref Range    Sodium 147 (H) 133 - 144 mmol/L    Potassium 4.2 3.4 - 5.3 mmol/L    Chloride 113 (H) 94 - 109 mmol/L    Carbon Dioxide 23 20 - 32 mmol/L    Anion Gap 11 3 - 14 mmol/L    Glucose 131 (H) 70 - 99 mg/dL    Urea Nitrogen 32 (H) 7 - 30 mg/dL    Creatinine 1.70 (H) 0.66 - 1.25 mg/dL    GFR Estimate 36 (L) >60 mL/min/[1.73_m2]    GFR Estimate If Black 42 (L) >60 mL/min/[1.73_m2]    Calcium 9.7 8.5 - 10.1 mg/dL    Bilirubin Total 0.7 0.2 - 1.3 mg/dL    Albumin 3.1 (L) 3.4 - 5.0 g/dL    Protein Total 7.0 6.8 - 8.8 g/dL    Alkaline Phosphatase 94 40 - 150 U/L    ALT 21 0 - 70 U/L    AST 18 0 - 45 U/L   INR   Result Value Ref Range    INR 2.22 (H) 0.86 - 1.14       Treatments provided: IV fluids    Family Comments: Daughter at bedside.     OBS brochure/video discussed/provided to patient/family: N/A              Name of person given brochure if not patient:               Relationship to patient:    ED Medications:   Medications   lactated ringers BOLUS 1,000 mL (1,000 mLs Intravenous New Bag 8/1/19 7593)       Drips infusing?:  No    For the majority of the shift this patient was Green.   Interventions performed were .    Severe Sepsis OR Septic Shock Diagnosis Present: No    To be done/followed up on inpatient unit:  N/A    ED NURSE PHONE NUMBER:  [phone]

## 2019-08-01 NOTE — H&P
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 No upper respiratory symptoms to suggest pneumonia. He does have several psychoactive medications (mirtazepine, zyprexa, norco) that could be affecting cognition and have side effects of confusion, hallucinations. INR 2.2 on admission.  -continue PTA Plavix but hold warfarin tonight  -q4 hour neuro checks  -neurology consultation  -defer further imaging for now  -check UA/Ucx to evaluate for infectious etiology for symptoms  -hold mirtazapine and zyprexa given lethargy    Chronic atrial fibrillation  -continue digoxin, metoprolol  -hold warfarin tonight, then resume per pharmacy with goal INR 2-3    Hypertension  Mitral and tricuspid regurgitation  -continue amlodipine 10 mg daily, imdur 30 mg daily    Dyslipidemia  -resume PTA statin    Type 2 diabetes  Managed with multiple medications: glipizide, metformin, saxagliptin. Recent hemoglobin A1c 6.4% indicating good glycemic control PTA.   -Minimal food intake all day and lethargic this evening so favor holding po diabetes meds until able to reliably tolerate meals.  -consider resuming PTA meds pending po intake trend tomorrow    CKD stage 3  Creatinine improved from baseline.   -monitor trend    Insomnia, anxiety  PTA med list includes both mirtazapine and zyprexa at bedtime.  At this time seems too lethargic to need these and would prefer to avoid rather than add to mental status changes.  -resume mirtazapine and zyprexa as needed    BPH  Continue PTA tamsulosin    DVT prophylaxis: on chronic anticoagulation  Code Status: Full Code    Disposition: Expected discharge in 2-3 days once mental status clears, further evaluation for broad differential of causes for encephalopathy.     Conchita Lang MD  Text Page    ~~~~~~~~~~~~~~~~~~~~~~~~~~~~~~~~~~~~~~~~~~~~~~~~~~~~~  Primary Care Physician   Jamie Guerrier    Chief Complaint   Hallucinations, confusion    History is obtained from the patient and medical records.    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 Complex PMH including recent hospitalization in June with chest pain and had an iatrogenic ischemic stroke from a cardia catheterization. Discharged July 2 to a TCU. Seen for neurology follow up appointment earlier today and due to altered mental status, hallucinations was sent to ED for further evaluation. The patient reported seeing muskrats on the floor and seemed confused about recent events. He was able to converse with his family and the ED staff. Discussed with Molly Crandall NP from the ED. Head CT did not reveal any new findings, MRI with 4-5 mm subdural hematoma with no mass effect. Per family, the patient has had several falls at the TCU. Not clear if he has had any changes in his medications but his med list does include 2 sleep aids and norco for pain. BMP fairly normal except for slight elevation in sodium. Creatinine is improved from prior hospitalization. No documented fevers or chills. After arrival to the inpatient unit and after family had left, patient was very sleepy and was reluctant to further answer questions.     Past Medical History    I have reviewed this patient's medical history and updated it with pertinent  information if needed.   Past Medical History:   Diagnosis Date     Atrial fibrillation (H)      Cancer (H)      Cerebral infarction (H)      Chronic kidney disease      Coronary artery disease 2019    s/p PCI      Diabetes (H)      Diabetes mellitus, type 2 (H)      Edema      Hypertension      Past Surgical History   I have reviewed this patient's surgical history and updated it with pertinent information if needed.  Past Surgical History:   Procedure Laterality Date     CHOLECYSTECTOMY       CV HEART CATHETERIZATION WITH POSSIBLE INTERVENTION N/A 2019    Procedure: Coronary Angiogram;  Surgeon: Cachorro Garcia MD;  Location:  HEART CARDIAC CATH LAB     CV INSTANTANEOUS WAVE-FREE RATIO N/A 2019    Procedure: Instantaneous Wave-Free Ratio;  Surgeon: Cachorro Garcia MD;  Location:  HEART CARDIAC CATH LAB     CV PCI STENT DRUG ELUTING N/A 2019    Procedure: PCI Stent Drug Eluting;  Surgeon: Cachorro Gacria MD;  Location:  HEART CARDIAC CATH LAB     CV RIGHT HEART CATH N/A 2019    Procedure: Right Heart Cath;  Surgeon: Cachorro Garica MD;  Location:  HEART CARDIAC CATH LAB     LAPAROSCOPIC APPENDECTOMY       Prior to Admission Medications   Prior to Admission Medications   Prescriptions Last Dose Informant Patient Reported? Taking?   HYDROcodone-acetaminophen (NORCO) 5-325 MG tablet prn med [facility] Yes Yes   Sig: Take 1 tablet by mouth every 4 hours as needed for severe pain   OLANZapine zydis (ZYPREXA) 20 MG ODT 2019 at 2000 [facility] No Yes   Sig: Take 1 tablet (20 mg) by mouth every evening   ONE TOUCH [place] LANCETS  Nursing Home Yes No   Sig: Check blood sugars twice a day.   ONETOUCH DELICA LANCETS [facility] [place] [facility]  Nursing [place] No No   Si strip 2 times daily   acetaminophen (TYLENOL) 325 MG tablet 2019 at 0800 [facility] Yes Yes   Sig: Take 650 mg by mouth 3 times daily Do not exceed 4gm in 24 hours. AND Give 650 mg by mouth every 6  hours as needed for pain (do not exceed 4gm acetaminophen in 24 hrs) 0800, 1400, 2000   acetaminophen (TYLENOL) 325 MG tablet prn [facility] [place]jail Yes Yes   Sig: Take 650 mg by mouth every 6 hours as needed for mild pain (Max of 4000mg in 24 hours.)   albuterol (PROVENTIL) (2.5 MG/3ML) 0.083% neb solution 8/1/2019 at [address] [place] [address] Yes Yes   Sig: Take 2.5 mg by nebulization 2 times daily Make sure he deep breaths and coughs with each treatment .   albuterol (PROVENTIL) (2.5 MG/3ML) 0.083% neb solution prn [facility] [place] Home Yes Yes   Sig: Take 2.5 mg by nebulization every 4 hours as needed for shortness of breath / dyspnea or wheezing   amLODIPine (NORVASC) 10 MG tablet 7/31/2019 at [address] [place] [address] No Yes   Sig: Take 1 tablet (10 mg) by mouth daily   Patient taking differently: Take 10 mg by mouth At Bedtime    bisacodyl (DULCOLAX) 10 MG suppository prn [facility] Yes Yes   Sig: Place 10 mg rectally daily as needed for constipation   bisacodyl (DULCOLAX) 5 MG EC tablet prn [facility] No Yes   Sig: Take 1 tablet (5 mg) by mouth daily as needed for constipation   clopidogrel (PLAVIX) 75 MG tablet 8/1/2019 at [address] [place] [address] No Yes   Sig: Take 1 tablet (75 mg) by mouth daily   digoxin (LANOXIN) 125 MCG tablet 8/1/2019 at [address] [place] [address] No Yes   Sig: Take 1 tablet (125 mcg) by mouth daily   glipiZIDE (GLUCOTROL) 10 MG tablet 8/1/2019 at [address] [place] [address] No Yes   Sig: Take 2 tablets (20 mg) by mouth 2 times daily Take 20mg in AM with breakfast and 20mg in PM with evening meal   isosorbide mononitrate (IMDUR) 30 MG 24 hr tablet 8/1/2019 at [address] [place] [address] No Yes   Sig: Take 1 tablet (30 mg) by mouth daily   loperamide (IMODIUM A-D) 2 MG tablet prn [facility] Yes Yes   Sig: Take 2 mg by mouth every 6 hours as needed for diarrhea   metFORMIN (GLUCOPHAGE) 1000 MG tablet 8/1/2019 at [address] [place] [address] No Yes   Sig: Take 1 tablet (1,000 mg) by mouth 2 times daily (with meals)   metoprolol succinate  ER (TOPROL-XL) 50 MG 24 hr tablet 8/1/2019 at 0800 [facility] Yes Yes   Sig: Take 100 mg by mouth daily   mirtazapine (REMERON) 30 MG tablet 7/31/2019 at 2000 [facility] No Yes   Sig: Take 1 tablet (30 mg) by mouth At Bedtime   multivitamin, therapeutic (THERA-VIT) TABS tablet 8/1/2019 at 0800 [facility] No Yes   Sig: Take 1 tablet by mouth daily   ondansetron (ZOFRAN-ODT) 4 MG ODT tab prn med Nursing Home No Yes   Sig: Take 1 tablet (4 mg) by mouth every 6 hours as needed for nausea or vomiting   rosuvastatin (CRESTOR) 10 MG tablet 7/31/2019 at 2000 [facility] No Yes   Sig: Take 1 tablet (10 mg) by mouth daily   saxagliptin (ONGLYZA) 2.5 MG TABS tablet 8/1/2019 at 0800 [facility] No Yes   Sig: Take 1 tablet (2.5 mg) by mouth daily   senna-docusate (SENOKOT-S/PERICOLACE) 8.6-50 MG tablet 8/1/2019 at 0800 [facility] Yes Yes   Sig: Take 1 tablet by mouth daily Hold for loose stools.   senna-docusate (SENOKOT-S/PERICOLACE) 8.6-50 MG tablet prn med [facility] Yes Yes   Sig: Take 1 tablet by mouth 2 times daily as needed for constipation   tamsulosin (FLOMAX) 0.4 MG capsule 8/1/2019 at 0800 [facility] No Yes   Sig: Take 1 capsule (0.4 mg) by mouth daily   warfarin (COUMADIN) 3 MG tablet 7/31/2019 at pm Nursing Home No Yes   Sig: Take 1 tablet (3 mg) by mouth daily   Patient taking differently: Take 3 mg by mouth daily (Patient would occasionally take 4.5mg on Mondays but not all of the time. Patient just moved to [facility] and plan was for patient to take 2mg this evening. Otherwise dose has been 3mg daily.)      Facility-Administered Medications: None     Allergies   Allergies   Allergen Reactions     Nkda [No Known Drug Allergies]      Social History   I have reviewed this patient's social history and updated it with pertinent information if needed. Suman Burton  reports that he quit smoking about 11 years ago. His smoking use included cigarettes. He started smoking about 70 years ago. He  smoked 2.00 packs per day. He has never used smokeless tobacco. He reports that he drinks alcohol. He reports that he does not use drugs.    Family History   I have reviewed this patient's family history and updated it with pertinent information if needed.     Review of Systems   The 10 point Review of Systems is negative other than noted in the HPI or here.    Physical Exam   Temp: 97.3  F (36.3  C) Temp src: Oral BP: (!) 164/77 Pulse: 64 Heart Rate: 81 Resp: 20 SpO2: 95 % O2 Device: Nasal cannula Oxygen Delivery: 3 LPM  Vital Signs with Ranges  Temp:  [97.3  F (36.3  C)-98.7  F (37.1  C)] 97.3  F (36.3  C)  Pulse:  [64-80] 64  Heart Rate:  [68-81] 81  Resp:  [13-20] 20  BP: (139-181)/() 164/77  SpO2:  [91 %-96 %] 95 %  138 lbs 0 oz    Constitutional: fatigued, NAD  Eyes: PERRL, sclerae anicteric  HEENT: mmm, atraumatic  Respiratory: Lungs CTAB, no wheezes or crackles  Cardiovascular: irregular, + systolic murmur  no LE edema  GI: soft, non-tender, nondistended  Skin/Integument: warm, dry, no acute rashes  Lymph/Hematologic: no supra or infraclavicular lymphadenopathy, no petechiae   Genitourinary: not examined  Musc: ALDRICH  Neuro: no focal deficits, no tremors, follows simple commands  Psych: not anxious, no overt hallucinations      Data   Data reviewed today:  I personally reviewed MRA of head which showed severe stenosis of the cavernous segment of the distal right internal carotid artery. Brain MRI with thin, 94-5mm) subacute subdural hematoma along left posterior convexity with no significant mass effect. No evidence for acute ischemia.   Recent Labs   Lab 08/01/19  1246   WBC 6.4   HGB 12.6*   MCV 96   *   INR 2.22*   *   POTASSIUM 4.2   CHLORIDE 113*   CO2 23   BUN 32*   CR 1.70*   ANIONGAP 11   VINCENT 9.7   *   ALBUMIN 3.1*   PROTTOTAL 7.0   BILITOTAL 0.7   ALKPHOS 94   ALT 21   AST 18       Imaging:  Recent Results (from the past 24 hour(s))   Head CT w/o contrast    Narrative    CT  SCAN OF THE HEAD WITHOUT CONTRAST   8/1/2019 1:37 PM     HISTORY: falls, confusion, anticoagulated    TECHNIQUE:  Axial images of the head and coronal reformations without  IV contrast material. Radiation dose for this scan was reduced using  automated exposure control, adjustment of the mA and/or kV according  to patient size, or iterative reconstruction technique.    COMPARISON: CT head 6/25/2019.    FINDINGS: There is no evidence of intracranial hemorrhage, mass, acute  infarct or anomaly. The ventricles are normal in size, shape and  configuration. Moderate diffuse parenchymal volume loss, commensurate  with the patient's age. Mild patchy periventricular white matter  hypodensities which are nonspecific, but likely related to chronic  microvascular ischemic disease. Scattered intracranial vascular  calcifications, most pronounced in the carotid siphons and vertebral  arteries.    The visualized portions of the sinuses and mastoids appear normal. The  bony calvarium and bones of the skull base appear intact. Severe right  temporomandibular joint osteoarthritis.      Impression    IMPRESSION:   No evidence of acute intracranial hemorrhage, mass, or  herniation.      LARRY CASILLAS MD   MR Head w/o Contrast Angiogram    Narrative    MR ANGIOGRAM OF THE HEAD WITHOUT CONTRAST   8/1/2019 3:34 PM     COMPARISON: Head and neck CTA 6/25/2019    HISTORY: Stroke, follow-up.    TECHNIQUE: 3D time-of-flight MR angiogram of the head without  contrast. MIP reconstruction of all MR angiographic data was  performed.    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 The left distal internal carotid,  basilar, bilateral anterior cerebral, bilateral middle cerebral and  bilateral posterior cerebral arteries are patent and unremarkable with  no evidence for cerebral artery stenosis or aneurysm. The anterior  communicating artery is patent and unremarkable.        Impression    IMPRESSION:    1. 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  2. Otherwise, normal [place] MRA.    LOLA RUTHERFORD MD   MR Brain w/o Contrast    Narrative    MRI BRAIN WITHOUT CONTRAST  8/1/2019 3:35 PM    HISTORY: Stroke, follow-up.    TECHNIQUE: Multiplanar, multisequence MRI of the brain without  gadolinium IV contrast material.      COMPARISON: Head CT 8/1/2019    FINDINGS:   The exam is mild to moderately limited due to motion artifact.  Moderate volume loss is present. Frontoparietal predominant  periventricular and subcortical T2 FLAIR hyperintensities likely  represent chronic small vessel ischemic change. A thin left posterior  convexity subdural fluid hematoma is present measuring approximately  4-5 mm thickness, new compared to the prior exam. Signal  characteristics demonstrate T1 hyperintensity and T2 hypointensity  compatible with subacute intracellular methemoglobin, not convincingly  appreciated in retrospect on prior CT. The visualized calvarium,  tympanic cavities, and extracranial soft tissues are unremarkable.  There is trace opacification of the left mastoid cavity, likely  inflammatory. Submucosal/Tornwaldt cyst is present within the  posterior nasopharynx. Bilateral lens replacements are present.      Impression    IMPRESSION:  1. Thin (4 to 5 mm) subacute subdural hematoma along the left  posterior convexity. No significant mass effect.  2. No evidence of acute ischemia.  3. Volume loss and white matter T2 hyperintensities likely reflecting  chronic small vessel ischemic change.    Findings were discussed by phone between Dr. Neves and nurse  practitioner Raz at 3:45 PM on 8/1/2019.    ZEKE NEVES MD

## 2019-08-01 NOTE — ED TRIAGE NOTES
Pt. Reports to ED via EMS after being seen in the neurology clinic for a follow up apt following a stroke that happened 6/25/119. Clinic states pt's cognitive functions were decreased today in clinic. Pt. Has had an increasing amount of falls within the last month and fell recently, which daughter states he hit his head. Pt denies any vision loss or current headache.

## 2019-08-01 NOTE — PLAN OF CARE
RECEIVING UNIT ED HANDOFF REVIEW    ED Nurse Handoff Report was reviewed by: Gunjan Nielson on August 1, 2019 at 5:52 PM

## 2019-08-01 NOTE — ED NOTES
Bed: ED26  Expected date:   Expected time:   Means of arrival:   Comments:  Kellen mccartney falls/recent stroke  6056

## 2019-08-02 ENCOUNTER — APPOINTMENT (OUTPATIENT)
Dept: CT IMAGING | Facility: CLINIC | Age: 84
DRG: 084 | End: 2019-08-02
Attending: HOSPITALIST
Payer: MEDICARE

## 2019-08-02 ENCOUNTER — APPOINTMENT (OUTPATIENT)
Dept: PHYSICAL THERAPY | Facility: CLINIC | Age: 84
DRG: 084 | End: 2019-08-02
Attending: HOSPITALIST
Payer: MEDICARE

## 2019-08-02 ENCOUNTER — TELEPHONE (OUTPATIENT)
Dept: FAMILY MEDICINE | Facility: CLINIC | Age: 84
End: 2019-08-02

## 2019-08-02 LAB
ANION GAP SERPL CALCULATED.3IONS-SCNC: 6 MMOL/L (ref 3–14)
BUN SERPL-MCNC: 28 MG/DL (ref 7–30)
CALCIUM SERPL-MCNC: 9.7 MG/DL (ref 8.5–10.1)
CHLORIDE SERPL-SCNC: 110 MMOL/L (ref 94–109)
CO2 SERPL-SCNC: 27 MMOL/L (ref 20–32)
CREAT SERPL-MCNC: 1.49 MG/DL (ref 0.66–1.25)
CRP SERPL-MCNC: 26.2 MG/L (ref 0–8)
DIGOXIN SERPL-MCNC: 1.1 UG/L (ref 0.5–2)
FOLATE SERPL-MCNC: 53 NG/ML
GFR SERPL CREATININE-BSD FRML MDRD: 43 ML/MIN/{1.73_M2}
GLUCOSE BLDC GLUCOMTR-MCNC: 111 MG/DL (ref 70–99)
GLUCOSE BLDC GLUCOMTR-MCNC: 192 MG/DL (ref 70–99)
GLUCOSE BLDC GLUCOMTR-MCNC: 317 MG/DL (ref 70–99)
GLUCOSE SERPL-MCNC: 242 MG/DL (ref 70–99)
POTASSIUM SERPL-SCNC: 3.5 MMOL/L (ref 3.4–5.3)
PROCALCITONIN SERPL-MCNC: 0.16 NG/ML
SODIUM SERPL-SCNC: 143 MMOL/L (ref 133–144)
T4 FREE SERPL-MCNC: 1.1 NG/DL (ref 0.76–1.46)
TSH SERPL DL<=0.005 MIU/L-ACNC: 4.35 MU/L (ref 0.4–4)
VIT B12 SERPL-MCNC: 242 PG/ML (ref 193–986)

## 2019-08-02 PROCEDURE — 25000132 ZZH RX MED GY IP 250 OP 250 PS 637: Mod: GY | Performed by: PSYCHIATRY & NEUROLOGY

## 2019-08-02 PROCEDURE — 86140 C-REACTIVE PROTEIN: CPT | Performed by: HOSPITALIST

## 2019-08-02 PROCEDURE — 97530 THERAPEUTIC ACTIVITIES: CPT | Mod: GP

## 2019-08-02 PROCEDURE — 36415 COLL VENOUS BLD VENIPUNCTURE: CPT | Performed by: HOSPITALIST

## 2019-08-02 PROCEDURE — 70450 CT HEAD/BRAIN W/O DYE: CPT

## 2019-08-02 PROCEDURE — 99221 1ST HOSP IP/OBS SF/LOW 40: CPT | Performed by: PHYSICIAN ASSISTANT

## 2019-08-02 PROCEDURE — 80162 ASSAY OF DIGOXIN TOTAL: CPT | Performed by: HOSPITALIST

## 2019-08-02 PROCEDURE — 99221 1ST HOSP IP/OBS SF/LOW 40: CPT | Performed by: PSYCHIATRY & NEUROLOGY

## 2019-08-02 PROCEDURE — 99233 SBSQ HOSP IP/OBS HIGH 50: CPT | Performed by: HOSPITALIST

## 2019-08-02 PROCEDURE — 97116 GAIT TRAINING THERAPY: CPT | Mod: GP

## 2019-08-02 PROCEDURE — 84443 ASSAY THYROID STIM HORMONE: CPT | Performed by: HOSPITALIST

## 2019-08-02 PROCEDURE — 82746 ASSAY OF FOLIC ACID SERUM: CPT | Performed by: HOSPITALIST

## 2019-08-02 PROCEDURE — 84439 ASSAY OF FREE THYROXINE: CPT | Performed by: HOSPITALIST

## 2019-08-02 PROCEDURE — 00000146 ZZHCL STATISTIC GLUCOSE BY METER IP

## 2019-08-02 PROCEDURE — 97161 PT EVAL LOW COMPLEX 20 MIN: CPT | Mod: GP

## 2019-08-02 PROCEDURE — 82607 VITAMIN B-12: CPT | Performed by: HOSPITALIST

## 2019-08-02 PROCEDURE — 12000047 ZZH R&B IMCU

## 2019-08-02 PROCEDURE — 95816 EEG AWAKE AND DROWSY: CPT

## 2019-08-02 PROCEDURE — 25800030 ZZH RX IP 258 OP 636

## 2019-08-02 PROCEDURE — 40000061 ZZH STATISTIC EEG TIME EA 10 MIN

## 2019-08-02 PROCEDURE — 25000128 H RX IP 250 OP 636

## 2019-08-02 PROCEDURE — 80048 BASIC METABOLIC PNL TOTAL CA: CPT | Performed by: HOSPITALIST

## 2019-08-02 PROCEDURE — 25000132 ZZH RX MED GY IP 250 OP 250 PS 637: Mod: GY | Performed by: INTERNAL MEDICINE

## 2019-08-02 PROCEDURE — 25000132 ZZH RX MED GY IP 250 OP 250 PS 637: Mod: GY | Performed by: HOSPITALIST

## 2019-08-02 PROCEDURE — 84145 PROCALCITONIN (PCT): CPT | Performed by: HOSPITALIST

## 2019-08-02 PROCEDURE — 85610 PROTHROMBIN TIME: CPT | Performed by: INTERNAL MEDICINE

## 2019-08-02 RX ORDER — NICOTINE POLACRILEX 4 MG
15-30 LOZENGE BUCCAL
Status: DISCONTINUED | OUTPATIENT
Start: 2019-08-02 | End: 2019-08-05 | Stop reason: HOSPADM

## 2019-08-02 RX ORDER — DEXTROSE MONOHYDRATE 25 G/50ML
25-50 INJECTION, SOLUTION INTRAVENOUS
Status: DISCONTINUED | OUTPATIENT
Start: 2019-08-02 | End: 2019-08-05 | Stop reason: HOSPADM

## 2019-08-02 RX ORDER — OLANZAPINE 10 MG/1
10 TABLET, ORALLY DISINTEGRATING ORAL AT BEDTIME
Status: DISCONTINUED | OUTPATIENT
Start: 2019-08-02 | End: 2019-08-05 | Stop reason: HOSPADM

## 2019-08-02 RX ORDER — HYDRALAZINE HYDROCHLORIDE 20 MG/ML
10-20 INJECTION INTRAMUSCULAR; INTRAVENOUS EVERY 4 HOURS PRN
Status: DISCONTINUED | OUTPATIENT
Start: 2019-08-02 | End: 2019-08-05 | Stop reason: HOSPADM

## 2019-08-02 RX ORDER — CLOPIDOGREL BISULFATE 75 MG/1
75 TABLET ORAL DAILY
Status: DISCONTINUED | OUTPATIENT
Start: 2019-08-02 | End: 2019-08-05 | Stop reason: HOSPADM

## 2019-08-02 RX ORDER — ALBUTEROL SULFATE 0.83 MG/ML
2.5 SOLUTION RESPIRATORY (INHALATION) EVERY 4 HOURS PRN
Status: DISCONTINUED | OUTPATIENT
Start: 2019-08-02 | End: 2019-08-05 | Stop reason: HOSPADM

## 2019-08-02 RX ADMIN — Medication 1 MG: at 20:25

## 2019-08-02 RX ADMIN — ROSUVASTATIN CALCIUM 10 MG: 5 TABLET, FILM COATED ORAL at 01:11

## 2019-08-02 RX ADMIN — ACETAMINOPHEN 650 MG: 325 TABLET, FILM COATED ORAL at 20:25

## 2019-08-02 RX ADMIN — SENNOSIDES AND DOCUSATE SODIUM 1 TABLET: 8.6; 5 TABLET ORAL at 09:25

## 2019-08-02 RX ADMIN — ACETAMINOPHEN 650 MG: 325 TABLET, FILM COATED ORAL at 09:25

## 2019-08-02 RX ADMIN — ACETAMINOPHEN 650 MG: 325 TABLET, FILM COATED ORAL at 16:54

## 2019-08-02 RX ADMIN — TAMSULOSIN HYDROCHLORIDE 0.4 MG: 0.4 CAPSULE ORAL at 09:25

## 2019-08-02 RX ADMIN — ISOSORBIDE MONONITRATE 30 MG: 30 TABLET, EXTENDED RELEASE ORAL at 09:25

## 2019-08-02 RX ADMIN — ACETAMINOPHEN 650 MG: 325 TABLET, FILM COATED ORAL at 01:11

## 2019-08-02 RX ADMIN — ROSUVASTATIN CALCIUM 5 MG: 5 TABLET, FILM COATED ORAL at 20:25

## 2019-08-02 RX ADMIN — METOPROLOL SUCCINATE 100 MG: 100 TABLET, EXTENDED RELEASE ORAL at 09:25

## 2019-08-02 RX ADMIN — CLOPIDOGREL BISULFATE 75 MG: 75 TABLET ORAL at 16:54

## 2019-08-02 RX ADMIN — DIGOXIN 125 MCG: 0.12 TABLET ORAL at 09:25

## 2019-08-02 RX ADMIN — AMLODIPINE BESYLATE 10 MG: 10 TABLET ORAL at 01:11

## 2019-08-02 RX ADMIN — AMLODIPINE BESYLATE 10 MG: 10 TABLET ORAL at 20:25

## 2019-08-02 RX ADMIN — OLANZAPINE 10 MG: 10 TABLET, ORALLY DISINTEGRATING ORAL at 20:25

## 2019-08-02 RX ADMIN — ROSUVASTATIN CALCIUM 5 MG: 5 TABLET, FILM COATED ORAL at 20:48

## 2019-08-02 RX ADMIN — PHYTONADIONE 2 MG: 2 INJECTION, EMULSION INTRAMUSCULAR; INTRAVENOUS; SUBCUTANEOUS at 09:24

## 2019-08-02 ASSESSMENT — ACTIVITIES OF DAILY LIVING (ADL)
ADLS_ACUITY_SCORE: 13.5
ADLS_ACUITY_SCORE: 21
ADLS_ACUITY_SCORE: 20
ADLS_ACUITY_SCORE: 21
ADLS_ACUITY_SCORE: 21
ADLS_ACUITY_SCORE: 20

## 2019-08-02 NOTE — PROCEDURES
Procedure Date: 2019      ELECTROENCEPHALOGRAM    ORDERING PROVIDER:  Stephen Everett MD.       [facility] EEG #[id number]      DATE OF STUDY:  2019      This is a digital EEG performed in the standard [employer] 10-20 electrode system.  The awake recording shows mainly alpha frequency activity and is symmetric.  There is posterior dominant rhythm of 8-9 Hz in quiet wakefulness and eye closure.  There are brief episodes of generalized slowing in the delta and theta frequency that occur intermittently throughout the recording.  The patient did not fall asleep.  Photic stimulation and hyperventilation were not performed.  No epileptiform discharges or seizures occurred during the recording.      IMPRESSION:  This is a mildly abnormal routine EEG in the awake state due to intermittent mild to moderate brief episodes of generalized slowing.  This is consistent with a mild encephalopathy, which can be due to numerous medical and neurological causes.  Clinical correlation is recommended.         STEPHEN EVERETT MD             D: 2019   T: 2019   MT: THOMAS      Name:     MOSES BONILLA   MRN:      [id number]        Account:        [id number]   :      1935           Procedure Date: 2019      Document: [id number]

## 2019-08-02 NOTE — PLAN OF CARE
"PT: PT orders received, initial eval completed and treatment initiated. Patient lives with his wife. He reports that he lives in the family home, however, CC reports that pt moved to [facility] after recent discharge from TCU. Pt was recently admitted in June 2019 with a CVA, discharged to TCU then home. He reports he was able to transfer and walk with a FWW on his own at home. Will need clarification on PLOF. He presents now with encephalopathy and hallucinations. Diagnosed with a SDH. Reports falls x 4 in the past \"few days.\"    Discharge Planner PT   Patient plan for discharge: home  Current status: Pt is oriented to self, pleasant, cooperative and is consistently following 1 step commands. He requires Ifeoma for bed mobility, transfers and gait x 75' with a FWW.  Barriers to return to prior living situation: level of assist required, fall risk, medical status  Recommendations for discharge: ARU  Rationale for recommendations: Pt participated well this session, motivated to return home. He would benefit from  intensive PT at ARU to progress mobility so he can return home with his wife. Anticipate he will tolerate 3 hours of therapy/day.       Entered by: Leah Naidu 08/02/2019 3:53 PM       "

## 2019-08-02 NOTE — TELEPHONE ENCOUNTER
Our goal is to have forms completed within 72 hours, however some forms may require a visit or additional information.    What clinic location was the form placed at [facility] or [facility]     Who is the form from?   Where did the form come from? Faxed to clinic   The form was placed in the inbox of Jamie Guerrier MD      Please fax to [phone]  Phone number: [phone]    Additional comments: [facility] suites skin tear      Call take on 8/2/2019 at 12:04 PM by Ca Conteh

## 2019-08-02 NOTE — PROGRESS NOTES
[facility]    Medicine Progress Note - Hospitalist Service       Date of Admission:  8/1/2019  Assessment & Plan      Suman Burton is a 83 year old male, retired physician, with recent CVA, chronic atrial fibrillation on plavix and warfarin, hypertension, dyslipidemia, BPH, CKD stage 3, anxiety/insomnia who was admitted on 8/1/2019 with encephalopathy and hallucinations, finding of subdural hematoma (4-5 mm) on brain MRI.     Left sided traumatic subdural hematoma in the setting of anticoagulation   Status post mechanical fall   Recent ischemic stroke after heart catheterization due to right ICA stenosis  The patient presented with confusion after apparently falling at home.  CT was negative for any acute pathology but MRI showed a 4 to 5 mm left-sided subdural hematoma.  The patient appears calmer today.  He has no headache.    Follow-up CT head this morning    Reverse warfarin with vitamin K    Neurology and neurosurgery were consulted    Physcial therapy evaluation     Acute encephalopathy with hallucinations  Insomnia, anxiety   PTA med list includes both mirtazapine and zyprexa at bedtime.    Psychiatrist consulting today    Permanent atrial fibrillation on anticoagulation with warfarin    Continue digoxin, metoprolol    Check digoxin level    Hold warfarin, give vitamin K     Coronary artery disease status post drug-eluting stent to proximal right coronary artery 6/2019  Hypertension  Mitral and tricuspid valve regurgitation    Continue amlodipine 10 mg daily, imdur 30 mg daily    Hold clopidogrel until ok with neurosurgery     Dyslipidemia    Continue PTA statin    Type 2 diabetes  Managed with multiple medications: glipizide, metformin, saxagliptin. Recent hemoglobin A1c 6.4% indicating good glycemic control PTA.     Continue to hold oral medications for now     Start aspart insulin correction scale     Stage 3 chronic kidney disease   Creatinine   Date Value Ref Range Status  "  08/02/2019 1.49 (H) 0.66 - 1.25 mg/dL Final   Stable     Continue to monitor     Benign prostatic hypertrophy     Continue PTA tamsulosin    Diet: Combination Diet Regular Diet Adult    DVT Prophylaxis: Pneumatic Compression Devices  Hutchins Catheter: not present  Code Status: Full Code      Disposition Plan   Expected discharge: 2 - 3 days, recommended to transitional care unit once mental status at baseline.  Entered: Bin Tellez MD 08/02/2019, 11:55 AM       The patient's care was discussed with the Bedside Nurse and Patient.    Bin Tellez MD  Hospitalist Service  [facility]    ______________________________________________________________________    Interval History   Patient denies headache.  He says he is very \"wobbly\" on his feet.    Data reviewed today: I reviewed all medications, new labs and imaging results over the last 24 hours. I personally reviewed no images or EKG's today.    Physical Exam   Vital Signs: Temp: 97.4  F (36.3  C) Temp src: Oral BP: 126/65 Pulse: 71 Heart Rate: 67 Resp: 16 SpO2: 92 % O2 Device: None (Room air) Oxygen Delivery: 3 LPM  Weight: 138 lbs 0 oz  Constitutional: awake, alert, cooperative, no apparent distress  Eyes: Lids and lashes normal, pupils equal, round, extra ocular muscles intact, sclera clear, conjunctiva normal  Respiratory: No increased work of breathing, good air exchange, clear to auscultation bilaterally, no crackles or wheezing  Cardiovascular: Irregular rate and rhythm, normal S1 and S2, no S3 or S4, and no murmur noted  GI: normal bowel sounds, soft, non-distended, non-tender, no masses palpated  Skin: no rashes and no jaundice  Musculoskeletal: There is no redness, warmth, or swelling of the joints.  Full range of motion noted.    Neurologic: right-handed. Awake, alert, oriented to place.  Cranial nerves II-XII are grossly intact.  Motor is 5 out of 5 bilaterally except for relatively weak right hand  compared to the " left . Mild resting right upper extremity tremor  Neuropsychiatric: General: normal, calm and normal eye contact    Data   Recent Labs   Lab 08/02/19  0853 08/01/19  1246   WBC  --  6.4   HGB  --  12.6*   MCV  --  96   PLT  --  124*   INR  --  2.22*    147*   POTASSIUM 3.5 4.2   CHLORIDE 110* 113*   CO2 27 23   BUN 28 32*   CR 1.49* 1.70*   ANIONGAP 6 11   VINCENT 9.7 9.7   * 131*   ALBUMIN  --  3.1*   PROTTOTAL  --  7.0   BILITOTAL  --  0.7   ALKPHOS  --  94   ALT  --  21   AST  --  18     Recent Results (from the past 24 hour(s))   Head CT w/o contrast    Narrative    CT SCAN OF THE HEAD WITHOUT CONTRAST   8/1/2019 1:37 PM     HISTORY: falls, confusion, anticoagulated    TECHNIQUE:  Axial images of the head and coronal reformations without  IV contrast material. Radiation dose for this scan was reduced using  automated exposure control, adjustment of the mA and/or kV according  to patient size, or iterative reconstruction technique.    COMPARISON: CT head 6/25/2019.    FINDINGS: There is no evidence of intracranial hemorrhage, mass, acute  infarct or anomaly. The ventricles are normal in size, shape and  configuration. Moderate diffuse parenchymal volume loss, commensurate  with the patient's age. Mild patchy periventricular white matter  hypodensities which are nonspecific, but likely related to chronic  microvascular ischemic disease. Scattered intracranial vascular  calcifications, most pronounced in the carotid siphons and vertebral  arteries.    The visualized portions of the sinuses and mastoids appear normal. The  bony calvarium and bones of the skull base appear intact. Severe right  temporomandibular joint osteoarthritis.      Impression    IMPRESSION:   No evidence of acute intracranial hemorrhage, mass, or  herniation.      LARRY CASILLAS MD   MR Head w/o Contrast Angiogram    Narrative    MR ANGIOGRAM OF THE HEAD WITHOUT CONTRAST   8/1/2019 3:34 PM     COMPARISON: Head and neck CTA  6/25/2019    HISTORY: Stroke, follow-up.    TECHNIQUE: 3D time-of-flight MR angiogram of the head without  contrast. MIP reconstruction of all MR angiographic data was  performed.    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 The left distal internal carotid,  basilar, bilateral anterior cerebral, bilateral middle cerebral and  bilateral posterior cerebral arteries are patent and unremarkable with  no evidence for cerebral artery stenosis or aneurysm. The anterior  communicating artery is patent and unremarkable.       Impression    IMPRESSION:    1. 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  2. Otherwise, normal [place] MRA.    LOLA RUTHERFORD MD   MR Brain w/o Contrast    Narrative    MRI BRAIN WITHOUT CONTRAST  8/1/2019 3:35 PM    HISTORY: Stroke, follow-up.    TECHNIQUE: Multiplanar, multisequence MRI of the brain without  gadolinium IV contrast material.      COMPARISON: Head CT 8/1/2019    FINDINGS:   The exam is mild to moderately limited due to motion artifact.  Moderate volume loss is present. Frontoparietal predominant  periventricular and subcortical T2 FLAIR hyperintensities likely  represent chronic small vessel ischemic change. A thin left posterior  convexity subdural fluid hematoma is present measuring approximately  4-5 mm thickness, new compared to the prior exam. Signal  characteristics demonstrate T1 hyperintensity and T2 hypointensity  compatible with subacute intracellular methemoglobin, not convincingly  appreciated in retrospect on prior CT. The visualized calvarium,  tympanic cavities, and extracranial soft tissues are unremarkable.  There is trace opacification of the left mastoid cavity, likely  inflammatory. Submucosal/Tornwaldt cyst is present within the  posterior nasopharynx. Bilateral lens replacements are present.      Impression    IMPRESSION:  1. Thin  (4 to 5 mm) subacute subdural hematoma along the left  posterior convexity. No significant mass effect.  2. No evidence of acute ischemia.  3. Volume loss and white matter T2 hyperintensities likely reflecting  chronic small vessel ischemic change.    Findings were discussed by phone between Dr. Neves and nurse  practitioner Raz at 3:45 PM on 8/1/2019.    ZEKE NEVES MD   CT Head w/o Contrast    Narrative    CT SCAN OF THE HEAD WITHOUT CONTRAST   8/2/2019 11:10 AM     HISTORY: Intracranial hemorrhage, known, follow up.    TECHNIQUE:  Axial images of the head and coronal reformations without  IV contrast material. Radiation dose for this scan was reduced using  automated exposure control, adjustment of the mA and/or kV according  to patient size, or iterative reconstruction technique.    COMPARISON: MRI 8/1/2019, CT head 8/1/2019.    FINDINGS:  The previously demonstrated very thin and subtle subdural hematoma  along the left parieto-occipital-temporal convexity on recent MRI is  not well seen on CT. The hematoma was also not well seen on the  8/1/2019 CT head exam. No definite evidence for interval increase in  the size of the subdural hematoma compared to most recent MRI. There  is no mass effect or midline shift.    There is mild global brain parenchymal volume loss without a definite  lobar predilation. Mild scattered hypoattenuation within the deep  cerebral white matter, likely related to small vessel ischemic  changes. Ventricles are stable. Scattered intracranial vascular  calcifications are again noted.    The visualized portions of the orbits, mid face, skull base and  calvarium are otherwise unremarkable.      Impression    IMPRESSION: The subtle left posterior cerebral convexity subdural  hematoma demonstrated on recent MRI is not well seen on CT. No  evidence for increase in size of the subdural hematoma or associated  mass effect. Head CT otherwise is stable.     Medications       acetaminophen   650 mg Oral TID     albuterol  2.5 mg Nebulization BID     amLODIPine  10 mg Oral At Bedtime     digoxin  125 mcg Oral Daily     isosorbide mononitrate  30 mg Oral Daily     metoprolol succinate ER  100 mg Oral Daily     OLANZapine zydis  10 mg Oral At Bedtime     rosuvastatin  10 mg Oral QPM     senna-docusate  1 tablet Oral Daily     sodium chloride (PF)  3 mL Intracatheter Q8H     tamsulosin  0.4 mg Oral Daily

## 2019-08-02 NOTE — PLAN OF CARE
"Pt here with subdural hematoma/ encephalopathy. Confused to situation, hard to assess orientation and complete neuro check- pt very lethargic starting around 1845. Pupils 2mm, reactive. According to family pt was having hallucinations in ED seeing \"muskrats\" in the room. Moderate hand , not yet OOB. Incontinent x1. VSS ex HTN, on 3 L NC. Hx A fib on coumadin. High falls risk on zone 2. PIV SL'd. . Dr Lang notified and aware of situation- possible UA this evening to r/o UTI.  "

## 2019-08-02 NOTE — PLAN OF CARE
PT : Orders received, chart reviewed. PT attempted to see Pt, but RN said Pt hasn't received BP meds yet and BP is above parameters. RN said Pt would be more appropriate for eval this PM.

## 2019-08-02 NOTE — PLAN OF CARE
Pt alert and oriented x3. BP elevated in the morning (168/81), normotensive after receiving scheduled antihypertensive medications. Pt reports remembering feeling confused and having visual hallucinations yesterday and overnight. Neuros intact. Denies headache or pain. CMS with baseline numbness in hands and feet. Skin with multiple bruises, scabs and skin tear to L elbow. ambulating with asst of 1 and GB. Unsteady gait. Tolerating regular diet. Incontinent of urine. Repeat CT with no change from recent MRI/CTs. Plan for EEG later today. Sitter at the bedside d/t pt's recent confusion and falls.    1900 update: no change.

## 2019-08-02 NOTE — PROGRESS NOTES
Routine EEG  completed at patient's bedside. Procedure explained to patient prior to testing.   Ordered by Dr Everett

## 2019-08-02 NOTE — PHARMACY-ANTICOAGULATION SERVICE
Clinical Pharmacy - Warfarin Dosing Consult     Pharmacy has been consulted to manage this patient s warfarin therapy.  Indication: Atrial Fibrillation  Therapy Goal: INR 2-3  Warfarin Prior to Admission: Yes  Warfarin PTA Regimen: 3 mg Daily  Significant drug interactions: Clopidogrel    INR   Date Value Ref Range Status   08/01/2019 2.22 (H) 0.86 - 1.14 Final   07/02/2019 2.61 (H) 0.86 - 1.14 Final       Recommend warfarin 3 mg today.  Pharmacy will monitor Suman M [place] daily and order warfarin doses to achieve specified goal.      Please contact pharmacy as soon as possible if the warfarin needs to be held for a procedure or if the warfarin goals change.        Rob FloresD

## 2019-08-02 NOTE — PLAN OF CARE
Pt here with subdural hematoma/ encephalopathy. Confused to situation, hard to assess orientation and complete neuro check- pt very lethargic starting around 1845. At 2000 Code 21 called as patient became agitated and aggressive. Tried to get out of bed, wanted to leave, sitter placed in room as patient high falls risk.  VSS ex HTN, patient refusing O2. UA came back negative. Patient slightly more cooperative as shift progressed able to do partial assessments, meds. Plan TBD.

## 2019-08-02 NOTE — CONSULTS
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 I was asked to see the patient for delirium.      --Was recently admitted with TIA 2/2 heart cath (off coumadin)- MRI not done then, cth and ctp were unremarkable (d/jerrod from hospital 7-2-19 for that),   --delirium unlikely 2/2 small SDH.  --CTH unremarkable  --MRI shows the small subdural hematoma. No acute strokes.  --TSH, B-12, dig level pending.  ---Procalcitonin neg, CBC and CMP unremarkable.  --CRP elevated. First dig level normal. UA neg  --Psych consulted.    --Small SDH  -found incidentally on MRI brain.  -pt is on warfarin and plavix, with an INR of 2.22  -warfarin held last night.  -NS consult  -repeat CTH today pending.      I discussed the above diagnosis, assessment, and further testing with the patient.        Evy Everett MD    Code Status    Full Code        Reason for Consult   Reason for consult: I was asked by Dr. Lang to evaluate this patient for acute delirium.      Chief Complaint   Hallucinations    History is obtained from the patient and the electronic medical chart.    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  He tells me that over the past 2 days that has few weeks, he has been having hallucinations of people in his house, either trying to take over his medical practice (he is retired now), or trying to start a medical practice but not legitimately or doing bad things.  At the time that they occurred, he fully believed to these hallucinations and delusions.  He tells me at one point though, he realized that this was not likely real and figure it out himself that these were probably hallucinations and delusions.  Is also noticed balance issues and has had multiple falls.    He was  seen for neurology follow-up clinic on August 1, 2018 at [facility]  At that time he was noted by providers to be hallucinating.  He seemed to worsen during the clinic visit.    He has a history of a iatrogenic TIA that occurred when he was taken off his Coumadin for a cardiac cath.  It occurred during the cardiac catheterization.  Is noted to have right-sided weakness and speech difficulty.  That cleared and he became back to baseline.    No recent fevers chills or other signs of illness.    The patient tells me that while he was having these hallucinations, his wife was out of town.  Is not clear if this is the case.  Family is not in the room today.    Past Medical History   I have reviewed this patient's medical history and updated it with pertinent information if needed.   Past Medical History:   Diagnosis Date     Atrial fibrillation (H)      Cancer (H)      Cerebral infarction (H)      Chronic kidney disease      Coronary artery disease 06/25/2019    s/p PCI      Diabetes (H)      Diabetes mellitus, type 2 (H)      Edema      Hypertension        Past Surgical History   I have reviewed this patient's surgical history and updated it with pertinent information if needed.  Past Surgical History:   Procedure Laterality Date     CHOLECYSTECTOMY       CV HEART CATHETERIZATION WITH POSSIBLE INTERVENTION N/A 6/25/2019    Procedure: Coronary Angiogram;  Surgeon: Cachorro Garcia MD;  Location: [facility] CARDIAC CATH LAB     CV INSTANTANEOUS WAVE-FREE RATIO N/A 6/25/2019    Procedure: Instantaneous Wave-Free Ratio;  Surgeon: Cachorro Garcia MD;  Location: [facility] CARDIAC CATH LAB     CV PCI STENT DRUG ELUTING N/A 6/25/2019    Procedure: PCI Stent Drug Eluting;  Surgeon: Cachorro Garcia MD;  Location: [facility] CARDIAC CATH LAB     CV RIGHT HEART CATH N/A 6/25/2019    Procedure: Right Heart Cath;  Surgeon: Cachorro Garcia MD;  Location: [facility] CARDIAC CATH LAB      LAPAROSCOPIC APPENDECTOMY         Prior to Admission Medications   Prior to Admission Medications   Prescriptions Last Dose Informant Patient Reported? Taking?   HYDROcodone-acetaminophen (NORCO) 5-325 MG tablet prn [facility] Yes Yes   Sig: Take 1 tablet by mouth every 4 hours as needed for severe pain   OLANZapine zydis (ZYPREXA) 20 MG ODT 2019 at [address] [place] [address] No Yes   Sig: Take 1 tablet (20 mg) by mouth every evening   ONE TOUCH FINE POINT LANCETS  [facility] Home Yes No   Sig: Check blood sugars twice a day.   ONETOUCH DELICA LANCETS 33G [facility] No No   Si strip 2 times daily   acetaminophen (TYLENOL) 325 MG tablet 2019 at 0800 [facility] Yes Yes   Sig: Take 650 mg by mouth 3 times daily Do not exceed 4gm in 24 hours. AND Give 650 mg by mouth every 6 hours as needed for pain (do not exceed 4gm acetaminophen in 24 hrs) 0800, 1400,    acetaminophen (TYLENOL) 325 MG tablet prn [facility] Yes Yes   Sig: Take 650 mg by mouth every 6 hours as needed for mild pain (Max of 4000mg in 24 hours.)   albuterol (PROVENTIL) (2.5 MG/3ML) 0.083% neb solution 2019 at 0800 [facility] Yes Yes   Sig: Take 2.5 mg by nebulization 2 times daily Make sure he deep breaths and coughs with each treatment .   albuterol (PROVENTIL) (2.5 MG/3ML) 0.083% neb solution prn [facility] Home Yes Yes   Sig: Take 2.5 mg by nebulization every 4 hours as needed for shortness of breath / dyspnea or wheezing   amLODIPine (NORVASC) 10 MG tablet 2019 at [address] [place] [address] No Yes   Sig: Take 1 tablet (10 mg) by mouth daily   Patient taking differently: Take 10 mg by mouth At Bedtime    bisacodyl (DULCOLAX) 10 MG suppository prn [facility] Home Yes Yes   Sig: Place 10 mg rectally daily as needed for constipation   bisacodyl (DULCOLAX) 5 MG EC tablet prn [facility] Home No Yes   Sig: Take 1 tablet (5 mg) by mouth daily as needed for constipation   clopidogrel (PLAVIX) 75 MG tablet 2019 at 0800  Nursing Home No Yes   Sig: Take 1 tablet (75 mg) by mouth daily   digoxin (LANOXIN) 125 MCG tablet 8/1/2019 at [address] [place] [address] No Yes   Sig: Take 1 tablet (125 mcg) by mouth daily   glipiZIDE (GLUCOTROL) 10 MG tablet 8/1/2019 at [address] [place] [address] No Yes   Sig: Take 2 tablets (20 mg) by mouth 2 times daily Take 20mg in AM with breakfast and 20mg in PM with evening meal   isosorbide mononitrate (IMDUR) 30 MG 24 hr tablet 8/1/2019 at [address] [place] [address] No Yes   Sig: Take 1 tablet (30 mg) by mouth daily   loperamide (IMODIUM A-D) 2 MG tablet prn [facility] Yes Yes   Sig: Take 2 mg by mouth every 6 hours as needed for diarrhea   metFORMIN (GLUCOPHAGE) 1000 MG tablet 8/1/2019 at [address] [place] [address] No Yes   Sig: Take 1 tablet (1,000 mg) by mouth 2 times daily (with meals)   metoprolol succinate ER (TOPROL-XL) 50 MG 24 hr tablet 8/1/2019 at [address] [place] [address] Yes Yes   Sig: Take 100 mg by mouth daily   mirtazapine (REMERON) 30 MG tablet 7/31/2019 at [address] [place] [address] No Yes   Sig: Take 1 tablet (30 mg) by mouth At Bedtime   multivitamin, therapeutic (THERA-VIT) TABS tablet 8/1/2019 at [address] [place] [address] No Yes   Sig: Take 1 tablet by mouth daily   ondansetron (ZOFRAN-ODT) 4 MG ODT tab prn [facility] No Yes   Sig: Take 1 tablet (4 mg) by mouth every 6 hours as needed for nausea or vomiting   rosuvastatin (CRESTOR) 10 MG tablet 7/31/2019 at [address] [place] [address] No Yes   Sig: Take 1 tablet (10 mg) by mouth daily   saxagliptin (ONGLYZA) 2.5 MG TABS tablet 8/1/2019 at [address] [place] [address] No Yes   Sig: Take 1 tablet (2.5 mg) by mouth daily   senna-docusate (SENOKOT-S/PERICOLACE) 8.6-50 MG tablet 8/1/2019 at [address] [place] [address] Yes Yes   Sig: Take 1 tablet by mouth daily Hold for loose stools.   senna-docusate (SENOKOT-S/PERICOLACE) 8.6-50 MG tablet prn med intermediate Yes Yes   Sig: Take 1 tablet by mouth 2 times daily as needed for constipation   tamsulosin (FLOMAX) 0.4 MG capsule 8/1/2019 at 0800 intermediate No Yes   Sig:  Take 1 capsule (0.4 mg) by mouth daily   warfarin (COUMADIN) 3 MG tablet 7/31/2019 at pm Nursing Home No Yes   Sig: Take 1 tablet (3 mg) by mouth daily   Patient taking differently: Take 3 mg by mouth daily (Patient would occasionally take 4.5mg on Mondays but not all of the time. Patient just moved to [facility] and plan was for patient to take 2mg this evening. Otherwise dose has been 3mg daily.)      Facility-Administered Medications: None     Allergies   Allergies   Allergen Reactions     Nkda [No Known Drug Allergies]        Social History   I have reviewed this patient's social history and updated it with pertinent information if needed. Suman Burton  reports that he quit smoking about 11 years ago. His smoking use included cigarettes. He started smoking about 70 years ago. He smoked 2.00 packs per day. He has never used smokeless tobacco. He reports that he drinks alcohol. He reports that he does not use drugs.    Family History   I have reviewed this patient's family history and updated it with pertinent information if needed.   None pertinent.    Review of Systems   The 10 point Review of Systems is negative other than noted in the HPI..    Physical Exam   Temp: 97.6  F (36.4  C) Temp src: Oral BP: (!) 168/81(scheduled bp meds administered) Pulse: 71 Heart Rate: 65 Resp: 16 SpO2: 90 % O2 Device: None (Room air) Oxygen Delivery: 3 LPM  Vital Signs with Ranges  Temp:  [97.3  F (36.3  C)-98.7  F (37.1  C)] 97.6  F (36.4  C)  Pulse:  [64-80] 71  Heart Rate:  [65-82] 65  Resp:  [13-20] 16  BP: (139-181)/() 168/81  SpO2:  [90 %-96 %] 90 %  138 lbs 0 oz    General Appearance:  No acute distress  Neuro:       Mental Status Exam:    He does know the date today.  He knows he is at hospital.  He recognizes and states that he has balance problems and is wobbly.  He recognizes and states that the previous hallucinations were not real and were hallucinations.  He is able to discuss his history well.   Able to subtract serial sevens.       Cranial Nerves:   Cranial nerves II through XII examined and intact           Motor:   Strength 5 out of 5 bilateral upper extremities and bilateral lower extremities           Reflexes: 1+ bilateral upper extremities and bilateral knees.  0 bilateral Achilles downgoing toes and no clonus.       Sensory: Normal light touch x4.  Decreased vibration bilateral feet.                   Coordination:   Finger-nose-finger mild dysmetria bilaterally.  Unable to tandem walk.       Gait: Uses a walker.  Is able to walk on his own without.  Somewhat short steps.  Good speed.  Wide-based gait.  Neck: no nuchal rigidity. No carotid bruits.    Extremities: No clubbing, no cyanosis, no edema  CV: RRR nl s1, s2  Resp: CTAB        Data   Results for orders placed or performed during the hospital encounter of 08/01/19 (from the past 24 hour(s))   CBC with platelets + differential   Result Value Ref Range    WBC 6.4 4.0 - 11.0 10e9/L    RBC Count 3.88 (L) 4.4 - 5.9 10e12/L    Hemoglobin 12.6 (L) 13.3 - 17.7 g/dL    Hematocrit 37.1 (L) 40.0 - 53.0 %    MCV 96 78 - 100 fl    MCH 32.5 26.5 - 33.0 pg    MCHC 34.0 31.5 - 36.5 g/dL    RDW 14.4 10.0 - 15.0 %    Platelet Count 124 (L) 150 - 450 10e9/L    Diff Method Automated Method     % Neutrophils 67.8 %    % Lymphocytes 15.7 %    % Monocytes 13.4 %    % Eosinophils 2.3 %    % Basophils 0.2 %    % Immature Granulocytes 0.6 %    Nucleated RBCs 0 0 /100    Absolute Neutrophil 4.4 1.6 - 8.3 10e9/L    Absolute Lymphocytes 1.0 0.8 - 5.3 10e9/L    Absolute Monocytes 0.9 0.0 - 1.3 10e9/L    Absolute Eosinophils 0.2 0.0 - 0.7 10e9/L    Absolute Basophils 0.0 0.0 - 0.2 10e9/L    Abs Immature Granulocytes 0.0 0 - 0.4 10e9/L    Absolute Nucleated RBC 0.0    Comprehensive metabolic panel   Result Value Ref Range    Sodium 147 (H) 133 - 144 mmol/L    Potassium 4.2 3.4 - 5.3 mmol/L    Chloride 113 (H) 94 - 109 mmol/L    Carbon Dioxide 23 20 - 32 mmol/L    Anion Gap  11 3 - 14 mmol/L    Glucose 131 (H) 70 - 99 mg/dL    Urea Nitrogen 32 (H) 7 - 30 mg/dL    Creatinine 1.70 (H) 0.66 - 1.25 mg/dL    GFR Estimate 36 (L) >60 mL/min/[1.73_m2]    GFR Estimate If Black 42 (L) >60 mL/min/[1.73_m2]    Calcium 9.7 8.5 - 10.1 mg/dL    Bilirubin Total 0.7 0.2 - 1.3 mg/dL    Albumin 3.1 (L) 3.4 - 5.0 g/dL    Protein Total 7.0 6.8 - 8.8 g/dL    Alkaline Phosphatase 94 40 - 150 U/L    ALT 21 0 - 70 U/L    AST 18 0 - 45 U/L   INR   Result Value Ref Range    INR 2.22 (H) 0.86 - 1.14   Head CT w/o contrast    Narrative    CT SCAN OF THE HEAD WITHOUT CONTRAST   8/1/2019 1:37 PM     HISTORY: falls, confusion, anticoagulated    TECHNIQUE:  Axial images of the head and coronal reformations without  IV contrast material. Radiation dose for this scan was reduced using  automated exposure control, adjustment of the mA and/or kV according  to patient size, or iterative reconstruction technique.    COMPARISON: CT head 6/25/2019.    FINDINGS: There is no evidence of intracranial hemorrhage, mass, acute  infarct or anomaly. The ventricles are normal in size, shape and  configuration. Moderate diffuse parenchymal volume loss, commensurate  with the patient's age. Mild patchy periventricular white matter  hypodensities which are nonspecific, but likely related to chronic  microvascular ischemic disease. Scattered intracranial vascular  calcifications, most pronounced in the carotid siphons and vertebral  arteries.    The visualized portions of the sinuses and mastoids appear normal. The  bony calvarium and bones of the skull base appear intact. Severe right  temporomandibular joint osteoarthritis.      Impression    IMPRESSION:   No evidence of acute intracranial hemorrhage, mass, or  herniation.      LARRY CASILLAS MD   MR Head w/o Contrast Angiogram    Narrative    MR ANGIOGRAM OF THE HEAD WITHOUT CONTRAST   8/1/2019 3:34 PM     COMPARISON: Head and neck CTA 6/25/2019    HISTORY: Stroke,  follow-up.    TECHNIQUE: 3D time-of-flight MR angiogram of the head without  contrast. MIP reconstruction of all MR angiographic data was  performed.    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 The left distal internal carotid,  basilar, bilateral anterior cerebral, bilateral middle cerebral and  bilateral posterior cerebral arteries are patent and unremarkable with  no evidence for cerebral artery stenosis or aneurysm. The anterior  communicating artery is patent and unremarkable.       Impression    IMPRESSION:    1. 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  2. Otherwise, normal [place] [facility].    LOLA RUTHERFORD MD   MR Brain w/o Contrast    Narrative    MRI BRAIN WITHOUT CONTRAST  8/1/2019 3:35 PM    HISTORY: Stroke, follow-up.    TECHNIQUE: Multiplanar, multisequence MRI of the brain without  gadolinium IV contrast material.      COMPARISON: Head CT 8/1/2019    FINDINGS:   The exam is mild to moderately limited due to motion artifact.  Moderate volume loss is present. Frontoparietal predominant  periventricular and subcortical T2 FLAIR hyperintensities likely  represent chronic small vessel ischemic change. A thin left posterior  convexity subdural fluid hematoma is present measuring approximately  4-5 mm thickness, new compared to the prior exam. Signal  characteristics demonstrate T1 hyperintensity and T2 hypointensity  compatible with subacute intracellular methemoglobin, not convincingly  appreciated in retrospect on prior CT. The visualized calvarium,  tympanic cavities, and extracranial soft tissues are unremarkable.  There is trace opacification of the left mastoid cavity, likely  inflammatory. Submucosal/Tornwaldt cyst is present within the  posterior nasopharynx. Bilateral lens replacements are present.      Impression    IMPRESSION:  1. Thin (4 to 5 mm) subacute subdural  hematoma along the left  posterior convexity. No significant mass effect.  2. No evidence of acute ischemia.  3. Volume loss and white matter T2 hyperintensities likely reflecting  chronic small vessel ischemic change.    Findings were discussed by phone between Dr. Neves and nurse  practitioner Raz at 3:45 PM on 8/1/2019.    ZEKE NEVES MD   Glucose by meter   Result Value Ref Range    Glucose 128 (H) 70 - 99 mg/dL   UA with Microscopic reflex to Culture   Result Value Ref Range    Color Urine Light Yellow     Appearance Urine Clear     Glucose Urine Negative NEG^Negative mg/dL    Bilirubin Urine Negative NEG^Negative    Ketones Urine Negative NEG^Negative mg/dL    Specific Gravity Urine 1.010 1.003 - 1.035    Blood Urine Negative NEG^Negative    pH Urine 6.5 5.0 - 7.0 pH    Protein Albumin Urine 10 (A) NEG^Negative mg/dL    Urobilinogen mg/dL Normal 0.0 - 2.0 mg/dL    Nitrite Urine Negative NEG^Negative    Leukocyte Esterase Urine Negative NEG^Negative    Source Midstream Urine     WBC Urine 1 0 - 5 /HPF    RBC Urine <1 0 - 2 /HPF   Basic metabolic panel   Result Value Ref Range    Sodium 143 133 - 144 mmol/L    Potassium 3.5 3.4 - 5.3 mmol/L    Chloride 110 (H) 94 - 109 mmol/L    Carbon Dioxide 27 20 - 32 mmol/L    Anion Gap 6 3 - 14 mmol/L    Glucose 242 (H) 70 - 99 mg/dL    Urea Nitrogen 28 7 - 30 mg/dL    Creatinine 1.49 (H) 0.66 - 1.25 mg/dL    GFR Estimate 43 (L) >60 mL/min/[1.73_m2]    GFR Estimate If Black 49 (L) >60 mL/min/[1.73_m2]    Calcium 9.7 8.5 - 10.1 mg/dL   Digoxin level   Result Value Ref Range    Digoxin Level 1.1 0.5 - 2.0 ug/L   CRP inflammation   Result Value Ref Range    CRP Inflammation 26.2 (H) 0.0 - 8.0 mg/L   Procalcitonin   Result Value Ref Range    Procalcitonin 0.16 ng/ml           Imaging and procedures:  I personally reviewed these images.  MRI: small SDH as above.  CTH: unremarkable.  CTA unchanged.

## 2019-08-02 NOTE — CONSULTS
[facility]    Neurosurgery Consultation     Date of Admission:  8/1/2019  Date of Consult (When I saw the patient): 08/02/19    Assessment & Plan   Suman Burton is a 83 year old male who was admitted on 8/1/2019. I was asked to see the patient for small SDH.  Had multiple falls.  On coumadin for afib.  Please recs for continued coumadin vs holding it.   .    Active Problems:    Encephalopathy    Assessment: MRI with thin subacute SDH along left posterior convexity, repeat head CT without evidence of worsening hemorrhage. Neuro intact. proximal RCA stent placed on 6/25; Coumadin has been held    Plan:   -OK to resume anticoagulation      I have discussed the following assessment and plan with Dr. Michael Munoz who is in agreement with initial plan and will follow up with further consultation recommendations.    Clarissa Saha PA-C  [facility]  [address] [address]    Tel [phone]  Pager [phone]        Code Status    Full Code    Reason for Consult   Reason for consult: I was asked by Evy Everett MD to evaluate this patient for Small SDH.  Had multiple falls.  On coumadin for afib.  Please recs for continued coumadin vs holding it.    Primary Care Physician   Jamie Guerrier    Chief Complaint   AMS and falls    History is obtained from the patient, electronic health record and patient's family    History of Present Illness   Suman Burton is a 83 year old male who presents with AMS and falls.     Past Medical History   I have reviewed this patient's medical history and updated it with pertinent information if needed.   Past Medical History:   Diagnosis Date     Atrial fibrillation (H)      Cancer (H)      Cerebral infarction (H)      Chronic kidney disease      Coronary artery disease 06/25/2019    s/p PCI      Diabetes (H)      Diabetes mellitus, type 2 (H)      Edema      Hypertension        Past  Surgical History   I have reviewed this patient's surgical history and updated it with pertinent information if needed.  Past Surgical History:   Procedure Laterality Date     CHOLECYSTECTOMY       CV HEART CATHETERIZATION WITH POSSIBLE INTERVENTION N/A 2019    Procedure: Coronary Angiogram;  Surgeon: Cachorro Garcia MD;  Location:  HEART CARDIAC CATH LAB     CV INSTANTANEOUS WAVE-FREE RATIO N/A 2019    Procedure: Instantaneous Wave-Free Ratio;  Surgeon: Cachorro Garcia MD;  Location:  HEART CARDIAC CATH LAB     CV PCI STENT DRUG ELUTING N/A 2019    Procedure: PCI Stent Drug Eluting;  Surgeon: Cachorro Garcia MD;  Location:  HEART CARDIAC CATH LAB     CV RIGHT HEART CATH N/A 2019    Procedure: Right Heart Cath;  Surgeon: Cachorro Garcia MD;  Location:  HEART CARDIAC CATH LAB     LAPAROSCOPIC APPENDECTOMY         Prior to Admission Medications   Prior to Admission Medications   Prescriptions Last Dose Informant Patient Reported? Taking?   HYDROcodone-acetaminophen (NORCO) 5-325 MG tablet prn med senior living Yes Yes   Sig: Take 1 tablet by mouth every 4 hours as needed for severe pain   OLANZapine zydis (ZYPREXA) 20 MG ODT 2019 at 2000 senior living No Yes   Sig: Take 1 tablet (20 mg) by mouth every evening   ONE TOUCH [place]ANC[place]  Nursing Home Yes No   Sig: Check blood sugars twice a day.   ONETOUCH DELICA LANCETS 33G [facility]  Nursing [place] No No   Si strip 2 times daily   acetaminophen (TYLENOL) 325 MG tablet 2019 at 0800 senior living Yes Yes   Sig: Take 650 mg by mouth 3 times daily Do not exceed 4gm in 24 hours. AND Give 650 mg by mouth every 6 hours as needed for pain (do not exceed 4gm acetaminophen in 24 hrs) 0800, 1400,    acetaminophen (TYLENOL) 325 MG tablet prn med senior living Yes Yes   Sig: Take 650 mg by mouth every 6 hours as needed for mild pain (Max of 4000mg in 24 hours.)   albuterol (PROVENTIL) (2.5 MG/3ML) 0.083% neb solution  8/1/2019 at [address] [place] [address] Yes Yes   Sig: Take 2.5 mg by nebulization 2 times daily Make sure he deep breaths and coughs with each treatment .   albuterol (PROVENTIL) (2.5 MG/3ML) 0.083% neb solution prn [facility] Yes Yes   Sig: Take 2.5 mg by nebulization every 4 hours as needed for shortness of breath / dyspnea or wheezing   amLODIPine (NORVASC) 10 MG tablet 7/31/2019 at [address] [place] [address] No Yes   Sig: Take 1 tablet (10 mg) by mouth daily   Patient taking differently: Take 10 mg by mouth At Bedtime    bisacodyl (DULCOLAX) 10 MG suppository prn [facility] Yes Yes   Sig: Place 10 mg rectally daily as needed for constipation   bisacodyl (DULCOLAX) 5 MG EC tablet prn [facility] No Yes   Sig: Take 1 tablet (5 mg) by mouth daily as needed for constipation   clopidogrel (PLAVIX) 75 MG tablet 8/1/2019 at [address] [place] [address] No Yes   Sig: Take 1 tablet (75 mg) by mouth daily   digoxin (LANOXIN) 125 MCG tablet 8/1/2019 at [address] [place] [address] No Yes   Sig: Take 1 tablet (125 mcg) by mouth daily   glipiZIDE (GLUCOTROL) 10 MG tablet 8/1/2019 at [address] [place] [address] No Yes   Sig: Take 2 tablets (20 mg) by mouth 2 times daily Take 20mg in AM with breakfast and 20mg in PM with evening meal   isosorbide mononitrate (IMDUR) 30 MG 24 hr tablet 8/1/2019 at [address] [place] [address] No Yes   Sig: Take 1 tablet (30 mg) by mouth daily   loperamide (IMODIUM A-D) 2 MG tablet prn [facility] Yes Yes   Sig: Take 2 mg by mouth every 6 hours as needed for diarrhea   metFORMIN (GLUCOPHAGE) 1000 MG tablet 8/1/2019 at [address] [place] [address] No Yes   Sig: Take 1 tablet (1,000 mg) by mouth 2 times daily (with meals)   metoprolol succinate ER (TOPROL-XL) 50 MG 24 hr tablet 8/1/2019 at [address] [place] [address] Yes Yes   Sig: Take 100 mg by mouth daily   mirtazapine (REMERON) 30 MG tablet 7/31/2019 at [address] [place] [address] No Yes   Sig: Take 1 tablet (30 mg) by mouth At Bedtime   multivitamin, therapeutic (THERA-VIT) TABS tablet 8/1/2019 at 0800 Nursing  Home No Yes   Sig: Take 1 tablet by mouth daily   ondansetron (ZOFRAN-ODT) 4 MG ODT tab prn med Nursing Home No Yes   Sig: Take 1 tablet (4 mg) by mouth every 6 hours as needed for nausea or vomiting   rosuvastatin (CRESTOR) 10 MG tablet 7/31/2019 at 2000 [facility] No Yes   Sig: Take 1 tablet (10 mg) by mouth daily   saxagliptin (ONGLYZA) 2.5 MG TABS tablet 8/1/2019 at 0800 [facility] No Yes   Sig: Take 1 tablet (2.5 mg) by mouth daily   senna-docusate (SENOKOT-S/PERICOLACE) 8.6-50 MG tablet 8/1/2019 at 0800 [facility] Yes Yes   Sig: Take 1 tablet by mouth daily Hold for loose stools.   senna-docusate (SENOKOT-S/PERICOLACE) 8.6-50 MG tablet prn med [facility] Yes Yes   Sig: Take 1 tablet by mouth 2 times daily as needed for constipation   tamsulosin (FLOMAX) 0.4 MG capsule 8/1/2019 at 0800 [facility] No Yes   Sig: Take 1 capsule (0.4 mg) by mouth daily   warfarin (COUMADIN) 3 MG tablet 7/31/2019 at pm Nursing Home No Yes   Sig: Take 1 tablet (3 mg) by mouth daily   Patient taking differently: Take 3 mg by mouth daily (Patient would occasionally take 4.5mg on Mondays but not all of the time. Patient just moved to [facility] and plan was for patient to take 2mg this evening. Otherwise dose has been 3mg daily.)      Facility-Administered Medications: None     Allergies   Allergies   Allergen Reactions     Nkda [No Known Drug Allergies]        Social History   I have reviewed this patient's social history and updated it with pertinent information if needed. Suman Burton  reports that he quit smoking about 11 years ago. His smoking use included cigarettes. He started smoking about 70 years ago. He smoked 2.00 packs per day. He has never used smokeless tobacco. He reports that he drinks alcohol. He reports that he does not use drugs.    Family History   I have reviewed this patient's family history and updated it with pertinent information if needed.   No family history on file.    Review of Systems  "  CONSTITUTIONAL: NEGATIVE for fever, chills, change in weight  INTEGUMENTARY/SKIN: NEGATIVE for worrisome rashes, moles or lesions  EYES: NEGATIVE for vision changes or irritation  ENT/MOUTH: NEGATIVE for ear, mouth and throat problems  RESP: NEGATIVE for significant cough or SOB  BREAST: NEGATIVE for masses, tenderness or discharge  CV: NEGATIVE for chest pain, palpitations or peripheral edema  GI: NEGATIVE for nausea, abdominal pain, heartburn, or change in bowel habits  : NEGATIVE for frequency, dysuria, or hematuria  MUSCULOSKELETAL: NEGATIVE for significant arthralgias or myalgia  NEURO: NEGATIVE for weakness, dizziness or paresthesias  ENDOCRINE: NEGATIVE for temperature intolerance, skin/hair changes  HEME: NEGATIVE for bleeding problems  PSYCHIATRIC: NEGATIVE for changes in mood or affect    Physical Exam   Temp: 97.4  F (36.3  C) Temp src: Oral BP: 126/65 Pulse: 71 Heart Rate: 67 Resp: 16 SpO2: 92 % O2 Device: None (Room air) Oxygen Delivery: 3 LPM  Vital Signs with Ranges  Temp:  [97.3  F (36.3  C)-98.7  F (37.1  C)] 97.4  F (36.3  C)  Pulse:  [64-80] 71  Heart Rate:  [65-82] 67  Resp:  [13-20] 16  BP: (126-181)/() 126/65  SpO2:  [90 %-96 %] 92 %  138 lbs 0 oz    Heart Rate: 67, Blood pressure 126/65, pulse 71, temperature 97.4  F (36.3  C), temperature source Oral, resp. rate 16, height 5' 9\" (1.753 m), weight 138 lb (62.6 kg), SpO2 92 %.  138 lbs 0 oz  HEENT:  Normocephalic, atraumatic.  PERRLA.  EOM s intact.   Neck:  Supple, non-tender, without lymphadenopathy.  Heart:  No peripheral edema  Lungs:  No SOB  Abdomen:  Soft, non-tender, non-distended.    Skin:  Warm and dry, good capillary refill.  Extremities:  No edema, cyanosis or clubbing.    NEUROLOGICAL EXAMINATION:   Mental status:  Alert and Oriented x 3, speech is fluent.  Cranial nerves:  II-XII intact.   Motor:  Strength is 5/5 throughout the upper and lower extremities  Sensation:  intact  Reflexes:   Negative Babinski.  Negative " Clonus.    Coordination:  Smooth finger to nose testing.   Negative pronator drift.   Gait:  Deferred.      Data     CBC RESULTS:   Recent Labs   Lab Test 08/01/19  1246   WBC 6.4   RBC 3.88*   HGB 12.6*   HCT 37.1*   MCV 96   MCH 32.5   MCHC 34.0   RDW 14.4   *     Basic Metabolic Panel:  Lab Results   Component Value Date     08/02/2019      Lab Results   Component Value Date    POTASSIUM 3.5 08/02/2019     Lab Results   Component Value Date    CHLORIDE 110 08/02/2019     Lab Results   Component Value Date    VINCENT 9.7 08/02/2019     Lab Results   Component Value Date    CO2 27 08/02/2019     Lab Results   Component Value Date    BUN 28 08/02/2019     Lab Results   Component Value Date    CR 1.49 08/02/2019     Lab Results   Component Value Date     08/02/2019     INR:  Lab Results   Component Value Date    INR 2.22 08/01/2019    INR 2.61 07/02/2019    INR 2.88 07/01/2019    INR 2.63 06/30/2019    INR 2.78 06/29/2019    INR 2.24 06/28/2019    INR 1.70 06/27/2019    INR 1.45 06/26/2019    INR 1.64 06/25/2019    INR 3.0 12/04/2017    INR 1.8 07/20/2017    INR 2.1 05/26/2017

## 2019-08-02 NOTE — CODE/RAPID RESPONSE
[facility]    House [facility] CODE 21 Note  8/1/2019   Time Called: 2002     Assessment & Plan     Acute encephalopathy, agitation, aggression in setting of subacute L SDH with history of delirium (2/2 CVA, atypical PNA, urinary retention), CVA, hypersomnia.  Alternatively considered undiagnosed cognitive impairment contributing  Danger to Self (self-injurious) or Others (violent/combative).  Upon arrival, pt lying crooked in bed, supine, awake, alert, in no apparent distress.  Per nursing, pt had been resting; however, suddenly attempted to get out of bed and with nursing redirection, was aggressive toward staff, swinging and kicking at staff.  With CODE 21 team present pt was able to be boosted up in bed; however, any stimulation of pt, pt would demonstrate agitation/aggressiveness at that time.    -- Accepted verbal de-escalation, distraction, re-direction to mostly cooperative.  -- No injury to pt or staff.     INTERVENTIONS:  - Pt may require physical restraints for medical healing to ensure pt's safety; however, at this time, pt redirectable  - Maintain fall precautions with bedside attendant as deemed appropriate by nursing staff  - Monitor urine output; pt with history of urinary retention causing escalated behavior  - Will defer antipsychotic/antianxiety medications at this time in setting of acute ICH requiring frequent neuro exams  - Of note, pt previously responded to zyprexa and remeron last hospitalization for noted agitation/delirium; also required restraints at that time.    Discussed with and defer further cares to nursing and hospitalist.    Interval History     Suman Burton is a 83 year old male who was admitted on 8/1/2019 for AMS and frequent falls.    Medical history significant for: A-fib on chronic anticoagulation, CVA, CKD III, CAD s/p RCA HIWOT, DMII, HTN, HLP, chronic pain syndrome, insomnia, hypersomnia, retinal artery occlusion, depression, MR, TR, delirium suspected 2/2  CVA, atypical PNA and urinary retention, [place]    Code Status: Full Code    Jorge Brannon, APRN CNP  House [facility]    Allergies   Allergies   Allergen Reactions     Nkda [No Known Drug Allergies]      Physical Exam   Vital Signs with Ranges:  Temp:  [97.3  F (36.3  C)-98.7  F (37.1  C)] 97.3  F (36.3  C)  Pulse:  [64-80] 64  Heart Rate:  [68-81] 81  Resp:  [13-20] 20  BP: (139-181)/() 164/77  SpO2:  [91 %-96 %] 95 %  No intake/output data recorded.    Constitutional: Pt lying supine in bed, with feet hanging over edge of bed, awake, alert  Pulmonary: In no apparent respiratory distress  Cardiovascular: Appears well perfused  GI: Flat  Skin/Integumen: Warm, dry  Neuro: Awake, alert  Psych:  Intermittently agitated/aggressive with cares  Extremities: Moves all extremities    Data     IMAGING: (X-ray/CT/MRI)   Recent Results (from the past 24 hour(s))   Head CT w/o contrast    Narrative    CT SCAN OF THE HEAD WITHOUT CONTRAST   8/1/2019 1:37 PM     HISTORY: falls, confusion, anticoagulated    TECHNIQUE:  Axial images of the head and coronal reformations without  IV contrast material. Radiation dose for this scan was reduced using  automated exposure control, adjustment of the mA and/or kV according  to patient size, or iterative reconstruction technique.    COMPARISON: CT head 6/25/2019.    FINDINGS: There is no evidence of intracranial hemorrhage, mass, acute  infarct or anomaly. The ventricles are normal in size, shape and  configuration. Moderate diffuse parenchymal volume loss, commensurate  with the patient's age. Mild patchy periventricular white matter  hypodensities which are nonspecific, but likely related to chronic  microvascular ischemic disease. Scattered intracranial vascular  calcifications, most pronounced in the carotid siphons and vertebral  arteries.    The visualized portions of the sinuses and mastoids appear normal. The  bony calvarium and bones of the skull base appear intact. Severe  right  temporomandibular joint osteoarthritis.      Impression    IMPRESSION:   No evidence of acute intracranial hemorrhage, mass, or  herniation.      LARRY CASILLAS MD   MR Head w/o Contrast Angiogram    Narrative    MR ANGIOGRAM OF THE HEAD WITHOUT CONTRAST   8/1/2019 3:34 PM     COMPARISON: Head and neck CTA 6/25/2019    HISTORY: Stroke, follow-up.    TECHNIQUE: 3D time-of-flight MR angiogram of the head without  contrast. MIP reconstruction of all MR angiographic data was  performed.    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 The left distal internal carotid,  basilar, bilateral anterior cerebral, bilateral middle cerebral and  bilateral posterior cerebral arteries are patent and unremarkable with  no evidence for cerebral artery stenosis or aneurysm. The anterior  communicating artery is patent and unremarkable.       Impression    IMPRESSION:    1. 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  2. Otherwise, normal [place] MRA.    LOLA RUTHERFORD MD   MR Brain w/o Contrast    Narrative    MRI BRAIN WITHOUT CONTRAST  8/1/2019 3:35 PM    HISTORY: Stroke, follow-up.    TECHNIQUE: Multiplanar, multisequence MRI of the brain without  gadolinium IV contrast material.      COMPARISON: Head CT 8/1/2019    FINDINGS:   The exam is mild to moderately limited due to motion artifact.  Moderate volume loss is present. Frontoparietal predominant  periventricular and subcortical T2 FLAIR hyperintensities likely  represent chronic small vessel ischemic change. A thin left posterior  convexity subdural fluid hematoma is present measuring approximately  4-5 mm thickness, new compared to the prior exam. Signal  characteristics demonstrate T1 hyperintensity and T2 hypointensity  compatible with subacute intracellular methemoglobin, not convincingly  appreciated in retrospect on prior CT. The  visualized calvarium,  tympanic cavities, and extracranial soft tissues are unremarkable.  There is trace opacification of the left mastoid cavity, likely  inflammatory. Submucosal/Tornwaldt cyst is present within the  posterior nasopharynx. Bilateral lens replacements are present.      Impression    IMPRESSION:  1. Thin (4 to 5 mm) subacute subdural hematoma along the left  posterior convexity. No significant mass effect.  2. No evidence of acute ischemia.  3. Volume loss and white matter T2 hyperintensities likely reflecting  chronic small vessel ischemic change.    Findings were discussed by phone between Dr. Neves and nurse  practitioner Raz at 3:45 PM on 8/1/2019.    ZEKE NEVES MD     Time Spent on this Encounter   I spent 10 minutes on the unit/floor managing the care of Suman Burton. Over 50% of my time was spent counseling the patient and/or coordinating care regarding services listed in this note.

## 2019-08-02 NOTE — PROVIDER NOTIFICATION
"Text page sent to hospitalist: \"FYI Neurology NP talked with DR. Munoz and pt is OK to restart anticoagulation. Do you still want Plavix administered or do you want to restart a different anticoagulation medication?\"  "

## 2019-08-02 NOTE — PROGRESS NOTES
"   08/02/19 1500   Quick Adds   Type of Visit Initial PT Evaluation   Living Environment   Lives With spouse   Living Arrangements assisted living   Home Accessibility no concerns   Transportation Anticipated family or friend will provide   Living Environment Comment Pt and wife recently moved to [facility] per CC. Pt reports that he lives in the family home.   Self-Care   Usual Activity Tolerance moderate   Current Activity Tolerance moderate   Activity/Exercise/Self-Care Comment Pt had been at TCU for therapies after disch from the hospital in early July 2019   Functional Level Prior   Ambulation 1-->assistive equipment  (FWW)   Transferring 1-->assistive equipment   Fall history within last six months yes   Number of times patient has fallen within last six months 4   Prior Functional Level Comment Pt reports using his walker \"sometimes.\" Does not recall if he was using the walker when he has fallen recently   General Information   Onset of Illness/Injury or Date of Surgery - Date 08/01/19   Referring Physician Bin Tellez MD   Patient/Family Goals Statement \"Go home\"   Pertinent History of Current Problem (include personal factors and/or comorbidities that impact the POC) 83 YOM admitted with encephalopathy, hallucinations. Pt recently was hospitalized in June/July 2019 with a CVA, disch to TCU then home, has fallen at least 4 times since returning home. Now diagnosed with a SDH. PMH: a-fib on anti-coagulation, DM2, CKD 3, HTN,    Precautions/Limitations fall precautions   Weight-Bearing Status - LUE full weight-bearing   Weight-Bearing Status - RUE full weight-bearing   Weight-Bearing Status - LLE full weight-bearing   Weight-Bearing Status - RLE full weight-bearing   General Observations Pleasant and cooperative   General Info Comments Activity: up with assist, sitter present   Cognitive Status Examination   Orientation person   Level of Consciousness alert;confused   Follows Commands and Answers " "Questions 100% of the time;able to follow single-step instructions   Personal Safety and Judgment impulsive   Memory impaired   Cognitive Comment defer to OT   Pain Assessment   Patient Currently in Pain No   Integumentary/Edema   Integumentary/Edema Comments brusing on hands and forearms   Posture    Posture Forward head position   Range of Motion (ROM)   ROM Comment BLEs WFL   Strength   Strength Comments BLEs grossly 5/5 throughout   Bed Mobility   Bed Mobility Comments Supine>sit with Ifeoma   Transfer Skills   Transfer Comments Sit>stand from EOB to FWW with Ifeoma   Gait   Gait Comments Gait with FWW x 5' with Ifeoma, reciprocal pattern, inconsistent step length, posterior lean   Balance   Balance Comments Good siting, requires Ifeoma and UE support for standing   Coordination   Coordination no deficits were identified   General Therapy Interventions   Planned Therapy Interventions balance training;bed mobility training;gait training;neuromuscular re-education;transfer training   Clinical Impression   Criteria for Skilled Therapeutic Intervention yes, treatment indicated   PT Diagnosis Impaired gait   Influenced by the following impairments impaired balance, confusion   Functional limitations due to impairments bed mobility, transfers, gait   Clinical Presentation Stable/Uncomplicated   Clinical Presentation Rationale see above   Clinical Decision Making (Complexity) Low complexity   Therapy Frequency Daily   Predicted Duration of Therapy Intervention (days/wks) 4 days   Anticipated Discharge Disposition Acute Rehabilitation Facility   Risk & Benefits of therapy have been explained Yes   Patient, Family & other staff in agreement with plan of care Yes   Clinical Impression Comments Pt was able to follow one step commands and participated well. Would benefit from OT russel   [facility] AM-PAC  \"6 Clicks\" V.2 Basic Mobility Inpatient Short Form   1. Turning from your back to your side while in a flat bed without " using bedrails? 3 - A Little   2. Moving from lying on your back to sitting on the side of a flat bed without using bedrails? 3 - A Little   3. Moving to and from a bed to a chair (including a wheelchair)? 3 - A Little   4. Standing up from a chair using your arms (e.g., wheelchair, or bedside chair)? 3 - A Little   5. To walk in hospital room? 3 - A Little   6. Climbing 3-5 steps with a railing? 3 - A Little   Basic Mobility Raw Score (Score out of 24.Lower scores equate to lower levels of function) 18   Total Evaluation Time   Total Evaluation Time (Minutes) 10

## 2019-08-02 NOTE — CONSULTS
Consult Date:  08/02/2019      REASON FOR CONSULTATION:  Hallucinosis in the setting of CVA pathology.      REQUESTING PHYSICIAN:  Conchita Lang MD      CHIEF COMPLAINT:  Episodic hallucinosis.      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  We were consulted during that hospitalization for post-CVI delirium, managed with 10 mg of olanzapine, which was replacing a prior dosing of Seroquel, which was started on admission during that hospitalization.  The patient did relatively well in that regard and was discharged in early June to a TCU, where he has remained.  The patient and his family note that he was doing generally well with respect to mental status until he began experiencing falls about a week prior.  They noticed coinciding with these falls was a worsening of confusion as well as intermittent hallucinosis.  He notes that the hallucinations tend to come and go.  He describes a variety of sensory experiences to include seeing bugs running around the room, grasping at things in the air, and pulling off what he thought were Band-Aids on his skin, but now recognizes nothing was there.  He states initially he was invested in these hallucinations and believed they were real, though with the passage of time, he tends to be more aware that they are happening.  He notes that he experiences no distress whatsoever during these hallucinations and simply makes note of them.  His family, to include his wife, was present in the room and they agree with these descriptions.  They note that he has been doing well today and is fully lucid and his normal self, with the absence of any hallucinosis at this time.  He notes he slept poorly last night, however.  He was admitted on a regimen of mirtazapine 30 mg and olanzapine 20 mg, though these have been held due to sedation.  As I reviewed with the patient and his family, we  will incorporate a lower dose of olanzapine at night with acute targets of sleep restoration as well as mitigation of potential worsening hallucinosis and/or delirium.      PAST PSYCHIATRIC HISTORY:  As per HPI.      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      FAMILY HISTORY:  Noncontributory.      SOCIAL HISTORY:  Reviewed in EMR.      REVIEW OF SYSTEMS:  Ten-point review of systems completed and negative other than noted in HPI.      ALLERGIES:  NO KNOWN DRUG ALLERGIES.      PRIOR TO ADMISSION MEDICATIONS:  Acetaminophen, Albuterol, amlodipine, Dulcolax, clopidogrel, digoxin, glipizide, hydrocodone, Imdur, loperamide, Metformin, metoprolol, Remeron 30 mg at bedtime, multivitamin, olanzapine 20 mg at bedtime, Zofran, rosuvastatin, Saxagliptin, senna docusate, tamsulosin, warfarin.      MENTAL STATUS EXAMINATION:  Elderly-appearing male with situationally appropriate grooming and hygiene.  Calm and cooperative with good eye contact.  Awake, alert and globally oriented.  Cooperative, forthcoming, and a seemingly reliable historian.  Mood was described as euthymic.  Affect was mood congruent, stable and appropriately reactive.  Speech was fluent, spontaneous clear, nonpressured.  Thoughts were linear, logical and goal directed.  No observation of delusional content.  No observation of response to internal stimuli.  No fluctuation in cognition appreciated.  Intelligence estimated as above average.  Memory grossly intact.  Attendance and concentration were well maintained.  Muscle strength and tone were not assessed.  Coordination, station and gait were not assessed.  Insight and judgment are intact at this time.  Denies suicidal or homicidal ideation, intent or plan.       DIAGNOSES:  Acute encephalopathy in the setting of subacute subdural hematoma with history of delirium.      ASSESSMENT:  Suman Bonilla is an 83-year-old male recently hospitalized for an iatrogenic ischemic stroke secondary to cardiac catheterization in  of this year, targeting episodes of agitation and confusion during that stay, we trialed management with olanzapine with benefit and he was discharged to a TCU.  He was doing reportedly relatively well on the TCU, though started experiencing falls of late.  With the falls, the family noticed that his confusion began worsening and he was thus admitted to the hospital.  Since admission, given some of the sedation and falls, the primary service has discontinued sedating meds as much as possible and holding his prior to admission mirtazapine, olanzapine and Norco.  Yesterday evening, the patient did have a code 21, as he was aggressive and kicking and swinging towards staff.  He did not require any medication interventions and was ultimately responsive to verbal redirection.  Thus far, investigation medically has demonstrated a subdural hematoma.  Neurology consult is pending.  For now, we will initiate a lower dose of olanzapine to target sleep, as well as potential mitigation of encephalopathy symptoms.  We will continue to hold Remeron.  Pending progress in any patterns of behavior, we can modify the antipsychotic dosing regimen.      PLAN:   1.  Begin olanzapine 10 mg at bedtime.   2.  Continue to hold mirtazapine.   3.  Reconsult Psychiatry.         GERARDO SHAH DO             D: 2019   T: 2019   MT: HERMANN      Name:     SUMAN BONILLA   MRN:      [id number]        Account:       [id number]   :      1935           Consult Date:  2019      Document: [id number]

## 2019-08-02 NOTE — PROGRESS NOTES
"Code 21 called: patient tried to get out of bed, when writer tried to get patient back in bed, he refused, threatened to take stethoscope and \"thrash\" me with it, tried to kick/punch other nurses trying to help. Patient went into bed with help of security, sitter called to stay with patient. Patient refusing all cares, threatens when attempts are made.  "

## 2019-08-03 ENCOUNTER — APPOINTMENT (OUTPATIENT)
Dept: OCCUPATIONAL THERAPY | Facility: CLINIC | Age: 84
DRG: 084 | End: 2019-08-03
Attending: PHYSICIAN ASSISTANT
Payer: MEDICARE

## 2019-08-03 ENCOUNTER — APPOINTMENT (OUTPATIENT)
Dept: PHYSICAL THERAPY | Facility: CLINIC | Age: 84
DRG: 084 | End: 2019-08-03
Attending: HOSPITALIST
Payer: MEDICARE

## 2019-08-03 LAB
DIGOXIN SERPL-MCNC: 0.9 UG/L (ref 0.5–2)
GLUCOSE BLDC GLUCOMTR-MCNC: 123 MG/DL (ref 70–99)
GLUCOSE BLDC GLUCOMTR-MCNC: 125 MG/DL (ref 70–99)
GLUCOSE BLDC GLUCOMTR-MCNC: 210 MG/DL (ref 70–99)
GLUCOSE BLDC GLUCOMTR-MCNC: 212 MG/DL (ref 70–99)
GLUCOSE BLDC GLUCOMTR-MCNC: 95 MG/DL (ref 70–99)
INR PPP: 1.23 (ref 0.86–1.14)

## 2019-08-03 PROCEDURE — 25000132 ZZH RX MED GY IP 250 OP 250 PS 637: Mod: GY | Performed by: PSYCHIATRY & NEUROLOGY

## 2019-08-03 PROCEDURE — 12000047 ZZH R&B IMCU

## 2019-08-03 PROCEDURE — 36415 COLL VENOUS BLD VENIPUNCTURE: CPT | Performed by: INTERNAL MEDICINE

## 2019-08-03 PROCEDURE — 99232 SBSQ HOSP IP/OBS MODERATE 35: CPT | Performed by: PSYCHIATRY & NEUROLOGY

## 2019-08-03 PROCEDURE — 97110 THERAPEUTIC EXERCISES: CPT | Mod: GP

## 2019-08-03 PROCEDURE — 80162 ASSAY OF DIGOXIN TOTAL: CPT | Performed by: INTERNAL MEDICINE

## 2019-08-03 PROCEDURE — 25000132 ZZH RX MED GY IP 250 OP 250 PS 637: Mod: GY | Performed by: INTERNAL MEDICINE

## 2019-08-03 PROCEDURE — 25000131 ZZH RX MED GY IP 250 OP 636 PS 637: Mod: GY | Performed by: HOSPITALIST

## 2019-08-03 PROCEDURE — 97116 GAIT TRAINING THERAPY: CPT | Mod: GP

## 2019-08-03 PROCEDURE — 97535 SELF CARE MNGMENT TRAINING: CPT | Mod: GO | Performed by: OCCUPATIONAL THERAPIST

## 2019-08-03 PROCEDURE — 97165 OT EVAL LOW COMPLEX 30 MIN: CPT | Mod: GO | Performed by: OCCUPATIONAL THERAPIST

## 2019-08-03 PROCEDURE — 99232 SBSQ HOSP IP/OBS MODERATE 35: CPT | Performed by: HOSPITALIST

## 2019-08-03 PROCEDURE — 25000132 ZZH RX MED GY IP 250 OP 250 PS 637: Mod: GY | Performed by: HOSPITALIST

## 2019-08-03 PROCEDURE — 85610 PROTHROMBIN TIME: CPT | Performed by: HOSPITALIST

## 2019-08-03 PROCEDURE — 25000128 H RX IP 250 OP 636: Performed by: PSYCHIATRY & NEUROLOGY

## 2019-08-03 PROCEDURE — 00000146 ZZHCL STATISTIC GLUCOSE BY METER IP

## 2019-08-03 PROCEDURE — 36415 COLL VENOUS BLD VENIPUNCTURE: CPT | Performed by: HOSPITALIST

## 2019-08-03 RX ORDER — GLIPIZIDE 10 MG/1
20 TABLET ORAL 2 TIMES DAILY
Status: DISCONTINUED | OUTPATIENT
Start: 2019-08-03 | End: 2019-08-05 | Stop reason: HOSPADM

## 2019-08-03 RX ORDER — WARFARIN SODIUM 3 MG/1
3 TABLET ORAL
Status: COMPLETED | OUTPATIENT
Start: 2019-08-03 | End: 2019-08-03

## 2019-08-03 RX ORDER — CYANOCOBALAMIN 1000 UG/ML
1000 INJECTION, SOLUTION INTRAMUSCULAR; SUBCUTANEOUS
Status: DISCONTINUED | OUTPATIENT
Start: 2019-08-03 | End: 2019-08-05 | Stop reason: HOSPADM

## 2019-08-03 RX ADMIN — ACETAMINOPHEN 650 MG: 325 TABLET, FILM COATED ORAL at 21:05

## 2019-08-03 RX ADMIN — ROSUVASTATIN CALCIUM 10 MG: 5 TABLET, FILM COATED ORAL at 21:05

## 2019-08-03 RX ADMIN — METOPROLOL SUCCINATE 100 MG: 100 TABLET, EXTENDED RELEASE ORAL at 09:46

## 2019-08-03 RX ADMIN — ISOSORBIDE MONONITRATE 30 MG: 30 TABLET, EXTENDED RELEASE ORAL at 09:46

## 2019-08-03 RX ADMIN — Medication 1000 MCG: at 17:28

## 2019-08-03 RX ADMIN — CLOPIDOGREL BISULFATE 75 MG: 75 TABLET ORAL at 09:46

## 2019-08-03 RX ADMIN — METFORMIN HYDROCHLORIDE 1000 MG: 500 TABLET, FILM COATED ORAL at 17:28

## 2019-08-03 RX ADMIN — INSULIN ASPART 2 UNITS: 100 INJECTION, SOLUTION INTRAVENOUS; SUBCUTANEOUS at 17:28

## 2019-08-03 RX ADMIN — INSULIN ASPART 2 UNITS: 100 INJECTION, SOLUTION INTRAVENOUS; SUBCUTANEOUS at 12:30

## 2019-08-03 RX ADMIN — TAMSULOSIN HYDROCHLORIDE 0.4 MG: 0.4 CAPSULE ORAL at 09:46

## 2019-08-03 RX ADMIN — AMLODIPINE BESYLATE 10 MG: 10 TABLET ORAL at 21:05

## 2019-08-03 RX ADMIN — DIGOXIN 125 MCG: 0.12 TABLET ORAL at 09:46

## 2019-08-03 RX ADMIN — OLANZAPINE 10 MG: 10 TABLET, ORALLY DISINTEGRATING ORAL at 21:05

## 2019-08-03 RX ADMIN — WARFARIN SODIUM 3 MG: 3 TABLET ORAL at 17:29

## 2019-08-03 RX ADMIN — GLIPIZIDE 20 MG: 10 TABLET ORAL at 18:06

## 2019-08-03 RX ADMIN — SENNOSIDES AND DOCUSATE SODIUM 1 TABLET: 8.6; 5 TABLET ORAL at 09:46

## 2019-08-03 RX ADMIN — ACETAMINOPHEN 650 MG: 325 TABLET, FILM COATED ORAL at 17:28

## 2019-08-03 RX ADMIN — CYANOCOBALAMIN 1000 MCG: 1000 INJECTION INTRAMUSCULAR; SUBCUTANEOUS at 13:46

## 2019-08-03 RX ADMIN — ACETAMINOPHEN 650 MG: 325 TABLET, FILM COATED ORAL at 09:49

## 2019-08-03 ASSESSMENT — ACTIVITIES OF DAILY LIVING (ADL)
ADLS_ACUITY_SCORE: 13.5
PREVIOUS_RESPONSIBILITIES: MEAL PREP
ADLS_ACUITY_SCORE: 13.5
WHICH_OF_THE_ABOVE_FUNCTIONAL_RISKS_HAD_A_RECENT_ONSET_OR_CHANGE?: AMBULATION
ADLS_ACUITY_SCORE: 14

## 2019-08-03 NOTE — PLAN OF CARE
Pt alert and oriented x2-3. Disoriented to place and situations at times, but reorients easily. Bedside sitter discontinued at 0700. BP elevated in the a.m., within ordered parameters after receiving scheduled antihypertensives. Forgetful and a little confused at times, neuros otherwise intact. CMS with baseline neuropathy in hands and feet, +1 edema in ankles and feet. Ambulating with asst of 1 GB and walker. Tolerating regular diet. Plan to discharge in 1-2 days to TCU vs ARU. Family at the bedside during the morning, supportive of pt.

## 2019-08-03 NOTE — PROGRESS NOTES
"SPIRITUAL HEALTH SERVICES Progress Note  [facility]    Initial visit per consult request. Pt welcomed  and invited reflective conversation.    Pt identifies as Cheondoism and said he plans to receive spiritual support from his  upon discharge. 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 SH helped pt to connect this idea of role reversal to Rambo's incarnation, which pt said was useful.    Pt said his wife and family are his main sources of support. Pt expressed guilt for some of his behavior before coming in to the hospital. \"I was having all these thoughts that I now realize were total bologna, and my wife was the one who made me come here. I'm glad she did,\" pt said.     provided spiritual/emotional support and prayer.  remains available upon request.    Cheryl Tong   Intern  Pager:  [phone]  "

## 2019-08-03 NOTE — PHARMACY-ANTICOAGULATION SERVICE
Clinical Pharmacy - Warfarin Dosing Consult     Pharmacy has been consulted to manage this patient s warfarin therapy.  Indication: Atrial Fibrillation, CVA  Therapy Goal: INR 2-3  Warfarin Prior to Admission: Yes  Warfarin PTA Regimen: 3 mg Daily (sometimes is 2mg daily and occasionally takes 4.5mg on Mondays)  Significant drug interactions: Clopidogrel and ASA    INR   Date Value Ref Range Status   08/03/2019 1.23 (H) 0.86 - 1.14 Final   08/01/2019 2.22 (H) 0.86 - 1.14 Final       Recommend warfarin 3 mg today.  Pharmacy will monitor Suman M [facility] daily and order warfarin doses to achieve specified goal.      Please contact pharmacy as soon as possible if the warfarin needs to be held for a procedure or if the warfarin goals change.

## 2019-08-03 NOTE — PLAN OF CARE
Discharge Planner PT   Patient plan for discharge: rehab  Current status: Pt received seated in chair, agreeable to PT. Assisted to don socks and shoes seated to prep for mobility. Performed sit>stand with Oneyda and FWW.  Pt ambulated 140' with FWW and Oneyda with shoes donned. Cues for upright posture, forward gaze, increased step length/ clearance. Pt needing increased cuing and assist when turning- needs cues for proximity to walker when turning. SpO2 post fabs90-85 on Ra. After seated rest post gait, performed standing exercises to promote strength and balance wth FWW for support and CGA. Pt seated up in chair upon departure of PT, all needs in reach.    Barriers to return to prior living situation: current level of assist, fall risk  Recommendations for discharge: ARU  Rationale for recommendations: Patient motivated to work with therapy and previously ambulated with AD without assistance. Anticipate patient will tolerate 3 hrs of therapy per day. Increased frequency to 2x/day.        Entered by: Jamila Steen 08/03/2019 5:18 PM

## 2019-08-03 NOTE — PROGRESS NOTES
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     Left sided traumatic subdural hematoma in the setting of anticoagulation   Status post mechanical fall   Recent ischemic stroke after heart catheterization due to right ICA stenosis  The patient presented with confusion after apparently falling at home.  CT was negative for any acute pathology but MRI showed a 4 to 5 mm left-sided subdural hematoma.   Follow-up head CT on August 2 did not show any change.  The patient's mental status is slowly improving.  The patient was seen by the neurosurgeon and the recommendation was to resume anticoagulation.  The patient will likely discharge to the ARU.    Acute encephalopathy with hallucinations  Insomnia, anxiety   PTA med list includes both mirtazapine and olanzapine at bedtime.  The psychiatrist saw the patient and recommended stopping mirtazapine and continuing olanzapine.  The patient's mental status is improving.  He had an EEG on August 2 which was consistent with mild encephalopathy.    Continue olanzapine    Do not restart mirtazapine    Permanent atrial fibrillation on anticoagulation with warfarin   INR   Date Value Ref Range Status   08/03/2019 1.23 (H) 0.86 - 1.14 Final     Lab Results   Component Value Date    DIGOXIN 0.9 08/03/2019   He received 2 mg of vitamin K on August 2 but as it is okay with neurosurgery that he resume anticoagulation we will resume it today.    Continue digoxin, metoprolol    Resume warfarin and monitor INR    Coronary artery disease status post drug-eluting stent to proximal right coronary  "artery 6/2019  Hypertension  Mitral and tricuspid valve regurgitation    Continue amlodipine 10 mg daily, imdur 30 mg daily    Resumed clopidogrel August 2    Dyslipidemia    Continue PTA statin    Type 2 diabetes  Recent Labs   Lab 08/03/19  1209 08/03/19  0715 08/03/19  0502 08/03/19  0203 08/02/19  2051 08/02/19  1742  08/02/19  0853  08/01/19  1246   GLC  --   --   --   --   --   --   --  242*  --  131*   * 125* 123* 95 317* 111*   < >  --    < >  --     < > = values in this interval not displayed.   Managed with multiple medications: glipizide, metformin, saxagliptin. Recent hemoglobin A1c 6.4% indicating good glycemic control PTA.     Continue to hold oral medications for now     Continue aspart insulin correction scale     Resume metformin and glipizide`    Stage 3 chronic kidney disease   Creatinine   Date Value Ref Range Status   08/02/2019 1.49 (H) 0.66 - 1.25 mg/dL Final   Stable     Continue to monitor     Benign prostatic hypertrophy     Continue PTA tamsulosin    Diet: Combination Diet Regular Diet Adult    DVT Prophylaxis: Pneumatic Compression Devices  Hutchins Catheter: not present  Code Status: Full Code      Disposition Plan   Expected discharge: 1-2 days, recommended to transitional care unit or ARUU once mental status at baseline.  Entered: Bin Tellez MD 08/03/2019, 12:40 PM       The patient's care was discussed with the Bedside Nurse and Patient.    Bin Tellez MD  Hospitalist Service  [facility]    ______________________________________________________________________    Interval History   Patient denies headache.  He says he is very \"wobbly\" on his feet.    Data reviewed today: I reviewed all medications, new labs and imaging results over the last 24 hours. I personally reviewed no images or EKG's today.    Physical Exam   Vital Signs: Temp: 98.1  F (36.7  C) Temp src: Oral BP: 112/80 Pulse: 63 Heart Rate: 65 Resp: 16 SpO2: 96 % O2 Device: None " (Room air)    Weight: 138 lbs 0 oz  Constitutional: awake, alert, cooperative, no apparent distress  Respiratory: No increased work of breathing, good air exchange, clear to auscultation bilaterally, no crackles or wheezing  Cardiovascular: Irregular rate and rhythm, normal S1 and S2, no S3 or S4, and no murmur noted  GI: normal bowel sounds, soft, non-distended, non-tender  Skin: no rashes and no jaundice  Neurologic: right-handed. Awake, alert, oriented to being in the hospital.  He is moving all extremities equally.  No tremor.  Neuropsychiatric: General: normal, calm and normal eye contact    Data   Recent Labs   Lab 08/03/19  1112 08/02/19  0853 08/01/19  1246   WBC  --   --  6.4   HGB  --   --  12.6*   MCV  --   --  96   PLT  --   --  124*   INR 1.23*  --  2.22*   NA  --  143 147*   POTASSIUM  --  3.5 4.2   CHLORIDE  --  110* 113*   CO2  --  27 23   BUN  --  28 32*   CR  --  1.49* 1.70*   ANIONGAP  --  6 11   VINCENT  --  9.7 9.7   GLC  --  242* 131*   ALBUMIN  --   --  3.1*   PROTTOTAL  --   --  7.0   BILITOTAL  --   --  0.7   ALKPHOS  --   --  94   ALT  --   --  21   AST  --   --  18     No results found for this or any previous visit (from the past 24 hour(s)).  Medications       acetaminophen  650 mg Oral TID     amLODIPine  10 mg Oral At Bedtime     clopidogrel  75 mg Oral Daily     cyanocobalamin  1,000 mcg Sublingual Daily     cyanocobalamin  1,000 mcg Intramuscular Q30 Days     digoxin  125 mcg Oral Daily     insulin aspart  1-7 Units Subcutaneous TID AC     insulin aspart  1-5 Units Subcutaneous At Bedtime     isosorbide mononitrate  30 mg Oral Daily     metoprolol succinate ER  100 mg Oral Daily     OLANZapine zydis  10 mg Oral At Bedtime     rosuvastatin  10 mg Oral QPM     senna-docusate  1 tablet Oral Daily     sodium chloride (PF)  3 mL Intracatheter Q8H     tamsulosin  0.4 mg Oral Daily

## 2019-08-03 NOTE — PROGRESS NOTES
08/03/19 0848   Quick Adds   Type of Visit Initial Occupational Therapy Evaluation   Living Environment   Lives With spouse   Living Arrangements assisted living   Living Environment Comment Pt reports he still lives in his own home, but chart indicates recent move to [facility]?   Self-Care   Usual Activity Tolerance moderate   Current Activity Tolerance fair   Regular Exercise No   Equipment Currently Used at Home cane, straight   Activity/Exercise/Self-Care Comment Recent TCU stay with discharge home   Functional Level   Ambulation 1-->assistive equipment   Transferring 1-->assistive equipment   Toileting 0-->independent   Bathing 0-->independent   Dressing 1-->assistive equipment   Eating 0-->independent   Communication 0-->understands/communicates without difficulty   Swallowing 0-->swallows foods/liquids without difficulty   Cognition 1 - attention or memory deficits   Fall history within last six months yes   Number of times patient has fallen within last six months 4   Prior Functional Level Comment Reports using a walking stick all the timeWife buttons shirts due to neuropathy in fingers   General Information   Onset of Illness/Injury or Date of Surgery - Date 08/01/19   Referring Physician Clarissa Saha PA-C   Patient/Family Goals Statement get stronger, return home with wife; seems agreeable to rehab stay though   Additional Occupational Profile Info/Pertinent History of Current Problem 83 YOM admitted with encephalopathy, hallucinations. Pt recently was hospitalized in June/July 2019 with a CVA, disch to TCU then home, has fallen at least 4 times since returning home. Now diagnosed with a SDH. PMH: a-fib on anti-coagulation, DM2, CKD 3, HTN,    Precautions/Limitations fall precautions   General Observations asked to have family bring in shoes   Cognitive Status Examination   Orientation person   Level of Consciousness alert   Follows Commands (Cognition) WNL   Memory impaired   Attention No deficits  "were identified   Organization/Problem Solving Problem solving impaired   Executive Function Working memory impaired, decreased storage of information for performing tasks   Cognitive Comment Knows the month, but confused about the the dateConfused about exact location; [facility] [place]   Visual Perception   Visual Perception Comments Reports low vision; uses a magnifying glass   Sensory Examination   Sensory Comments Neuropathy in both hands an feet   Pain Assessment   Patient Currently in Pain Yes, see Vital Sign flowsheet  (R lower flank)   Posture   Posture forward head position;kyphosis   Range of Motion (ROM)   ROM Comment WFL BUE, llimited by forward flexed posture   Strength   Strength Comments 4/5 grossly   Hand Strength   Hand Strength Comments fair, equal   Coordination   Coordination Comments Mild impairment; difficulty writing and doing small buttons. Neuropathy in hands    Mobility   Bed Mobility Comments Min A supine to EOB   Transfer Skill: Bed to Chair/Chair to Bed   Level of [place]: Bed to Chair moderate assist (50% patients effort)   Physical Assist/Nonphysical Assist: Bed to Chair verbal cues   Assistive Device - Transfer Skill Bed to Chair Chair to Bed Rehab Eval rolling walker   Transfer Skill: Sit to Stand   Level of [place]: Sit/Stand minimum assist (75% patients effort)   Physical Assist/Nonphysical Assist: Sit/Stand verbal cues   Assistive Device for Transfer: Sit/Stand rolling walker   Transfer Skill: Toilet Transfer   Level of [place]: Toilet minimum assist (75% patients effort)   Physical Assist/Nonphysical Assist: Toilet verbal cues   Assistive Device grab bars;seat riser   Balance   Balance Comments CGA and cues for EOB sitting. At least Min A and WW for standing balance   Upper Body Dressing   Level of [place]: Dress Upper Body minimum assist (75% patients effort)   Physical Assist/Nonphysical Assist: Dress Upper Body verbal cues   Lower Body " Dressing   Level of [place]: Dress Lower Body moderate assist (50% patients effort)   Physical Assist/Nonphysical Assist: Dress Lower Body verbal cues   Toileting   Level of [place]: Toilet moderate assist (50% patients effort)   Physical Assist/Nonphysical Assist: Toilet verbal cues   Grooming   Level of [place]: Grooming minimum assist (75% patients effort)   Physical Assist/Nonphysical Assist: Grooming verbal cues   Eating/Self Feeding   Level of [place]: Eating independent   Physical Assist/Nonphysical Assist: Eating set-up required   Instrumental Activities of Daily Living (IADL)   Previous Responsibilities meal prep   IADL Comments Wife assists with IADL   Activities of Daily Living Analysis   Impairments Contributing to Impaired Activities of Daily Living balance impaired;cognition impaired;coordination impaired;motor control impaired;pain;postural control impaired;ROM decreased;sensation decreased;strength decreased;sensory feedback impaired   General Therapy Interventions   Planned Therapy Interventions ADL retraining;IADL retraining;balance training;bed mobility training;cognition;motor coordination training;ROM;strengthening;transfer training;risk factor education;progressive activity/exercise   Clinical Impression   Criteria for Skilled Therapeutic Interventions Met yes, treatment indicated   OT Diagnosis impaired ADL and mobility    Influenced by the following impairments medical status, encephalopathy, recent falls with suspected rib fx/bruise   Assessment of Occupational Performance 3-5 Performance Deficits   Identified Performance Deficits ADL, IADL, mobility   Clinical Decision Making (Complexity) Low complexity   Therapy Frequency Daily   Predicted Duration of Therapy Intervention (days/wks) 1 week   Anticipated Equipment Needs at Discharge shower chair;raised toilet seat   Anticipated Discharge Disposition Acute Rehabilitation Facility   Risks and Benefits of Treatment have  been explained. Yes   Patient, Family & other staff in agreement with plan of care Yes   Total Evaluation Time   Total Evaluation Time (Minutes) 10

## 2019-08-03 NOTE — PLAN OF CARE
Discharge Planner OT   Patient plan for discharge: Seems agreeable to rehab  Current status: OT eval completed and treatment initiated. Pt completed SLUMS with score of 24/30, indicating Mild Impairment. Had difficulty with day/date/year and place. He was able to recall 4/5 words, recalled 4/4 details from a short story. Able to name 13 animals in 1 minute. Able to complete addition and subtraction word problem. Able to concentrate well to say 4 digits backwards. He struggled with the clock drawing, but was able to place all the hour markers correctly. He was very pleasant and cooperative. Needed Min A  for bed mobility. Once sitting on EOB, tended to lose balance backwards. Needed Mod A to don pants and Max A to don socks. Transferred bed to chair with Min A, use of WW and cues. Quite unsteady on feet; reports B feet numbness/neuropathy. Requested he ask family to bring shoes in and wrote this on white board as well. OT provided with crossword puzzle, as he reports the ones in the paper on Fri/Sat are now too difficult for him. He demonstrated knowledge of current events in the news. Up in chair with call light, chair alarm at end of session.   Barriers to return to prior living situation: High falls risk, impaired cognition/orientation, level of assist with ADL and mobility  Recommendations for discharge: ARU  Rationale for recommendations: Pt will benefit from inpatient rehab with daily, intensive therapies. Anticipate he will tolerate 3+ hours of therapy per day easily. 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       Entered by: Berenice Wood 08/03/2019 9:42 AM

## 2019-08-03 NOTE — PLAN OF CARE
Pt here with SDH traumatic. A&OX4 but makes occasional illogical statement. Calm and cooperative. Neuros intact except forgetfulness. VSS. Regular diet with thin liquids. Takes pills whole. Up with 1 gait belt and walker. Continent of bowel and bladder, sometimes dribbles. Denies pain. Plan to see OT today. Discharge to to ARU pending.

## 2019-08-03 NOTE — PROGRESS NOTES
[facility]    Neurology Progress Note    Date of Service (when I saw the patient): 08/03/2019     Today's developments: Improving but still seeing some black spots in his vision.  B12 borderline low.  Replacing that.    Follow up with me for memory eval in about 1 month.  Neuro will sign off, please call if any questions or concerns.    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 I was asked to see the patient for delirium.        --Was recently admitted with TIA 2/2 heart cath (off coumadin)- MRI not done then, cth and ctp were unremarkable (d/jerrod from hospital 7-2-19 for that),   --delirium unlikely 2/2 small SDH.  --CTH unremarkable  --MRI shows the small subdural hematoma. No acute strokes.  --TSH mild high but T4 normal.  --- B-12 boderline low, gave IM and now daily SL.  Continue daily SL on discharge.  ---Procalcitonin neg, CBC and CMP unremarkable.  --CRP elevated. First dig level normal. UA neg  --Psych has seen- appreciate recs.     --Small SDH  -found incidentally on MRI brain.  -pt is on warfarin and plavix, with an INR of 2.22  -warfarin held on admission.  -NS consult  -repeat CTH did not show any SDH- too tiny to be seen on CTH  -Warfarin restarted as per NS recs.     I discussed the above diagnosis, assessment, testing, and results with the patient.       Evy Everett MD          Interval History   Feeling better.  Still occasionally sees black spots in his vision.  No formed hallucinations and no delusions.  He still clearly states that he realizes the previous episodes of hallucinations.  No new problems.      Physical Exam   Temp: 98.1  F (36.7  C) Temp src: Oral BP: 112/80 Pulse: 63 Heart Rate: 65 Resp: 16 SpO2: 96 % O2 Device: None (Room air)    Vitals:    08/01/19 1227   Weight: 62.6 kg (138 lb)     Vital Signs with Ranges  Temp:  [97.3  F (36.3  C)-98.7  F (37.1  C)] 98.1  F (36.7  C)  Pulse:   [57-64] 63  Heart Rate:  [45-65] 65  Resp:  [16] 16  BP: (112-180)/(60-88) 112/80  SpO2:  [95 %-96 %] 96 %  I/O last 3 completed shifts:  In: 1020 [P.O.:1020]  Out: -       General Appearance:  No acute distress  Neuro:       Mental Status Exam:    Awake alert, knows it is August but is wrong on the date.  Does correctly year.  Appropriate conversation.       Cranial Nerves:   Extraocular movements intact face equal and symmetric speech normal           Motor:   5 out of 5 bilateral upper extremities and bilateral lower extremities         Sensory: Normal light touch x4                   Coordination:   Finger-nose-finger normal bilaterally  CV: RRR nl s1, s2  Resp: CTAB    Extremities: No clubbing, no cyanosis, no edema    Medications     Warfarin Therapy Reminder         acetaminophen  650 mg Oral TID     amLODIPine  10 mg Oral At Bedtime     clopidogrel  75 mg Oral Daily     cyanocobalamin  1,000 mcg Sublingual Daily     cyanocobalamin  1,000 mcg Intramuscular Q30 Days     digoxin  125 mcg Oral Daily     glipiZIDE  20 mg Oral BID     insulin aspart  1-7 Units Subcutaneous TID AC     insulin aspart  1-5 Units Subcutaneous At Bedtime     isosorbide mononitrate  30 mg Oral Daily     metFORMIN  1,000 mg Oral BID w/meals     metoprolol succinate ER  100 mg Oral Daily     OLANZapine zydis  10 mg Oral At Bedtime     rosuvastatin  10 mg Oral QPM     senna-docusate  1 tablet Oral Daily     sodium chloride (PF)  3 mL Intracatheter Q8H     tamsulosin  0.4 mg Oral Daily     warfarin  3 mg Oral ONCE at 18:00       Data   Results for orders placed or performed during the hospital encounter of 08/01/19 (from the past 24 hour(s))   Glucose by meter   Result Value Ref Range    Glucose 111 (H) 70 - 99 mg/dL   Glucose by meter   Result Value Ref Range    Glucose 317 (H) 70 - 99 mg/dL   Glucose by meter   Result Value Ref Range    Glucose 95 70 - 99 mg/dL   Glucose by meter   Result Value Ref Range    Glucose 123 (H) 70 - 99 mg/dL    Glucose by meter   Result Value Ref Range    Glucose 125 (H) 70 - 99 mg/dL   Digoxin level   Result Value Ref Range    Digoxin Level 0.9 0.5 - 2.0 ug/L   Psychiatry IP Consult: FU hallucinations; Consultant may enter orders: Yes; Patient to be seen: Routine; Call back #: [phone]; Requesting provider? Attending physician    Joseph Tate DO     8/3/2019 12:00 PM  Patient seen for psychiatric follow-up. Refer to note.    Joseph Holm DO     INR   Result Value Ref Range    INR 1.23 (H) 0.86 - 1.14   Glucose by meter   Result Value Ref Range    Glucose 212 (H) 70 - 99 mg/dL         Images and Procedures:  I personally reviewed the images of the following studies:  None new

## 2019-08-03 NOTE — CONSULTS
[facility] Psychiatric Consult Progress Note    Interval History:   Pt seen, chart reviewed, case discussed with nursing staff and treating clinicians. Patient initiated on olanzapine 10 mg last evening, reduced from PTA 20 mg. No behavioral issues since we met yesterday. A bit tired on my interview but feels like he slept better last night. Nursing has charted some forgetfulness and the occasional illogical statement, but oriented and calm. Plan at present is likely discharge to an ARU.     Review of systems:   10 point Review of Systems completed by Joseph Holm DO, and is  is negative other than noted in the HPI     Medications:       acetaminophen  650 mg Oral TID     amLODIPine  10 mg Oral At Bedtime     clopidogrel  75 mg Oral Daily     digoxin  125 mcg Oral Daily     insulin aspart  1-7 Units Subcutaneous TID AC     insulin aspart  1-5 Units Subcutaneous At Bedtime     isosorbide mononitrate  30 mg Oral Daily     metoprolol succinate ER  100 mg Oral Daily     OLANZapine zydis  10 mg Oral At Bedtime     rosuvastatin  10 mg Oral QPM     senna-docusate  1 tablet Oral Daily     sodium chloride (PF)  3 mL Intracatheter Q8H     tamsulosin  0.4 mg Oral Daily     acetaminophen, albuterol, bisacodyl, bisacodyl, glucose **OR** dextrose **OR** glucagon, hydrALAZINE, lidocaine 4%, lidocaine (buffered or not buffered), loperamide, melatonin, naloxone, ondansetron **OR** ondansetron, polyethylene glycol, senna-docusate **OR** senna-docusate, sodium chloride (PF)    Mental Status Examination:     Elderly-appearing male with situationally appropriate grooming and hygiene.  Calm and cooperative with good eye contact.  Asleep but arousable and interactive. Drowsy. Globally oriented. Cooperative.  Stable mood.  Affect was mood congruent, stable.  Speech was fluent, spontaneous clear, nonpressured.  Thoughts were linear, logical and goal directed.  No observation of delusional content.  No observation of  response to internal stimuli.  No fluctuation in cognition appreciated.  Intelligence estimated as above average.  Memory poor.  Attendance and concentration were fair.  Muscle strength and tone were not assessed.  Coordination, station and gait were not assessed.  Insight and judgment are diminshed.  No suicidal or homicidal ideation, intent or plan.         Labs/Vitals:     Recent Results (from the past 24 hour(s))   Glucose by meter    Collection Time: 08/02/19 12:36 PM   Result Value Ref Range    Glucose 192 (H) 70 - 99 mg/dL   Glucose by meter    Collection Time: 08/02/19  5:42 PM   Result Value Ref Range    Glucose 111 (H) 70 - 99 mg/dL   Glucose by meter    Collection Time: 08/02/19  8:51 PM   Result Value Ref Range    Glucose 317 (H) 70 - 99 mg/dL   Glucose by meter    Collection Time: 08/03/19  2:03 AM   Result Value Ref Range    Glucose 95 70 - 99 mg/dL   Glucose by meter    Collection Time: 08/03/19  5:02 AM   Result Value Ref Range    Glucose 123 (H) 70 - 99 mg/dL   Glucose by meter    Collection Time: 08/03/19  7:15 AM   Result Value Ref Range    Glucose 125 (H) 70 - 99 mg/dL   Digoxin level    Collection Time: 08/03/19  8:24 AM   Result Value Ref Range    Digoxin Level 0.9 0.5 - 2.0 ug/L   INR    Collection Time: 08/03/19 11:12 AM   Result Value Ref Range    INR 1.23 (H) 0.86 - 1.14     B/P: 112/80, T: 98.1, P: 63, R: 16    Impression:   Tolerated the resumption of a lower dose of olanzapine last evening, targeting delirium with associated hallucinosis and episodic behavioral dyscontrol. No behavioral events overnight and other than being a bit groggy on my interview, he's generally appropriate and placid. Dispo likely to ARU. Will continue olanzapine without modification.      DIagnoses:   1. Acute encephalopathy in the setting of subacute subdural hematoma with history of delirium.         Plan:   1.  Continue olanzapine 10 mg at bedtime.   2.  Continue to hold mirtazapine.      Attestation:  Patient  has been seen and evaluated by me,  Joseph Holm, DO

## 2019-08-04 ENCOUNTER — APPOINTMENT (OUTPATIENT)
Dept: OCCUPATIONAL THERAPY | Facility: CLINIC | Age: 84
DRG: 084 | End: 2019-08-04
Payer: MEDICARE

## 2019-08-04 ENCOUNTER — APPOINTMENT (OUTPATIENT)
Dept: PHYSICAL THERAPY | Facility: CLINIC | Age: 84
DRG: 084 | End: 2019-08-04
Payer: MEDICARE

## 2019-08-04 LAB
ANION GAP SERPL CALCULATED.3IONS-SCNC: 8 MMOL/L (ref 3–14)
BUN SERPL-MCNC: 27 MG/DL (ref 7–30)
CALCIUM SERPL-MCNC: 9.6 MG/DL (ref 8.5–10.1)
CHLORIDE SERPL-SCNC: 113 MMOL/L (ref 94–109)
CO2 SERPL-SCNC: 24 MMOL/L (ref 20–32)
CREAT SERPL-MCNC: 1.41 MG/DL (ref 0.66–1.25)
GFR SERPL CREATININE-BSD FRML MDRD: 45 ML/MIN/{1.73_M2}
GLUCOSE BLDC GLUCOMTR-MCNC: 112 MG/DL (ref 70–99)
GLUCOSE BLDC GLUCOMTR-MCNC: 121 MG/DL (ref 70–99)
GLUCOSE BLDC GLUCOMTR-MCNC: 124 MG/DL (ref 70–99)
GLUCOSE BLDC GLUCOMTR-MCNC: 140 MG/DL (ref 70–99)
GLUCOSE SERPL-MCNC: 171 MG/DL (ref 70–99)
INR PPP: 1.17 (ref 0.86–1.14)
POTASSIUM SERPL-SCNC: 3.5 MMOL/L (ref 3.4–5.3)
SODIUM SERPL-SCNC: 145 MMOL/L (ref 133–144)

## 2019-08-04 PROCEDURE — 12000047 ZZH R&B IMCU

## 2019-08-04 PROCEDURE — 25000132 ZZH RX MED GY IP 250 OP 250 PS 637: Mod: GY | Performed by: PSYCHIATRY & NEUROLOGY

## 2019-08-04 PROCEDURE — 25000132 ZZH RX MED GY IP 250 OP 250 PS 637: Mod: GY | Performed by: PHYSICIAN ASSISTANT

## 2019-08-04 PROCEDURE — 00000146 ZZHCL STATISTIC GLUCOSE BY METER IP

## 2019-08-04 PROCEDURE — 97112 NEUROMUSCULAR REEDUCATION: CPT | Mod: GP

## 2019-08-04 PROCEDURE — 80048 BASIC METABOLIC PNL TOTAL CA: CPT | Performed by: HOSPITALIST

## 2019-08-04 PROCEDURE — 97530 THERAPEUTIC ACTIVITIES: CPT | Mod: GO | Performed by: OCCUPATIONAL THERAPIST

## 2019-08-04 PROCEDURE — 97535 SELF CARE MNGMENT TRAINING: CPT | Mod: GO | Performed by: OCCUPATIONAL THERAPIST

## 2019-08-04 PROCEDURE — 97530 THERAPEUTIC ACTIVITIES: CPT | Mod: GP

## 2019-08-04 PROCEDURE — 85610 PROTHROMBIN TIME: CPT | Performed by: HOSPITALIST

## 2019-08-04 PROCEDURE — 36415 COLL VENOUS BLD VENIPUNCTURE: CPT | Performed by: HOSPITALIST

## 2019-08-04 PROCEDURE — 25000132 ZZH RX MED GY IP 250 OP 250 PS 637: Mod: GY | Performed by: INTERNAL MEDICINE

## 2019-08-04 PROCEDURE — 99232 SBSQ HOSP IP/OBS MODERATE 35: CPT | Performed by: HOSPITALIST

## 2019-08-04 PROCEDURE — 97116 GAIT TRAINING THERAPY: CPT | Mod: GP

## 2019-08-04 PROCEDURE — 25000132 ZZH RX MED GY IP 250 OP 250 PS 637: Mod: GY | Performed by: HOSPITALIST

## 2019-08-04 RX ORDER — LOPERAMIDE HCL 2 MG
4 CAPSULE ORAL EVERY 6 HOURS PRN
Status: DISCONTINUED | OUTPATIENT
Start: 2019-08-04 | End: 2019-08-05 | Stop reason: HOSPADM

## 2019-08-04 RX ORDER — WARFARIN SODIUM 3 MG/1
3 TABLET ORAL
Status: COMPLETED | OUTPATIENT
Start: 2019-08-04 | End: 2019-08-04

## 2019-08-04 RX ADMIN — METFORMIN HYDROCHLORIDE 1000 MG: 500 TABLET, FILM COATED ORAL at 09:50

## 2019-08-04 RX ADMIN — ISOSORBIDE MONONITRATE 30 MG: 30 TABLET, EXTENDED RELEASE ORAL at 09:51

## 2019-08-04 RX ADMIN — ACETAMINOPHEN 650 MG: 325 TABLET, FILM COATED ORAL at 16:35

## 2019-08-04 RX ADMIN — WARFARIN SODIUM 3 MG: 3 TABLET ORAL at 17:32

## 2019-08-04 RX ADMIN — Medication 1 MG: at 20:27

## 2019-08-04 RX ADMIN — OLANZAPINE 10 MG: 10 TABLET, ORALLY DISINTEGRATING ORAL at 20:26

## 2019-08-04 RX ADMIN — METOPROLOL SUCCINATE 100 MG: 100 TABLET, EXTENDED RELEASE ORAL at 09:51

## 2019-08-04 RX ADMIN — METFORMIN HYDROCHLORIDE 1000 MG: 500 TABLET, FILM COATED ORAL at 17:32

## 2019-08-04 RX ADMIN — ACETAMINOPHEN 650 MG: 325 TABLET, FILM COATED ORAL at 06:20

## 2019-08-04 RX ADMIN — DIGOXIN 125 MCG: 0.12 TABLET ORAL at 09:51

## 2019-08-04 RX ADMIN — LOPERAMIDE HYDROCHLORIDE 2 MG: 2 CAPSULE ORAL at 20:27

## 2019-08-04 RX ADMIN — GLIPIZIDE 20 MG: 10 TABLET ORAL at 10:00

## 2019-08-04 RX ADMIN — GLIPIZIDE 20 MG: 10 TABLET ORAL at 17:32

## 2019-08-04 RX ADMIN — LOPERAMIDE HYDROCHLORIDE 2 MG: 2 CAPSULE ORAL at 20:59

## 2019-08-04 RX ADMIN — Medication 1000 MCG: at 09:52

## 2019-08-04 RX ADMIN — AMLODIPINE BESYLATE 10 MG: 10 TABLET ORAL at 20:27

## 2019-08-04 RX ADMIN — TAMSULOSIN HYDROCHLORIDE 0.4 MG: 0.4 CAPSULE ORAL at 09:51

## 2019-08-04 RX ADMIN — CLOPIDOGREL BISULFATE 75 MG: 75 TABLET ORAL at 09:51

## 2019-08-04 RX ADMIN — ACETAMINOPHEN 650 MG: 325 TABLET, FILM COATED ORAL at 20:26

## 2019-08-04 ASSESSMENT — ACTIVITIES OF DAILY LIVING (ADL)
ADLS_ACUITY_SCORE: 21
ADLS_ACUITY_SCORE: 22
ADLS_ACUITY_SCORE: 22
ADLS_ACUITY_SCORE: 14
ADLS_ACUITY_SCORE: 21
ADLS_ACUITY_SCORE: 21

## 2019-08-04 NOTE — PLAN OF CARE
"PT: Attempted to see pt for PM session. Pt sleeping soundly and not woken by voice or gentle touch. With lights turned on, pt able to respond, stating \"Maybe later\" to PT session without ever opening eyes. RN aware.   "

## 2019-08-04 NOTE — PROGRESS NOTES
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     Left sided traumatic subdural hematoma in the setting of anticoagulation   Status post mechanical fall   Recent ischemic stroke after heart catheterization due to right ICA stenosis  The patient presented with confusion after apparently falling at home.  CT was negative for any acute pathology but MRI showed a 4 to 5 mm left-sided subdural hematoma.   Follow-up head CT on August 2 did not show any change.  The patient's mental status is slowly improving.  The patient was seen by the neurosurgeon and the recommendation was to resume anticoagulation.  The patient will likely discharge to the ARU.    Acute encephalopathy with hallucinations  Insomnia, anxiety   PTA med list includes both mirtazapine and olanzapine at bedtime.  The psychiatrist saw the patient and recommended stopping mirtazapine and continuing olanzapine.  He had an EEG on August 2 which was consistent with mild encephalopathy. The patient is still intermittently confused.  He was on one to one observation but this has been discontinued.    Continue olanzapine    Do not restart mirtazapine    Permanent atrial fibrillation on anticoagulation with warfarin   INR   Date Value Ref Range Status   08/04/2019 1.17 (H) 0.86 - 1.14 Final     Lab Results   Component Value Date    DIGOXIN 0.9 08/03/2019   He received 2 mg of vitamin K on August 2 but as it is okay with neurosurgery that he resume anticoagulation we will resume it today.    Continue digoxin, metoprolol    Resumed warfarin,  monitor INR    Coronary artery  disease status post drug-eluting stent to proximal right coronary artery 6/2019  Hypertension  Mitral and tricuspid valve regurgitation    Continue amlodipine 10 mg daily, imdur 30 mg daily    Resumed clopidogrel August 2    Dyslipidemia    Continue PTA statin    Type 2 diabetes  Recent Labs   Lab 08/04/19  1213 08/04/19  0812 08/04/19  0707 08/04/19  0210 08/03/19  1644 08/03/19  1209 08/03/19  0715  08/02/19  0853  08/01/19  1246   GLC  --  171*  --   --   --   --   --   --  242*  --  131*   *  --  112* 124* 210* 212* 125*   < >  --    < >  --     < > = values in this interval not displayed.   Managed with multiple medications: glipizide, metformin, saxagliptin. Recent hemoglobin A1c 6.4% indicating good glycemic control PTA.     Continue to hold oral medications for now     Continue aspart insulin correction scale     Resume metformin and glipizide`    Stage 3 chronic kidney disease   Creatinine   Date Value Ref Range Status   08/04/2019 1.41 (H) 0.66 - 1.25 mg/dL Final   Stable     Continue to monitor     Benign prostatic hypertrophy     Continue PTA tamsulosin    Diet: Combination Diet Regular Diet Adult    DVT Prophylaxis: Pneumatic Compression Devices  Hutchins Catheter: not present  Code Status: Full Code      Disposition Plan   Expected discharge: 1-2 days, recommended to transitional care unit or ARUU once mental status at baseline.  Entered: Bin Tellez MD 08/04/2019, 12:51 PM       The patient's care was discussed with the Bedside Nurse and Patient.    Bin Tellez MD  Hospitalist Service  [facility]    ______________________________________________________________________    Interval History   No complaints     Data reviewed today: I reviewed all medications, new labs and imaging results over the last 24 hours. I personally reviewed no images or EKG's today.    Physical Exam   Vital Signs: Temp: 97.2  F (36.2  C) Temp src: Oral BP: 134/72 Pulse: 59 Heart Rate:  68 Resp: 16 SpO2: 95 % O2 Device: None (Room air)    Weight: 138 lbs 0 oz  Constitutional: awake, alert, cooperative, no apparent distress  Respiratory: No increased work of breathing, good air exchange, clear to auscultation bilaterally, no crackles or wheezing  Cardiovascular: Irregular rate and rhythm, normal S1 and S2, no S3 or S4, and no murmur noted  GI: normal bowel sounds, soft, non-distended, non-tender  Skin: no rashes and no jaundice  Neurologic: right-handed. Awake, alert, oriented to being in the hospital.  He is moving all extremities equally- there is a resting right upper extremity tremor.    Neuropsychiatric: General: normal, calm and normal eye contact    Data   Recent Labs   Lab 08/04/19  0812 08/03/19  1112 08/02/19  0853 08/01/19  1246   WBC  --   --   --  6.4   HGB  --   --   --  12.6*   MCV  --   --   --  96   PLT  --   --   --  124*   INR 1.17* 1.23*  --  2.22*   *  --  143 147*   POTASSIUM 3.5  --  3.5 4.2   CHLORIDE 113*  --  110* 113*   CO2 24  --  27 23   BUN 27  --  28 32*   CR 1.41*  --  1.49* 1.70*   ANIONGAP 8  --  6 11   VINCENT 9.6  --  9.7 9.7   *  --  242* 131*   ALBUMIN  --   --   --  3.1*   PROTTOTAL  --   --   --  7.0   BILITOTAL  --   --   --  0.7   ALKPHOS  --   --   --  94   ALT  --   --   --  21   AST  --   --   --  18     No results found for this or any previous visit (from the past 24 hour(s)).  Medications     Warfarin Therapy Reminder         acetaminophen  650 mg Oral TID     amLODIPine  10 mg Oral At Bedtime     clopidogrel  75 mg Oral Daily     cyanocobalamin  1,000 mcg Sublingual Daily     cyanocobalamin  1,000 mcg Intramuscular Q30 Days     digoxin  125 mcg Oral Daily     glipiZIDE  20 mg Oral BID     insulin aspart  1-7 Units Subcutaneous TID AC     insulin aspart  1-5 Units Subcutaneous At Bedtime     isosorbide mononitrate  30 mg Oral Daily     metFORMIN  1,000 mg Oral BID w/meals     metoprolol succinate ER  100 mg Oral Daily     OLANZapine zydis  10  mg Oral At Bedtime     rosuvastatin  10 mg Oral QPM     senna-docusate  1 tablet Oral Daily     sodium chloride (PF)  3 mL Intracatheter Q8H     tamsulosin  0.4 mg Oral Daily     warfarin  3 mg Oral ONCE at 18:00

## 2019-08-04 NOTE — PLAN OF CARE
Pt alert and oriented x2-3. VS within ordered parameters. Neuro with intermittent confusion and forgetfulness, otherwise intact. CMS with trace edema in BLEs and baseline neuropathy in hands and feet. Lung sounds clear. Tolerating regular diet with pre-prandial BG checks. Ambulating with 1 GB and walker. Needs reminders to call for assistance with ambulating, bed/chair alarms in use.     1900 update: no change.

## 2019-08-04 NOTE — PROGRESS NOTES
Care Transition Initial Assessment - SW     Met with: Patient , wife Lexis, and daughter, Lydia   Active Problems:    Encephalopathy       DATA  Lives With: spouse   Living Arrangements: assisted living     Description of Support System: Supportive, Involved  Who is your support system?: Wife, Children    Identified issues/concerns regarding health management: Pt had been hospitalized on 6/25 for chest pain and had a stent placed.  Wile hospitalized pt suffered a stroke. [facility]. Family states that pt had multiple falls at [facility] and suffered rib fracture. This caused pt some pain and slowed his progress in therapy at the TCU. Pt discharged from [facility] despite two appeals by family and they had made arrangements with [facility] for continued care along with [facility]. Pt had another fall and was showing signs of confusion so he seen by neurology and brought to hospital with subdural hematoma.Therapy has been recommending ARU placement.  Pt has been confused and requiring a sitter off and on throughout his hospital stay.  Referral sent to  ARU for assessment. Referral currently under review.    SW met with pt, wife and daughter to discuss discharge options. TCU, memory TCU and [facility] with home care all discussed as   Alternatives if pt does not meet criteria for ARU.  Family states that they assessing whether or not a TCU placement would be beneficial vs a return to [facility] with home care and family  Supports initially. Pt's daughter states she would plan on staying with pt overnight initially to make sure he has enough supervision until he is safe to get to the bathroom independently.  Plan is for pt to return home with his wife when he is independent in his mobility.  Pt uses a cane at home but is currently using a walker and agreeable to continuing with the walker.    Transportation Anticipated: family or friend will  provide    ASSESSMENT  Cognitive Status:  awake and alert but confused as to place and time.  Concerns to be addressed: Pt will require short term placement with PT/OT prior to returning home with wife as she is physically  Not able to handle him.     PLAN  Financial costs for the patient includes TBD  Patient given options and choices for discharge yes    ALEXIA Farmer  [facility]  Phone: [phone]

## 2019-08-04 NOTE — PLAN OF CARE
Alert, oriented x3-4, reorients well if forgets date/place. Did become restless and more confused in the evening, wasn't wanting to take meds and wanted to leave. Writer had patient talk to his daughter on the phone, he calmed down after that, took his meds and fell asleep. Neuros intact. Denies pain. VSS. BG covered appropriately. Up with Ax1/GB/W. Incontinent of urine at times. Tolerating regular diet. Plan is for discharge to ARU

## 2019-08-04 NOTE — PLAN OF CARE
"Pt here with SDH and R CVA. A&Ox3, disoriented to situation. Sitter throughout the night. No combative behaviors noted,  refusing saline flushes to IV stating \"my doctor never told me I needed that, I'll wait for him to come.\" Baseline neuropathy to feet, otherwise neuros and CMS intact. Elevated BP, but within parameters. Other VSS on RA. Tolerating regular diet, thin liquids. Up with assist x1 w/ GB/W. Walked halls x1 during night. Denies pain. Discharge plan pending, therapies recommending ARU, continue monitoring.     "

## 2019-08-04 NOTE — PLAN OF CARE
"Discharge Planner PT   Patient plan for discharge: pt's family considering TCU vs pt returning to [facility]\" with assist of staff for mobility (call button), home PT and pt's daughter to stay overnight with pt  Current status: Pt received sitting up in chair, family in room, agreeable to PT. Performed sit>stand with FWW and SBA, ambulated 2 x 100' with FWW and SBA with Ifeoma needed when turning with tendency for pt to step outside of support of walker. Performed 1x3 steps with B rails and SBA with reciprocal pattern ascending. Trained turning with FWW with ambulation around chair in room x3 reps- pt demos improved position of walker, stepping inside support of walker primarily with cues and SBA. Engaged in balance training activities with CGA without AD including side stepping, forward/backward walking, marching without UE support, sit<>stand reps without AD. PT with 2 LOB during balance activities, needing Ifeoma to recover. Discussed options for discharge with pt's family and pt. Pt's wife and daughter. Pt's wife states that she feels pt would be more comfortable at [facility] than at [facility]CU but they are open to whatever is best for the pt. Pt's daughter would be able to stay overnight for 1-2 weeks with pt at [facility] and family would be in and out during the day. Pt's family states staff is available to assist via call button and there is 1 staff person for 5 residents at [facility].   Barriers to return to prior living situation: currently level of assist, fall risk  Recommendations for discharge: TCU*, would benefit from  TCU  Rationale for recommendations: Patient currently needing SBA for transfers and ambulation with increased unsteadiness and assist needed when turning. Patient will benefit from daily therapy in TCU, however, pt also appropriate for return to [facility] with SBA for transfers and ambulation AND Home PT. Pt's family states that they would assist patient with therapy exercises and " ambulation on days that he would not receive home therapy if he returns to [facility].        Entered by: Jamila Steen 08/04/2019 12:26 PM

## 2019-08-04 NOTE — PLAN OF CARE
Discharge Planner OT   Patient plan for discharge: rehab  Current status: with long-arm sitter this am, as he was more confused overnight. Did not recall meeting this writer yesterday or the events of OT session yesterday. Transfers from chair with cues for posture and walker safety. Much more steady on feet with shoes on, but still needing at least CGA for ambulation, approx 125' with WW. Needs repeated cues to stand tall and keep walker close to body. Stood at sink approx 5 min to brush teeth and wash face with CGA while standing. Needs Min A to doff sweatshirt and assist to tie drawstring on pants. Up in chair with chair alarm at end of session.  Barriers to return to prior living situation: falls risk, impaired cognition, level of assist with ADL and mobility  Recommendations for discharge: TCU-memory care TCU may be needed.  Rationale for recommendations: Pt will benefit from inpatient rehab with daily, intensive therapies. Anticipate he will tolerate 3+ hours of therapy per day. If carryover of teaching during therapies seems to be an issue due to memory impairment, then TCU may be more appropriate; will continue to assess.       Entered by: Berenice Wood 08/04/2019 8:10 AM     Addendum: ARU had been previously recommended.  After talking with PT and ARU admissions coordinator, PT/OT are in agreement that patient is not best suited for ARU at discharge at this time. Patient is  Overall CGA with mobility and does have some difficulty with carryover of information due to memory impairment. TCU/memory care TCU seems more appropriate at this time due to cognitive impairment that may be nearing baseline. OT recommends strict 24 hour supervision due to safety/fall concerns. Per PT, family may opt for DC to [facility] with home therapies instead of TCU.

## 2019-08-05 ENCOUNTER — APPOINTMENT (OUTPATIENT)
Dept: OCCUPATIONAL THERAPY | Facility: CLINIC | Age: 84
DRG: 084 | End: 2019-08-05
Payer: MEDICARE

## 2019-08-05 ENCOUNTER — MEDICAL CORRESPONDENCE (OUTPATIENT)
Dept: HEALTH INFORMATION MANAGEMENT | Facility: CLINIC | Age: 84
End: 2019-08-05

## 2019-08-05 ENCOUNTER — APPOINTMENT (OUTPATIENT)
Dept: PHYSICAL THERAPY | Facility: CLINIC | Age: 84
DRG: 084 | End: 2019-08-05
Payer: MEDICARE

## 2019-08-05 VITALS
DIASTOLIC BLOOD PRESSURE: 69 MMHG | TEMPERATURE: 97.4 F | OXYGEN SATURATION: 93 % | RESPIRATION RATE: 18 BRPM | HEART RATE: 59 BPM | HEIGHT: 69 IN | BODY MASS INDEX: 20.44 KG/M2 | WEIGHT: 138 LBS | SYSTOLIC BLOOD PRESSURE: 136 MMHG

## 2019-08-05 DIAGNOSIS — G89.4 CHRONIC PAIN SYNDROME: ICD-10-CM

## 2019-08-05 LAB
GLUCOSE BLDC GLUCOMTR-MCNC: 159 MG/DL (ref 70–99)
GLUCOSE BLDC GLUCOMTR-MCNC: 82 MG/DL (ref 70–99)
INR PPP: 1.23 (ref 0.86–1.14)

## 2019-08-05 PROCEDURE — 97116 GAIT TRAINING THERAPY: CPT | Mod: GP

## 2019-08-05 PROCEDURE — 36415 COLL VENOUS BLD VENIPUNCTURE: CPT | Performed by: HOSPITALIST

## 2019-08-05 PROCEDURE — 85610 PROTHROMBIN TIME: CPT | Performed by: HOSPITALIST

## 2019-08-05 PROCEDURE — 97530 THERAPEUTIC ACTIVITIES: CPT | Mod: GP

## 2019-08-05 PROCEDURE — 25000132 ZZH RX MED GY IP 250 OP 250 PS 637: Mod: GY | Performed by: HOSPITALIST

## 2019-08-05 PROCEDURE — 25000132 ZZH RX MED GY IP 250 OP 250 PS 637: Mod: GY | Performed by: PSYCHIATRY & NEUROLOGY

## 2019-08-05 PROCEDURE — 25000132 ZZH RX MED GY IP 250 OP 250 PS 637: Mod: GY | Performed by: INTERNAL MEDICINE

## 2019-08-05 PROCEDURE — 99238 HOSP IP/OBS DSCHRG MGMT 30/<: CPT | Performed by: HOSPITALIST

## 2019-08-05 PROCEDURE — 97535 SELF CARE MNGMENT TRAINING: CPT | Mod: GO

## 2019-08-05 PROCEDURE — 00000146 ZZHCL STATISTIC GLUCOSE BY METER IP

## 2019-08-05 RX ORDER — WARFARIN SODIUM 5 MG/1
5 TABLET ORAL
Status: DISCONTINUED | OUTPATIENT
Start: 2019-08-05 | End: 2019-08-05 | Stop reason: HOSPADM

## 2019-08-05 RX ORDER — OLANZAPINE 10 MG/1
10 TABLET, ORALLY DISINTEGRATING ORAL EVERY EVENING
Qty: 30 TABLET | Refills: 0 | Status: SHIPPED | OUTPATIENT
Start: 2019-08-05 | End: 2019-08-07

## 2019-08-05 RX ADMIN — ISOSORBIDE MONONITRATE 30 MG: 30 TABLET, EXTENDED RELEASE ORAL at 08:15

## 2019-08-05 RX ADMIN — Medication 1000 MCG: at 08:18

## 2019-08-05 RX ADMIN — METOPROLOL SUCCINATE 100 MG: 100 TABLET, EXTENDED RELEASE ORAL at 08:15

## 2019-08-05 RX ADMIN — GLIPIZIDE 20 MG: 10 TABLET ORAL at 08:14

## 2019-08-05 RX ADMIN — TAMSULOSIN HYDROCHLORIDE 0.4 MG: 0.4 CAPSULE ORAL at 08:15

## 2019-08-05 RX ADMIN — DIGOXIN 125 MCG: 0.12 TABLET ORAL at 08:13

## 2019-08-05 RX ADMIN — METFORMIN HYDROCHLORIDE 1000 MG: 500 TABLET, FILM COATED ORAL at 08:13

## 2019-08-05 RX ADMIN — ACETAMINOPHEN 650 MG: 325 TABLET, FILM COATED ORAL at 08:16

## 2019-08-05 RX ADMIN — CLOPIDOGREL BISULFATE 75 MG: 75 TABLET ORAL at 08:16

## 2019-08-05 ASSESSMENT — ACTIVITIES OF DAILY LIVING (ADL)
ADLS_ACUITY_SCORE: 21
ADLS_ACUITY_SCORE: 20
ADLS_ACUITY_SCORE: 21
ADLS_ACUITY_SCORE: 21

## 2019-08-05 NOTE — CONSULTS
Discussed with Jacqueline RN @ [facility] [phone]. They are able to accommodate patient's needs- they have 24/7 nursing services. Additional Homecare RN/PT/OT services were originally planned thru [facility] ( never started before hospital admission). Referral sent to [facility].    1130: Discharge orders  FAXED to [phone] [facility]    PCP appointment re scheduled. Dr Guerrier not avail until 8/26. Patient will have home RN visits and Cardiology 8/12/19) & Neurology ( 8/6/19)  appointments before then  Cardiology appointment for post stent placement (previous appointment cancelled) rescheduled for 8/12 @ 2:30    [facility]: good contact is christopher Maciel @[phone]       RN to draw INR 8/8 per MD order    Christopher Freeman will transport today approx 1300

## 2019-08-05 NOTE — PLAN OF CARE
Physical Therapy Discharge Summary    Reason for therapy discharge:    Discharged to transitional care facility.    Progress towards therapy goal(s). See goals on Care Plan in [facility] electronic health record for goal details.  Goals met    Therapy recommendation(s):    Continued therapy is recommended.  Rationale/Recommendations:  PT at TCU to progress mobility.

## 2019-08-05 NOTE — PLAN OF CARE
Pt here with SDH traumatic. A&OX4. Calm and cooperative. Neuros intact except forgetfulness. Slept well overnight. VSS, one BP above 160 systolic, resolved without intervention. Regular diet with thin liquids. Takes pills whole. Up with 1 gait belt and walker. Continent of bowel and bladder, sometimes dribbles. Developed diarrhea on evenings (chronic intermittent issue), resolved with imodium. Denies pain. Discharge to to ARU pending.

## 2019-08-05 NOTE — TELEPHONE ENCOUNTER
Controlled Substance Refill Request for HYDROcodone-acetaminophen (NORCO) 5-325 MG tablet  Problem List Complete:  No     PROVIDER TO CONSIDER COMPLETION OF PROBLEM LIST AND OVERVIEW/CONTROLLED SUBSTANCE AGREEMENT    Last Written Prescription Date:  na  Last Fill Quantity: na,  # refills: na   Last office visit: 6/4/2019 with prescribing provider:  Fareed   Future Office Visit:   Next 5 appointments (look out 90 days)    Aug 26, 2019 10:30 AM CDT  [facility] [address] [place] [id number]  [phone]           Controlled substance agreement:   Encounter-Level CSA:    There are no encounter-level csa.     Patient-Level CSA:    There are no patient-level csa.         Last Urine Drug Screen: No results found for: CDAUT, No results found for: COMDAT, No results found for: THC13, PCP13, COC13, MAMP13, OPI13, AMP13, BZO13, TCA13, MTD13, BAR13, OXY13, PPX13, BUP13     Processing:  Fax Rx to pended pharmacy     [email or web address]       checked in past 3 months?  No, route to RN

## 2019-08-05 NOTE — DISCHARGE SUMMARY
[facility]  Hospitalist Discharge Summary       Date of Admission:  8/1/2019  Date of Discharge:  8/5/2019  Discharging Provider: Bin Tellez MD  Discharge Diagnoses   1. Left sided traumatic subdural hematoma in the setting of anticoagulation   2. Status post mechanical fall   3. Acute encephalopathy with hallucinations due to subdural hematoma   4. Vitamin B12 deficiency     History of     Recent ischemic stroke after heart catheterization due to right ICA stenosis    Insomnia, anxiety     Permanent atrial fibrillation on anticoagulation with warfarin     Coronary artery disease status post drug-eluting stent to proximal right coronary artery 6/2019    Hypertension    Mitral and tricuspid valve regurgitation    Dyslipidemia    Type 2 diabetes    Stage 3 chronic kidney disease     Benign prostatic hypertrophy     Follow-ups Needed After Discharge   Follow-up Appointments     Follow-up and recommended labs and tests       Follow up with primary care provider, Jamie Guerrier, within 7   days for hospital follow- up.  The following labs/tests are recommended:   BMP, CBC, C-reactive protein. Vitamin B12 in one month.  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     Left sided traumatic subdural hematoma in the setting of anticoagulation   Status post mechanical fall   Recent ischemic stroke  after heart catheterization due to right ICA stenosis  The patient presented with confusion after apparently falling at home.  CT was negative for any acute pathology but MRI showed a 4 to 5 mm left-sided subdural hematoma.   Follow-up head CT on August 2 did not show any change.  The patient's mental status is slowly improving.  The patient was seen by the neurosurgeon and the recommendation was to resume anticoagulation.    Acute encephalopathy with hallucinations  Insomnia, anxiety   PTA med list includes both mirtazapine and olanzapine at bedtime.  The psychiatrist saw the patient and recommended stopping mirtazapine and continuing olanzapine.  He had an EEG on August 2 which was consistent with mild encephalopathy. The patient is still intermittently confused.  He was on one to one observation but this has been discontinued.  He is going to discharge home with home care.    Continue olanzapine    Do not restart mirtazapine    Permanent atrial fibrillation on anticoagulation with warfarin   INR   Date Value Ref Range Status   08/04/2019 1.17 (H) 0.86 - 1.14 Final     Lab Results   Component Value Date    DIGOXIN 0.9 08/03/2019   He received 2 mg of vitamin K on August 2 but as it is okay with neurosurgery that he resume anticoagulation we will resume it today.    Continue digoxin, metoprolol    Resumed warfarin,  monitor INR    Coronary artery disease status post drug-eluting stent to proximal right coronary artery 6/2019  Hypertension  Mitral and tricuspid valve regurgitation    Continue amlodipine 10 mg daily, imdur 30 mg daily    Resumed clopidogrel    Dyslipidemia    Continue PTA statin    Type 2 diabetes  Recent Labs   Lab 08/04/19  1213 08/04/19  0812 08/04/19  0707 08/04/19  0210 08/03/19  1644 08/03/19  1209 08/03/19  0715  08/02/19  0853  08/01/19  1246   GLC  --  171*  --   --   --   --   --   --  242*  --  131*   *  --  112* 124* 210* 212* 125*   < >  --    < >  --     < > = values in this  interval not displayed.   Managed with multiple medications: glipizide, metformin, saxagliptin. Recent hemoglobin A1c 6.4% indicating good glycemic control PTA.     Home on usual medications    Stage 3 chronic kidney disease   Creatinine   Date Value Ref Range Status   08/04/2019 1.41 (H) 0.66 - 1.25 mg/dL Final   Stable     Benign prostatic hypertrophy     Continue PTA tamsulosin    Vitamin B12 deficiency   He received 1 parenteral dose of B12    Dscharge on oral supplementation.      Follow-up B12 level with primary care physician    Consultations This Hospital Stay   NEUROLOGY IP CONSULT  PHARMACY TO DOSE WARFARIN  PHARMACY TO DOSE PHYTONADIONE  PHYSICAL THERAPY ADULT IP CONSULT  PSYCHIATRY IP CONSULT  NEUROSURGERY IP CONSULT  OCCUPATIONAL THERAPY ADULT IP CONSULT  PSYCHIATRY IP CONSULT  SPIRITUAL HEALTH SERVICES IP CONSULT  PHARMACY TO DOSE WARFARIN  CARE TRANSITION RN/SW IP CONSULT    Code Status   Full Code    Time Spent on this Encounter   I, Bin Tellez, personally saw the patient today and spent less than or equal to 30 minutes discharging this patient.       Bin Tellez MD  [facility]  ______________________________________________________________________    Physical Exam   Vital Signs: Temp: 97.7  F (36.5  C) Temp src: Oral BP: (!) 165/68 Pulse: 59 Heart Rate: 70 Resp: 18 SpO2: (!) 89 % O2 Device: None (Room air)    Weight: 138 lbs 0 oz  Constitutional: awake, alert, cooperative, no apparent distress, and appears stated age  Neurologic: Awake, alert  Neuropsychiatric: General: normal, calm and normal eye contact       Primary Care Physician   Jamie Guerrier    Discharge Orders      Home care nursing referral      Home Care PT Referral for Hospital Discharge      Home Care OT Referral for Hospital Discharge      Reason for your hospital stay    Bleeding in the head     Follow-up and recommended labs and tests     Follow up with primary care provider, Jamie Carrero  Fareed, within 7 days for hospital follow- up.  The following labs/tests are recommended: BMP, CBC, C-reactive protein. Vitamin B12 in one month.  INR on 8/8/19     Activity    Your activity upon discharge: activity as tolerated, no driving     MD face to face encounter    Documentation of Face to Face and Certification for Home Health Services    I certify that patient: Suman Burton is under my care and that I, or a nurse practitioner or physician's assistant working with me, had a face-to-face encounter that meets the physician face-to-face encounter requirements with this patient on: August 5, 2019.    This encounter with the patient was in whole, or in part, for the following medical condition, which is the primary reason for home health care: subdural hematoma.    I certify that, based on my findings, the following services are medically necessary home health services: Nursing, Occupational Therapy and Physical Therapy.    My clinical findings support the need for the above services because: Nurse is needed: To provide assessment and oversight required in the home to assure adherence to the medical plan due to: cognitive impairment.., Occupational Therapy Services are needed to assess and treat cognitive ability and address ADL safety due to impairment in mobility. and Physical Therapy Services are needed to assess and treat the following functional impairments: gait instability.    Further, I certify that my clinical findings support that this patient is homebound (i.e. absences from home require considerable and taxing effort and are for medical reasons or [employer] services or infrequently or of short duration when for other reasons) because: Mental health symptoms including: Patient gets lost or wanders to due to cognitive impairments...    Based on the above findings. I certify that this patient is confined to the home and needs intermittent skilled nursing care, physical therapy and/or speech  therapy.  The patient is under my care, and I have initiated the establishment of the plan of care.  This patient will be followed by a physician who will periodically review the plan of care.  Physician/Provider to provide follow up care: Jamie Guerrier    Attending [facility] physician (the Medicare certified [facility] provider): Bin Tellez  Physician Signature: See electronic signature associated with these discharge orders.  Date: 8/5/2019     Diet    Follow this diet upon discharge:  Regular Diet Adult       Significant Results and Procedures   Most Recent 3 CBC's:  Recent Labs   Lab Test 08/01/19  1246 07/15/19 07/02/19  0718   WBC 6.4 10.0 8.4   HGB 12.6* 15.4 14.4   MCV 96 95.5 89   * 276 148*     Most Recent 3 BMP's:  Recent Labs   Lab Test 08/04/19  0812 08/02/19  0853 08/01/19  1246   * 143 147*   POTASSIUM 3.5 3.5 4.2   CHLORIDE 113* 110* 113*   CO2 24 27 23   BUN 27 28 32*   CR 1.41* 1.49* 1.70*   ANIONGAP 8 6 11   VINCENT 9.6 9.7 9.7   * 242* 131*     Most Recent TSH and T4:  Recent Labs   Lab Test 08/02/19  0853   TSH 4.35*   T4 1.10     Most Recent Anemia Panel:  Recent Labs   Lab Test 08/02/19  0853 08/01/19  1246   WBC  --  6.4   HGB  --  12.6*   HCT  --  37.1*   MCV  --  96   PLT  --  124*   B12 242  --    FOLIC 53.0  --    ,   Results for orders placed or performed during the hospital encounter of 08/01/19   Head CT w/o contrast    Narrative    CT SCAN OF THE HEAD WITHOUT CONTRAST   8/1/2019 1:37 PM     HISTORY: falls, confusion, anticoagulated    TECHNIQUE:  Axial images of the head and coronal reformations without  IV contrast material. Radiation dose for this scan was reduced using  automated exposure control, adjustment of the mA and/or kV according  to patient size, or iterative reconstruction technique.    COMPARISON: CT head 6/25/2019.    FINDINGS: There is no evidence of intracranial hemorrhage, mass, acute  infarct or anomaly. The ventricles are normal in  size, shape and  configuration. Moderate diffuse parenchymal volume loss, commensurate  with the patient's age. Mild patchy periventricular white matter  hypodensities which are nonspecific, but likely related to chronic  microvascular ischemic disease. Scattered intracranial vascular  calcifications, most pronounced in the carotid siphons and vertebral  arteries.    The visualized portions of the sinuses and mastoids appear normal. The  bony calvarium and bones of the skull base appear intact. Severe right  temporomandibular joint osteoarthritis.      Impression    IMPRESSION:   No evidence of acute intracranial hemorrhage, mass, or  herniation.      LARRY CASILLAS MD   MR Head w/o Contrast Angiogram    Narrative    MR ANGIOGRAM OF THE HEAD WITHOUT CONTRAST   8/1/2019 3:34 PM     COMPARISON: Head and neck CTA 6/25/2019    HISTORY: Stroke, follow-up.    TECHNIQUE: 3D time-of-flight MR angiogram of the head without  contrast. MIP reconstruction of all MR angiographic data was  performed.    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 The left distal internal carotid,  basilar, bilateral anterior cerebral, bilateral middle cerebral and  bilateral posterior cerebral arteries are patent and unremarkable with  no evidence for cerebral artery stenosis or aneurysm. The anterior  communicating artery is patent and unremarkable.       Impression    IMPRESSION:    1. 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  2. Otherwise, normal [place] MRA.    LOLA RUTHERFORD MD   MR Brain w/o Contrast    Narrative    MRI BRAIN WITHOUT CONTRAST  8/1/2019 3:35 PM    HISTORY: Stroke, follow-up.    TECHNIQUE: Multiplanar, multisequence MRI of the brain without  gadolinium IV contrast material.      COMPARISON: Head CT 8/1/2019    FINDINGS:   The exam is mild to moderately limited due to motion artifact.  Moderate  volume loss is present. Frontoparietal predominant  periventricular and subcortical T2 FLAIR hyperintensities likely  represent chronic small vessel ischemic change. A thin left posterior  convexity subdural fluid hematoma is present measuring approximately  4-5 mm thickness, new compared to the prior exam. Signal  characteristics demonstrate T1 hyperintensity and T2 hypointensity  compatible with subacute intracellular methemoglobin, not convincingly  appreciated in retrospect on prior CT. The visualized calvarium,  tympanic cavities, and extracranial soft tissues are unremarkable.  There is trace opacification of the left mastoid cavity, likely  inflammatory. Submucosal/Tornwaldt cyst is present within the  posterior nasopharynx. Bilateral lens replacements are present.      Impression    IMPRESSION:  1. Thin (4 to 5 mm) subacute subdural hematoma along the left  posterior convexity. No significant mass effect.  2. No evidence of acute ischemia.  3. Volume loss and white matter T2 hyperintensities likely reflecting  chronic small vessel ischemic change.    Findings were discussed by phone between Dr. Neves and nurse  practitioner Raz at 3:45 PM on 8/1/2019.    ZEKE NEVES MD   CT Head w/o Contrast    Narrative    CT SCAN OF THE HEAD WITHOUT CONTRAST   8/2/2019 11:10 AM     HISTORY: Intracranial hemorrhage, known, follow up.    TECHNIQUE:  Axial images of the head and coronal reformations without  IV contrast material. Radiation dose for this scan was reduced using  automated exposure control, adjustment of the mA and/or kV according  to patient size, or iterative reconstruction technique.    COMPARISON: MRI 8/1/2019, CT head 8/1/2019.    FINDINGS:  The previously demonstrated very thin and subtle subdural hematoma  along the left parieto-occipital-temporal convexity on recent MRI is  not well seen on CT. The hematoma was also not well seen on the  8/1/2019 CT head exam. No definite evidence for interval  increase in  the size of the subdural hematoma compared to most recent MRI. There  is no mass effect or midline shift.    There is mild global brain parenchymal volume loss without a definite  lobar predilation. Mild scattered hypoattenuation within the deep  cerebral white matter, likely related to small vessel ischemic  changes. Ventricles are stable. Scattered intracranial vascular  calcifications are again noted.    The visualized portions of the orbits, mid face, skull base and  calvarium are otherwise unremarkable.      Impression    IMPRESSION: The subtle left posterior cerebral convexity subdural  hematoma demonstrated on recent MRI is not well seen on CT. No  evidence for increase in size of the subdural hematoma or associated  mass effect. Head CT otherwise is stable.    LARRY CASILLAS MD       Discharge Medications   Current Discharge Medication List      START taking these medications    Details   cyanocobalamin (VITAMIN B-12) 500 MCG SUBL sublingual tablet Place 2 tablets (1,000 mcg) under the tongue daily  Qty: 30 tablet, Refills: 0    Associated Diagnoses: Vitamin B12 deficiency (non anemic)         CONTINUE these medications which have CHANGED    Details   OLANZapine zydis (ZYPREXA) 10 MG ODT Take 1 tablet (10 mg) by mouth every evening  Qty: 30 tablet, Refills: 0    Comments: Refills per primary care provider  Associated Diagnoses: Chronic diastolic congestive heart failure (H)         CONTINUE these medications which have NOT CHANGED    Details   !! acetaminophen (TYLENOL) 325 MG tablet Take 650 mg by mouth every 6 hours as needed for mild pain (Max of 4000mg in 24 hours.)      !! acetaminophen (TYLENOL) 325 MG tablet Take 650 mg by mouth 3 times daily Do not exceed 4gm in 24 hours. AND Give 650 mg by mouth every 6 hours as needed for pain (do not exceed 4gm acetaminophen in 24 hrs) 0800, 1400, 2000      !! albuterol (PROVENTIL) (2.5 MG/3ML) 0.083% neb solution Take 2.5 mg by nebulization every 4  hours as needed for shortness of breath / dyspnea or wheezing      !! albuterol (PROVENTIL) (2.5 MG/3ML) 0.083% neb solution Take 2.5 mg by nebulization 2 times daily Make sure he deep breaths and coughs with each treatment .      amLODIPine (NORVASC) 10 MG tablet Take 1 tablet (10 mg) by mouth daily    Associated Diagnoses: Chronic diastolic congestive heart failure (H)      bisacodyl (DULCOLAX) 10 MG suppository Place 10 mg rectally daily as needed for constipation      bisacodyl (DULCOLAX) 5 MG EC tablet Take 1 tablet (5 mg) by mouth daily as needed for constipation    Associated Diagnoses: Chronic diastolic congestive heart failure (H)      clopidogrel (PLAVIX) 75 MG tablet Take 1 tablet (75 mg) by mouth daily    Associated Diagnoses: Chronic diastolic congestive heart failure (H)      digoxin (LANOXIN) 125 MCG tablet Take 1 tablet (125 mcg) by mouth daily  Qty: 90 tablet, Refills: 3    Associated Diagnoses: Chronic atrial fibrillation (H)      glipiZIDE (GLUCOTROL) 10 MG tablet Take 2 tablets (20 mg) by mouth 2 times daily Take 20mg in AM with breakfast and 20mg in PM with evening meal  Qty: 360 tablet, Refills: 0    Associated Diagnoses: Type 2 diabetes mellitus with hyperglycemia, with long-term current use of insulin (H)      HYDROcodone-acetaminophen (NORCO) 5-325 MG tablet Take 1 tablet by mouth every 4 hours as needed for severe pain      isosorbide mononitrate (IMDUR) 30 MG 24 hr tablet Take 1 tablet (30 mg) by mouth daily  Qty: 30 tablet, Refills: 3    Associated Diagnoses: SOB (shortness of breath); Atrial fibrillation, unspecified type (H); Chronic diastolic congestive heart failure (H); Bilateral carotid artery stenosis; Chest pain due to myocardial ischemia, unspecified ischemic chest pain type      loperamide (IMODIUM A-D) 2 MG tablet Take 2 mg by mouth every 6 hours as needed for diarrhea      metFORMIN (GLUCOPHAGE) 1000 MG tablet Take 1 tablet (1,000 mg) by mouth 2 times daily (with meals)     Associated Diagnoses: Type 2 diabetes mellitus with hyperglycemia, with long-term current use of insulin (H)      metoprolol succinate ER (TOPROL-XL) 50 MG 24 hr tablet Take 100 mg by mouth daily      multivitamin, therapeutic (THERA-VIT) TABS tablet Take 1 tablet by mouth daily    Associated Diagnoses: Chronic diastolic congestive heart failure (H)      ondansetron (ZOFRAN-ODT) 4 MG ODT tab Take 1 tablet (4 mg) by mouth every 6 hours as needed for nausea or vomiting    Associated Diagnoses: Chronic diastolic congestive heart failure (H)      rosuvastatin (CRESTOR) 10 MG tablet Take 1 tablet (10 mg) by mouth daily    Associated Diagnoses: Chronic diastolic congestive heart failure (H)      !! senna-docusate (SENOKOT-S/PERICOLACE) 8.6-50 MG tablet Take 1 tablet by mouth daily Hold for loose stools.      !! senna-docusate (SENOKOT-S/PERICOLACE) 8.6-50 MG tablet Take 1 tablet by mouth 2 times daily as needed for constipation      tamsulosin (FLOMAX) 0.4 MG capsule Take 1 capsule (0.4 mg) by mouth daily    Associated Diagnoses: Chronic diastolic congestive heart failure (H)      warfarin (COUMADIN) 3 MG tablet Take 1 tablet (3 mg) by mouth daily  Qty: 90 tablet, Refills: 3    Associated Diagnoses: Chronic atrial fibrillation (H)      !! ONE TOUCH FINE POINT LANCETS Check blood sugars twice a day.      !! ONETOUCH DELICA LANCETS 33G MISC 1 strip 2 times daily  Qty: 100 each, Refills: 11    Associated Diagnoses: Type 2 diabetes mellitus with hyperglycemia, with long-term current use of insulin (H)       !! - Potential duplicate medications found. Please discuss with provider.      STOP taking these medications       mirtazapine (REMERON) 30 MG tablet Comments:   Reason for Stopping:         saxagliptin (ONGLYZA) 2.5 MG TABS tablet Comments:   Reason for Stopping:             Allergies   Allergies   Allergen Reactions     Nkda [No Known Drug Allergies]

## 2019-08-05 NOTE — PLAN OF CARE
Discharge Planner OT   Patient plan for discharge: TCU  Current status: Original plan was to do med mgmt task, however pt's daughter reports pt has not done this in years as pt's wife sets up all meds d/t pt low vision. Pt engaged in ADL tasks of g/h in stance at sink, pt brushed teeth with close SBA and cues to initiate. OT provides mod assist for pt to order lunch from menu in room, pt requires assist to recall what he wanted to order. Pt completes bed mobility with min A to move supine to sit, transfers to chair with CGA and walker with cues for sequencing. OT, pt and daughter discussed d/c plan as pt moving to TCU today. When asked about d/c plans later in session, pt unable to recall where he would be going, per daughter pt has been to many facilities in recent days and having difficulty remembering. Pt in chair with chair alarm on at end of session.   Barriers to return to prior living situation: Level of assist with ADL and mobility, fall risk, impaired cognition  Recommendations for discharge: TCU  Rationale for recommendations: Pt demo decreased functional mobility, decreased ADL independence, difficulty with task initiation, impaired cognition. Recommend continued therapy to maximize safety and independence with ADLs.        Entered by: Adelita Navarrete 08/05/2019 12:03 PM      Occupational Therapy Discharge Summary    Reason for therapy discharge:    Discharged to transitional care facility.    Progress towards therapy goal(s). See goals on Care Plan [facility] electronic health record for goal details.  Goals partially met.  Barriers to achieving goals:   discharge from facility.    Therapy recommendation(s):    Continued therapy is recommended.  Rationale/Recommendations:  See above rationale.

## 2019-08-05 NOTE — PROVIDER NOTIFICATION
MD Notification    Notified Person: MD    Notified Person Name: Luis Eduardo Fitch PA    Notification Date/Time: 8/4/2019 8:35 PM    Notification Interaction: paged    Purpose of Notification: patient normally takes 4mg of Imodium for occasional diarrhea, which he just started, states that controls it well. Only able to give 2mg per order, can I get another 2mg? Thanks     Orders Received:    Comments:

## 2019-08-05 NOTE — PLAN OF CARE
RN: Patient discharged to [facility] today for acute rehab needs with the goal of returning to home. A/O with baseline forgetfulness.    VSS on RA.  No complaints of pain.  Able to call and state needs.  Up with SBA.  Tolerating diet.  Multiple bruises secondary to falls prior to coming in.  Other skin areas intact.  Reviewed AVS and medication changes/additions with both the patient and his family prior to discharge to home.  Copy of AVS provided to the angeli.

## 2019-08-05 NOTE — PLAN OF CARE
Discharge Planner PT   Patient plan for discharge: did not state  Current status: Pt requires SBA for bed mobility, transfers and gait x 200' with a FWW, no overt LOB and less distracted today. Pt did complete a toilet transfer with SBA but required modA for changing his brief and clothes after an episode of urinary incontinence.  Barriers to return to prior living situation: level of assist required, fall risk  Recommendations for discharge: care suites with assist for all mobility, home PT  Rationale for recommendations: Pt's family is requesting to have pt return to his [facility] with care suites section instead of TCU. Agree with this plan as daughter will be present to assist. Home PT to progress mobility to independent.       Entered by: Leah Naidu 08/05/2019 12:04 PM

## 2019-08-06 ENCOUNTER — PATIENT OUTREACH (OUTPATIENT)
Dept: CARE COORDINATION | Facility: CLINIC | Age: 84
End: 2019-08-06

## 2019-08-06 ENCOUNTER — TELEPHONE (OUTPATIENT)
Dept: FAMILY MEDICINE | Facility: CLINIC | Age: 84
End: 2019-08-06

## 2019-08-06 ENCOUNTER — CARE COORDINATION (OUTPATIENT)
Dept: CARDIOLOGY | Facility: CLINIC | Age: 84
End: 2019-08-06

## 2019-08-06 ENCOUNTER — TRANSFERRED RECORDS (OUTPATIENT)
Dept: HEALTH INFORMATION MANAGEMENT | Facility: CLINIC | Age: 84
End: 2019-08-06

## 2019-08-06 ENCOUNTER — MEDICAL CORRESPONDENCE (OUTPATIENT)
Dept: HEALTH INFORMATION MANAGEMENT | Facility: CLINIC | Age: 84
End: 2019-08-06

## 2019-08-06 NOTE — TELEPHONE ENCOUNTER
RX monitoring program ([place]) reviewed: 8/6/19 Provider to review. First fill this year on 7/3/19 from an outside provider.     [place]  [email or web address]

## 2019-08-06 NOTE — TELEPHONE ENCOUNTER
Reason for Call: Request for an order or referral:    Order or referral being requested: sk nrsg 1x 1week 2x for 1 week 1x 2 weeks with 3prn PT/OT/ST eval and treat sw eval    Date needed: as soon as possible    Has the patient been seen by the PCP for this problem? NO    Additional comments:     Phone number Patient can be reached at:  Other phone number:      Best Time:      Can we leave a detailed message on this number?  YES    Call taken on 8/6/2019 at 3:29 PM by CARMELA SHIN

## 2019-08-06 NOTE — PROGRESS NOTES
Clinic Care Coordination Contact  Care Coordination Communication    Referral Source: IP Report    Clinical Data: Patient was hospitalized at [facility] from 8/1/19 to 8/5/19 with diagnosis of subdural hematoma.     Home Care Contact:              Home Care Agency: [facility]              Contact name () and phone number: not assigned yet              Care Coordination contacted home care: No              Anticipated start of care date: unknown    Pt living in [facility] with additional nursing care and supports.    Patient Contact:               Introduced self and role of care coordination to pt's daughter.               Discharge instructions were reviewed with patient/caregiver.               Do you have any questions about your medications? Yes, after clarification it was determined that pt had discharged from [facility] TCU on 80 mg of furosemide. This was discontinued during hospital stay and no longer appears on pt's med list after discharge. Marcus Preston RN has already contact cardiology to clarify of pt needs to restart furosemide, pt did not receive medication while in the hospital, per family.     Pt also seen by neurology already today and stopped his warfarin. Pt now starting on eliquis. Will bring dosing information to PCP appointment tomorrow.                 Follow up appointment is scheduled for 8/7/19 at 8:45am. Pt received confirmation email from  stating that he had an appointment scheduled for today at 11am. TORIE CHRISTIANSON unable to find appointment anywhere in [facility]. Able to scheduled follow up for tomorrow, appreciate PCP being flexible.            Plan: No additional needs or gaps in care identified. No further outreaches will be made at this time unless a new referral is made or a change in the pt's status occurs. Patient was provided with this writer's contact information and encouraged to call with any questions or concerns.    ALEXIA Cheung   Social Work Care  Coordinator  [phone]

## 2019-08-06 NOTE — Clinical Note
Naerndra Guerrier, Thanks for being flexible and fitting him in tomorrow! They were so appreciative. They wanted me to make sure you knew that the medication confusion is around his furosemide. The RN at his snf has already called cardiology so it might be straightened out by tomorrow but just and FYI. Also the pt was in to see neurology today already and the stopped his warfarin and started him on eliquis. They will bring in dosing info tomorrow as his daughter couldn't remember and they were trying to get in the car. Let me know if you have any questions,Shilpi

## 2019-08-06 NOTE — TELEPHONE ENCOUNTER
This pt meets criteria for Care Coordination outreach. SW CC will follow up. No action needed from triage at this time.     ALEXIA Cheung   Social Work Care Coordinator  [phone]

## 2019-08-06 NOTE — TELEPHONE ENCOUNTER
This is tough decision and it is unfortunate that the admitting team or discharging team is not willing to help out of with this? It is very tough for me to make that decision unfortunately. Can we have him come in sooner with LEISA to review this, this week. I see that lasix was long-standing medication for him for even prior to him seeing me. If his creatinine is stable and he is not hypotensive, then I think it is reasonable for him to be on what he was taking prior to this recent admission last week, unless there is a documented reason to stop lasix.

## 2019-08-06 NOTE — PROGRESS NOTES
Received VM from KIRILL Phillips with [facility]. She is inquiring whether pt should be back on Lasix.     Jacqueline reports pt discharged from [facility] to [facility], but previous to this last hospitalization he was at [facility].     Pt was at [facility] from 7/2 - 7/31/19 and on Lasix 80 mg QD. He finally discharged from the TCU to the [facility] on 7/31. He fell his first day at [facility], and as a result was hospitalized at [facility] for several days for a subdural hematoma. Jacqueline reports that the entire hospitalization (8/1 - 8/5/19), per her record review and understanding, pt did not receive Lasix as the admitting nurse or provider through the ED failed to enter Lasix as a PTA med. Upon my chart review, it appears the [facility] Pharmacist discontinued Lasix 80 mg every day upon admission Med Rec. Note in the chart.     Jacqueline reports that she tried to call [facility], but whatever nurse she ended up getting in touch with, was unwilling to help review this with the hospitalist service or help her get an order for Lasix. Therefore, Jacqueline calling us for Lasix orders - she is requesting if he should go back on Lasix 80 mg every day?    Pt scheduled for post-hospital discharge return on 8/12/19 with Lilian Woods PA-C, but at this time, the only provider pt has seen here is Dr. Ruiz. Per 6/20/19 OV note, it appears pt was already on Lasix 80 mg when he established care here.    We do not have a list of weights, as pt just returned to the [facility] yesterday. I asked the [facility] nurse to assess pt VS's and weight, symptoms today and call me back. Will send to Dr. Ruiz.     Madison Sanders RN on 8/6/2019 at 10:24 AM      Partial note extracted from [facility] discharge summary on 7/31/19:          Most recent BMP/CMP's on file:    Component      Latest Ref Rng & Units 7/15/2019 8/1/2019 8/2/2019   Sodium      133 - 144 mmol/L 142 147 (H) 143   Potassium      3.4 - 5.3 mmol/L 4.3  4.2 3.5   Chloride      94 - 109 mmol/L 99 113 (H) 110 (H)   Carbon Dioxide      20 - 32 mmol/L 24 23 27   Anion Gap      3 - 14 mmol/L 19 (A) 11 6   Glucose      70 - 99 mg/dL 144 (A) 131 (H) 242 (H)   Urea Nitrogen      7 - 30 mg/dL 81 (A) 32 (H) 28   Creatinine      0.66 - 1.25 mg/dL 2.02 (A) 1.70 (H) 1.49 (H)   GFR Estimate      >60 mL/min/1.73:m2 30 (A) 36 (L) 43 (L)   GFR Estimate If Black      >60 mL/min/1.73:m2 34 (A) 42 (L) 49 (L)   Calcium      8.5 - 10.1 mg/dL 10.8 (A) 9.7 9.7   Bilirubin Total      0.2 - 1.3 mg/dL  0.7    Albumin      3.4 - 5.0 g/dL  3.1 (L)    Protein Total      6.8 - 8.8 g/dL  7.0    Alkaline Phosphatase      40 - 150 U/L  94    ALT      0 - 70 U/L  21    AST      0 - 45 U/L  18        Current Outpatient Medications   Medication     acetaminophen (TYLENOL) 325 MG tablet     acetaminophen (TYLENOL) 325 MG tablet     albuterol (PROVENTIL) (2.5 MG/3ML) 0.083% neb solution     albuterol (PROVENTIL) (2.5 MG/3ML) 0.083% neb solution     amLODIPine (NORVASC) 10 MG tablet     bisacodyl (DULCOLAX) 10 MG suppository     bisacodyl (DULCOLAX) 5 MG EC tablet     clopidogrel (PLAVIX) 75 MG tablet     cyanocobalamin (VITAMIN B-12) 500 MCG SUBL sublingual tablet     digoxin (LANOXIN) 125 MCG tablet     glipiZIDE (GLUCOTROL) 10 MG tablet     HYDROcodone-acetaminophen (NORCO) 5-325 MG tablet     isosorbide mononitrate (IMDUR) 30 MG 24 hr tablet     loperamide (IMODIUM A-D) 2 MG tablet     metFORMIN (GLUCOPHAGE) 1000 MG tablet     metoprolol succinate ER (TOPROL-XL) 50 MG 24 hr tablet     multivitamin, therapeutic (THERA-VIT) TABS tablet     OLANZapine zydis (ZYPREXA) 10 MG ODT     ondansetron (ZOFRAN-ODT) 4 MG ODT tab     ONE TOUCH FINE POINT LANCETS     ONETOUCH DELICA LANCETS 33G MISC     rosuvastatin (CRESTOR) 10 MG tablet     senna-docusate (SENOKOT-S/PERICOLACE) 8.6-50 MG tablet     senna-docusate (SENOKOT-S/PERICOLACE) 8.6-50 MG tablet     tamsulosin (FLOMAX) 0.4 MG capsule     warfarin (COUMADIN)  3 MG tablet     No current facility-administered medications for this visit.

## 2019-08-06 NOTE — TELEPHONE ENCOUNTER
Patient Contact    Attempt # 1    Was call answered?  No.  Left message on voicemail with information to call triage back.    Upon callback, provide verbal orders for:    Skilled Nursinx 1week 2x for 1 week 1x 2 weeks with 3prn  PT: Eval and Treat  OT: Eval and Treat  ST:  Eval and Treat

## 2019-08-07 ENCOUNTER — TELEPHONE (OUTPATIENT)
Dept: FAMILY MEDICINE | Facility: CLINIC | Age: 84
End: 2019-08-07

## 2019-08-07 ENCOUNTER — OFFICE VISIT (OUTPATIENT)
Dept: FAMILY MEDICINE | Facility: CLINIC | Age: 84
End: 2019-08-07
Payer: MEDICARE

## 2019-08-07 VITALS
TEMPERATURE: 97.7 F | DIASTOLIC BLOOD PRESSURE: 60 MMHG | OXYGEN SATURATION: 98 % | RESPIRATION RATE: 12 BRPM | HEART RATE: 115 BPM | WEIGHT: 141 LBS | HEIGHT: 69 IN | BODY MASS INDEX: 20.88 KG/M2 | SYSTOLIC BLOOD PRESSURE: 138 MMHG

## 2019-08-07 DIAGNOSIS — Z79.4 TYPE 2 DIABETES MELLITUS WITH HYPERGLYCEMIA, WITH LONG-TERM CURRENT USE OF INSULIN (H): ICD-10-CM

## 2019-08-07 DIAGNOSIS — R29.6 RECURRENT FALLS: ICD-10-CM

## 2019-08-07 DIAGNOSIS — E11.65 TYPE 2 DIABETES MELLITUS WITH HYPERGLYCEMIA, WITH LONG-TERM CURRENT USE OF INSULIN (H): ICD-10-CM

## 2019-08-07 DIAGNOSIS — E11.22 TYPE 2 DIABETES MELLITUS WITH STAGE 3 CHRONIC KIDNEY DISEASE, WITHOUT LONG-TERM CURRENT USE OF INSULIN (H): ICD-10-CM

## 2019-08-07 DIAGNOSIS — E53.8 VITAMIN B12 DEFICIENCY (NON ANEMIC): ICD-10-CM

## 2019-08-07 DIAGNOSIS — I10 BENIGN HYPERTENSION: ICD-10-CM

## 2019-08-07 DIAGNOSIS — N18.30 TYPE 2 DIABETES MELLITUS WITH STAGE 3 CHRONIC KIDNEY DISEASE, WITHOUT LONG-TERM CURRENT USE OF INSULIN (H): ICD-10-CM

## 2019-08-07 DIAGNOSIS — R60.9 DEPENDENT EDEMA: ICD-10-CM

## 2019-08-07 DIAGNOSIS — R29.90 STROKE-LIKE SYMPTOM: ICD-10-CM

## 2019-08-07 DIAGNOSIS — S06.5XAA SUBDURAL HEMATOMA (H): Primary | ICD-10-CM

## 2019-08-07 DIAGNOSIS — R41.3 SHORT-TERM MEMORY LOSS: ICD-10-CM

## 2019-08-07 DIAGNOSIS — I50.32 CHRONIC DIASTOLIC CONGESTIVE HEART FAILURE (H): ICD-10-CM

## 2019-08-07 DIAGNOSIS — R19.7 DIARRHEA, UNSPECIFIED TYPE: ICD-10-CM

## 2019-08-07 LAB
ANION GAP SERPL CALCULATED.3IONS-SCNC: 6 MMOL/L (ref 3–14)
BASOPHILS # BLD AUTO: 0 10E9/L (ref 0–0.2)
BASOPHILS NFR BLD AUTO: 0.3 %
BUN SERPL-MCNC: 24 MG/DL (ref 7–30)
CALCIUM SERPL-MCNC: 9.7 MG/DL (ref 8.5–10.1)
CHLORIDE SERPL-SCNC: 116 MMOL/L (ref 94–109)
CO2 SERPL-SCNC: 23 MMOL/L (ref 20–32)
CREAT SERPL-MCNC: 1.6 MG/DL (ref 0.66–1.25)
CRP SERPL-MCNC: 9.6 MG/L (ref 0–8)
DIFFERENTIAL METHOD BLD: ABNORMAL
EOSINOPHIL # BLD AUTO: 0.2 10E9/L (ref 0–0.7)
EOSINOPHIL NFR BLD AUTO: 3 %
ERYTHROCYTE [DISTWIDTH] IN BLOOD BY AUTOMATED COUNT: 14.4 % (ref 10–15)
GFR SERPL CREATININE-BSD FRML MDRD: 39 ML/MIN/{1.73_M2}
GLUCOSE SERPL-MCNC: 205 MG/DL (ref 70–99)
HCT VFR BLD AUTO: 37 % (ref 40–53)
HGB BLD-MCNC: 12.7 G/DL (ref 13.3–17.7)
LYMPHOCYTES # BLD AUTO: 1.2 10E9/L (ref 0.8–5.3)
LYMPHOCYTES NFR BLD AUTO: 18.6 %
MCH RBC QN AUTO: 33.3 PG (ref 26.5–33)
MCHC RBC AUTO-ENTMCNC: 34.3 G/DL (ref 31.5–36.5)
MCV RBC AUTO: 97 FL (ref 78–100)
MONOCYTES # BLD AUTO: 0.8 10E9/L (ref 0–1.3)
MONOCYTES NFR BLD AUTO: 11.7 %
NEUTROPHILS # BLD AUTO: 4.4 10E9/L (ref 1.6–8.3)
NEUTROPHILS NFR BLD AUTO: 66.4 %
PLATELET # BLD AUTO: 141 10E9/L (ref 150–450)
POTASSIUM SERPL-SCNC: 4.7 MMOL/L (ref 3.4–5.3)
RBC # BLD AUTO: 3.81 10E12/L (ref 4.4–5.9)
SODIUM SERPL-SCNC: 145 MMOL/L (ref 133–144)
WBC # BLD AUTO: 6.6 10E9/L (ref 4–11)

## 2019-08-07 PROCEDURE — 85025 COMPLETE CBC W/AUTO DIFF WBC: CPT | Performed by: FAMILY MEDICINE

## 2019-08-07 PROCEDURE — 80048 BASIC METABOLIC PNL TOTAL CA: CPT | Performed by: FAMILY MEDICINE

## 2019-08-07 PROCEDURE — 99495 TRANSJ CARE MGMT MOD F2F 14D: CPT | Performed by: FAMILY MEDICINE

## 2019-08-07 PROCEDURE — 86140 C-REACTIVE PROTEIN: CPT | Performed by: FAMILY MEDICINE

## 2019-08-07 PROCEDURE — 36415 COLL VENOUS BLD VENIPUNCTURE: CPT | Performed by: FAMILY MEDICINE

## 2019-08-07 RX ORDER — TAMSULOSIN HYDROCHLORIDE 0.4 MG/1
0.4 CAPSULE ORAL DAILY
Qty: 90 CAPSULE | Refills: 2 | Status: SHIPPED | OUTPATIENT
Start: 2019-08-07

## 2019-08-07 RX ORDER — LOPERAMIDE HYDROCHLORIDE 2 MG/1
2-4 TABLET ORAL 3 TIMES DAILY PRN
Qty: 100 TABLET | Refills: 3 | Status: SHIPPED | OUTPATIENT
Start: 2019-08-07

## 2019-08-07 RX ORDER — HYDROCODONE BITARTRATE AND ACETAMINOPHEN 5; 325 MG/1; MG/1
1 TABLET ORAL 2 TIMES DAILY PRN
Qty: 10 TABLET | Refills: 0 | Status: SHIPPED | OUTPATIENT
Start: 2019-08-07

## 2019-08-07 RX ORDER — OLANZAPINE 10 MG/1
10 TABLET, ORALLY DISINTEGRATING ORAL EVERY EVENING
Qty: 90 TABLET | Refills: 2 | Status: SHIPPED | OUTPATIENT
Start: 2019-08-07

## 2019-08-07 RX ORDER — LANCETS 33 GAUGE
1 EACH MISCELLANEOUS 2 TIMES DAILY
Qty: 100 EACH | Refills: 11
Start: 2019-08-07

## 2019-08-07 ASSESSMENT — MIFFLIN-ST. JEOR: SCORE: 1324.95

## 2019-08-07 NOTE — TELEPHONE ENCOUNTER
"Requested Prescriptions   Pending Prescriptions Disp Refills     tamsulosin (FLOMAX) 0.4 MG capsule       Sig: Take 1 capsule (0.4 mg) by mouth daily       Alpha Blockers Passed - 8/7/2019 10:20 AM        Passed - Blood pressure under 140/90 in past 12 months     BP Readings from Last 3 Encounters:   08/07/19 138/60   08/05/19 136/69   07/30/19 (!) 165/75                 Passed - Recent (12 mo) or future (30 days) visit within the authorizing provider's specialty     Patient had office visit in the last 12 months or has a visit in the next 30 days with authorizing provider or within the authorizing provider's specialty.  See \"Patient Info\" tab in inbasket, or \"Choose Columns\" in Meds & Orders section of the refill encounter.              Passed - Patient does not have Tadalafil, Vardenafil, or Sildenafil on their medication list        Passed - Medication is active on med list        Passed - Patient is 18 years of age or older        OLANZapine zydis (ZYPREXA) 10 MG ODT 30 tablet 0     Sig: Take 1 tablet (10 mg) by mouth every evening       Antipsychotic Medications Passed - 8/7/2019 10:20 AM        Passed - Blood pressure under 140/90 in past 12 months     BP Readings from Last 3 Encounters:   08/07/19 138/60   08/05/19 136/69   07/30/19 (!) 165/75                 Passed - Patient is 12 years of age or older        Passed - Lipid panel on file within the past 12 months     Recent Labs   Lab Test 06/25/19  0730  02/05/13  1050   CHOL 118   < > 155.0   TRIG 201*   < > 206.0*   HDL 32*   < > 41.0   LDL 46   < > 74.0   NHDL 86   < >  --    VLDL  --   --  41.0*   CHOLHDLRATIO  --   --  3.8    < > = values in this interval not displayed.               Passed - CBC on file in past 12 months     Recent Labs   Lab Test 08/01/19  1246   WBC 6.4   RBC 3.88*   HGB 12.6*   HCT 37.1*   *                 Passed - Heart Rate on file within past 12 months     Pulse Readings from Last 3 Encounters:   08/07/19 115   08/04/19 " "59   07/30/19 71               Passed - A1c or Glucose on file in past 12 months     Recent Labs   Lab Test 08/04/19  0812  06/26/19  0347   *   < > 90   A1C  --   --  6.4*    < > = values in this interval not displayed.       Please review patients last 3 weights. If a weight gain of >10 lbs exists, you may refill the prescription once after instructing the patient to schedule an appointment within the next 30 days.    Wt Readings from Last 3 Encounters:   08/07/19 64 kg (141 lb)   08/01/19 62.6 kg (138 lb)   07/30/19 62.7 kg (138 lb 3.2 oz)             Passed - Medication is active on med list        Passed - Recent (6 mo) or future (30 days) visit within the authorizing provider's specialty     Patient had office visit in the last 6 months or has a visit in the next 30 days with authorizing provider or within the authorizing provider's specialty.  See \"Patient Info\" tab in inbasket, or \"Choose Columns\" in Meds & Orders section of the refill encounter.            cyanocobalamin (VITAMIN B-12) 500 MCG SUBL sublingual tablet 30 tablet 0     Sig: Place 2 tablets (1,000 mcg) under the tongue daily       Vitamin Supplements (Adult) Protocol Passed - 8/7/2019 10:20 AM        Passed - High dose Vitamin D not ordered        Passed - Recent (12 mo) or future (30 days) visit within the authorizing provider's specialty     Patient had office visit in the last 12 months or has a visit in the next 30 days with authorizing provider or within the authorizing provider's specialty.  See \"Patient Info\" tab in inbasket, or \"Choose Columns\" in Meds & Orders section of the refill encounter.              Passed - Medication is active on med list          "

## 2019-08-07 NOTE — LETTER
August 14, 2019      Suman Burton  [address] [address]        Dear ,    We are writing to inform you of your test results.    This is all pretty stable and can be repeated in 1 month.  I should see you at that time.    Resulted Orders   CBC with platelets differential   Result Value Ref Range    WBC 6.6 4.0 - 11.0 10e9/L    RBC Count 3.81 (L) 4.4 - 5.9 10e12/L    Hemoglobin 12.7 (L) 13.3 - 17.7 g/dL    Hematocrit 37.0 (L) 40.0 - 53.0 %    MCV 97 78 - 100 fl    MCH 33.3 (H) 26.5 - 33.0 pg    MCHC 34.3 31.5 - 36.5 g/dL    RDW 14.4 10.0 - 15.0 %    Platelet Count 141 (L) 150 - 450 10e9/L    Diff Method Automated Method     % Neutrophils 66.4 %    % Lymphocytes 18.6 %    % Monocytes 11.7 %    % Eosinophils 3.0 %    % Basophils 0.3 %    Absolute Neutrophil 4.4 1.6 - 8.3 10e9/L    Absolute Lymphocytes 1.2 0.8 - 5.3 10e9/L    Absolute Monocytes 0.8 0.0 - 1.3 10e9/L    Absolute Eosinophils 0.2 0.0 - 0.7 10e9/L    Absolute Basophils 0.0 0.0 - 0.2 10e9/L   Basic metabolic panel   Result Value Ref Range    Sodium 145 (H) 133 - 144 mmol/L    Potassium 4.7 3.4 - 5.3 mmol/L    Chloride 116 (H) 94 - 109 mmol/L    Carbon Dioxide 23 20 - 32 mmol/L    Anion Gap 6 3 - 14 mmol/L    Glucose 205 (H) 70 - 99 mg/dL    Urea Nitrogen 24 7 - 30 mg/dL    Creatinine 1.60 (H) 0.66 - 1.25 mg/dL    GFR Estimate 39 (L) >60 mL/min/[1.73_m2]      Comment:      Non  GFR Calc  Starting 12/18/2018, serum creatinine based estimated GFR (eGFR) will be   calculated using the Chronic Kidney Disease Epidemiology Collaboration   (CKD-EPI) equation.      GFR Estimate If Black 45 (L) >60 mL/min/[1.73_m2]      Comment:       GFR Calc  Starting 12/18/2018, serum creatinine based estimated GFR (eGFR) will be   calculated using the Chronic Kidney Disease Epidemiology Collaboration   (CKD-EPI) equation.      Calcium 9.7 8.5 - 10.1 mg/dL   CRP, inflammation   Result Value Ref Range    CRP  Inflammation 9.6 (H) 0.0 - 8.0 mg/L       If you have any questions or concerns, please call the clinic at the number listed above.       Sincerely,        Jamie Guerrier MD

## 2019-08-07 NOTE — TELEPHONE ENCOUNTER
Alphonse's pharmacist called requesting clarification. Is ELIQUIS replacing pt's warfarin? Please advice.

## 2019-08-07 NOTE — PROGRESS NOTES
Subjective     Suman Burton is a 83 year old male who presents to clinic today for the following health issues:    HPI     Hospital Follow-up Visit:    Hospital/Nursing Home/IP Rehab Facility: [facility]  Date of Admission: 06/25/2019  Date of Discharge: 07/02/2019  Reason(s) for Admission: Myocardial Ischemia    Hospital Follow-up Visit:    Hospital/Nursing Home/IP Rehab Facility: [facility]   Date of Admission: 07/02/2019  Date of Discharge: 07/31/2019  Reason(s) for Admission: CVA    Hospital Follow-up Visit:    Hospital/Nursing Home/IP Rehab Facility: [facility]   Date of Admission: 07/31/2019  Date of Discharge: 08/01/2019  Reason(s) for Admission: [facility]    Hospital Follow-up Visit:    Hospital/Nursing Home/IP Rehab Facility: [facility]  Date of Admission: 08/01/2019  Date of Discharge: 08/05/2019  Reason(s) for Admission: Left Sided Traumatic Subdural Hematoma            Problems taking medications regularly:  None       Medication changes since discharge: Yes       Problems adhering to non-medication therapy:  None    Summary of hospitalization:  [facility] discharge summary reviewed  Diagnostic Tests/Treatments reviewed.  Follow up needed: Saw neurology on Monday and had warfarin stopped and placed on Eliquis.  Other Healthcare Providers Involved in Patient s Care: Patient is currently a resident at [facility].  He has been there for 2 nights.  His daughter has stayed with him overnight as there is just one attendant overnight for 5 patients and she does not want to have him have any further falls.           Update since discharge: improved.     Post Discharge Medication Reconciliation: discharge medications reconciled, continue medications without change.  Plan of care communicated with patient and family     Coding guidelines for this visit:  Type of Medical   Decision Making Face-to-Face Visit       within 7 Days of discharge  Face-to-Face Visit        within 14 days of discharge   Moderate Complexity [id number]   High Complexity [id number]            Patient Active Problem List   Diagnosis     Adenocarcinoma (H)     Benign hypertension     Health Care Home     Cerebral infarction due to occlusion or stenosis of carotid artery     Chronic kidney disease, stage III (moderate) (H)     Diabetes mellitus, type 2 (H)     Hyperlipidemia     Lumbago     Thoracic or lumbosacral neuritis or radiculitis, unspecified     Transient visual loss     Diabetes mellitus, type 2 (H)     Chronic atrial fibrillation (H)     Chronic pain syndrome     Hypersomnia     Persistent insomnia     Dystrophic nail     Retinal artery occlusion     SOB (shortness of breath)     Urinary incontinence without sensory awareness     Moderate major depression (H)     Dependent edema     Mitral regurgitation     Tricuspid regurgitation     Chest pain due to myocardial ischemia, unspecified ischemic chest pain type     Status post coronary angiogram     Stroke-like symptom     Delirium     Encephalopathy     Short-term memory loss     Diarrhea, unspecified type     Recurrent falls     Subdural hematoma (H)     Past Surgical History:   Procedure Laterality Date     CHOLECYSTECTOMY       CV HEART CATHETERIZATION WITH POSSIBLE INTERVENTION N/A 6/25/2019    Procedure: Coronary Angiogram;  Surgeon: Cachorro Garcia MD;  Location:  HEART CARDIAC CATH LAB     CV INSTANTANEOUS WAVE-FREE RATIO N/A 6/25/2019    Procedure: Instantaneous Wave-Free Ratio;  Surgeon: Cachorro Garcia MD;  Location:  HEART CARDIAC CATH LAB     CV PCI STENT DRUG ELUTING N/A 6/25/2019    Procedure: PCI Stent Drug Eluting;  Surgeon: Cachorro Garcia MD;  Location:  HEART CARDIAC CATH LAB     CV RIGHT HEART CATH N/A 6/25/2019    Procedure: Right Heart Cath;  Surgeon: Cachorro Garcia MD;  Location:  HEART CARDIAC CATH LAB     LAPAROSCOPIC APPENDECTOMY         Social History  "    Tobacco Use     Smoking status: Former Smoker     Packs/day: 2.00     Types: Cigarettes     Start date:      Last attempt to quit: 2008     Years since quittin.6     Smokeless tobacco: Never Used   Substance Use Topics     Alcohol use: Yes     Alcohol/week: 0.0 oz     Comment: Occasionally.      History reviewed. No pertinent family history.        Short-term memory problems      Duration: Many months    Description (location/character/radiation): Problems with short-term memory    Intensity:  moderate    Accompanying signs and symptoms: None    History (similar episodes/previous evaluation): None    Precipitating or alleviating factors: None    Therapies tried and outcome: None     Reviewed and updated as needed this visit by Provider         Review of Systems   ROS COMP: Constitutional, HEENT, cardiovascular, pulmonary, GI, , musculoskeletal, neuro, skin, endocrine and psych systems are negative, except as otherwise noted.      Objective    /60   Pulse 115   Temp 97.7  F (36.5  C) (Tympanic)   Resp 12   Ht 1.753 m (5' 9\")   Wt 64 kg (141 lb)   SpO2 98%   BMI 20.82 kg/m    Body mass index is 20.82 kg/m .  Physical Exam   GENERAL APPEARANCE: healthy, alert and no distress  RESP: lungs clear to auscultation - no rales, rhonchi or wheezes  CV: regular rates and rhythm, normal S1 S2, no S3 or S4 and no murmur, click or rub  NEURO: Normal strength and tone, speech normal, cranial nerves 2-12 intact and the patient seems much sharper to me today than he has been the last many times he is been in the office.  PSYCH: mentation appears normal and affect normal/bright            Assessment & Plan       ICD-10-CM    1. Subdural hematoma (H) S06.5X9A    2. Recurrent falls R29.6    3. Stroke-like symptom R29.90 HEART FAILURE ACTION PLAN     DISCONTINUED: apixaban ANTICOAGULANT (ELIQUIS) 2.5 MG tablet   4. Diarrhea, unspecified type R19.7 loperamide (IMODIUM A-D) 2 MG tablet   5. Type 2 diabetes " mellitus with stage 3 chronic kidney disease, without long-term current use of insulin (H) E11.22 CBC with platelets differential    N18.3 Basic metabolic panel     CRP, inflammation   6. Dependent edema R60.9    7. Benign hypertension I10    8. Short-term memory loss R41.3           Patient Instructions   The patient has been changed from warfarin to Eliquis by neurology.  I did refill that medication and faxed it to [facility] for filling.  We will leave him off of furosemide at present.  He is currently not having any swelling in his ankles.  He will check his blood sugar once a day while he is at [facility].  Long-term plan is eventually to move him back home.  Some of that will depend on how he does with therapies.  He was much sharper mentally, today, then I have seen him in many months.  We actually had some discussion about his medications that he actually knew things about where he has not in the past.  Both his wife and his daughter were at the visit today.  Everyone was in agreement with the plan.  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  I increased his dose of Imodium AD to 1 to 2 tablets up to 3 times daily for his loose stools.  He does have a follow-up MRI scheduled to check on the subdural hematoma.  We will check labs today and set up future follow-up pending review of those tests.  Otherwise I will see him back in 1 month.      Return in about 4 weeks (around 9/4/2019) for hypertension, edema.    Jamie Guerrier MD  [facility]

## 2019-08-07 NOTE — NURSING NOTE
"Chief Complaint   Patient presents with     Hospital F/U     /60   Pulse 115   Temp 97.7  F (36.5  C) (Tympanic)   Resp 12   Ht 1.753 m (5' 9\")   Wt 64 kg (141 lb)   SpO2 98%   BMI 20.82 kg/m   Estimated body mass index is 20.82 kg/m  as calculated from the following:    Height as of this encounter: 1.753 m (5' 9\").    Weight as of this encounter: 64 kg (141 lb).  BP completed using cuff size: belen Sullivan CMA    Health Maintenance Due   Topic Date Due     HF ACTION PLAN  1935     URINE DRUG SCREEN  1935     ADVANCE CARE PLANNING  1935     DEPRESSION ACTION PLAN  1935     MEDICARE ANNUAL WELLNESS VISIT  08/16/2000     ZOSTER IMMUNIZATION (2 of 3) 03/12/2010     DTAP/TDAP/TD IMMUNIZATION (2 - Td) 02/04/2015     FALL RISK ASSESSMENT  01/08/2019     Health Maintenance reviewed at today's visit patient asked to schedule/complete:   Routine Health Visit: Patient agrees to schedule  Depression:  Patient agrees to schedule  Immunizations:  Patient agrees to schedule    "

## 2019-08-07 NOTE — TELEPHONE ENCOUNTER
Prescription approved per [facility] for 12 months of refills since last appointment, which was 8/7/2019.

## 2019-08-07 NOTE — TELEPHONE ENCOUNTER
Pt still in clinic from OV with PVP. He needed a 30 days supply [facility].     Eliquis

## 2019-08-08 ENCOUNTER — TELEPHONE (OUTPATIENT)
Dept: FAMILY MEDICINE | Facility: CLINIC | Age: 84
End: 2019-08-08

## 2019-08-08 NOTE — TELEPHONE ENCOUNTER
Our goal is to have forms completed within 72 hours, however some forms may require a visit or additional information.    What clinic location was the form placed at [facility] or [place].?     Who is the form from?   Where did the form come from? Faxed to clinic   The form was placed in the inbox of Jamie Guerrier MD      Please fax to [phone]  Phone number: [phone]    Additional comments: [facility] [employer] [facility] review     Call take on 8/8/2019 at 10:12 AM by Ca Conteh

## 2019-08-08 NOTE — TELEPHONE ENCOUNTER
Our goal is to have forms completed within 72 hours, however some forms may require a visit or additional information.    What clinic location was the form placed at [facility] or [place].?     Who is the form from?   Where did the form come from? Faxed to clinic   The form was placed in the inbox of Jamie Guerrier MD      Please fax to [phone]  Phone number: [phone]    Additional comments: [facility] med order change    Call take on 8/8/2019 at 10:31 AM by Ca Conteh

## 2019-08-09 ENCOUNTER — TELEPHONE (OUTPATIENT)
Dept: FAMILY MEDICINE | Facility: CLINIC | Age: 84
End: 2019-08-09

## 2019-08-09 ENCOUNTER — DOCUMENTATION ONLY (OUTPATIENT)
Dept: CARE COORDINATION | Facility: CLINIC | Age: 84
End: 2019-08-09

## 2019-08-09 NOTE — TELEPHONE ENCOUNTER
Reason for Call:  Home Health Care    Svitlana with  Homecare called regarding (reason for call): orders    Orders are needed for this patient. OT    PT: na    OT: 2 x a wk for 3 wks.  1 x a wk for 1 wk for ADL safety, exercise and vision      Skilled Nursing: na    Pt Provider: Dr Guerrier    Phone Number Homecare Nurse can be reached at: [phone]    Can we leave a detailed message on this number? YES    Phone number patient can be reached at: Home number on file [phone] (home)    Best Time: any    Call taken on 8/9/2019 at 2:14 PM by ELIZABETH ALEJANDRE

## 2019-08-09 NOTE — PROGRESS NOTES
[facility] now requests orders and shares plan of care/discharge summaries for some patients through [employer].  Please REPLY TO THIS MESSAGE OR ROUTE BACK TO THE AUTHOR in order to give authorization for orders when needed.  This is considered a verbal order, you will still receive a faxed copy of orders for signature.  Thank you for your assistance in improving collaboration for our patients.    ORDER: PT 3w2, 2w2 for strength, balance, aerobic endurance, safety, and functional mobility.     Rocio Sharp, PT, DPT  [email or web address]  [phone]

## 2019-08-12 ENCOUNTER — OFFICE VISIT (OUTPATIENT)
Dept: CARDIOLOGY | Facility: CLINIC | Age: 84
End: 2019-08-12
Payer: MEDICARE

## 2019-08-12 VITALS
HEART RATE: 80 BPM | WEIGHT: 140.7 LBS | DIASTOLIC BLOOD PRESSURE: 66 MMHG | SYSTOLIC BLOOD PRESSURE: 136 MMHG | HEIGHT: 68 IN | BODY MASS INDEX: 21.32 KG/M2

## 2019-08-12 DIAGNOSIS — I25.10 CORONARY ARTERY DISEASE INVOLVING NATIVE CORONARY ARTERY OF NATIVE HEART WITHOUT ANGINA PECTORIS: Primary | ICD-10-CM

## 2019-08-12 PROCEDURE — 99214 OFFICE O/P EST MOD 30 MIN: CPT | Performed by: PHYSICIAN ASSISTANT

## 2019-08-12 ASSESSMENT — MIFFLIN-ST. JEOR: SCORE: 1307.71

## 2019-08-12 NOTE — PROGRESS NOTES
Cardiology Clinic Progress Note    Suman Burton MRN# [id number]   YOB: 1935 Age: 83 year old   Primary cardiologist: Dr. Ruiz         Assessment and Plan:     In summary, Suman Burton presents today for a hospital f/u visit.     1. CAD, s/p PCI to RCA on 6/25/19  2. CVA post-cath  3. Traumatic SDH after mechanical fall 8/1  4. SKYLER, with down-trending creatinine off Lasix  5. Deconditioning   6. Hx HFpEF, historically on Lasix 80    Plan:  - No changes for the moment. He has no current evidence of volume overload and is down 6 lbs in wt despite ongoing Lasix hold. I worry the patient is forgetting to eat/drink and therefore remains at risk for SKYLER if placed back on diuretic therapy. I have written orders for the facility to obtain daily weights and to call me if there is 2+ lb wt gain from one day to next which does not return to baseline by following day, or 5+ lb wt gain over one week.   - Will allow neurology (whom he sees later today) to dictate antihypertensive medication changes.   - F/U with Dr. Ruiz in 1 month, repeat CBC + BMP.         History of Presenting Illness:      Suman Burton is a pleasant 83 year old patient who presents today for a hospital f/u visit.    The patient has a history of the following -   # CAD, s/p HIWOT to RCA on 6/25/19, with residual 50% prox-mid LCX lesion  # Iatrogenic embolic CVA, R ICA stenosis following cath  # Traumatic SDH from mechanical fall  # CAF, on Eliquis  # Carotid artery disease  # SKYLER, with creatinine > 2, Lasix held  # CKD, baseline creat ~ 1.3  # DMII, poorly-controlled  # HLP  # HTN  # Social - retired physician    The patient was seen in consult by Dr. Ruiz on 6/20/19. He reported progressive leg swelling, fatigue, exertional dyspnea and chest pressure x 6 months. R/LHC, TTE, carotid ultrasound and 7-day zio monitor were ordered. Aspirin, Imdur, Crestor were started.   Carotid ultrasound was performed 6/24 and showed moderate  "stenosis of the bilateral internal carotids. Coronary angiogram was performed on 6/25 and revealed a 95% prox RCA lesion, 50% prox-mid LCX lesion which had insignificant iFR. He received a 3x16 mm EES to the RCA. R/LHC showed normal RH filling pressures with a PASP of 43 and PAD of 20. He was instructed to take triple therapy with asa/plavix/warfarin x 1 month, then stop aspirin.   Suman has unfortunately had a trying course since then. Immediately following cath he was admitted for a CVA from R ICA stenosis. Neuro note states \"likely from cardiac cath and low INR from AFib.\" On 8/1 he had a mechanical fall resulting in a traumatic SDH (4-5 mm) requiring vitamin K administration and a temporary warfarin hold. After discharge he was seen by neurology who switched him to low-dose Eliquis. He was seen by his PCP Dr. Guerrier five days ago and appeared to be doing well. BMP was obtained and showed a creat of 1.6 (up slightly from baseline), BUN 24, Na 145, K+ 4.7, K+ 4.7. Hgb was 12.7, platelet ct 141.   I note that his long-standing Lasix 80 mg was held day of cath and it appears it hasn't been re-started during admission or upon discharge. I assume this is due to SKYLER with a creatinine > 2 as inpatient.  TTE in the hospital showed a preserved LVEF with  Moderate LAE, mild-mod TAMMY, no hemodynamically significant VHD and a normal IVC.     He is currently residing at [facility] and is unfortunately making little progress with therapy there.     Today, he presents with his wife Lexis and daughter Birgit in a wheelchair. His memory for recent events is very poor. He doesn't quite recall that he had stents placed or why he had them placed. Patient denies chest pain, shortness of breath, PND, orthopnea, edema, claudication, palpitations, near syncope or syncope. He currently resides at [facility].   His weight is 140 lbs, which is down 6 lbs from visit with Dr. Ruiz. SBP is borderline-elevated at 136. Per Neuro " "note, it appears they would like this to remain at < 130 mmHg. He sees neuro later today. His family reports that his O2 sat has been > 90% at rehab.          Review of Systems:     12-pt ROS is negative except for as noted in the HPI.          Physical Exam:     Vitals: /66   Pulse 80   Ht 1.727 m (5' 8\")   Wt 63.8 kg (140 lb 11.2 oz)   BMI 21.39 kg/m    Wt Readings from Last 10 Encounters:   08/12/19 63.8 kg (140 lb 11.2 oz)   08/07/19 64 kg (141 lb)   08/01/19 62.6 kg (138 lb)   07/30/19 62.7 kg (138 lb 3.2 oz)   07/23/19 62.1 kg (137 lb)   07/17/19 61.6 kg (135 lb 12.8 oz)   07/11/19 62.6 kg (138 lb 1.6 oz)   07/08/19 61.1 kg (134 lb 9.6 oz)   07/03/19 66.2 kg (146 lb)   06/25/19 66.2 kg (146 lb)       Constitutional:  Patient is pleasant, alert, cooperative, and in NAD.  HEENT:  NCAT. PERRLA. EOM's intact.   Neck:  CVP appears normal. No carotid bruits.   Pulmonary: Normal respiratory effort. CTAB.   Cardiac: RRR, normal S1/S2, no S3/S4, no murmur or rub.   Abdomen:  Non-tender abdomen, no hepatosplenomegaly appreciated.   Vascular: Pulses in the upper and lower extremities are 2+ and equal bilaterally.  Extremities: No edema, erythema, cyanosis or tenderness appreciated.  Skin:  No rashes or lesions appreciated.   Neurological:  No gross motor or sensory deficits.   Psych: Appropriate affect.        Data:     Cardiac Diagnostics reviewed:  Type Date Result   TTE 6/26/19 1. Normal biventricular size and function. Left ventricular ejection fraction  of 60-65%. No segmental wall motion abnormalities noted.  2. The left atrium is moderately dilated. The right atrium is mild to  moderately dilated.  3. No hemodynamically significant valvular disease.  4. No evidence of shuntting at the atrial level on suboptimal bubble study.  Compared to the previous echocardiogram on 9/26/2018, there are no significant  changes. Rhythm is atrial fibrillation.  The study was technically difficult.        Cath 6/25/19 "   Right sided filling pressures are normal.    Normal cardiac output level.    Prox Cx to Mid Cx lesion is 50% stenosed. Hemodynamically not significant by iFR 1.0.    Prox RCA lesion is 95% stenosed.    PCI with one drug eluting stent to the proximal RCA.     1)_ Successful PCI proximal RCA with 3.0 x 16 mm everolimus eluting Synergy stent  2) Mild pulmonary hypertension  PA systolic 40 mm Hg  3) PAD = 10 mm Hg  4. Moderate proximal CX lesion with normal IFR   Stress     EKG     Device     Other       Labs reviewed:  Recent Labs   Lab Test 08/02/19  0853 06/25/19  0730 06/20/19  1017 06/18/19  1348 05/01/18  1345   LDL  --  46 71  --  77   HDL  --  32* 36*  --  36*   NHDL  --  86 119  --  129   CHOL  --  118 155  --  165   TRIG  --  201* 239*  --  262*   TSH 4.35*  --   --   --  5.28*   NTBNP  --   --   --  2,666*  --        Lab Results   Component Value Date    WBC 6.6 08/07/2019    RBC 3.81 (L) 08/07/2019    HGB 12.7 (L) 08/07/2019    HCT 37.0 (L) 08/07/2019    MCV 97 08/07/2019    MCH 33.3 (H) 08/07/2019    MCHC 34.3 08/07/2019    RDW 14.4 08/07/2019     (L) 08/07/2019       Lab Results   Component Value Date     (H) 08/07/2019    POTASSIUM 4.7 08/07/2019    CHLORIDE 116 (H) 08/07/2019    CO2 23 08/07/2019    ANIONGAP 6 08/07/2019     (H) 08/07/2019    BUN 24 08/07/2019    CR 1.60 (H) 08/07/2019    GFRESTIMATED 39 (L) 08/07/2019    GFRESTBLACK 45 (L) 08/07/2019    VINCENT 9.7 08/07/2019      Lab Results   Component Value Date    AST 18 08/01/2019    ALT 21 08/01/2019       Lab Results   Component Value Date    A1C 6.4 (H) 06/26/2019       Lab Results   Component Value Date    INR 1.23 (H) 08/05/2019    INR 1.17 (H) 08/04/2019           Problem List:     Patient Active Problem List   Diagnosis     Adenocarcinoma (H)     Benign hypertension     Health Care Home     Cerebral infarction due to occlusion or stenosis of carotid artery     Chronic kidney disease, stage III (moderate) (H)     Diabetes  mellitus, type 2 (H)     Hyperlipidemia     Lumbago     Thoracic or lumbosacral neuritis or radiculitis, unspecified     Transient visual loss     Diabetes mellitus, type 2 (H)     Chronic atrial fibrillation (H)     Chronic pain syndrome     Hypersomnia     Persistent insomnia     Dystrophic nail     Retinal artery occlusion     SOB (shortness of breath)     Urinary incontinence without sensory awareness     Moderate major depression (H)     Dependent edema     Mitral regurgitation     Tricuspid regurgitation     Chest pain due to myocardial ischemia, unspecified ischemic chest pain type     Status post coronary angiogram     Stroke-like symptom     Delirium     Encephalopathy     Short-term memory loss     Diarrhea, unspecified type     Recurrent falls     Subdural hematoma (H)           Medications:     Current Outpatient Medications   Medication Sig Dispense Refill     acetaminophen (TYLENOL) 325 MG tablet Take 650 mg by mouth 3 times daily Do not exceed 4gm in 24 hours. AND Give 650 mg by mouth every 6 hours as needed for pain (do not exceed 4gm acetaminophen in 24 hrs) 0800, 1400, 2000       amLODIPine (NORVASC) 10 MG tablet Take 1 tablet (10 mg) by mouth daily (Patient taking differently: Take 10 mg by mouth At Bedtime )       apixaban ANTICOAGULANT (ELIQUIS) 2.5 MG tablet Take 1 tablet (2.5 mg) by mouth 2 times daily 60 tablet 0     bisacodyl (DULCOLAX) 5 MG EC tablet Take 1 tablet (5 mg) by mouth daily as needed for constipation       clopidogrel (PLAVIX) 75 MG tablet Take 1 tablet (75 mg) by mouth daily       cyanocobalamin (VITAMIN B-12) 500 MCG SUBL sublingual tablet Place 2 tablets (1,000 mcg) under the tongue daily 180 tablet 2     digoxin (LANOXIN) 125 MCG tablet Take 1 tablet (125 mcg) by mouth daily 90 tablet 3     glipiZIDE (GLUCOTROL) 10 MG tablet Take 2 tablets (20 mg) by mouth 2 times daily Take 20mg in AM with breakfast and 20mg in PM with evening meal 360 tablet 0     HYDROcodone-acetaminophen  (NORCO) 5-325 MG tablet Take 1 tablet by mouth 2 times daily as needed for severe pain 10 tablet 0     isosorbide mononitrate (IMDUR) 30 MG 24 hr tablet Take 1 tablet (30 mg) by mouth daily 30 tablet 3     loperamide (IMODIUM A-D) 2 MG tablet Take 1-2 tablets (2-4 mg) by mouth 3 times daily as needed for diarrhea 100 tablet 3     metFORMIN (GLUCOPHAGE) 1000 MG tablet Take 1 tablet (1,000 mg) by mouth 2 times daily (with meals)       metoprolol succinate ER (TOPROL-XL) 50 MG 24 hr tablet Take 100 mg by mouth daily       multivitamin, therapeutic (THERA-VIT) TABS tablet Take 1 tablet by mouth daily       OLANZapine zydis (ZYPREXA) 10 MG ODT Take 1 tablet (10 mg) by mouth every evening 90 tablet 2     ondansetron (ZOFRAN-ODT) 4 MG ODT tab Take 1 tablet (4 mg) by mouth every 6 hours as needed for nausea or vomiting       ONETOUCH DELICA LANCETS 33G MISC 1 strip 2 times daily Check blood sugars once daily and  each 11     rosuvastatin (CRESTOR) 10 MG tablet Take 1 tablet (10 mg) by mouth daily       senna-docusate (SENOKOT-S/PERICOLACE) 8.6-50 MG tablet Take 1 tablet by mouth daily Hold for loose stools.       tamsulosin (FLOMAX) 0.4 MG capsule Take 1 capsule (0.4 mg) by mouth daily 90 capsule 2           Past Medical History:     Past Medical History:   Diagnosis Date     Atrial fibrillation (H)      Cancer (H)      Cerebral infarction (H)      Chronic kidney disease      Coronary artery disease 06/25/2019    s/p PCI      Diabetes (H)      Diabetes mellitus, type 2 (H)      Edema      Hypertension      Past Surgical History:   Procedure Laterality Date     CHOLECYSTECTOMY       CV HEART CATHETERIZATION WITH POSSIBLE INTERVENTION N/A 6/25/2019    Procedure: Coronary Angiogram;  Surgeon: Cachorro Garcia MD;  Location: [facility] CARDIAC CATH LAB     CV INSTANTANEOUS WAVE-FREE RATIO N/A 6/25/2019    Procedure: Instantaneous Wave-Free Ratio;  Surgeon: Cachorro Garcia MD;  Location:  HEART CARDIAC CATH LAB      CV PCI STENT DRUG ELUTING N/A 2019    Procedure: PCI Stent Drug Eluting;  Surgeon: Cachorro Garcia MD;  Location:  HEART CARDIAC CATH LAB     CV RIGHT HEART CATH N/A 2019    Procedure: Right Heart Cath;  Surgeon: Cachorro Garcia MD;  Location:  HEART CARDIAC CATH LAB     LAPAROSCOPIC APPENDECTOMY       History reviewed. No pertinent family history.  Social History     Socioeconomic History     Marital status:      Spouse name: Not on file     Number of children: Not on file     Years of education: Not on file     Highest education level: Not on file   Occupational History     Not on file   Social Needs     Financial resource strain: Not on file     Food insecurity:     Worry: Not on file     Inability: Not on file     Transportation needs:     Medical: Not on file     Non-medical: Not on file   Tobacco Use     Smoking status: Former Smoker     Packs/day: 2.00     Types: Cigarettes     Start date:      Last attempt to quit:      Years since quittin.6     Smokeless tobacco: Never Used   Substance and Sexual Activity     Alcohol use: Yes     Alcohol/week: 0.0 oz     Comment: Occasionally.      Drug use: No     Sexual activity: Not Currently     Partners: Female   Lifestyle     Physical activity:     Days per week: Not on file     Minutes per session: Not on file     Stress: Not on file   Relationships     Social connections:     Talks on phone: Not on file     Gets together: Not on file     Attends [employer] service: Not on file     Active member of club or organization: Not on file     Attends meetings of clubs or organizations: Not on file     Relationship status: Not on file     Intimate partner violence:     Fear of current or ex partner: Not on file     Emotionally abused: Not on file     Physically abused: Not on file     Forced sexual activity: Not on file   Other Topics Concern     Parent/sibling w/ CABG, MI or angioplasty before 65F 55M? Not Asked   Social  History Narrative     Not on file           Allergies:   Nkda [no known drug allergies]      Lilian Woods PA-C  [facility]  Pager: [phone]

## 2019-08-12 NOTE — LETTER
8/12/2019    Jamie Guerrier MD  [address]  [facility] [address]    RE: Suman Burton       Dear Colleague,    I had the pleasure of seeing Suman Burton in the [facility].    Cardiology Clinic Progress Note    Suman Burton MRN# [id number]   YOB: 1935 Age: 83 year old   Primary cardiologist: Dr. Ruiz         Assessment and Plan:     In summary, Suman Burton presents today for a hospital f/u visit.     1. CAD, s/p PCI to RCA on 6/25/19  2. CVA post-cath  3. Traumatic SDH after mechanical fall 8/1  4. SKYLER, with down-trending creatinine off Lasix  5. Deconditioning   6. Hx HFpEF, historically on Lasix 80    Plan:  - No changes for the moment. He has no current evidence of volume overload and is down 6 lbs in wt despite ongoing Lasix hold. I worry the patient is forgetting to eat/drink and therefore remains at risk for SKYLER if placed back on diuretic therapy. I have written orders for the facility to obtain daily weights and to call me if there is 2+ lb wt gain from one day to next which does not return to baseline by following day, or 5+ lb wt gain over one week.   - Will allow neurology (whom he sees later today) to dictate antihypertensive medication changes.   - F/U with Dr. Ruiz in 1 month, repeat CBC + BMP.         History of Presenting Illness:      Suman Burton is a pleasant 83 year old patient who presents today for a hospital f/u visit.    The patient has a history of the following -   # CAD, s/p HIWOT to RCA on 6/25/19, with residual 50% prox-mid LCX lesion  # Iatrogenic embolic CVA, R ICA stenosis following cath  # Traumatic SDH from mechanical fall  # CAF, on Eliquis  # Carotid artery disease  # SKYLER, with creatinine > 2, Lasix held  # CKD, baseline creat ~ 1.3  # DMII, poorly-controlled  # HLP  # HTN  # Social - retired physician    The patient was seen in consult by Dr. Ruiz on 6/20/19. He reported progressive leg swelling,  "fatigue, exertional dyspnea and chest pressure x 6 months. R/LHC, TTE, carotid ultrasound and 7-day zio monitor were ordered. Aspirin, Imdur, Crestor were started.   Carotid ultrasound was performed 6/24 and showed moderate stenosis of the bilateral internal carotids. Coronary angiogram was performed on 6/25 and revealed a 95% prox RCA lesion, 50% prox-mid LCX lesion which had insignificant iFR. He received a 3x16 mm EES to the RCA. R/LHC showed normal RH filling pressures with a PASP of 43 and PAD of 20. He was instructed to take triple therapy with asa/plavix/warfarin x 1 month, then stop aspirin.   Suman has unfortunately had a trying course since then. Immediately following cath he was admitted for a CVA from R ICA stenosis. Neuro note states \"likely from cardiac cath and low INR from AFib.\" On 8/1 he had a mechanical fall resulting in a traumatic SDH (4-5 mm) requiring vitamin K administration and a temporary warfarin hold. After discharge he was seen by neurology who switched him to low-dose Eliquis. He was seen by his PCP Dr. Guerrier five days ago and appeared to be doing well. BMP was obtained and showed a creat of 1.6 (up slightly from baseline), BUN 24, Na 145, K+ 4.7, K+ 4.7. Hgb was 12.7, platelet ct 141.   I note that his long-standing Lasix 80 mg was held day of cath and it appears it hasn't been re-started during admission or upon discharge. I assume this is due to SKYLER with a creatinine > 2 as inpatient.  TTE in the hospital showed a preserved LVEF with  Moderate LAE, mild-mod TAMMY, no hemodynamically significant VHD and a normal IVC.     He is currently residing at [facility] and is unfortunately making little progress with therapy there.     Today, he presents with his wife Lexis and daughter Birgit in a wheelchair. His memory for recent events is very poor. He doesn't quite recall that he had stents placed or why he had them placed. Patient denies chest pain, shortness of breath, PND, " "orthopnea, edema, claudication, palpitations, near syncope or syncope. He currently resides at [facility].   His weight is 140 lbs, which is down 6 lbs from visit with Dr. Ruiz. SBP is borderline-elevated at 136. Per Neuro note, it appears they would like this to remain at < 130 mmHg. He sees neuro later today. His family reports that his O2 sat has been > 90% at rehab.          Review of Systems:     12-pt ROS is negative except for as noted in the HPI.          Physical Exam:     Vitals: /66   Pulse 80   Ht 1.727 m (5' 8\")   Wt 63.8 kg (140 lb 11.2 oz)   BMI 21.39 kg/m     Wt Readings from Last 10 Encounters:   08/12/19 63.8 kg (140 lb 11.2 oz)   08/07/19 64 kg (141 lb)   08/01/19 62.6 kg (138 lb)   07/30/19 62.7 kg (138 lb 3.2 oz)   07/23/19 62.1 kg (137 lb)   07/17/19 61.6 kg (135 lb 12.8 oz)   07/11/19 62.6 kg (138 lb 1.6 oz)   07/08/19 61.1 kg (134 lb 9.6 oz)   07/03/19 66.2 kg (146 lb)   06/25/19 66.2 kg (146 lb)       Constitutional:  Patient is pleasant, alert, cooperative, and in NAD.  HEENT:  NCAT. PERRLA. EOM's intact.   Neck:  CVP appears normal. No carotid bruits.   Pulmonary: Normal respiratory effort. CTAB.   Cardiac: RRR, normal S1/S2, no S3/S4, no murmur or rub.   Abdomen:  Non-tender abdomen, no hepatosplenomegaly appreciated.   Vascular: Pulses in the upper and lower extremities are 2+ and equal bilaterally.  Extremities: No edema, erythema, cyanosis or tenderness appreciated.  Skin:  No rashes or lesions appreciated.   Neurological:  No gross motor or sensory deficits.   Psych: Appropriate affect.        Data:     Cardiac Diagnostics reviewed:  Type Date Result   TTE 6/26/19 1. Normal biventricular size and function. Left ventricular ejection fraction  of 60-65%. No segmental wall motion abnormalities noted.  2. The left atrium is moderately dilated. The right atrium is mild to  moderately dilated.  3. No hemodynamically significant valvular disease.  4. No evidence of shuntting " at the atrial level on suboptimal bubble study.  Compared to the previous echocardiogram on 9/26/2018, there are no significant  changes. Rhythm is atrial fibrillation.  The study was technically difficult.        Cath 6/25/19   Right sided filling pressures are normal.    Normal cardiac output level.    Prox Cx to Mid Cx lesion is 50% stenosed. Hemodynamically not significant by iFR 1.0.    Prox RCA lesion is 95% stenosed.    PCI with one drug eluting stent to the proximal RCA.     1)_ Successful PCI proximal RCA with 3.0 x 16 mm everolimus eluting Synergy stent  2) Mild pulmonary hypertension  PA systolic 40 mm Hg  3) PAD = 10 mm Hg  4. Moderate proximal CX lesion with normal IFR   Stress     EKG     Device     Other       Labs reviewed:  Recent Labs   Lab Test 08/02/19  0853 06/25/19  0730 06/20/19  1017 06/18/19  1348 05/01/18  1345   LDL  --  46 71  --  77   HDL  --  32* 36*  --  36*   NHDL  --  86 119  --  129   CHOL  --  118 155  --  165   TRIG  --  201* 239*  --  262*   TSH 4.35*  --   --   --  5.28*   NTBNP  --   --   --  2,666*  --        Lab Results   Component Value Date    WBC 6.6 08/07/2019    RBC 3.81 (L) 08/07/2019    HGB 12.7 (L) 08/07/2019    HCT 37.0 (L) 08/07/2019    MCV 97 08/07/2019    MCH 33.3 (H) 08/07/2019    MCHC 34.3 08/07/2019    RDW 14.4 08/07/2019     (L) 08/07/2019       Lab Results   Component Value Date     (H) 08/07/2019    POTASSIUM 4.7 08/07/2019    CHLORIDE 116 (H) 08/07/2019    CO2 23 08/07/2019    ANIONGAP 6 08/07/2019     (H) 08/07/2019    BUN 24 08/07/2019    CR 1.60 (H) 08/07/2019    GFRESTIMATED 39 (L) 08/07/2019    GFRESTBLACK 45 (L) 08/07/2019    VINCENT 9.7 08/07/2019      Lab Results   Component Value Date    AST 18 08/01/2019    ALT 21 08/01/2019       Lab Results   Component Value Date    A1C 6.4 (H) 06/26/2019       Lab Results   Component Value Date    INR 1.23 (H) 08/05/2019    INR 1.17 (H) 08/04/2019           Problem List:     Patient Active  Problem List   Diagnosis     Adenocarcinoma (H)     Benign hypertension     Health Care Home     Cerebral infarction due to occlusion or stenosis of carotid artery     Chronic kidney disease, stage III (moderate) (H)     Diabetes mellitus, type 2 (H)     Hyperlipidemia     Lumbago     Thoracic or lumbosacral neuritis or radiculitis, unspecified     Transient visual loss     Diabetes mellitus, type 2 (H)     Chronic atrial fibrillation (H)     Chronic pain syndrome     Hypersomnia     Persistent insomnia     Dystrophic nail     Retinal artery occlusion     SOB (shortness of breath)     Urinary incontinence without sensory awareness     Moderate major depression (H)     Dependent edema     Mitral regurgitation     Tricuspid regurgitation     Chest pain due to myocardial ischemia, unspecified ischemic chest pain type     Status post coronary angiogram     Stroke-like symptom     Delirium     Encephalopathy     Short-term memory loss     Diarrhea, unspecified type     Recurrent falls     Subdural hematoma (H)           Medications:     Current Outpatient Medications   Medication Sig Dispense Refill     acetaminophen (TYLENOL) 325 MG tablet Take 650 mg by mouth 3 times daily Do not exceed 4gm in 24 hours. AND Give 650 mg by mouth every 6 hours as needed for pain (do not exceed 4gm acetaminophen in 24 hrs) 0800, 1400, 2000       amLODIPine (NORVASC) 10 MG tablet Take 1 tablet (10 mg) by mouth daily (Patient taking differently: Take 10 mg by mouth At Bedtime )       apixaban ANTICOAGULANT (ELIQUIS) 2.5 MG tablet Take 1 tablet (2.5 mg) by mouth 2 times daily 60 tablet 0     bisacodyl (DULCOLAX) 5 MG EC tablet Take 1 tablet (5 mg) by mouth daily as needed for constipation       clopidogrel (PLAVIX) 75 MG tablet Take 1 tablet (75 mg) by mouth daily       cyanocobalamin (VITAMIN B-12) 500 MCG SUBL sublingual tablet Place 2 tablets (1,000 mcg) under the tongue daily 180 tablet 2     digoxin (LANOXIN) 125 MCG tablet Take 1  tablet (125 mcg) by mouth daily 90 tablet 3     glipiZIDE (GLUCOTROL) 10 MG tablet Take 2 tablets (20 mg) by mouth 2 times daily Take 20mg in AM with breakfast and 20mg in PM with evening meal 360 tablet 0     HYDROcodone-acetaminophen (NORCO) 5-325 MG tablet Take 1 tablet by mouth 2 times daily as needed for severe pain 10 tablet 0     isosorbide mononitrate (IMDUR) 30 MG 24 hr tablet Take 1 tablet (30 mg) by mouth daily 30 tablet 3     loperamide (IMODIUM A-D) 2 MG tablet Take 1-2 tablets (2-4 mg) by mouth 3 times daily as needed for diarrhea 100 tablet 3     metFORMIN (GLUCOPHAGE) 1000 MG tablet Take 1 tablet (1,000 mg) by mouth 2 times daily (with meals)       metoprolol succinate ER (TOPROL-XL) 50 MG 24 hr tablet Take 100 mg by mouth daily       multivitamin, therapeutic (THERA-VIT) TABS tablet Take 1 tablet by mouth daily       OLANZapine zydis (ZYPREXA) 10 MG ODT Take 1 tablet (10 mg) by mouth every evening 90 tablet 2     ondansetron (ZOFRAN-ODT) 4 MG ODT tab Take 1 tablet (4 mg) by mouth every 6 hours as needed for nausea or vomiting       ONETOUCH DELICA LANCETS 33G MISC 1 strip 2 times daily Check blood sugars once daily and  each 11     rosuvastatin (CRESTOR) 10 MG tablet Take 1 tablet (10 mg) by mouth daily       senna-docusate (SENOKOT-S/PERICOLACE) 8.6-50 MG tablet Take 1 tablet by mouth daily Hold for loose stools.       tamsulosin (FLOMAX) 0.4 MG capsule Take 1 capsule (0.4 mg) by mouth daily 90 capsule 2           Past Medical History:     Past Medical History:   Diagnosis Date     Atrial fibrillation (H)      Cancer (H)      Cerebral infarction (H)      Chronic kidney disease      Coronary artery disease 06/25/2019    s/p PCI      Diabetes (H)      Diabetes mellitus, type 2 (H)      Edema      Hypertension      Past Surgical History:   Procedure Laterality Date     CHOLECYSTECTOMY       CV HEART CATHETERIZATION WITH POSSIBLE INTERVENTION N/A 6/25/2019    Procedure: Coronary Angiogram;   Surgeon: Cachorro Garcia MD;  Location:  HEART CARDIAC CATH LAB     CV INSTANTANEOUS WAVE-FREE RATIO N/A 2019    Procedure: Instantaneous Wave-Free Ratio;  Surgeon: Cachorro Garcia MD;  Location:  HEART CARDIAC CATH LAB     CV PCI STENT DRUG ELUTING N/A 2019    Procedure: PCI Stent Drug Eluting;  Surgeon: Cachorro Garcia MD;  Location:  HEART CARDIAC CATH LAB     CV RIGHT HEART CATH N/A 2019    Procedure: Right Heart Cath;  Surgeon: Cachorro Garcia MD;  Location:  HEART CARDIAC CATH LAB     LAPAROSCOPIC APPENDECTOMY       History reviewed. No pertinent family history.  Social History     Socioeconomic History     Marital status:      Spouse name: Not on file     Number of children: Not on file     Years of education: Not on file     Highest education level: Not on file   Occupational History     Not on file   Social Needs     Financial resource strain: Not on file     Food insecurity:     Worry: Not on file     Inability: Not on file     Transportation needs:     Medical: Not on file     Non-medical: Not on file   Tobacco Use     Smoking status: Former Smoker     Packs/day: 2.00     Types: Cigarettes     Start date:      Last attempt to quit: 2008     Years since quittin.6     Smokeless tobacco: Never Used   Substance and Sexual Activity     Alcohol use: Yes     Alcohol/week: 0.0 oz     Comment: Occasionally.      Drug use: No     Sexual activity: Not Currently     Partners: Female   Lifestyle     Physical activity:     Days per week: Not on file     Minutes per session: Not on file     Stress: Not on file   Relationships     Social connections:     Talks on phone: Not on file     Gets together: Not on file     Attends Mandaeism service: Not on file     Active member of club or organization: Not on file     Attends meetings of clubs or organizations: Not on file     Relationship status: Not on file     Intimate partner violence:     Fear of current or ex  partner: Not on file     Emotionally abused: Not on file     Physically abused: Not on file     Forced sexual activity: Not on file   Other Topics Concern     Parent/sibling w/ CABG, MI or angioplasty before 65F 55M? Not Asked   Social History Narrative     Not on file           Allergies:   Nkda [no known drug allergies]      Lilian Woods PA-C  [facility]  Pager: [phone]      Thank you for allowing me to participate in the care of your patient.    Sincerely,     Lilian Woods PA-C     [facility]

## 2019-08-12 NOTE — PATIENT INSTRUCTIONS
Today's Plan:   - Let's keep Suman off of the Lasix for now. Facility to monitor daily weights and call my RN with a weight gain of 2+ lbs overnight which doesn't return to baseline by the following day, or 5+ lbs within one week. In that case, I will start him back on low dose Lasix. Please also call if any leg swelling or difficulty breathing is noted.   - I will ask neurology to dictate any change in the blood pressure medications for now.   - Please return to see Dr. Ruiz in 1 month with labs (CBC, BMP) prior.     If you have questions or concerns please call my nurse team at [phone].  Scheduling phone number: [phone]  Reminder: Please bring in all current medications, over the counter supplements and vitamin bottles to your next appointment.    It was a pleasure seeing you today!     Lilian Woods PA-C

## 2019-08-13 ENCOUNTER — DOCUMENTATION ONLY (OUTPATIENT)
Dept: CARE COORDINATION | Facility: CLINIC | Age: 84
End: 2019-08-13

## 2019-08-13 ENCOUNTER — TELEPHONE (OUTPATIENT)
Dept: FAMILY MEDICINE | Facility: CLINIC | Age: 84
End: 2019-08-13

## 2019-08-13 DIAGNOSIS — I50.32 CHRONIC DIASTOLIC CONGESTIVE HEART FAILURE (H): ICD-10-CM

## 2019-08-13 DIAGNOSIS — R29.90 STROKE-LIKE SYMPTOM: ICD-10-CM

## 2019-08-13 RX ORDER — CLOPIDOGREL BISULFATE 75 MG/1
75 TABLET ORAL DAILY
Qty: 30 TABLET | Refills: 5 | Status: CANCELLED | OUTPATIENT
Start: 2019-08-13

## 2019-08-13 RX ORDER — CLOPIDOGREL BISULFATE 75 MG/1
75 TABLET ORAL DAILY
Qty: 30 TABLET | Refills: 5 | Status: SHIPPED | OUTPATIENT
Start: 2019-08-13

## 2019-08-13 NOTE — PROGRESS NOTES
[facility] now requests orders and shares plan of care/discharge summaries for some patients through [employer].  Please REPLY TO THIS MESSAGE OR ROUTE BACK TO THE AUTHOR in order to give authorization for orders when needed.  This is considered a verbal order, you will still receive a faxed copy of orders for signature.  Thank you for your assistance in improving collaboration for our patients.    ORDER    Requesting orders for ST [id number] to target cognitive linguistic and word finding skills.    Molly Edwards MA, CCC-SLP  Speech-Language Pathologist  [facility]  [email or web address]  [phone]

## 2019-08-13 NOTE — TELEPHONE ENCOUNTER
Call back from daughterLydia this afternoon.     Eliquis and Plavix     Follow up questions from patients last OV with PCP.     Could not  short term supply of Eliquis because it was 400 dollars. Currently taking samples from \[facility]"" clinic of neurology. Will run out of samples within the next couple of weeks.       Daughter reaching out to see if provider was able to [facility] as well as able to see if these [facility] yet? She needs to know ahead of time so if more samples are needed she can request from neurology.

## 2019-08-13 NOTE — TELEPHONE ENCOUNTER
Our goal is to have forms completed within 72 hours, however some forms may require a visit or additional information.    What clinic location was the form placed at [facility] or [place].?     Who is the form from?   Where did the form come from? Faxed to clinic   The form was placed in the inbox of Jamie Guerrier MD      Please fax to [phone]  Phone number: [phone]    Additional [facility], & [facility]     Call take on 8/13/2019 at 11:19 AM by Ca Conteh

## 2019-08-13 NOTE — TELEPHONE ENCOUNTER
Reason for Call:  Other call back    Detailed comments: The patients daughter called and stated that her father had seen Dr. Guerrier on 8/7/19 and they needed to get prescriptions for apixaban ANTICOAGULANT (ELIQUIS) 2.5 MG tablet and clopidogrel (PLAVIX) 75 MG tablet approved so he could [facility] since [facility]. She is calling to get an update on if everything is good to go with those medications as she would like to pick them up soon. She would like a call back to discuss this.     Phone Number Patient can be reached at: [phone]    Best Time: Any    Can we leave a detailed message on this number? YES    Call taken on 8/13/2019 at 2:35 PM by Flor Auguste

## 2019-08-16 ENCOUNTER — TELEPHONE (OUTPATIENT)
Dept: FAMILY MEDICINE | Facility: CLINIC | Age: 84
End: 2019-08-16

## 2019-08-16 NOTE — TELEPHONE ENCOUNTER
Our goal is to have forms completed within 72 hours, however some forms may require a visit or additional information.    What clinic location was the form placed at [facility] or [place].?     Who is the form from?   Where did the form come from? Faxed to clinic   The form was placed in the inbox of Jamie Guerrier MD      Please fax to [phone]  Phone number: [phone]    Additional comments: [facility] suites review discrepancies    Call take on 8/16/2019 at 11:59 AM by Ca Conteh

## 2019-08-18 ENCOUNTER — MEDICAL CORRESPONDENCE (OUTPATIENT)
Dept: HEALTH INFORMATION MANAGEMENT | Facility: CLINIC | Age: 84
End: 2019-08-18

## 2019-08-19 ENCOUNTER — TELEPHONE (OUTPATIENT)
Dept: FAMILY MEDICINE | Facility: CLINIC | Age: 84
End: 2019-08-19

## 2019-08-19 NOTE — TELEPHONE ENCOUNTER
Our goal is to have forms completed within 72 hours, however some forms may require a visit or additional information.    What clinic location was the form placed at [facility] or [place].?     Who is the form from?   Where did the form come from? Faxed to clinic   The form was placed in the inbox of Jamie Guerrier MD      Please fax to [phone]  Phone number: [phone]    Additional comments: [facility] suites fall incident     Call take on 8/19/2019 at 9:33 AM by Ca Conteh

## 2019-08-19 NOTE — TELEPHONE ENCOUNTER
Our goal is to have forms completed within 72 hours, however some forms may require a visit or additional information.    What clinic location was the form placed at [facility] or [place].?     Who is the form from?   Where did the form come from? Faxed to clinic   The form was placed in the inbox of Jamie Guerrier MD      Please fax to [phone]   Phone number:      Additional comments: [facility] OT 2 w 3, 1 w 1    Call take on 8/19/2019 at 9:53 AM by Ca Conteh

## 2019-08-21 ENCOUNTER — TELEPHONE (OUTPATIENT)
Dept: FAMILY MEDICINE | Facility: CLINIC | Age: 84
End: 2019-08-21

## 2019-08-21 NOTE — TELEPHONE ENCOUNTER
Our goal is to have forms completed within 72 hours, however some forms may require a visit or additional information.    What clinic location was the form placed at [facility] or [place].?     Who is the form from?   Where did the form come from? Faxed to clinic   The form was placed in the inbox of Jamie Guerrier MD and [facility]      Please fax to [phone]  Phone number:      Additional comments: [facility] needs [facility] stating why on tamsulosin, b-12, & olanzapine  need most recent b-12 studies and CBC    Call take on 8/21/2019 at 10:30 AM by Ca Conteh

## 2019-08-26 ENCOUNTER — OFFICE VISIT (OUTPATIENT)
Dept: FAMILY MEDICINE | Facility: CLINIC | Age: 84
End: 2019-08-26
Payer: MEDICARE

## 2019-08-26 VITALS
TEMPERATURE: 97.5 F | HEART RATE: 69 BPM | SYSTOLIC BLOOD PRESSURE: 132 MMHG | OXYGEN SATURATION: 94 % | DIASTOLIC BLOOD PRESSURE: 60 MMHG

## 2019-08-26 DIAGNOSIS — E11.65 TYPE 2 DIABETES MELLITUS WITH HYPERGLYCEMIA, WITH LONG-TERM CURRENT USE OF INSULIN (H): ICD-10-CM

## 2019-08-26 DIAGNOSIS — R60.9 DEPENDENT EDEMA: ICD-10-CM

## 2019-08-26 DIAGNOSIS — N18.30 CHRONIC KIDNEY DISEASE, STAGE III (MODERATE) (H): ICD-10-CM

## 2019-08-26 DIAGNOSIS — L60.3 DYSTROPHIC NAIL: ICD-10-CM

## 2019-08-26 DIAGNOSIS — Z79.4 TYPE 2 DIABETES MELLITUS WITH HYPERGLYCEMIA, WITH LONG-TERM CURRENT USE OF INSULIN (H): ICD-10-CM

## 2019-08-26 DIAGNOSIS — R29.6 RECURRENT FALLS: ICD-10-CM

## 2019-08-26 DIAGNOSIS — R41.3 SHORT-TERM MEMORY LOSS: ICD-10-CM

## 2019-08-26 DIAGNOSIS — S06.5XAA SUBDURAL HEMATOMA (H): ICD-10-CM

## 2019-08-26 DIAGNOSIS — I48.20 CHRONIC ATRIAL FIBRILLATION (H): ICD-10-CM

## 2019-08-26 DIAGNOSIS — R06.02 SOB (SHORTNESS OF BREATH): Primary | ICD-10-CM

## 2019-08-26 LAB — NT-PROBNP SERPL-MCNC: 2448 PG/ML (ref 0–450)

## 2019-08-26 PROCEDURE — 36415 COLL VENOUS BLD VENIPUNCTURE: CPT | Performed by: FAMILY MEDICINE

## 2019-08-26 PROCEDURE — 99215 OFFICE O/P EST HI 40 MIN: CPT | Performed by: FAMILY MEDICINE

## 2019-08-26 PROCEDURE — 83880 ASSAY OF NATRIURETIC PEPTIDE: CPT | Performed by: FAMILY MEDICINE

## 2019-08-26 NOTE — PATIENT INSTRUCTIONS
I spent 50 minutes with the patient, his wife and his daughter.  The cost of Eliquis is extraordinarily high.  I am not sure if the [facility] covers other products that are similar.  They do not cover Eliquis.  We did discuss possibly going back on warfarin.  However, given the fact that he has had recurrent falls and his subdural I am a little nervous putting him back on warfarin where the highs and lows can occur and he could spontaneously have problems with bleeding even if he does not fall.  They are going to check at [facility] to see if any of similar drugs to Eliquis are covered on their formulary.  He has also been complaining of looser stools.  Where he is staying right now they are reluctant to give him Imodium.  His stools have been soft and mushy.  His order for Imodium is written up to 3 times daily as needed.  I explained to the patient and his family that would rather have his stool soft and mushy then hard and getting problems with constipation.    Given his diabetes and his dystrophic nails and scaling on his feet I did refer him to podiatry.  He used to have [facility] come out once a month or so to take care of his toenails.  That could again resume but we will get an opinion from podiatry first.    I also reviewed the [employer] form with the patient, his wife and his daughter.

## 2019-08-26 NOTE — PROGRESS NOTES
"Subjective     Suman Burton is a 84 year old male who presents to clinic today for the following health issues:    HPI     Pt here to follow up on last visits, pt fell, memory, breathing issue,sleep  Can pt take 1000mg  valerin root?  Was told to take 900  Pt has some forms needed for [employer]  Want to update and go over [place]  Pt has developed a cough and complaining of \"heart failure\"  Pt complaining of right sided chest/side into front possibly from one of his falls?      Heart Failure Follow-up  Are you experiencing any shortness of breath? Yes, with activity only   How would you describe your shortness of breath?  Same as usual        Are you experiencing any swelling in your legs or feet?  No    Are you using more pillows than usual? No    Do you cough at night?  No    Do you check your weight daily?  No    Have you had a weight change recently?  No    Are you having any of the following side effects from your medications? (Select all that apply)  The patient does not report symptoms of dizziness, fatigue, cough, swelling, or slow heart beat.    Since your last visit, how many times have you gone to the cardiologist, urgent care, emergency room, or hospital because of your heart failure?   None      Patient Active Problem List   Diagnosis     Adenocarcinoma (H)     Benign hypertension     Health Care Home     Cerebral infarction due to occlusion or stenosis of carotid artery     Chronic kidney disease, stage III (moderate) (H)     Diabetes mellitus, type 2 (H)     Hyperlipidemia     Lumbago     Thoracic or lumbosacral neuritis or radiculitis, unspecified     Transient visual loss     Diabetes mellitus, type 2 (H)     Chronic atrial fibrillation (H)     Chronic pain syndrome     Hypersomnia     Persistent insomnia     Dystrophic nail     Retinal artery occlusion     SOB (shortness of breath)     Urinary incontinence without sensory awareness     Moderate major depression (H)     Dependent edema     " Mitral regurgitation     Tricuspid regurgitation     Chest pain due to myocardial ischemia, unspecified ischemic chest pain type     Status post coronary angiogram     Stroke-like symptom     Delirium     Encephalopathy     Short-term memory loss     Diarrhea, unspecified type     Recurrent falls     Subdural hematoma (H)     Past Surgical History:   Procedure Laterality Date     CHOLECYSTECTOMY       CV HEART CATHETERIZATION WITH POSSIBLE INTERVENTION N/A 2019    Procedure: Coronary Angiogram;  Surgeon: Cachorro Garcia MD;  Location:  HEART CARDIAC CATH LAB     CV INSTANTANEOUS WAVE-FREE RATIO N/A 2019    Procedure: Instantaneous Wave-Free Ratio;  Surgeon: Cachorro Garcia MD;  Location:  HEART CARDIAC CATH LAB     CV PCI STENT DRUG ELUTING N/A 2019    Procedure: PCI Stent Drug Eluting;  Surgeon: Cachorro Garcia MD;  Location:  HEART CARDIAC CATH LAB     CV RIGHT HEART CATH N/A 2019    Procedure: Right Heart Cath;  Surgeon: Cachorro Garcia MD;  Location:  HEART CARDIAC CATH LAB     LAPAROSCOPIC APPENDECTOMY         Social History     Tobacco Use     Smoking status: Former Smoker     Packs/day: 2.00     Types: Cigarettes     Start date:      Last attempt to quit:      Years since quittin.6     Smokeless tobacco: Never Used   Substance Use Topics     Alcohol use: Yes     Alcohol/week: 0.0 oz     Comment: Occasionally.      History reviewed. No pertinent family history.        Reviewed and updated as needed this visit by Provider         Review of Systems   ROS COMP: Constitutional, HEENT, cardiovascular, pulmonary, gi and gu systems are negative, except as otherwise noted.      Objective    /60 (Cuff Size: Adult Regular)   Pulse 69   Temp 97.5  F (36.4  C) (Tympanic)   SpO2 94%   There is no height or weight on file to calculate BMI.  Physical Exam   GENERAL APPEARANCE: healthy, alert and no distress  RESP: lungs clear to auscultation - no  rales, rhonchi or wheezes  CV: regular rates and rhythm, normal S1 S2, no S3 or S4 and no murmur, click or rub  SKIN: no suspicious lesions or rashes and toenail deformities are present along with marked scaling of the skin of the feet.            Assessment & Plan       ICD-10-CM    1. SOB (shortness of breath) R06.02 BNP-N terminal pro   2. Recurrent falls R29.6    3. Subdural hematoma (H) S06.5X9A    4. Short-term memory loss R41.3    5. Dependent edema R60.9    6. Dystrophic nail L60.3 PODIATRY/FOOT & ANKLE SURGERY REFERRAL   7. Chronic atrial fibrillation (H) I48.2    8. Type 2 diabetes mellitus with hyperglycemia, with long-term current use of insulin (H) E11.65     Z79.4    9. Chronic kidney disease, stage III (moderate) (H) N18.3           Patient Instructions   I spent 50 minutes with the patient, his wife and his daughter.  The cost of Eliquis is extraordinarily high.  I am not sure if the [facility] covers other products that are similar.  They do not cover Eliquis.  We did discuss possibly going back on warfarin.  However, given the fact that he has had recurrent falls and his subdural I am a little nervous putting him back on warfarin where the highs and lows can occur and he could spontaneously have problems with bleeding even if he does not fall.  They are going to check at [facility] to see if any of similar drugs to Eliquis are covered on their formulary.  He has also been complaining of looser stools.  Where he is staying right now they are reluctant to give him Imodium.  His stools have been soft and mushy.  His order for Imodium is written up to 3 times daily as needed.  I explained to the patient and his family that would rather have his stool soft and mushy then hard and getting problems with constipation.    Given his diabetes and his dystrophic nails and scaling on his feet I did refer him to podiatry.  He used to have [facility] come out once a month or so to take care of his toenails.  That could again  resume but we will get an opinion from podiatry first.    I also reviewed the POLST form with the patient, his wife and his daughter.      Return in about 3 months (around 11/26/2019) for diabetes,recurrent falls.    Jamie Guerrier MD  [facility]

## 2019-08-26 NOTE — LETTER
August 27, 2019      Suman Burton  [address] [address]        Dear ,    We are writing to inform you of your test results.    Your test is markedly elevated.  That would just suggest that a lot of your shortness of breath may be coming from heart failure.  We may need to adjust some of your medicines.  Let me know how you would like to proceed.    Resulted Orders   BNP-N terminal pro   Result Value Ref Range    N-Terminal Pro Bnp 2,448 (H) 0 - 450 pg/mL      Comment:         Reference range shown and results flagged as abnormal are for the outpatient,   non acute settings. Establishing a baseline value for each individual patient   is useful for follow-up.  Suggested inpatient cut points for confirming diagnosis of CHF in an acute   setting are:   >450 pg/mL (age 18 to less than 50)   >900 pg/mL (age 50 to less than 75)   >1800 pg/mL (75 yrs and older)  An inpatient or emergency department NT-proPBNP <300 pg/mL effectively rules   out acute CHF, with 99% negative predictive value.          If you have any questions or concerns, please call the clinic at the number listed above.       Sincerely,        Jamie Guerrier MD

## 2019-08-27 ENCOUNTER — TELEPHONE (OUTPATIENT)
Dept: FAMILY MEDICINE | Facility: CLINIC | Age: 84
End: 2019-08-27

## 2019-08-27 ENCOUNTER — DOCUMENTATION ONLY (OUTPATIENT)
Dept: OTHER | Facility: CLINIC | Age: 84
End: 2019-08-27

## 2019-08-27 ENCOUNTER — AMBULATORY - HEALTHEAST (OUTPATIENT)
Dept: OTHER | Facility: CLINIC | Age: 84
End: 2019-08-27

## 2019-08-27 DIAGNOSIS — Z53.9 DIAGNOSIS NOT YET DEFINED: Primary | ICD-10-CM

## 2019-08-27 PROCEDURE — G0180 MD CERTIFICATION HHA PATIENT: HCPCS | Performed by: FAMILY MEDICINE

## 2019-08-27 NOTE — TELEPHONE ENCOUNTER
Our goal is to have forms completed within 72 hours, however some forms may require a visit or additional information.    What clinic location was the form placed at [facility] or [place].?     Who is the form from?   Where did the form come from? Faxed to clinic   The form was placed in the inbox of Jamie Guerrier MD      Please fax to [phone]  Phone number: [phone]    Additional comments: [facility] suites family noticed patient confused     Call take on 8/27/2019 at 2:34 PM by Ca Conteh

## 2019-08-27 NOTE — TELEPHONE ENCOUNTER
Our goal is to have forms completed within 72 hours, however some forms may require a visit or additional information.    What clinic location was the form placed at [facility] or [place].?     Who is the form from?   Where did the form come from? Faxed to clinic   The form was placed in the inbox of Jamie Guerrier MD      Please fax to [phone]   Phone number:      Additional comments: [facility] 4 month 1    Call take on 8/27/2019 at 11:44 AM by Ca Conteh

## 2019-08-27 NOTE — TELEPHONE ENCOUNTER
Our goal is to have forms completed within 72 hours, however some forms may require a visit or additional information.    What clinic location was the form placed at [facility] or [place].?     Who is the form from?   Where did the form come from? Faxed to clinic   The form was placed in the inbox of Jamie Guerrier MD      Please fax to [phone]  Phone number: [phone]    Additional comments: [facility] suites fall incident     Call take on 8/27/2019 at 2:32 PM by Ca Conteh

## 2019-08-27 NOTE — TELEPHONE ENCOUNTER
Our goal is to have forms completed within 72 hours, however some forms may require a visit or additional information.    What clinic location was the form placed at [facility] or [place].?     Who is the form from?   Where did the form come from? Faxed to clinic   The form was placed in the inbox of Jamie Guerrier MD      Please fax to [phone]  Phone number:      Additional comments: [facility] 8/6/19 to 10/4/19    Call take on 8/27/2019 at 2:10 PM by Ca Conteh

## 2019-08-28 ENCOUNTER — TELEPHONE (OUTPATIENT)
Dept: FAMILY MEDICINE | Facility: CLINIC | Age: 84
End: 2019-08-28

## 2019-08-28 DIAGNOSIS — N18.30 TYPE 2 DIABETES MELLITUS WITH STAGE 3 CHRONIC KIDNEY DISEASE, WITHOUT LONG-TERM CURRENT USE OF INSULIN (H): Primary | ICD-10-CM

## 2019-08-28 DIAGNOSIS — E11.22 TYPE 2 DIABETES MELLITUS WITH STAGE 3 CHRONIC KIDNEY DISEASE, WITHOUT LONG-TERM CURRENT USE OF INSULIN (H): Primary | ICD-10-CM

## 2019-08-29 ENCOUNTER — OFFICE VISIT (OUTPATIENT)
Dept: PODIATRY | Facility: CLINIC | Age: 84
End: 2019-08-29
Payer: MEDICARE

## 2019-08-29 ENCOUNTER — TELEPHONE (OUTPATIENT)
Dept: FAMILY MEDICINE | Facility: CLINIC | Age: 84
End: 2019-08-29

## 2019-08-29 VITALS
SYSTOLIC BLOOD PRESSURE: 129 MMHG | HEART RATE: 73 BPM | DIASTOLIC BLOOD PRESSURE: 66 MMHG | HEIGHT: 68 IN | BODY MASS INDEX: 19.81 KG/M2 | WEIGHT: 130.7 LBS

## 2019-08-29 DIAGNOSIS — L85.3 XEROSIS OF SKIN: ICD-10-CM

## 2019-08-29 DIAGNOSIS — E11.42 TYPE 2 DIABETES MELLITUS WITH DIABETIC POLYNEUROPATHY, UNSPECIFIED WHETHER LONG TERM INSULIN USE (H): ICD-10-CM

## 2019-08-29 DIAGNOSIS — B35.1 ONYCHOMYCOSIS: Primary | ICD-10-CM

## 2019-08-29 PROCEDURE — 99203 OFFICE O/P NEW LOW 30 MIN: CPT | Mod: 25 | Performed by: PODIATRIST

## 2019-08-29 PROCEDURE — 11721 DEBRIDE NAIL 6 OR MORE: CPT | Mod: GA | Performed by: PODIATRIST

## 2019-08-29 RX ORDER — AMMONIUM LACTATE 12 G/100G
CREAM TOPICAL DAILY PRN
Qty: 385 G | Refills: 3 | Status: SHIPPED | OUTPATIENT
Start: 2019-08-29

## 2019-08-29 RX ORDER — AMMONIUM LACTATE 12 G/100G
CREAM TOPICAL DAILY PRN
Qty: 385 G | Refills: 3 | Status: SHIPPED | OUTPATIENT
Start: 2019-08-29 | End: 2019-08-29

## 2019-08-29 ASSESSMENT — MIFFLIN-ST. JEOR: SCORE: 1257.35

## 2019-08-29 NOTE — TELEPHONE ENCOUNTER
Reason for Call: Request for an order or referral:    Order or referral being requested: Hospice orders    Date needed: as soon as possible    Has the patient been seen by the PCP for this problem? YES    Additional comments: Jacqueline petersen nurse at the nursing home where the patient lives would like Dr. Guerrier to put in orders for hospice evaluate and treat and she would like them faxed to her at [phone] and she would like a call back to discuss this. She also stated that she needs this done as soon as possible.     Phone number Patient can be reached at:  Other phone number: [phone]    Best Time:  Any    Can we leave a detailed message on this number?  YES    Call taken on 8/29/2019 at 12:09 PM by Flor Auguste

## 2019-08-29 NOTE — LETTER
8/29/2019         RE: Suman Burton  [address] [address]        Dear Colleague,    Thank you for referring your patient, Suman Burton, to the [facility]. Please see a copy of my visit note below.    PATIENT HISTORY:  Suman Burton is a 84 year old male who presents to clinic for thick toenails, skin concerns.  Present for years.  Daughter present, who informs me pt is going into hospice care.  Hx of DM with neuropathy.  Normally sees [facility] for nail care.  Pt with recent stroke.  Denies current pain.  They request nail care today.    I was requested to see this patient for this issue by Dr Guerrier.    Review of Systems:  Patient denies fever, chills, rash, wound, stiffness, limping, numbness, weakness, heart burn, blood in stool, chest pain with activity, calf pain when walking, shortness of breath with activity, chronic cough, swelling of ankles, excessive thirst, fatigue, depression, anxiety.  Patient admits to easy bleeding/bruising.     PAST MEDICAL HISTORY:   Past Medical History:   Diagnosis Date     Atrial fibrillation (H)      Cancer (H)      Cerebral infarction (H)      Chronic kidney disease      Coronary artery disease 06/25/2019    s/p PCI      Diabetes (H)      Diabetes mellitus, type 2 (H)      Edema      Hypertension         PAST SURGICAL HISTORY:   Past Surgical History:   Procedure Laterality Date     CHOLECYSTECTOMY       CV HEART CATHETERIZATION WITH POSSIBLE INTERVENTION N/A 6/25/2019    Procedure: Coronary Angiogram;  Surgeon: Cachorro Garcia MD;  Location:  HEART CARDIAC CATH LAB     CV INSTANTANEOUS WAVE-FREE RATIO N/A 6/25/2019    Procedure: Instantaneous Wave-Free Ratio;  Surgeon: Cachorro Garcia MD;  Location:  HEART CARDIAC CATH LAB     CV PCI STENT DRUG ELUTING N/A 6/25/2019    Procedure: PCI Stent Drug Eluting;  Surgeon: Cachorro Garcia MD;  Location:  HEART CARDIAC CATH LAB     CV RIGHT HEART CATH N/A  6/25/2019    Procedure: Right Heart Cath;  Surgeon: Cachorro Garcia MD;  Location:  HEART CARDIAC CATH LAB     LAPAROSCOPIC APPENDECTOMY          MEDICATIONS:   Current Outpatient Medications:      ammonium lactate (LAC-HYDRIN) 12 % external cream, Apply topically daily as needed for dry skin Do not apply between toes., Disp: 385 g, Rfl: 3     acetaminophen (TYLENOL) 325 MG tablet, Take 650 mg by mouth 3 times daily Do not exceed 4gm in 24 hours. AND Give 650 mg by mouth every 6 hours as needed for pain (do not exceed 4gm acetaminophen in 24 hrs) 0800, 1400, 2000, Disp: , Rfl:      amLODIPine (NORVASC) 10 MG tablet, Take 1 tablet (10 mg) by mouth daily (Patient taking differently: Take 10 mg by mouth At Bedtime ), Disp: , Rfl:      apixaban ANTICOAGULANT (ELIQUIS) 2.5 MG tablet, Take 1 tablet (2.5 mg) by mouth 2 times daily, Disp: 60 tablet, Rfl: 5     bisacodyl (DULCOLAX) 5 MG EC tablet, Take 1 tablet (5 mg) by mouth daily as needed for constipation, Disp: , Rfl:      clopidogrel (PLAVIX) 75 MG tablet, Take 1 tablet (75 mg) by mouth daily, Disp: 30 tablet, Rfl: 5     cyanocobalamin (VITAMIN B-12) 500 MCG SUBL sublingual tablet, Place 2 tablets (1,000 mcg) under the tongue daily, Disp: 180 tablet, Rfl: 2     digoxin (LANOXIN) 125 MCG tablet, Take 1 tablet (125 mcg) by mouth daily, Disp: 90 tablet, Rfl: 3     glipiZIDE (GLUCOTROL) 10 MG tablet, Take 2 tablets (20 mg) by mouth 2 times daily Take 20mg in AM with breakfast and 20mg in PM with evening meal, Disp: 360 tablet, Rfl: 0     HYDROcodone-acetaminophen (NORCO) 5-325 MG tablet, Take 1 tablet by mouth 2 times daily as needed for severe pain, Disp: 10 tablet, Rfl: 0     isosorbide mononitrate (IMDUR) 30 MG 24 hr tablet, Take 1 tablet (30 mg) by mouth daily, Disp: 30 tablet, Rfl: 3     loperamide (IMODIUM A-D) 2 MG tablet, Take 1-2 tablets (2-4 mg) by mouth 3 times daily as needed for diarrhea, Disp: 100 tablet, Rfl: 3     metFORMIN (GLUCOPHAGE) 1000 MG  tablet, Take 1 tablet (1,000 mg) by mouth 2 times daily (with meals), Disp: , Rfl:      metoprolol succinate ER (TOPROL-XL) 50 MG 24 hr tablet, Take 100 mg by mouth daily, Disp: , Rfl:      multivitamin, therapeutic (THERA-VIT) TABS tablet, Take 1 tablet by mouth daily, Disp: , Rfl:      OLANZapine zydis (ZYPREXA) 10 MG ODT, Take 1 tablet (10 mg) by mouth every evening, Disp: 90 tablet, Rfl: 2     ondansetron (ZOFRAN-ODT) 4 MG ODT tab, Take 1 tablet (4 mg) by mouth every 6 hours as needed for nausea or vomiting, Disp: , Rfl:      ONETOUCH DELICA LANCETS 33G MISC, 1 strip 2 times daily Check blood sugars once daily and PRN, Disp: 100 each, Rfl: 11     order for DME, Glucometer, brand as covered by insurance., Disp: 1 each, Rfl: 0     order for DME, Test strips for pt's glucometer, brand as covered by insurance. Test daily and prn. Accu- chek sterile single use., Disp: 100 each, Rfl: 4     order for DME, Lancets.  Daily and prn.  Fill per patient's insurance. Accu- chek sterile single use lancets., Disp: 100 each, Rfl: 4     rosuvastatin (CRESTOR) 10 MG tablet, Take 1 tablet (10 mg) by mouth daily, Disp: , Rfl:      senna-docusate (SENOKOT-S/PERICOLACE) 8.6-50 MG tablet, Take 1 tablet by mouth daily Hold for loose stools., Disp: , Rfl:      tamsulosin (FLOMAX) 0.4 MG capsule, Take 1 capsule (0.4 mg) by mouth daily, Disp: 90 capsule, Rfl: 2     ALLERGIES:    Allergies   Allergen Reactions     Nkda [No Known Drug Allergies]         SOCIAL HISTORY:   Social History     Socioeconomic History     Marital status:      Spouse name: Not on file     Number of children: Not on file     Years of education: Not on file     Highest education level: Not on file   Occupational History     Not on file   Social Needs     Financial resource strain: Not on file     Food insecurity:     Worry: Not on file     Inability: Not on file     Transportation needs:     Medical: Not on file     Non-medical: Not on file   Tobacco Use      "Smoking status: Former Smoker     Packs/day: 2.00     Types: Cigarettes     Start date:      Last attempt to quit: 2008     Years since quittin.6     Smokeless tobacco: Never Used   Substance and Sexual Activity     Alcohol use: Yes     Alcohol/week: 0.0 oz     Comment: Occasionally.      Drug use: No     Sexual activity: Not Currently     Partners: Female   Lifestyle     Physical activity:     Days per week: Not on file     Minutes per session: Not on file     Stress: Not on file   Relationships     Social connections:     Talks on phone: Not on file     Gets together: Not on file     Attends Oriental orthodox service: Not on file     Active member of club or organization: Not on file     Attends meetings of clubs or organizations: Not on file     Relationship status: Not on file     Intimate partner violence:     Fear of current or ex partner: Not on file     Emotionally abused: Not on file     Physically abused: Not on file     Forced sexual activity: Not on file   Other Topics Concern     Parent/sibling w/ CABG, MI or angioplasty before 65F 55M? Not Asked   Social History Narrative     Not on file        FAMILY HISTORY: No pertinent family history.     EXAM:Vitals: /66   Pulse 73   Ht 1.727 m (5' 8\")   Wt 59.3 kg (130 lb 11.2 oz)   BMI 19.87 kg/m     BMI= Body mass index is 19.87 kg/m .    General appearance: Patient is alert and fully cooperative with history & exam.  No sign of distress is noted during the visit.     Psychiatric: Affect is pleasant & appropriate.  Patient appears motivated to improve health.     Respiratory: Breathing is regular & unlabored while sitting.     HEENT: Hearing is intact to spoken word.  Speech is clear.  No gross evidence of visual impairment that would impact ambulation.     Dermatologic: scaling xerotic skin to dorsal and lateral aspects of feet including toes.  B/l toenails are thickened, mycotic, elongated.  No paronychia or evidence of soft tissue infection is " noted.  Atrophic skin.     Vascular: DP & PT pulses are not readily palpable on the R, 1/4 on the L  No significant edema.  Varicosities noted.  CFT and skin temperature are normal to both lower extremities.     Neurologic: Lower extremity sensation is diminished to light touch b/l.     Musculoskeletal: Patient presents in a WC.  No gross ankle deformity noted.  No foot or ankle joint effusion is noted.     ASSESSMENT:   B/l onychomycosis  B/l xerosis of skin of feet  DM II with neuropathy     PLAN:  Reviewed patient's chart in epic.  Discussed condition and treatment options including pros and cons.    I debrided the nails x10 with a nail nipper.  Discussed I normally don't do routine nail care.  They are content to continue with [facility].  ABN was signed.    Lachydrin prescribed for dry skin.  Do not apply between toes.    All questions answered.    F/u prn.     Carmine Hyde DPM, FACFAS    Weight management plan Patient was referred to their PCP to discuss a diet and exercise plan.      Again, thank you for allowing me to participate in the care of your patient.        Sincerely,        Carmine Hyde DPM

## 2019-08-29 NOTE — TELEPHONE ENCOUNTER
Returned call to Jacqueline for verbal home care orders:   Hospice evaluate and treat.    Per Jacqueline patient is experiencing a lot of pain on his left side, possibly due to recent fall.  Patient has been receiving scheduled Tylenol 650 mg TID without relief.  Jacqueline would like a pain medication scheduled instead of PRN [place].  Patient has been receiving PRN Norco BID but does not seem to be effective because patient is doing a lot of repositioning in bed      Patient also would like to stop getting his BG's checked, highest was 191.    Routing to provider for review and advise as appropriate.  Pharmacy is pended.    Jacqueline can be reached at: [phone], ok to leave a detailed message.    PATY JacobsN, RN  [employer] [facility]

## 2019-08-29 NOTE — PATIENT INSTRUCTIONS
"Thank you for choosing [facility]!    Follow up as needed    DR. ARRIAGA'S CLINIC LOCATIONS     MONDAY  [place] TUESDAY & FRIDAY AM  [place]   [address] [facility] [address]   [address]   [place] [address] HIMA Foreman [address]   [phone]  [phone]  [phone]       WEDNESDAY  [place] SCHEDULE SURGERY: [phone]   [address] [place] [address] APPOINTMENTS: [phone]   [place] [address] BILLING QUESTIONS: [phone]   [phone]         DIABETES AND YOUR FEET  Diabetes can result in several problems in the feet including ulcers (open sores) and amputations. Two of the most important reasons why people develop foot problems when they have diabetes is : 1. Neuropathy (loss of feeling)  2. Vascular disease (loss or decrease of blood flow).    Neuropathy is a term used to describe a loss of nerve function.  Patients with diabetes are at risk of developing neuropathy if their sugars continue to run high and are above the normal value. One theory for neuropathy is that the \"extra\" sugar in the body enters the nerves and is broken down. These by-products build up in the nerve causing it to swell and impairing nerve function. Often times, this can be prevented by controlling your sugars, dieting and exercise.    When a person develops neuropathy, they usually begin to feel numbness or tingling in their feet and sometime in their legs.  Other symptoms may include painful burning or hot feet, tingling or feeling like insects or ants are crawling on your feet or legs.  If the diabetes is sever and the sugars run high for long periods of time, neuropathy can also occur in the hands.    Vascular disease  is a term used to describe a loss or decrease in circulation (blood flow). There is a problem in getting blood and oxygen to areas that need it. Similar to neuropathy, sugars can build up in the walls of the arteries (blood vessels) " and cause them to become swollen, thickened and hardened. This decreases the amount of blood that can go to an area that needs it. Though this is common in the legs of diabetic patients, it can also affect other arteries (blood vessels) in the body such as in the heart and eyes.    In the legs, vascular disease usually results in cramping. Patients who develop leg cramps after walking the same distance every time (i.e. One block, half a mile, ect.) need to let their doctors know so that their circulation may be checked. Cramps causing severe pain in the feet and/or legs while sleeping and the cramps go away when you stand or hang your legs off the side of the bed, may also be a sign of poor blood circulation.  Occasional cramping in cold weather or on rare occasions with activity may not be due to poor circulation, but you should inform your doctor.    PREVENTION OF THESE DISEASES  The key to prevention is good blood sugar control. Poor blood sugar control is a big reason many of these problems start. Physical activity (exercise) is a very good way to help decrease your blood sugars. Exercise can lower your blood sugar, blood pressure, and cholesterol. It also reduces your risk for heart disease and stroke, relieves stress, and strengthens your heart, muscles and bones.  In addition, regular activity helps insulin work better, improves your blood circulation, and keeps your joints flexible. If you're trying to lose weight, a combination of exercise and wise food choices can help you reach your target weight and maintain it.      PAIN MANAGEMENT  1.Blood Sugar Control - Most important  2. Medications such as:  Amytriptylline, duloxetine, gabapentin, lyrica, tramadol  3. Nutritional therapy:  Vitamin B6 (100mg daily), Vitamin B12 (75mcg daily), Vitamin D 2000 IU daily), Alpha-Lipoic Acid (600-1800mg daily), Acetyl-L-Carnitine (500-1000mg TID, L-methyl folate (1500mcg daily)    ** Metformin can block Vitamin B6 and B12  so it is important to supplement**    FOOT CARE RECOMMENDATIONS   1. Wash your feet with lukewarm water and a mild soap and then dry them thoroughly, especially between the toes.     2. Examine your feet daily looking for cuts, corns, blisters, cracks, ect, especially after wearing new shoes. Make sure to look between your toes. If you cannot see the bottom of your feet, set a mirror on the floor and hold your foot over it, or ask a spouse, friend or family member to examine your feet for you. Contact your doctor immediately if new problems are noted or if sores are not healing.     3. Immediately apply moisturizer to the tops and bottoms of your feet, avoiding areas between the toes. Hand lotion (Intesive Care, Jenn, Eucerin, Neutrogena, Curel, ect) is sufficient unless your doctor prescribes a medicated lotion. Apply sunscreen to your feet when going swimming outside.     4. Use clean comfortable shoes, wear white socks (if you have any bleeding or drainage, you will see it on white socks). Socks should not have thick seams or cut off the circulation around the leg. Break in new shoes slowly and rotate with older shoes until broken in. Check the inside of your shoes with your hand to look for areas of irritation or objects that may have fallen into your shoes.       5. Keep slippers by the side of your bed for use during the night.     6.  Shoes should be fitted by a professional and should not cause areas of irritation.  Check your feet regularly when wearing a new pair of shoes and replace them as needed.     7.  Talk to your doctor about proper exercise. Exercise and stretching stimulate blood flow to your feet and maintain proper glucose levels.     8.  Monitor your blood glucose level as instructed by your doctor. Notify your doctor immediately if your blood sugar is abnormally high or low.    9. Cut your nails straight across, but then gently round any sharp edges with a cardboard nail file. If you have  neuropathy, peripheral vascular disease or cannot see that well to trim your own toenails contact [facility] ([phone]) or [facility] ([phone]).      THINGS TO AVOID DOING   1.  Do not soak your feet if you have an open sore. Use only lukewarm water and always check the temperature with your hand as hot water can easily burn your feet.       2.  Never use a hot water bottle or heating pad on your feet. Also do not apply cold compresses to your feet. With decreased sensation, you could burn or freeze your feet.       3.  Do not apply any of these to your feet:    -  Over the counter medicine for corns or warts    -  Harsh chemicals like boric acid    -  Do not self-treat corns, cuts, blisters or infections. Always consult your doctor.       4.  Do not wear sandals, slippers or walk barefoot, especially on hot sand or concrete or other harsh surfaces.     5.  If you smoke, stop!!!    ROUTINE FOOT CARE    [facility]   $55 nails - $10 calluses  [phone]  (Travels to your home) [facility] - $40  [phone]  [email or web address] for locations or they can come to your home   [facility]  [phone]  [place] [facility] [place] [facility]  [phone]  (Travels to your home)   Trudy Aponte, DPM  [address], [place] [id number] [phone] Jamie Rangel, NAIDAM  [address] [place] [address] [phone]   [facility]  [address] [place] [address] [phone] [facility]   [phone]  [facility]   [facility]  [phone]  [facility] [place]does [place]) [facility]   [phone]     NAIL FUNGUS / ONYCHOMYCOSIS   Nail fungus is not a hygiene problem and will not likely lead to significant medical   problems. The nails may get thick causing pain and possibly local skin infection.   Treatments include debridement (trimming), oral antifungals, topical antifungals and complete removal  of the nail. Most fungal nails are not treated.   Topicals such as tea tree oil can be helpful for surface fungus and may, at best, limit   progression. Over the counter creams (such as Lamisil) can also be used however, their effectiveness is also quite low.  Topical treatment with Pen lac is expensive and often not covered by insurance. Pen lac has an approximate 8% success rate. Topical therapy recommendations is to apply twice a day for at least 3-4 months as it takes 9 months for new nail to grow out.    Experts suggest soaking your feet for 15 to 20 minutes in a mixture of 1 cup vinegar to 4 cups warm water. Be sure to rinse well and pat your feet dry when you're done. You can soak your feet like this daily. But if your skin becomes irritated, try soaking only two to three times a week. Vicks VapoRub, as with vinegar, there have been no controlled clinical trials to assess the effectiveness of Vicks VapoRub on nail fungus, but there have been numerous anecdotal reports that it works. There's no consensus on how often to apply this product, so check with your doctor before using it on your nails.     Oral therapies include Sporanox and Lamisil. Oral therapies are also expensive and not very effective. Side effects such as liver disease are the main concern. Return of fungus is common even if the treatment worked.     Other Tips:  - Penlac nail medication apply daily x 4 months; remove old polish first day of each week  - Antifungal cream/powder (Zeasorb) - apply daily to feet and shoes x 2 months  - Clean shoes with Lysol or in washing machine every few weeks  - Rotate shoe gear; give them 24 hours to dry out between days wearing them  - Clean pair of socks in morning, clean pair in afternoon if your feet sweat  - Shower shoes used in public showers/pools          BODY WEIGHT AND YOUR FEET  The following information is included in the after visit summary for all patients. Body weight can be a sensitive issue to  discuss in clinic, but we think the following information is very important. Although we focus on the feet and ankles, we do support the overall health of our patients.     Many things can cause foot and ankle problems. Foot structure, activity level, foot mechanics and injuries are common causes of pain. One very important issue that often goes unmentioned, is body weight. Extra weight can cause increased stress on muscles, ligaments, bones and tendons. Sometimes just a few extra pounds is all it takes to put one over her/his threshold. Without reducing that stress, it can be difficult to alleviate pain. As Foot & Ankle specialists, our job is addressing the lower extremity problem and possible causes. Regarding extra body weight, we encourage patients to discuss diet and weight management plans with their primary care doctors. It is this team approach that gives you the best opportunity for pain relief and getting you back on your feet.      [facility] has a Comprehensive Weight Management Program. This program includes counseling, education, non-surgical and surgical approaches to weight loss. If you are interested in learning more either talk to you primary care provider or call [phone].

## 2019-08-29 NOTE — PROGRESS NOTES
PATIENT HISTORY:  Suman Burton is a 84 year old male who presents to clinic for thick toenails, skin concerns.  Present for years.  Daughter present, who informs me pt is going into hospice care.  Hx of DM with neuropathy.  Normally sees [facility] for nail care.  Pt with recent stroke.  Denies current pain.  They request nail care today.    I was requested to see this patient for this issue by Dr Guerrier.    Review of Systems:  Patient denies fever, chills, rash, wound, stiffness, limping, numbness, weakness, heart burn, blood in stool, chest pain with activity, calf pain when walking, shortness of breath with activity, chronic cough, swelling of ankles, excessive thirst, fatigue, depression, anxiety.  Patient admits to easy bleeding/bruising.     PAST MEDICAL HISTORY:   Past Medical History:   Diagnosis Date     Atrial fibrillation (H)      Cancer (H)      Cerebral infarction (H)      Chronic kidney disease      Coronary artery disease 06/25/2019    s/p PCI      Diabetes (H)      Diabetes mellitus, type 2 (H)      Edema      Hypertension         PAST SURGICAL HISTORY:   Past Surgical History:   Procedure Laterality Date     CHOLECYSTECTOMY       CV HEART CATHETERIZATION WITH POSSIBLE INTERVENTION N/A 6/25/2019    Procedure: Coronary Angiogram;  Surgeon: Cachorro Garcia MD;  Location:  HEART CARDIAC CATH LAB     CV INSTANTANEOUS WAVE-FREE RATIO N/A 6/25/2019    Procedure: Instantaneous Wave-Free Ratio;  Surgeon: Cachorro Garcia MD;  Location:  HEART CARDIAC CATH LAB     CV PCI STENT DRUG ELUTING N/A 6/25/2019    Procedure: PCI Stent Drug Eluting;  Surgeon: Cachorro Garcia MD;  Location:  HEART CARDIAC CATH LAB     CV RIGHT HEART CATH N/A 6/25/2019    Procedure: Right Heart Cath;  Surgeon: Cachorro Garcia MD;  Location:  HEART CARDIAC CATH LAB     LAPAROSCOPIC APPENDECTOMY          MEDICATIONS:   Current Outpatient Medications:      ammonium lactate (LAC-HYDRIN) 12 % external  cream, Apply topically daily as needed for dry skin Do not apply between toes., Disp: 385 g, Rfl: 3     acetaminophen (TYLENOL) 325 MG tablet, Take 650 mg by mouth 3 times daily Do not exceed 4gm in 24 hours. AND Give 650 mg by mouth every 6 hours as needed for pain (do not exceed 4gm acetaminophen in 24 hrs) 0800, 1400, 2000, Disp: , Rfl:      amLODIPine (NORVASC) 10 MG tablet, Take 1 tablet (10 mg) by mouth daily (Patient taking differently: Take 10 mg by mouth At Bedtime ), Disp: , Rfl:      apixaban ANTICOAGULANT (ELIQUIS) 2.5 MG tablet, Take 1 tablet (2.5 mg) by mouth 2 times daily, Disp: 60 tablet, Rfl: 5     bisacodyl (DULCOLAX) 5 MG EC tablet, Take 1 tablet (5 mg) by mouth daily as needed for constipation, Disp: , Rfl:      clopidogrel (PLAVIX) 75 MG tablet, Take 1 tablet (75 mg) by mouth daily, Disp: 30 tablet, Rfl: 5     cyanocobalamin (VITAMIN B-12) 500 MCG SUBL sublingual tablet, Place 2 tablets (1,000 mcg) under the tongue daily, Disp: 180 tablet, Rfl: 2     digoxin (LANOXIN) 125 MCG tablet, Take 1 tablet (125 mcg) by mouth daily, Disp: 90 tablet, Rfl: 3     glipiZIDE (GLUCOTROL) 10 MG tablet, Take 2 tablets (20 mg) by mouth 2 times daily Take 20mg in AM with breakfast and 20mg in PM with evening meal, Disp: 360 tablet, Rfl: 0     HYDROcodone-acetaminophen (NORCO) 5-325 MG tablet, Take 1 tablet by mouth 2 times daily as needed for severe pain, Disp: 10 tablet, Rfl: 0     isosorbide mononitrate (IMDUR) 30 MG 24 hr tablet, Take 1 tablet (30 mg) by mouth daily, Disp: 30 tablet, Rfl: 3     loperamide (IMODIUM A-D) 2 MG tablet, Take 1-2 tablets (2-4 mg) by mouth 3 times daily as needed for diarrhea, Disp: 100 tablet, Rfl: 3     metFORMIN (GLUCOPHAGE) 1000 MG tablet, Take 1 tablet (1,000 mg) by mouth 2 times daily (with meals), Disp: , Rfl:      metoprolol succinate ER (TOPROL-XL) 50 MG 24 hr tablet, Take 100 mg by mouth daily, Disp: , Rfl:      multivitamin, therapeutic (THERA-VIT) TABS tablet, Take 1 tablet  by mouth daily, Disp: , Rfl:      OLANZapine zydis (ZYPREXA) 10 MG ODT, Take 1 tablet (10 mg) by mouth every evening, Disp: 90 tablet, Rfl: 2     ondansetron (ZOFRAN-ODT) 4 MG ODT tab, Take 1 tablet (4 mg) by mouth every 6 hours as needed for nausea or vomiting, Disp: , Rfl:      ONETOUCH DELICA LANCETS 33G MISC, 1 strip 2 times daily Check blood sugars once daily and PRN, Disp: 100 each, Rfl: 11     order for DME, Glucometer, brand as covered by insurance., Disp: 1 each, Rfl: 0     order for DME, Test strips for pt's glucometer, brand as covered by insurance. Test daily and prn. Accu- chek sterile single use., Disp: 100 each, Rfl: 4     order for DME, Lancets.  Daily and prn.  Fill per patient's insurance. Accu- chek sterile single use lancets., Disp: 100 each, Rfl: 4     rosuvastatin (CRESTOR) 10 MG tablet, Take 1 tablet (10 mg) by mouth daily, Disp: , Rfl:      senna-docusate (SENOKOT-S/PERICOLACE) 8.6-50 MG tablet, Take 1 tablet by mouth daily Hold for loose stools., Disp: , Rfl:      tamsulosin (FLOMAX) 0.4 MG capsule, Take 1 capsule (0.4 mg) by mouth daily, Disp: 90 capsule, Rfl: 2     ALLERGIES:    Allergies   Allergen Reactions     Nkda [No Known Drug Allergies]         SOCIAL HISTORY:   Social History     Socioeconomic History     Marital status:      Spouse name: Not on file     Number of children: Not on file     Years of education: Not on file     Highest education level: Not on file   Occupational History     Not on file   Social Needs     Financial resource strain: Not on file     Food insecurity:     Worry: Not on file     Inability: Not on file     Transportation needs:     Medical: Not on file     Non-medical: Not on file   Tobacco Use     Smoking status: Former Smoker     Packs/day: 2.00     Types: Cigarettes     Start date:      Last attempt to quit:      Years since quittin.6     Smokeless tobacco: Never Used   Substance and Sexual Activity     Alcohol use: Yes      "Alcohol/week: 0.0 oz     Comment: Occasionally.      Drug use: No     Sexual activity: Not Currently     Partners: Female   Lifestyle     Physical activity:     Days per week: Not on file     Minutes per session: Not on file     Stress: Not on file   Relationships     Social connections:     Talks on phone: Not on file     Gets together: Not on file     Attends Gnosticism service: Not on file     Active member of club or organization: Not on file     Attends meetings of clubs or organizations: Not on file     Relationship status: Not on file     Intimate partner violence:     Fear of current or ex partner: Not on file     Emotionally abused: Not on file     Physically abused: Not on file     Forced sexual activity: Not on file   Other Topics Concern     Parent/sibling w/ CABG, MI or angioplasty before 65F 55M? Not Asked   Social History Narrative     Not on file        FAMILY HISTORY: No pertinent family history.     EXAM:Vitals: /66   Pulse 73   Ht 1.727 m (5' 8\")   Wt 59.3 kg (130 lb 11.2 oz)   BMI 19.87 kg/m    BMI= Body mass index is 19.87 kg/m .    General appearance: Patient is alert and fully cooperative with history & exam.  No sign of distress is noted during the visit.     Psychiatric: Affect is pleasant & appropriate.  Patient appears motivated to improve health.     Respiratory: Breathing is regular & unlabored while sitting.     HEENT: Hearing is intact to spoken word.  Speech is clear.  No gross evidence of visual impairment that would impact ambulation.     Dermatologic: scaling xerotic skin to dorsal and lateral aspects of feet including toes.  B/l toenails are thickened, mycotic, elongated.  No paronychia or evidence of soft tissue infection is noted.  Atrophic skin.     Vascular: DP & PT pulses are not readily palpable on the R, 1/4 on the L  No significant edema.  Varicosities noted.  CFT and skin temperature are normal to both lower extremities.     Neurologic: Lower extremity sensation " is diminished to light touch b/l.     Musculoskeletal: Patient presents in a WC.  No gross ankle deformity noted.  No foot or ankle joint effusion is noted.     ASSESSMENT:   B/l onychomycosis  B/l xerosis of skin of feet  DM II with neuropathy     PLAN:  Reviewed patient's chart in epic.  Discussed condition and treatment options including pros and cons.    I debrided the nails x10 with a nail nipper.  Discussed I normally don't do routine nail care.  They are content to continue with [facility].  ABN was signed.    Lachydrin prescribed for dry skin.  Do not apply between toes.    All questions answered.    F/u prn.     Carmine Hyde DPM, FACFAS    Weight management plan Patient was referred to their PCP to discuss a diet and exercise plan.

## 2019-08-30 ENCOUNTER — MEDICAL CORRESPONDENCE (OUTPATIENT)
Dept: HEALTH INFORMATION MANAGEMENT | Facility: CLINIC | Age: 84
End: 2019-08-30

## 2019-08-30 ENCOUNTER — TELEPHONE (OUTPATIENT)
Dept: FAMILY MEDICINE | Facility: CLINIC | Age: 84
End: 2019-08-30

## 2019-08-30 NOTE — TELEPHONE ENCOUNTER
ERIK Garcia from [facility] called to let you know that pt was admitted into hospice care today.  If you should have any questions you can call Radha from [facility] at [phone].

## 2019-09-04 ENCOUNTER — TELEPHONE (OUTPATIENT)
Dept: FAMILY MEDICINE | Facility: CLINIC | Age: 84
End: 2019-09-04

## 2019-09-04 NOTE — TELEPHONE ENCOUNTER
yJoti from [facility] Medical examiners office calling, requesting call back to discuss pt death.  She states that pt had a fall recently and they would like to know if that fall could have something to do with pt death.  Please call [phone] to discuss.  Jyoti states that  can speak to anyone who answers.

## 2019-09-10 ENCOUNTER — MEDICAL CORRESPONDENCE (OUTPATIENT)
Dept: HEALTH INFORMATION MANAGEMENT | Facility: CLINIC | Age: 84
End: 2019-09-10

## 2019-09-13 ENCOUNTER — TELEPHONE (OUTPATIENT)
Dept: FAMILY MEDICINE | Facility: CLINIC | Age: 84
End: 2019-09-13

## 2019-09-13 NOTE — TELEPHONE ENCOUNTER
Our goal is to have forms completed within 72 hours, however some forms may require a visit or additional information.    What clinic location was the form placed at [facility] or [place].?     Who is the form from? Home Care  Where did the form come from? Faxed to clinic   The form was placed in the inbox of Jamie Guerrier MD      Please fax to [phone]    Additional comments:     Call take on 9/13/2019 at 12:02 PM by Belinda Oropeza

## 2019-09-13 NOTE — TELEPHONE ENCOUNTER
Our goal is to have forms completed within 72 hours, however some forms may require a visit or additional information.    What clinic location was the form placed at [facility] or [facility].?     Who is the form from? [facility]  Where did the form come from? Faxed to clinic   The form was placed in the inbox of Jamie Guerrier MD      Please fax to [phone]    Additional comments:     Call take on 9/13/2019 at 11:40 AM by Belinda Oropeza

## 2019-09-18 ENCOUNTER — MEDICAL CORRESPONDENCE (OUTPATIENT)
Dept: HEALTH INFORMATION MANAGEMENT | Facility: CLINIC | Age: 84
End: 2019-09-18

## 2019-09-18 ENCOUNTER — TELEPHONE (OUTPATIENT)
Dept: FAMILY MEDICINE | Facility: CLINIC | Age: 84
End: 2019-09-18

## 2019-09-18 NOTE — TELEPHONE ENCOUNTER
Our goal is to have forms completed within 72 hours, however some forms may require a visit or additional information.    What clinic location was the form placed at [facility] or [facility].?     Who is the form from?   Where did the form come from? Faxed to clinic   The form was placed in the inbox of Jamie Guerrier MD      Please fax to [phone]  Phone number: [phone]    Additional comments: [facility] cert of terminal illness     Call take on 9/18/2019 at 5:04 PM by Ca Conteh

## 2020-07-16 ENCOUNTER — TELEPHONE (OUTPATIENT)
Dept: FAMILY MEDICINE | Facility: CLINIC | Age: 85
End: 2020-07-16

## 2020-07-16 NOTE — TELEPHONE ENCOUNTER
Panel Management Review      Patient has the following on his problem list:     Depression / Dysthymia review    Measure:  Needs PHQ-9 score of 4 or less during index window.  Administer PHQ-9 and if score is 5 or more, send encounter to provider for next steps.    5 - 7 month window range:     PHQ-9 SCORE 1/8/2018 3/12/2019   PHQ-9 Total Score 5 3       If PHQ-9 recheck is 5 or more, route to provider for next steps.    Patient is due for:  PHQ9    Diabetes    ASA: Failed    Last A1C  Lab Results   Component Value Date    A1C 6.4 06/26/2019    A1C 6.5 06/25/2019    A1C 6.7 03/12/2019    A1C 7.1 12/17/2018    A1C 10.6 08/27/2018     A1C tested: MONITOR    Last LDL:    Lab Results   Component Value Date    CHOL 118 06/25/2019     Lab Results   Component Value Date    HDL 32 06/25/2019     Lab Results   Component Value Date    LDL 46 06/25/2019     Lab Results   Component Value Date    TRIG 201 06/25/2019     Lab Results   Component Value Date    CHOLHDLRATIO 3.8 02/05/2013     Lab Results   Component Value Date    NHDL 86 06/25/2019       Is the patient on a Statin? YES             Is the patient on Aspirin? NO    Medications     HMG CoA Reductase Inhibitors     rosuvastatin (CRESTOR) 10 MG tablet             Last three blood pressure readings:  BP Readings from Last 3 Encounters:   08/29/19 129/66   08/26/19 132/60   08/12/19 136/66       Date of last diabetes office visit:      Tobacco History:     History   Smoking Status     Former Smoker     Packs/day: 2.00     Types: Cigarettes     Start date: 1949     Quit date: 2008   Smokeless Tobacco     Never Used         Hypertension   Last three blood pressure readings:  BP Readings from Last 3 Encounters:   08/29/19 129/66   08/26/19 132/60   08/12/19 136/66     Blood pressure: Passed    HTN Guidelines:  Less than 140/90      Composite cancer screening  Chart review shows that this patient is due/due soon for the following None  Summary:    Patient is due/failing the  following:   PHQ9 and PHYSICAL    Action needed:   Patient needs office visit for physical.    Type of outreach:    Sent letter.    Questions for provider review:    None

## 2020-07-16 NOTE — LETTER
July 16, 2020    Suman JONES [place]  [address] [facility] [address]    Dear Joao Will cares about your health and your health plan.  I have reviewed your medical conditions, medication list and lab results, and am making recommendations based on this review to better manage your health.    You are in particular need of attention regarding:  -Depression/Anxiety  -Wellness (Physical) Visit     I am recommending that you:     -schedule a WELLNESS (Physical) APPOINTMENT with me.   I will check fasting labs the same day - nothing to eat except water and meds for 8-10 hours prior. (If you go elsewhere for Wellness visits then please disregard this reminder.) This can be a face to face visit or a video visit.       -Please complete the enclosed PHQ9 and mail back to clinic in the envelope  provided.         Please call us at the [employer] location:  [phone] or use [facility] to address the above recommendations.     Thank you for trusting [facility].  We appreciate the opportunity to serve you and look forward to supporting your healthcare in the future.    If you have (or plan to have) any of these tests done at a facility other than a [facility], [facility] or a [facility], please have the results sent to the [facility] location noted above.      Best Regards,    Jamie Guerrier MD

## 2021-05-30 ENCOUNTER — RECORDS - HEALTHEAST (OUTPATIENT)
Dept: ADMINISTRATIVE | Facility: CLINIC | Age: 86
End: 2021-05-30

## 2021-05-31 ENCOUNTER — RECORDS - HEALTHEAST (OUTPATIENT)
Dept: ADMINISTRATIVE | Facility: CLINIC | Age: 86
End: 2021-05-31

## 2021-06-01 ENCOUNTER — RECORDS - HEALTHEAST (OUTPATIENT)
Dept: ADMINISTRATIVE | Facility: CLINIC | Age: 86
End: 2021-06-01

## 2021-10-19 PROBLEM — F32.9 MAJOR DEPRESSION: Status: ACTIVE | Noted: 2019-06-04

## 2022-08-30 NOTE — PROGRESS NOTES
"BRIEF NUTRITION ASSESSMENT      REASON FOR ASSESSMENT:  Suman Burton is a 83 year old male seen by Registered Dietitian for LOS      CURRENT DIET AND INTAKE:  Diet:  DD3, thin liquids              Chart reviewed  SLP following pt for appropriate diet texture  7/1: SLP ---> continue with DD3, thin liquids    Visited with pt and family this morning  Pt seemed a bit confused - picking at cords and playing with a magnifying glass    Wife notes that pt has been eating well  She states that pt drinks Ensure at home - \"he likes chocolate and vanilla\"  Wife asks if pt can have a drink now - \"he has been asking for Ensure\"    Plan for TCU at d/c      ANTHROPOMETRICS:  Height: 5' 7.992\"  Weight:(6/25) 66.2 kg /  145 lbs 15.99 oz  Body mass index is 22.2 kg/m .   Weight Status: Normal BMI  IBW:  70 kg  %IBW: 94%  Weight History: Wt has been stable  Wt Readings from Last 10 Encounters:   06/25/19 66.2 kg (146 lb)   06/20/19 66.2 kg (146 lb)   06/04/19 64.9 kg (143 lb)   04/30/19 66.2 kg (146 lb)   03/12/19 68.9 kg (152 lb)   12/17/18 68.9 kg (152 lb)   10/29/18 68.9 kg (152 lb)   09/24/18 68.9 kg (152 lb)   08/27/18 68.9 kg (152 lb)   05/01/18 69.4 kg (153 lb)         LABS:  Labs noted    MALNUTRITION:  Patient does not meet two of the following criteria necessary for diagnosing malnutrition: significant weight loss, reduced intake, subcutaneous fat loss, muscle loss or fluid retention    NUTRITION INTERVENTION:  Nutrition Diagnosis:  No nutrition diagnosis at this time.    Implementation:  Nutrition Education ---> Reviewed diet order with pt's wife (she has list of allowed menu items)  Ordered Boost Glucose Control for pt - he may order additional supplements as desired    FOLLOW UP/MONITORING:   Will re-evaluate in 7 - 10 days, or sooner, if re-consulted.          " 11-May-2021

## 2023-01-24 NOTE — TELEPHONE ENCOUNTER
Patient Contact    Attempt # 1    Was call answered?  No.  Left message on voicemail with information to call the clinic back to discuss question below.      oriented to person, place and time

## (undated) DEVICE — Device

## (undated) DEVICE — CATH ANGIO INFINITI 3DRC 4FRX100CM 538476

## (undated) DEVICE — INTRODUCER CATH VASC 5FRX10CM  MPIS-501-NT-U-SST

## (undated) DEVICE — INTRO GLIDESHEATH SLENDER 6FR 10X45CM 60-1060

## (undated) DEVICE — INFL DVC KIT W/10CC NITRO IN4530

## (undated) DEVICE — CATH ANGIO WEDGE PRESSURE 5FRX110CM DL AI-07124

## (undated) DEVICE — CATH ANGIO JUDKINS R4 6FRX100CM INFINITI 534621T

## (undated) DEVICE — GUIDEWIRE VASC 0.035INX150CM INQWIRE J TIP IQ35F150J3F/A

## (undated) DEVICE — SLEEVE TR BAND RADIAL COMPRESSION DEVICE 24CM TRB24-REG

## (undated) DEVICE — WIRE GLIDE 0.035"X150CM 3CM ANG REG UWR6035

## (undated) DEVICE — GUIDEWIRE VASC 0.014INX180CM RUNTHROUGH 25-1011

## (undated) DEVICE — TOTE ANGIO CORP PC15AT SAN32CC83O

## (undated) DEVICE — MANIFOLD KIT ANGIO AUTOMATED 014613

## (undated) DEVICE — KIT HAND CONTROL ANGIOTOUCH ACIST 65CM AT-P65

## (undated) DEVICE — CATH JACKY 5FR 3.5 CURVE 40-5023

## (undated) DEVICE — DEFIB PRO-PADZ LVP LQD GEL ADULT 8900-2105-01

## (undated) DEVICE — CATH LAUNCHER 6FR JR 4.0 LA6JR40

## (undated) DEVICE — CATH BALLOON EMERGE 2.5X8MM H7493918908250

## (undated) DEVICE — 185CM VERRATA PLUS PRESSURE GUIDEWIRE

## (undated) DEVICE — CATH LAUNCHER 6FR JL 3.5 LA6JL35

## (undated) DEVICE — 185CM VERRATA PRESSURE GUIDEWIRE, STRAIGHT

## (undated) DEVICE — VALVE HEMOSTASIS .096" COPILOT MECH 1003331

## (undated) DEVICE — CATH ANGIO JUDKINS JL3.5 6FRX100CM INFINITI 534618T

## (undated) DEVICE — WIRE GUIDE 0.035"X260CM SAFE-T-J EXCHANGE G00517

## (undated) DEVICE — INTRO SHEATH 7FRX10CM PINNACLE RSS702

## (undated) RX ORDER — LIDOCAINE HYDROCHLORIDE 10 MG/ML
INJECTION, SOLUTION EPIDURAL; INFILTRATION; INTRACAUDAL; PERINEURAL
Status: DISPENSED
Start: 2019-06-25

## (undated) RX ORDER — CLOPIDOGREL 300 MG/1
TABLET, FILM COATED ORAL
Status: DISPENSED
Start: 2019-06-25

## (undated) RX ORDER — FENTANYL CITRATE 50 UG/ML
INJECTION, SOLUTION INTRAMUSCULAR; INTRAVENOUS
Status: DISPENSED
Start: 2019-06-25

## (undated) RX ORDER — VERAPAMIL HYDROCHLORIDE 2.5 MG/ML
INJECTION, SOLUTION INTRAVENOUS
Status: DISPENSED
Start: 2019-06-25

## (undated) RX ORDER — HEPARIN SODIUM 1000 [USP'U]/ML
INJECTION, SOLUTION INTRAVENOUS; SUBCUTANEOUS
Status: DISPENSED
Start: 2019-06-25